# Patient Record
Sex: MALE | Race: WHITE | NOT HISPANIC OR LATINO | Employment: OTHER | URBAN - METROPOLITAN AREA
[De-identification: names, ages, dates, MRNs, and addresses within clinical notes are randomized per-mention and may not be internally consistent; named-entity substitution may affect disease eponyms.]

---

## 2017-03-15 ENCOUNTER — HOSPITAL ENCOUNTER (OUTPATIENT)
Dept: RADIOLOGY | Facility: HOSPITAL | Age: 82
Discharge: HOME | End: 2017-03-15
Attending: ORTHOPAEDIC SURGERY
Payer: MEDICARE

## 2017-03-15 DIAGNOSIS — M54.50 LOW BACK PAIN: ICD-10-CM

## 2017-03-15 PROCEDURE — 72170 X-RAY EXAM OF PELVIS: CPT | Mod: 26,,, | Performed by: RADIOLOGY

## 2017-03-15 PROCEDURE — 72110 X-RAY EXAM L-2 SPINE 4/>VWS: CPT | Mod: 26,,, | Performed by: RADIOLOGY

## 2017-03-15 PROCEDURE — 72110 X-RAY EXAM L-2 SPINE 4/>VWS: CPT | Mod: TC

## 2017-03-15 PROCEDURE — 72170 X-RAY EXAM OF PELVIS: CPT | Mod: TC

## 2017-03-27 PROBLEM — M54.50 LUMBAR BACK PAIN: Status: ACTIVE | Noted: 2017-03-27

## 2017-03-30 RX ORDER — CARBIDOPA AND LEVODOPA 25; 100 MG/1; MG/1
1 TABLET ORAL 3 TIMES DAILY
Qty: 90 TABLET | Refills: 3 | Status: SHIPPED | OUTPATIENT
Start: 2017-03-30 | End: 2017-04-17 | Stop reason: SDUPTHER

## 2017-04-17 ENCOUNTER — OFFICE VISIT (OUTPATIENT)
Dept: NEUROLOGY | Facility: CLINIC | Age: 82
End: 2017-04-17
Payer: MEDICARE

## 2017-04-17 VITALS
HEIGHT: 65 IN | RESPIRATION RATE: 16 BRPM | SYSTOLIC BLOOD PRESSURE: 128 MMHG | DIASTOLIC BLOOD PRESSURE: 62 MMHG | BODY MASS INDEX: 31.36 KG/M2 | HEART RATE: 50 BPM | WEIGHT: 188.25 LBS

## 2017-04-17 DIAGNOSIS — G20.A1 PARKINSON DISEASE: Primary | ICD-10-CM

## 2017-04-17 DIAGNOSIS — R41.3 MEMORY LOSS: ICD-10-CM

## 2017-04-17 PROCEDURE — 1157F ADVNC CARE PLAN IN RCRD: CPT | Mod: 8P | Performed by: PSYCHIATRY & NEUROLOGY

## 2017-04-17 PROCEDURE — 1159F MED LIST DOCD IN RCRD: CPT | Performed by: PSYCHIATRY & NEUROLOGY

## 2017-04-17 PROCEDURE — 99213 OFFICE O/P EST LOW 20 MIN: CPT | Mod: PBBFAC | Performed by: PSYCHIATRY & NEUROLOGY

## 2017-04-17 PROCEDURE — 1160F RVW MEDS BY RX/DR IN RCRD: CPT | Performed by: PSYCHIATRY & NEUROLOGY

## 2017-04-17 PROCEDURE — 99999 PR PBB SHADOW E&M-EST. PATIENT-LVL III: CPT | Mod: PBBFAC,,, | Performed by: PSYCHIATRY & NEUROLOGY

## 2017-04-17 PROCEDURE — 99213 OFFICE O/P EST LOW 20 MIN: CPT | Mod: S$PBB | Performed by: PSYCHIATRY & NEUROLOGY

## 2017-04-17 PROCEDURE — 1125F AMNT PAIN NOTED PAIN PRSNT: CPT | Performed by: PSYCHIATRY & NEUROLOGY

## 2017-04-17 RX ORDER — CARBIDOPA AND LEVODOPA 25; 100 MG/1; MG/1
1 TABLET ORAL 2 TIMES DAILY
Qty: 60 TABLET | Refills: 11 | Status: SHIPPED | OUTPATIENT
Start: 2017-04-17 | End: 2017-10-17 | Stop reason: SDUPTHER

## 2017-04-17 NOTE — PROGRESS NOTES
HPI:Elvis Thompson is a 84 y.o. male with tremor for 1 year and exam findings consistent with parkinson's disease. Vague memory loss noted at times  Since the last visit, the patient is taking sinemet BID.  He has some shaking in the left hand only in episodes and is not bothersome  He is walking well- no falls.  He is using a walker at night for security.   He is swallowing and eating well.  No constipation.  He is sleeping well with a good mood.  Memory is about the same/ he is functioning well- he has someone coming in to clear his house once per month now.   He drives well, without accidents or incidents.     Review of Systems  Review of Systems   Constitutional: Negative for fever.   HENT: Negative for nosebleeds.    Eyes: Negative for double vision.   Respiratory: Negative for hemoptysis.    Cardiovascular: Negative for leg swelling.   Gastrointestinal: Negative for constipation.   Genitourinary: Negative for hematuria.   Musculoskeletal: Negative for falls.   Skin: Negative for rash.   Neurological: Positive for tremors.   Psychiatric/Behavioral: Positive for memory loss.             Objective:      Physical Exam   Constitutional: He is oriented to person, place, and time. He appears well-developed and well-nourished. No distress.   Eyes: EOM are normal. Pupils are equal, round, and reactive to light.   Cardiovascular: Intact distal pulses.    Musculoskeletal: He exhibits no edema.   Neurological: He is oriented to person, place, and time. He has normal strength. He has a normal Finger-Nose-Finger Test, a normal Heel to Daniel Test and a normal Romberg Test. Gait normal.   Psychiatric: His speech is normal.   Neurologic Exam     Mental Status   Oriented to person, place, and time.   Attention: normal. Concentration: normal.   Speech: speech is normal   Level of consciousness: alert  Knowledge: consistent with education.   Able to name object. Able to repeat. Normal comprehension.        Recent and remote  memory intact     Cranial Nerves     CN II   Visual fields full to confrontation.   Right visual field deficit: none    CN III, IV, VI   Pupils are equal, round, and reactive to light.  Extraocular motions are normal.   CN III: no CN III palsy  Nystagmus: none   Diplopia: none  Ophthalmoparesis: none    CN V   Facial sensation intact.     CN VII   Facial expression full, symmetric.     CN VIII   CN VIII normal.     CN IX, X   CN IX normal.   CN X normal.     CN XI   CN XI normal.     CN XII   CN XII normal.        Optic disc are flat with normal vasculature      Motor Exam   Muscle bulk: normal  Overall muscle tone: normal  Left arm tone: cogwheel rigidity    Strength   Strength 5/5 throughout.        Mild bradykinesia noted bilaterally -- improved again today     Sensory Exam   Vibration normal.        Temp intact bilaterally     Gait, Coordination, and Reflexes     Gait  Gait: normal (No shuffling but reduced arm swing noted. Good postural reflexes)    Coordination   Romberg: negative  Finger to nose coordination: normal  Heel to shin coordination: normal    Tremor   Resting tremor: present (left greater than right hand-- but stable today and mild)  Intention tremor: absent  Action tremor: absent    Reflexes   Right ankle clonus: absent  Left ankle clonus: absent       1+ reflexes in the upper and lower extremities throughout         Imaging: 3/2016 CT head unremarkable  Labs: B12/TSh unremarkable        Assessment/ Recommendations:    Elvis Thompson is a 84 y.o. male with tremor consistent with parkinson's disease. Vague memory loss noted at times  I recommend:    1. Patient will continue sinemet at BID dose. TID dosing prior did not clearly improve symptoms/ he is satisfied with current symptom control.   3. Memory loss is rare and not bothersome- he is functioning well. Consider treating with exelon in the future if worse. No restrictions needed. CDR rating is less than 1,stable    RTC 6 months.

## 2017-04-17 NOTE — MR AVS SNAPSHOT
[facility]  [address] [address]  Phone: [phone]  Fax: [phone]                  Elvis Thompson   2017 10:30 AM   Office Visit    Description:  Male : 1933   Provider:  Bandar Lopez MD   Department:  [facility]           Reason for Visit     Tremors           Diagnoses this Visit        Comments    Parkinson disease    -  Primary            To Do List           Goals (5 Years of Data)     None      Follow-Up and Disposition     Return in about 6 months (around 10/17/2017).      [facility]     [facility] On Call Nurse Care Line -  Assistance  Unless otherwise directed by your provider, please contact [facility], our nurse care line that is available for  assistance.     Registered nurses in the [facility] On Call Center provide: appointment scheduling, clinical advisement, health education, and other advisory services.  Call: [phone] (toll free)               Medications           Message regarding Medications     Verify the changes and/or additions to your medication regime listed below are the same as discussed with your clinician today.  If any of these changes or additions are incorrect, please notify your healthcare provider.             Verify that the below list of medications is an accurate representation of the medications you are currently taking.  If none reported, the list may be blank. If incorrect, please contact your healthcare provider. Carry this list with you in case of emergency.           Current Medications     amlodipine (NORVASC) 5 MG tablet Take 5 mg by mouth once daily.    carbidopa-levodopa  mg (SINEMET)  mg per tablet Take 1 tablet by mouth 3 (three) times daily.    celecoxib (CELEBREX) 200 MG capsule Take 200 mg by mouth daily as needed for Pain.     furosemide (LASIX) 20 MG tablet Take 20 mg by mouth daily as needed.     warfarin (COUMADIN) 2 MG tablet Take 2 mg by mouth  "Daily.            Clinical Reference Information           Your Vitals Were     BP Pulse Resp Height Weight BMI    128/62 (BP Location: Right arm, Patient Position: Sitting, BP Method: Manual) 50 16 5' 5" (1.651 m) 85.4 kg (188 lb 4.4 oz) 31.33 kg/m2      Blood Pressure          Most Recent Value    BP  128/62      Allergies as of 4/17/2017     Iodine And Iodide Containing Products    Codeine    Morphine      Immunizations Administered on Date of Encounter - 4/17/2017     None      Language Assistance Services     ATTENTION: Language assistance services are available, free of charge. Please call [phone].      ATENCIÓN: Si habla español, tiene a chaney disposición servicios gratuitos de asistencia lingüística. Llame al [phone].     PARKER Ý: N?u b?n nói Ti?ng Vi?t, có các d?ch v? h? tr? ngôn ng? mi?n phí dành cho b?n. G?i s? [phone].         [facility] complies with applicable Federal civil rights laws and does not discriminate on the basis of race, color, national origin, age, disability, or sex.        "

## 2017-06-09 ENCOUNTER — HOSPITAL ENCOUNTER (EMERGENCY)
Facility: HOSPITAL | Age: 82
Discharge: HOME | End: 2017-06-09
Attending: SURGERY
Payer: MEDICARE

## 2017-06-09 VITALS
SYSTOLIC BLOOD PRESSURE: 140 MMHG | BODY MASS INDEX: 29.73 KG/M2 | WEIGHT: 185 LBS | RESPIRATION RATE: 18 BRPM | DIASTOLIC BLOOD PRESSURE: 60 MMHG | TEMPERATURE: 98 F | HEIGHT: 66 IN | OXYGEN SATURATION: 97 % | HEART RATE: 77 BPM
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
## 2017-06-09 DIAGNOSIS — S02.2XXA CLOSED FRACTURE OF NASAL BONE, INITIAL ENCOUNTER: Primary | ICD-10-CM

## 2017-06-09 DIAGNOSIS — T07.XXXA ABRASIONS OF MULTIPLE SITES: ICD-10-CM

## 2017-06-09 LAB
ALBUMIN SERPL BCP-MCNC: 3.6 G/DL
ALP SERPL-CCNC: 99 U/L
ALT SERPL W/O P-5'-P-CCNC: 13 U/L
ANION GAP SERPL CALC-SCNC: 6 MMOL/L
APTT BLDCRRT: 35.4 SEC
AST SERPL-CCNC: 19 U/L
BASOPHILS # BLD AUTO: 0.03 K/UL
BASOPHILS NFR BLD: 0.6 %
BILIRUB SERPL-MCNC: 1.2 MG/DL
BUN SERPL-MCNC: 16 MG/DL
CALCIUM SERPL-MCNC: 9.1 MG/DL
CHLORIDE SERPL-SCNC: 107 MMOL/L
CO2 SERPL-SCNC: 24 MMOL/L
CREAT SERPL-MCNC: 0.9 MG/DL
DIFFERENTIAL METHOD: ABNORMAL
EOSINOPHIL # BLD AUTO: 0 K/UL
EOSINOPHIL NFR BLD: 0.8 %
ERYTHROCYTE [DISTWIDTH] IN BLOOD BY AUTOMATED COUNT: 12.2 %
EST. GFR  (AFRICAN AMERICAN): >60 ML/MIN/1.73 M^2
EST. GFR  (NON AFRICAN AMERICAN): >60 ML/MIN/1.73 M^2
GLUCOSE SERPL-MCNC: 169 MG/DL
HCT VFR BLD AUTO: 39 %
HGB BLD-MCNC: 13.4 G/DL
INR PPP: 3.1
LYMPHOCYTES # BLD AUTO: 1.4 K/UL
LYMPHOCYTES NFR BLD: 27.9 %
MCH RBC QN AUTO: 31.8 PG
MCHC RBC AUTO-ENTMCNC: 34.4 %
MCV RBC AUTO: 92 FL
MONOCYTES # BLD AUTO: 0.6 K/UL
MONOCYTES NFR BLD: 11.8 %
NEUTROPHILS # BLD AUTO: 3 K/UL
NEUTROPHILS NFR BLD: 58.9 %
PLATELET # BLD AUTO: 158 K/UL
PMV BLD AUTO: 9.7 FL
POTASSIUM SERPL-SCNC: 4 MMOL/L
PROT SERPL-MCNC: 6.5 G/DL
PROTHROMBIN TIME: 31 SEC
RBC # BLD AUTO: 4.22 M/UL
SODIUM SERPL-SCNC: 137 MMOL/L
WBC # BLD AUTO: 5.01 K/UL

## 2017-06-09 PROCEDURE — 99284 EMERGENCY DEPT VISIT MOD MDM: CPT

## 2017-06-09 PROCEDURE — 80053 COMPREHEN METABOLIC PANEL: CPT

## 2017-06-09 PROCEDURE — 85730 THROMBOPLASTIN TIME PARTIAL: CPT

## 2017-06-09 PROCEDURE — 36415 COLL VENOUS BLD VENIPUNCTURE: CPT

## 2017-06-09 PROCEDURE — 85610 PROTHROMBIN TIME: CPT

## 2017-06-09 PROCEDURE — 12011 RPR F/E/E/N/L/M 2.5 CM/<: CPT

## 2017-06-09 PROCEDURE — 93005 ELECTROCARDIOGRAM TRACING: CPT

## 2017-06-09 PROCEDURE — 85025 COMPLETE CBC W/AUTO DIFF WBC: CPT

## 2017-06-09 PROCEDURE — 25000003 PHARM REV CODE 250: Performed by: SURGERY

## 2017-06-09 PROCEDURE — 93010 ELECTROCARDIOGRAM REPORT: CPT | Mod: ,,, | Performed by: INTERNAL MEDICINE

## 2017-06-09 RX ORDER — IBUPROFEN 800 MG/1
800 TABLET ORAL EVERY 6 HOURS PRN
Qty: 20 TABLET | Refills: 0 | Status: SHIPPED | OUTPATIENT
Start: 2017-06-09 | End: 2018-06-29

## 2017-06-09 RX ORDER — MUPIROCIN 20 MG/G
1 OINTMENT TOPICAL
Status: COMPLETED | OUTPATIENT
Start: 2017-06-09 | End: 2017-06-09

## 2017-06-09 RX ORDER — MUPIROCIN 20 MG/G
OINTMENT TOPICAL 3 TIMES DAILY
Qty: 15 G | Refills: 0 | Status: SHIPPED | OUTPATIENT
Start: 2017-06-09 | End: 2017-06-19

## 2017-06-09 RX ORDER — ACETAMINOPHEN 500 MG
1000 TABLET ORAL
Status: COMPLETED | OUTPATIENT
Start: 2017-06-09 | End: 2017-06-09

## 2017-06-09 RX ORDER — CEPHALEXIN 500 MG/1
500 CAPSULE ORAL EVERY 6 HOURS
Qty: 28 CAPSULE | Refills: 0 | Status: SHIPPED | OUTPATIENT
Start: 2017-06-09 | End: 2017-06-16

## 2017-06-09 RX ADMIN — ACETAMINOPHEN 1000 MG: 500 TABLET ORAL at 11:06

## 2017-06-09 RX ADMIN — MUPIROCIN 22 G: 20 OINTMENT TOPICAL at 11:06

## 2017-06-09 NOTE — ED PROVIDER NOTES
[facility]                                        June 9, 2017                   Chief Complaint  84 y.o. male with Fall and Facial Laceration    History of Present Illness  Elvis Thompson presents to the emergency room with facial abrasions today  Patient was working outside when a slip and fall, hitting his face prior to arrival  Patient denies any loss of consciousness with this injury, facial abrasions noted  Patient on exam is alert and oriented ×3 with a GCS 15 and normal neuro exam  Patient's only complaint is regarding the abrasions to the face and nasal pain  Patient has abrasions to the forehead, midface, and inner lip mucosa on exam   Patient has good distal pulses and capillary refill, is neurovascular intact on exam    The history is provided by the patient  Medical HX: Anticoagulation, bradycardia, HTN, Parkinson's, PE, shingles  Surgical HX: Pacemaker, cataracts, foot, healing, hernia, knee, prostate surgery  ALLERGIES: Iodine and codeine    Review of Systems and Physical Exam     Review of Systems  -- Constitution - no fever, denies fatigue, no weakness, no chills  -- Eyes - no tearing or redness, no visual disturbance  -- Ear, Nose - nasal bridge pain after falling this morning  -- Mouth,Throat - no sore throat, no toothache, normal voice, normal swallowing  -- Respiratory - denies cough and congestion, no shortness of breath, no SANCHEZ  -- Cardiovascular - denies chest pain, no palpitations, denies claudication  -- Gastrointestinal - denies abdominal pain, nausea, vomiting, or diarrhea  -- Musculoskeletal - denies back pain, negative for myalgias and arthralgias   -- Neurological - no headache, denies weakness or seizure; no LOC  -- Skin - facial abrasions after a fall this morning    Vital Signs  -- His oral temperature is 96 °F (35.6 °C).   -- His blood pressure is 143/66 (abnormal) and his pulse is 74.   -- His respiration is 18 and oxygen saturation is 98%.      Physical  Exam  -- Nursing note and vitals reviewed  -- Constitutional: Appears well-developed and well-nourished  -- Head: Facial and nasal bridge abrasions  -- Eyes: Pupils are equal and reactive to light. Normal conjunctiva and lids  -- Nose: Nose normal in appearance, nares grossly normal. No discharge  -- Throat: Small superficial abrasion in the right lower inner lip  -- Ears: External ears and TM normal bilaterally. Normal hearing and no drainage  -- Neck: Normal range of motion. Neck supple. No masses, trachea midline  -- Cardiac: Normal rate, regular rhythm and normal heart sounds  -- Pulmonary: Normal respiratory effort, breath sounds clear to auscultation  -- Abdominal: Soft, no tenderness. Normal bowel sounds. Normal liver edge  -- Musculoskeletal: Normal range of motion, no effusions. Joints stable   -- Neurological: No focal deficits. Showed good interaction with staff  -- Vascular: Posterior tibial, dorsalis pedis and radial pulses 2+ bilaterally      Emergency Room Course     EKG  --Atrial fibrillation with occasional ventricular paced complexes      CT Face  -- Comminuted bilateral nasal bone fractures which are mildly displaced   -- with a mildly displaced fracture of the bony nasal septum.    Radiology  -- The CT of the head performed in the ER today was negative for acute pathology   -- C-spine x-rays showed no evidence of acute fracture or dislocation  -- Chest x-ray showed no infiltrate and showed no acute pathology  -- Pelvis x-ray showed no evidence of fracture or dislocation    Abnormal lab values  -- Glucose 169 (*)   -- Total Bilirubin 1.2 (*)   -- Anion Gap 6 (*)   -- RBC 4.22 (*)   -- Hemoglobin 13.4 (*)   -- Hematocrit 39.0 (*)   -- MCH 31.8 (*)   -- Prothrombin Time 31.0 (*)   -- INR 3.1 (*)     Medications Given  -- mupirocin 2 % ointment 22 g (22 g Topical (Top) Given 6/9/17 1127)   -- acetaminophen tablet 1,000 mg (1,000 mg Oral Given 6/9/17 1127)     Laceration Repair  -- Performed by:  JACK ORTIZ  -- Consent Done: Emergent Situation  -- Body area: Nasal bridge  -- Laceration length: Superficial abrasion, 2 cm  -- Tendon involvement: none  -- Nerve involvement: none  -- Vascular damage: no  -- Amount of cleaning: standard  -- Skin closure: Dermabond    ED Physician Notes  -- 11:33 AM: Patient with fall at home with facial abrasions and a mild nasal fracture   -- Wounds were dressed up with Bactroban, no active nasal bleeding in the ER now   -- Small amount of Dermabond applied to the bridge of the nose to stop bleeding today  -- Daughter counseled on wound care and close follow-up, voiced understanding of plan     Diagnosis  -- The primary encounter diagnosis was Closed fracture of nasal bone, initial encounter.   -- Diagnoses of Fall and Abrasions of multiple sites were also pertinent to this visit.    Disposition and Plan  -- Disposition: home  -- Condition: stable  -- Follow-up: Patient to follow up with Regulo Martínez MD in 1-2 days.  -- I advised the patient that we have found no life threatening condition today  -- At this time, I believe the patient is clinically stable for discharge.   -- The patient acknowledges that close follow up with a MD is required   -- Patient agrees to comply with all instruction and direction given in the ER    This note is dictated on Dragon Natural Speaking word recognition program.  There are word recognition mistakes that are occasionally missed on review.           Jack Ortiz MD  06/09/17 6759

## 2017-06-09 NOTE — ED TRIAGE NOTES
Patient presents to the ER via ambulance with c/o facial pain after falling at home in his driveway while doing yard work.  Patient denies LOC.  Patient has a laceration on his nose, abrasion on forehead and nasal passages are packed at this time due to nose bleed.  Patient does take blood thinnners.

## 2017-09-07 RX ORDER — CARBIDOPA AND LEVODOPA 25; 100 MG/1; MG/1
1 TABLET ORAL 3 TIMES DAILY
Qty: 90 TABLET | Refills: 3 | Status: SHIPPED | OUTPATIENT
Start: 2017-09-07 | End: 2017-10-02 | Stop reason: SDUPTHER

## 2017-10-02 RX ORDER — CARBIDOPA AND LEVODOPA 25; 100 MG/1; MG/1
1 TABLET ORAL 2 TIMES DAILY
Qty: 60 TABLET | Refills: 3 | Status: SHIPPED | OUTPATIENT
Start: 2017-10-02 | End: 2017-10-17 | Stop reason: SDUPTHER

## 2017-10-02 NOTE — TELEPHONE ENCOUNTER
----- Message from Migdalia Lugo sent at 10/2/2017 10:35 AM CDT -----  Contact: PATIENT  Elvis Thompson  MRN: [id number]  : 1933  PCP: Regulo Martínez  Home Phone      [phone]  Work Phone      Not on file.  Mobile          Not on file.      MESSAGE: Patient needs a refill on Carbidopa-Levodopa  mg BID sent to [facility].      Phone: [phone]

## 2017-10-02 NOTE — TELEPHONE ENCOUNTER
----- Message from Migdalia Lugo sent at 10/2/2017 10:35 AM CDT -----  Contact: PATIENT  Elvis Thompson  MRN: [id number]  : 1933  PCP: Regulo Martínez  Home Phone      [phone]  Work Phone      Not on file.  Mobile          Not on file.      MESSAGE: Patient needs a refill on Carbidopa-Levodopa  mg BID sent to [facility].      Phone: [phone]

## 2017-10-17 ENCOUNTER — OFFICE VISIT (OUTPATIENT)
Dept: NEUROLOGY | Facility: CLINIC | Age: 82
End: 2017-10-17
Payer: MEDICARE

## 2017-10-17 VITALS
BODY MASS INDEX: 31.96 KG/M2 | SYSTOLIC BLOOD PRESSURE: 110 MMHG | HEART RATE: 60 BPM | WEIGHT: 191.81 LBS | HEIGHT: 65 IN | DIASTOLIC BLOOD PRESSURE: 70 MMHG | RESPIRATION RATE: 16 BRPM

## 2017-10-17 DIAGNOSIS — R41.3 MEMORY LOSS: ICD-10-CM

## 2017-10-17 DIAGNOSIS — G20.A1 PARKINSON DISEASE: Primary | ICD-10-CM

## 2017-10-17 PROCEDURE — 99214 OFFICE O/P EST MOD 30 MIN: CPT | Mod: S$PBB | Performed by: PSYCHIATRY & NEUROLOGY

## 2017-10-17 PROCEDURE — 99213 OFFICE O/P EST LOW 20 MIN: CPT | Mod: PBBFAC | Performed by: PSYCHIATRY & NEUROLOGY

## 2017-10-17 PROCEDURE — 99999 PR PBB SHADOW E&M-EST. PATIENT-LVL III: CPT | Mod: PBBFAC,,, | Performed by: PSYCHIATRY & NEUROLOGY

## 2017-10-17 PROCEDURE — 99999 PR STA SHADOW: CPT | Mod: PBBFAC,,, | Performed by: PSYCHIATRY & NEUROLOGY

## 2017-10-17 RX ORDER — CARBIDOPA AND LEVODOPA 25; 100 MG/1; MG/1
1 TABLET ORAL 3 TIMES DAILY
Qty: 90 TABLET | Refills: 11 | Status: SHIPPED | OUTPATIENT
Start: 2017-10-17 | End: 2018-08-21 | Stop reason: SDUPTHER

## 2017-10-17 NOTE — PROGRESS NOTES
HPI:Elvis Thompson is a 84 y.o. male with tremor consistent with parkinson's disease. Vague memory loss noted at times    Since the last visit, patient had a fall which resulted in ER visit and nasal bone fracture. CT head done at that time (6/2017- see below). He states he was working in yard when he tried to unplug his blower and fell forward. He had another fall since then, again outdoors, but no injury as he fell in the grass.   The patient has shaking in the left arm which is more prominent that before.   No trouble with voice or swallowing.  Patient has seen Dr Bettencourt for lumbar injections without much relief - he knows of degenerative disc disease in the L spine- and has relief with NSAIDs    Review of Systems  Review of Systems   Constitutional: Negative for fever.   HENT: Negative for nosebleeds.    Eyes: Negative for double vision.   Respiratory: Negative for hemoptysis.    Cardiovascular: Negative for chest pain.   Gastrointestinal: Negative for constipation.   Genitourinary: Negative for hematuria.   Musculoskeletal: Positive for falls.   Skin: Negative for rash.   Neurological: Positive for tremors.   Psychiatric/Behavioral: Positive for memory loss.        Memory stable, doing well. Driving without difficulty             Objective:      Physical Exam   Constitutional: He is oriented to person, place, and time. He appears well-developed and well-nourished. No distress.   Eyes: EOM are normal. Pupils are equal, round, and reactive to light.   Cardiovascular: Intact distal pulses.    Musculoskeletal: He exhibits no edema.   Neurological: He is oriented to person, place, and time. He has normal strength. He has a normal Finger-Nose-Finger Test, a normal Heel to Daniel Test and a normal Romberg Test. Gait normal.   Reflex Scores:       Achilles reflexes are 2+ on the right side.  Psychiatric: His speech is normal.   Neurologic Exam     Mental Status   Oriented to person, place, and time.   Attention: normal.  Concentration: normal.   Speech: speech is normal   Level of consciousness: alert  Knowledge: consistent with education.   Able to name object. Able to repeat. Normal comprehension.   Recent and remote memory intact     Cranial Nerves     CN II   Visual fields full to confrontation.   Right visual field deficit: none    CN III, IV, VI   Pupils are equal, round, and reactive to light.  Extraocular motions are normal.   CN III: no CN III palsy  Nystagmus: none   Diplopia: none  Ophthalmoparesis: none    CN V   Facial sensation intact.     CN VII   Facial expression full, symmetric.     CN VIII   CN VIII normal.     CN IX, X   CN IX normal.   CN X normal.     CN XI   CN XI normal.     CN XII   CN XII normal.   Optic disc are flat with normal vasculature      Motor Exam   Muscle bulk: normal  Overall muscle tone: normal  Left arm tone: cogwheel rigidity    Strength   Strength 5/5 throughout. Mild bradykinesia noted bilaterally     Sensory Exam   Vibration normal.   Temp intact bilaterally     Gait, Coordination, and Reflexes     Gait  Gait: normal (No shuffling but reduced arm swing noted. Good postural reflexes)    Coordination   Romberg: negative  Finger to nose coordination: normal  Heel to shin coordination: normal    Tremor   Resting tremor: present (left greater than right hand-- mildly worse today)  Intention tremor: absent  Action tremor: absent    Reflexes   Right achilles: 2+  Right ankle clonus: absent  Left ankle clonus: absent1+ reflexes in the upper and lower extremities throughout         Imagin2017:  CT head unremarkable  Labs: B12/TSh unremarkable        Assessment/ Recommendations:    Elvis Thompson is a 84 y.o. male with tremor consistent with parkinson's disease. Vague memory loss noted at times  I recommend:    1. Increase sinemet to TID dosing unless side effects.   2. Refer for LSVT Big Therapy for Parkinson's disease with increasing falls.   3. Memory loss is rare and not bothersome- he is  functioning well. Consider treating with exelon in the future if worse. No restrictions needed. CDR rating is less than 1,stable    RTC 4 months

## 2017-10-24 ENCOUNTER — CLINICAL SUPPORT (OUTPATIENT)
Dept: REHABILITATION | Facility: HOSPITAL | Age: 82
End: 2017-10-24
Payer: MEDICARE

## 2017-10-24 DIAGNOSIS — G20.A1 PARKINSON'S DISEASE: ICD-10-CM

## 2017-10-24 DIAGNOSIS — R27.9 LACK OF COORDINATION: ICD-10-CM

## 2017-10-24 DIAGNOSIS — R26.89 BALANCE PROBLEM: Primary | ICD-10-CM

## 2017-10-24 PROCEDURE — G8985 CARRY GOAL STATUS: HCPCS | Mod: CI

## 2017-10-24 PROCEDURE — G8984 CARRY CURRENT STATUS: HCPCS | Mod: CI

## 2017-10-24 PROCEDURE — 97165 OT EVAL LOW COMPLEX 30 MIN: CPT

## 2017-10-25 NOTE — PROGRESS NOTES
"Name: Elvis Thompson  MRN: [id number]   Physician: Dr. Lopez     Diagnosis: Parkinson's Disease    Onset date: ~1.5 years    Date of service: 10/25/2017  Start time: 200  End time: 300    Orders: Eval and Treat    Subjective:  Patient goals: "I will like to get back walking on my treadmill."  Chief Complaint: No chief complaint on file.     Past Medical History:   Diagnosis Date    Anticoagulant long-term use     Bradycardia     HTN (hypertension)     Parkinson disease 03/24/2017    Pulmonary embolism     Shingles       Current Outpatient Prescriptions   Medication Sig    amlodipine (NORVASC) 5 MG tablet Take 5 mg by mouth once daily.    carbidopa-levodopa  mg (SINEMET)  mg per tablet Take 1 tablet by mouth 3 (three) times daily.    celecoxib (CELEBREX) 200 MG capsule Take 200 mg by mouth daily as needed for Pain.     furosemide (LASIX) 20 MG tablet Take 20 mg by mouth daily as needed.     ibuprofen (ADVIL,MOTRIN) 800 MG tablet Take 1 tablet (800 mg total) by mouth every 6 (six) hours as needed for Pain.    warfarin (COUMADIN) 2 MG tablet Take 2 mg by mouth Daily.      No current facility-administered medications for this visit.        Social Hx: Pt has been living alone since his wife passed away four years ago.  He lives in a one story house with no steps to enter the house.  Pt reports that he go to [place] and [place] several times a week.  Pt has hired some one to cut his grass since his most recent fall (2 falls in past month) and a lady come clean his house once a month.    DME: Pt uses a RW only at night to use the restroom.  Pt also has a straight cane that was given to him by a neighbor but does not use it due to incorrect height.          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    Objective    Physical Exam:    Postural examination/scapula alignment: Rounded shoulder, Head forward and Increased kyphosis  Skin integrity: distal (B) UE appears red, slight swelling, dry and thin  Edema: Mild    Range of Motion:  (B) UE ROM was WFL for all planes/axis    Strength      Left Right   Shoulder flex 3+/5 4/5   Shoulder abd 3+/5 3+/5   Shoulder ER 5/5 4/5   Shoulder IR 5/5 4/5   Shoulder Extension 3/5 4/5   Shoulder Horizontal adduction 5/5 5/5        Strength:                  (R) UE: Good                   (L) UE: Fair    9-hole peg:   (L): 41.60"  (R): 47.05"    Buttonin minutes and .09 seconds                                           Bernabe Balance Scale    SITTING TO STANDING  INSTRUCTIONS: Please stand up. Try not to use your hand for support.  (    ) 4 able to stand without using hands and stabilize independently  ( X ) 3 able to stand independently using hands  (    ) 2 able to stand using hands after several tries  (    ) 1 needs minimal aid to stand or stabilize  (    ) 0 needs moderate or maximal assist to stand    STANDING UNSUPPORTED  INSTRUCTIONS: Please stand for two minutes without holding on.  (  X) 4 able to stand safely for 2 minutes  (    ) 3 able to stand 2 minutes with supervision  (    ) 2 able to stand 30 seconds unsupported  (    ) 1 needs several tries to stand 30 seconds unsupported  (    ) 0 unable to stand 30 seconds unsupported    If a subject is able to stand 2 minutes unsupported, score full points for sitting unsupported. Proceed to item #4.    SITTING WITH BACK UNSUPPORTED BUT FEET SUPPORTED ON FLOOR OR ON A STOOL  INSTRUCTIONS: Please sit with arms folded for 2 minutes.  (    ) 4 able to sit safely and securely for 2 minutes  (    ) 3 able to sit 2 minutes under supervision  (    ) 2 able to " able to sit 30 seconds  (    ) 1 able to sit 10 seconds  (    ) 0 unable to sit  without support 10 seconds    STANDING TO SITTING  INSTRUCTIONS: Please sit down.  (  X) 4 sits safely with minimal use of hands  (    ) 3 controls descent by using hands  (    ) 2 uses back of legs against chair to control descent  (    ) 1 sits independently but has uncontrolled descent  (    ) 0 needs assist to sit    TRANSFERS  INSTRUCTIONS: Arrange chair(s) for pivot transfer. Ask subject to transfer one way toward a seat with armrests and one way toward a seat without armrests. You may use two chairs (one with and one without armrests) or a bed and a chair.  (    ) 4 able to transfer safely with minor use of hands  ( X ) 3 able to transfer safely definite need of hands  (    ) 2 able to transfer with verbal cuing and/or supervision  (    ) 1 needs one person to assist  (    ) 0 needs two people to assist or supervise to be safe    STANDING UNSUPPORTED WITH EYES CLOSED  INSTRUCTIONS: Please close your eyes and stand still for 10 seconds.  ( X ) 4 able to stand 10 seconds safely  (    ) 3 able to stand 10 seconds with supervision   (    ) 2 able to stand 3 seconds  (    ) 1 unable to keep eyes closed 3 seconds but stays safely  (    ) 0 needs help to keep from falling    STANDING UNSUPPORTED WITH FEET TOGETHER  INSTRUCTIONS: Place your feet together and stand without holding on.  (  X) 4 able to place feet together independently and stand 1 minute safely  (    ) 3 able to place feet together independently and stand 1 minute with supervision  (    ) 2 able to place feet together independently but unable to hold for 30 seconds  (    ) 1 needs help to attain position but able to stand 15 seconds feet together  (    ) 0 needs help to attain position and unable to hold for 15 seconds      REACHING FORWARD WITH OUTSTRETCHED ARM WHILE STANDING  INSTRUCTIONS: Lift arm to 90 degrees. Stretch out your fingers and reach forward as far as you  can. (Examiner places a ruler at the end of fingertips when arm is at 90 degrees. Fingers should not touch the ruler while reaching forward. The recorded measure is the distance forward that the fingers reach while the subject is in the most forward lean position. When possible, ask subject to use both arms when reaching to avoid rotation of the trunk.)  (    ) 4 can reach forward confidently 25 cm (10 inches)  (    ) 3 can reach forward  12 cm (5 inches)  ( X ) 2 can reach forward 5 cm (2 inches)  (    ) 1 reaches forward but needs supervision  (    ) 0 loses balance while trying/requires external support     OBJECT FROM THE FLOOR FROM A STANDING POSITION  INSTRUCTIONS:  the shoe/slipper, which is place in front of your feet.  (    ) 4 able to  slipper safely and easily  ( X ) 3 able to  slipper but needs supervision   (    ) 2 unable to  but reaches 2-5 cm(1-2 inches) from slipper and keeps balance  independently  (    ) 1 unable to  and needs supervision while trying  (    ) 0 unable to try/needs assist to keep from losing balance or falling  TURNING TO LOOK BEHIND OVER LEFT AND RIGHT SHOULDERS WHILE STANDING  INSTRUCTIONS: Turn to look directly behind you over toward the left shoulder. Repeat to the right. Examiner may pick an object to look at directly behind the subject to encourage a better twist turn.  (    ) 4 looks behind from both sides and weight shifts well  ( X ) 3 looks behind one side only other side shows less weight shift  (    ) 2 turns sideways only but maintains balance  (    ) 1 needs supervision when turning  (    ) 0 needs assist to keep from losing balance or falling    TURN 360 DEGREES  INSTRUCTIONS: Turn completely around in a full [place]. Pause. Then turn a full [place] in the other direction.  ( X ) 4 able to turn 360 degrees safely in 4 seconds or less  (    ) 3 able to turn 360 degrees safely one side only 4 seconds or less  (    ) 2 able to  turn 360 degrees safely but slowly  (    ) 1 needs close supervision or verbal cuing  (    ) 0 needs assistance while turning    PLACE ALTERNATE FOOT ON STEP OR STOOL WHILE STANDING UNSUPPORTED  INSTRUCTIONS: Place each foot alternately on the step/stool. Continue until each foot has touch the step/stool four times.  ( X ) 4 able to stand independently and safely and complete 8 steps in 20 seconds  (    ) 3 able to stand independently and complete 8 steps in > 20 seconds  (    ) 2 able to complete 4 steps without aid with supervision  (    ) 1 able to complete > 2 steps needs minimal assist  (    ) 0 needs assistance to keep from falling/unable to try    STANDING UNSUPPORTED ONE FOOT IN FRONT  INSTRUCTIONS: (DEMONSTRATE TO SUBJECT) Place one foot directly in front of the other. If you feel that you cannot place your foot directly in front, try to step far enough ahead that the heel of your forward foot is ahead of the toes of the other foot. (To score 3 points, the length of the step should exceed the length of the other foot and the width of the stance should approximate the subjects normal stride width.)   (    ) 4 able to place foot tandem independently and hold 30 seconds  ( X ) 3 able to place foot ahead independently and hold 30 seconds  (    ) 2 able to take small step independently and hold 30 seconds  (    ) 1 needs help to step but can hold 15 seconds  (    ) 0 loses balance while stepping or standing    STANDING ON ONE LEG  INSTRUCTIONS: Stand on one leg as long as you can without holding on.  (    ) 4 able to lift leg independently and hold > 10 seconds  (    ) 3 able to lift leg independently and hold  5-10 seconds  (    ) 2 able to lift leg independently and hold ? 3 seconds  ( X ) 1 tries to lift leg unable to hold 3 seconds but remains standing independently.  (    ) 0 unable to try of needs assist to prevent fall      (  45  ) TOTAL SCORE (Maximum = 56)    Interpretation: 41-56 = low fall  "risk     21-40 = medium fall risk     0 -20 = high fall risk    Minimal Detectable Change:    A change of 4 points is needed to be 95% confident that true change has occurred if a patient scores within 45-56 initially, 5 points if they score within 35-44, 7 points if they score within 25-34 and, finally, 5 points if their initial score is within 0-24 on the Bernabe Balance Scale.  Christina RAMÍREZ; Physiotherapy Research and Older People (PROP) group, Kate FERNANDEZ. (2009). How much change is true change? The minimum detectable change of the Bernabe Balance Scale in elderly people.  J Rehabil Med. 41(5):343-6.    TIMED UP and GO: 17.05"       Treatment today also included: OT evaluation    ASSESSMENT:  OT diagnosis: decrease balance limiting Pt performance in ADLs/IADLs    Assessment  Elvis Thompson is a 84 y.o. male with a medical diagnosis of No diagnosis found. referred to occupational therapy for Parkinson's disease. He presents with decrease balance.    Elvis can benefit from outpatient Occupational therapy and a home program to address the stated goals to improve impairments and functional limitations. Treatment will be directed at improve the following impairments. Rehab potential is good.    PROBLEM LIST/IMPAIRMENTS:   impaired function due to decrease balance    GOALS:  Time frame: 4 weeks   1. Pt will increase Bernabe Balance score by 4 points to show a significant change in balance to improve performance in ADLs/IADLs   2. Pt will tolerate 5 minutes walking on treadmill with UE support at a speed of 2.5 to increase performance of leisure activity of walking on treadmill.    3. Pt will increase MMT score of shoulder flexion, EXT, and ABD to 4/5 to increase proximal strength for transfers.              4. Pt will decrease 9-hole peg score by 10 sec to increase fine motor coordination for increase performance in dressing.    G-codes:  Carrying, moving and handling objects  Current:  CI  1<20% impaired  Goal:     "   CI 1<20% impaired     Factors that may affect patient's status:  [x] Patient's age [] Time since onset of injury/illness/exacerbation  [x]Prior Level of function       [x] Current level of function [] Status of current condition []Patient's cognitive status and safety concerns      [] Patient's level of motivation    [] Patient's home situation (environment and family support)  [x] Objective examination findings [] Goals and goal agreement with the patient        [x] Rehab potential (prognosis) and probable outcome    [x] Expected progression of patient    Criteria:     Medical Background and History:    Brief History - 30211  Occupational Performance and Deficits:  identifies 1 - 3 performance deficits - 16449  Clinical Decision Making (Complexity):  limited # of tx options - 57018     Evaluation Code (Complexity):  Low - 30468 -       After brief  review of medical and/or therapy records relating to the presenting problem and on examination of body system using standardized tests and measures patient presents with 1 - 3 elements from any of the following: body structures and functions, activity limitations, and/or participation restrictions.  Leading to a clinical consideration of a  limited number of treatment options that may lead to no necessary modification of tasks or assistance necessary to enable completion of evaluation component.    PLAN:    Patient/caregiver understands and agrees with plan of care.    Outpatient occupational therapy 4x/week  for 4 weeks  to include: pt ed, hep, therapeutic exercises, therapeutic activity, ADLs,  neuromuscular re-education, and the use of high amplitude, high frequency movements to induce cortical change for improved movement patterns.    SONIA Cuellar    I certify the need for these services furnished under this plan of treatment and while under my care.  ____________________________________ Physician/Referring _______________________Practitioner Date of  Signature

## 2017-10-30 ENCOUNTER — CLINICAL SUPPORT (OUTPATIENT)
Dept: REHABILITATION | Facility: HOSPITAL | Age: 82
End: 2017-10-30
Payer: MEDICARE

## 2017-10-30 DIAGNOSIS — R27.9 LACK OF COORDINATION: ICD-10-CM

## 2017-10-30 DIAGNOSIS — G20.A1 PARKINSON'S DISEASE: ICD-10-CM

## 2017-10-30 DIAGNOSIS — R26.89 BALANCE PROBLEM: Primary | ICD-10-CM

## 2017-10-30 PROCEDURE — 97110 THERAPEUTIC EXERCISES: CPT

## 2017-10-30 NOTE — PROGRESS NOTES
Occupational Therapy   Treatment    Elvis Thompson   MRN: [id number]   Date of Note: 10/30/2017   Referring Physician: Dr. Lopez      Diagnosis: Parkinson's   Encounter Diagnoses   Name Primary?    Balance problem Yes    Parkinson's disease     Lack of coordination       Start Time: 2:00  End Time: 3:00  Total Time: 60 min    Subjective:   Pain: no reports of pain today    Objective:   Patient being seen by occupational therapy for therapeutic exercise in order to     Treatment: Therapeutic exercises x 60 min  Maximal Daily Exercises  1) Floor to Ceiling (seated) - 8 reps held for 10 seconds each  2) Side to Side (seated) - 8 reps held for 10 seconds each  3) Forward Step and Reach (standing) - 8 reps each side right and left  4) Sideways Step and Reach (standing) - 8 reps each side right and left  5) Backward Step and Reach (standing) - 8 reps each side right and left  6) Forwards Rock and Reach (standing) - 10 reps each side right and left  7) Sideways Rock and Reach (standing) - 10 reps each side right and left    Functional Component Tasks  1) Movement #1: BIG Sit to Stand - 5 reps. Required tactile cues for full upright posture just prior to and after standing.  2) Movement #2: Bilateral  strengthening with medium strength (pink) thera putty with gross grasp and tripod pinch.  3) Movement #3: Bilateral scapula retractions 10 reps x 5  4) Movement #4: Picked items off floor.  5) Movement #5: To be completed when appropriate.    BIG Walking (Distance, surface, time, etc):  Completed 30 feet in wide hallway x 5 reps. Required visual and verbal cues to continue posture and BIG arms throughout activity.     Hierarchy Tasks (patient effort, modeling, cueing, assistance, progressions)    1) Hierarchy #1: To be completed when appropriate.  2) Hierarchy #2: To be completed when appropriate.  3) Hierarchy #3: To be completed when appropriate.    Carryover Assignment:   To be completed when  appropriate.    Education:  Patient educated on home exercise program and given a handout. Patient demonstrated and verbalized understanding.     Assessment:  Patient tolerated session well. Patient with more difficulty with balance and required CGA at times when patient was cued and began to complete exercises upright with good posture. Patient would benefit from continued skilled OT services to continue challenging high rep therapeutic exercise and activity in order for patient to improve strength and balance needed for patient to improve his ability to complete and safety with his daily home activities.    Plan:  Continue 4 x week x 4 weeks with an additional 2 weeks for the original evaluation and at the end for a discharge session during the certification period from   10/25/2017 to 12/1/2017 in pursuit of OT goals.      SONIA Jones,CHT

## 2017-10-31 ENCOUNTER — CLINICAL SUPPORT (OUTPATIENT)
Dept: REHABILITATION | Facility: HOSPITAL | Age: 82
End: 2017-10-31
Payer: MEDICARE

## 2017-10-31 DIAGNOSIS — G20.A1 PARKINSON'S DISEASE: ICD-10-CM

## 2017-10-31 DIAGNOSIS — R27.9 LACK OF COORDINATION: ICD-10-CM

## 2017-10-31 DIAGNOSIS — R26.89 BALANCE PROBLEM: Primary | ICD-10-CM

## 2017-10-31 PROCEDURE — 97110 THERAPEUTIC EXERCISES: CPT

## 2017-10-31 NOTE — PROGRESS NOTES
"Occupational Therapy   Treatment    Elvis Thompson   MRN: [id number]   Date of Note: 10/31/2017   Referring Physician: Dr. Lopez      Diagnosis: Parkinson's   Encounter Diagnoses   Name Primary?    Balance problem Yes    Parkinson's disease     Lack of coordination       Start Time: 2:00  End Time: 3:00  Total Time: 60 min    Subjective: "I did my exercises this morning."  Pain: no reports of pain today    Objective:   Patient being seen by occupational therapy for therapeutic exercise in order to     Treatment: Therapeutic exercises x 60 min  Maximal Daily Exercises  1) Floor to Ceiling (seated) - 8 reps held for 10 seconds each  2) Side to Side (seated) - 8 reps held for 10 seconds each  3) Forward Step and Reach (standing) - 8 reps each side right and left  4) Sideways Step and Reach (standing) - 8 reps each side right and left  5) Backward Step and Reach (standing) - 8 reps each side right and left-Pt require verbal cues due to forward flexion of trunk instead of flexion at the hips  6) Forwards Rock and Reach (standing) - 10 reps each side right and left-Pt with decrease coordination of movements.  Pt LE with correct movement pattern with reduce speed.  LE and UE performed separately and then added together as one, but with difficulty due to the lack of coordination of UE.   7) Sideways Rock and Reach (standing) - 10 reps each side right and left    Functional Component Tasks  1) Movement #1: BIG Sit to Stand - 5 reps. Pt with LOB on first attempted, educated on "nose over toes" visual cueing before standing.  2) Movement #2: Bilateral  strengthening with medium strength (pink) thera putty with gross grasp and tripod pinch.  3) Movement #3: Bilateral scapula retractions 10 reps x 5  4) Movement #4: Picked cones off floor x 5 reps  5) Movement #5: Step-ups onto [employer] x 5 reps. Pt with LOB on first attempt that required Mod (A) from therapist to recover.     BIG Walking (Distance, surface, time, " etc):  Completed 30 feet in wide hallway x 5 reps. Required visual and verbal cues to continue posture and BIG arms throughout activity.     Hierarchy Tasks (patient effort, modeling, cueing, assistance, progressions)    1) Hierarchy #1: Trunk rotation with functional reaching x 5 reps each side.  Pt compensates with the use of momentum with arm swing instead of functional trunk rotation and IR at hip joint with pivoting of the foot, required visual and verbal cues in order to correct.   2) Hierarchy #2: Walking on treadmill at 1.0 speed x 2 minutes with (B) UE supported.- Pt short of breath after 2 minutes, but able to recover quickly.  Pt with short steps on treadmill with decrease foot width, but able to maintain balance.      Carryover Assignment:   To be completed when appropriate.       Assessment:  Patient tolerated session well. Patient with increase LOB today with sit to stand and change in terrain. Pt continues to lack erect posture, but able to self correct when verbally cued.  Patient would benefit from continued skilled OT services to continue challenging high rep therapeutic exercise and activity in order for patient to improve strength and balance needed for patient to improve his ability to complete and safety with his daily home activities.    Plan:  Continue 4 x week x 4 weeks with an additional 2 weeks for the original evaluation and at the end for a discharge session during the certification period from   10/25/2017 to 12/1/2017 in pursuit of OT goals.      SONIA Cuellar

## 2017-11-01 ENCOUNTER — CLINICAL SUPPORT (OUTPATIENT)
Dept: REHABILITATION | Facility: HOSPITAL | Age: 82
End: 2017-11-01
Payer: MEDICARE

## 2017-11-01 DIAGNOSIS — R26.89 BALANCE PROBLEM: Primary | ICD-10-CM

## 2017-11-01 DIAGNOSIS — G20.A1 PARKINSON'S DISEASE: ICD-10-CM

## 2017-11-01 DIAGNOSIS — R27.9 LACK OF COORDINATION: ICD-10-CM

## 2017-11-01 PROCEDURE — 97110 THERAPEUTIC EXERCISES: CPT

## 2017-11-02 ENCOUNTER — CLINICAL SUPPORT (OUTPATIENT)
Dept: REHABILITATION | Facility: HOSPITAL | Age: 82
End: 2017-11-02
Payer: MEDICARE

## 2017-11-02 DIAGNOSIS — R27.9 LACK OF COORDINATION: ICD-10-CM

## 2017-11-02 DIAGNOSIS — R26.89 BALANCE PROBLEM: Primary | ICD-10-CM

## 2017-11-02 DIAGNOSIS — G20.A1 PARKINSON'S DISEASE: ICD-10-CM

## 2017-11-02 PROCEDURE — 97110 THERAPEUTIC EXERCISES: CPT

## 2017-11-02 NOTE — PROGRESS NOTES
"Occupational Therapy   Treatment    Elvis Thompson   MRN: [id number]   Date of Note: 11/1/2017  Referring Physician: Dr. Lopez      Diagnosis: Parkinson's   Encounter Diagnoses   Name Primary?    Balance problem Yes    Parkinson's disease     Lack of coordination       Start Time: 2:00  End Time: 3:00  Total Time: 60 min    Visit: 4/10    Medicare:  Therapeutic exercise x 4= $127.92  Total: $468.49    Subjective: "I did my exercises this morning. My neighbor told me yesterday that they think I am walking more upright." per pt  Pain: pt unable to numerically rate pain, however reported, "my back hurts a little" per pt    Objective:   Patient being seen by occupational therapy for therapeutic exercise in order to improve posture, ROM, strength, balance, and endurance in order to improve patient's ability to complete functional mobility and daily home activities with improved independence and safety.    Treatment: Therapeutic exercises x 60 min  Maximal Daily Exercises  1) Floor to Ceiling (seated) - 8 reps held for 10 seconds each  2) Side to Side (seated) - 8 reps held for 10 seconds each  3) Forward Step and Reach (standing) - 8 reps each side right and left  4) Sideways Step and Reach (standing) - 8 reps each side right and left  5) Backward Step and Reach (standing) - 8 reps each side right and left-Pt require verbal cues due to forward flexion of trunk instead of flexion at the hips  6) Forwards Rock and Reach (standing) - 10 reps each side right and left-Pt with decrease coordination of movements.  Pt LE with correct movement pattern with reduce speed.  LE and UE performed separately and then added together as one, but with difficulty due to the lack of coordination of UE.   7) Sideways Rock and Reach (standing) - 10 reps each side right and left    (Completed all maximum daily exercises without a chair with patient requiring constant cueing for upright posture. Patient with increased loss of balance when " "attempting exercises with upright shoulders back posture. Then completed daily maximal exercises modified with chair- pt was then able to complete exercises with upright shoulders back posture with min verbal cueing without loss of balance.    Functional Component Tasks  1) Movement #1: BIG Sit to Stand - 5 reps. educated on "nose over toes" visual cueing before standing.  2) Movement #2: Bilateral  strengthening with medium strength (pink) thera putty with gross grasp and tripod pinch. (not completed today 11/1/2017)  3) Movement #3: Bilateral scapula retractions 10 reps x 5 with red theraband  4) Movement #4: Picked cones off floor x 5 reps  5) Movement #5: Step-ups onto Airex x 5 reps. Pt with LOB on first attempt that required Mod (A) from therapist to recover.     BIG Walking (Distance, surface, time, etc):  Completed 30 feet in wide hallway x 5 reps. Required visual and verbal cues to continue posture and BIG arms throughout activity.     Hierarchy Tasks (patient effort, modeling, cueing, assistance, progressions)    1) Hierarchy #1: Trunk rotation with functional reaching x 5 reps of 5 sets each side.    2) Hierarchy #2: Obstacle course with patient turning in small spaces and stepping over objects. Required constant cueing for shoulders back posture   3) Hierarchy #3: Walked on treadmill for 2 min at 1.0 speed. Verbal cueing for larger steps needed to decrease shuffling.    Assessment:  Patient tolerated session well. Patient with increase LOB today when attempting activities with shoulder back posture. Pt continues to lack erect posture, but able to self correct when verbally cued.  Patient would benefit from continued skilled OT services to continue challenging high rep therapeutic exercise and activity in order for patient to improve strength and balance needed for patient to improve his ability to complete and safety with his daily home activities.    Plan:  Continue 4 x week x 4 weeks with an " additional 2 weeks for the original evaluation and at the end for a discharge session during the certification period from 10/25/2017 to 12/1/2017 in pursuit of OT goals.      SONIA Armando

## 2017-11-06 ENCOUNTER — CLINICAL SUPPORT (OUTPATIENT)
Dept: REHABILITATION | Facility: HOSPITAL | Age: 82
End: 2017-11-06
Attending: PSYCHIATRY & NEUROLOGY
Payer: MEDICARE

## 2017-11-06 DIAGNOSIS — G20.A1 PARKINSON'S DISEASE: ICD-10-CM

## 2017-11-06 DIAGNOSIS — R26.89 BALANCE PROBLEM: Primary | ICD-10-CM

## 2017-11-06 DIAGNOSIS — R27.9 LACK OF COORDINATION: ICD-10-CM

## 2017-11-06 PROCEDURE — 97110 THERAPEUTIC EXERCISES: CPT

## 2017-11-06 NOTE — PROGRESS NOTES
"Occupational Therapy   Treatment    Elvis Thompson   MRN: [id number]   Date of Note: 11/06/2017  Referring Physician: Dr. Lopez      Diagnosis: Parkinson's   Encounter Diagnoses   Name Primary?    Balance problem Yes    Parkinson's disease     Lack of coordination       Start Time: 2:00  End Time: 3:00  Total Time: 60 min    Visit: 6/10    Medicare:  Therapeutic exercise x 4= $127.92  Total: $724.33    Subjective: No new complaints  Pain: pt unable to numerically rate pain, however reported, "my back was hurting this weekend. It's better today but I wasn't able to do many of my exercises this weekend." per pt  "It's hard for me to keep good posture because my back feels better when I'm not as straight." per pt    Objective:   Patient being seen by occupational therapy for therapeutic exercise in order to improve posture, ROM, strength, balance, and endurance in order to improve patient's ability to complete functional mobility and daily home activities with improved independence and safety.    Treatment: Therapeutic exercises x 60 min  Maximal Daily Exercises  1) Floor to Ceiling (seated) - 8 reps held for 10 seconds each  2) Side to Side (seated) - 8 reps held for 10 seconds each  3) Forward Step and Reach (standing) - 8 reps each side right and left  4) Sideways Step and Reach (standing) - 8 reps each side right and left  5) Backward Step and Reach (standing) - 8 reps each side right and left  6) Forwards Rock and Reach (standing) - 10 reps each side right and left  7) Sideways Rock and Reach (standing) - 10 reps each side right and left  *Required verbal cueing to  feet to take big steps during all exercises      Functional Component Tasks  1) Movement #1: BIG Sit to Stand - 5 reps. educated on "nose over toes" visual cueing before standing.  2) Movement #2: Bilateral  strengthening with medium strength (pink) thera putty with gross grasp and tripod pinch  3) Movement #3: Bilateral scapula " retractions 10 reps x 5 with red theraband  4) Movement #4: Picked cones off floor x 5 reps  5) Movement #5: Step-ups onto Airex x 5 reps.    BIG Walking (Distance, surface, time, etc):  Completed 30 feet in wide hallway x 5 reps. Required visual and verbal cues to continue posture and BIG arms throughout activity.     Hierarchy Tasks (patient effort, modeling, cueing, assistance, progressions)    1) Hierarchy #1: Walking on either side of cones to maintain proper width between feet with walking    2) Hierarchy #2: Obstacle course with patient turning in small spaces and stepping over objects.    3) Hierarchy #3: Walked on treadmill for 2 min at 1.5 speed. Verbal cueing for larger steps needed to decrease shuffling, however less cueing needed today verses last session. Pt had to hold front bar instead of side rails to increase balance.     Assessment:  Patient tolerated session well.  Pt continues to lack erect posture, but able to self correct when verbally cued, however back pain also contributing to posture. Patient with improved balance and speed on treadmill however poor endurance with patient needed to stop at 2 min. Patient's goal is 10 min due to this being his routine at home prior to becoming weak. Patient would benefit from continued skilled OT services to continue challenging high rep therapeutic exercise and activity in order for patient to improve strength and balance needed for patient to improve his ability to complete and safety with his daily home activities.    Plan:  Continue 4 x week x 4 weeks with an additional 2 weeks for the original evaluation and at the end for a discharge session during the certification period from 10/25/2017 to 12/1/2017 in pursuit of OT goals.      SONIA Armando

## 2017-11-06 NOTE — PROGRESS NOTES
"Occupational Therapy   Treatment    Elvis Thompson   MRN: [id number]   Date of Note: 11/2/2017  Referring Physician: Dr. Lopez      Diagnosis: Parkinson's   Encounter Diagnoses   Name Primary?    Balance problem Yes    Parkinson's disease     Lack of coordination       Start Time: 2:00  End Time: 3:00  Total Time: 60 min    Visit: 5/10    Medicare:  Therapeutic exercise x 4= $127.92  Total: $596.41    Subjective: No new complaints  Pain: pt unable to numerically rate pain, however reported, "my back hurts a little" per pt    Objective:   Patient being seen by occupational therapy for therapeutic exercise in order to improve posture, ROM, strength, balance, and endurance in order to improve patient's ability to complete functional mobility and daily home activities with improved independence and safety.    Treatment: Therapeutic exercises x 60 min  Maximal Daily Exercises  1) Floor to Ceiling (seated) - 8 reps held for 10 seconds each  2) Side to Side (seated) - 8 reps held for 10 seconds each  3) Forward Step and Reach (standing) - 8 reps each side right and left  4) Sideways Step and Reach (standing) - 8 reps each side right and left  5) Backward Step and Reach (standing) - 8 reps each side right and left  6) Forwards Rock and Reach (standing) - 10 reps each side right and left  7) Sideways Rock and Reach (standing) - 10 reps each side right and left      Functional Component Tasks  1) Movement #1: BIG Sit to Stand - 5 reps. educated on "nose over toes" visual cueing before standing.  2) Movement #2: Bilateral  strengthening with medium strength (pink) thera putty with gross grasp and tripod pinch  3) Movement #3: Bilateral scapula retractions 10 reps x 5 with red theraband  4) Movement #4: Picked cones off floor x 5 reps  5) Movement #5: Step-ups onto Airex x 5 reps.    BIG Walking (Distance, surface, time, etc):  Completed 30 feet in wide hallway x 5 reps. Required visual and verbal cues to continue " posture and BIG arms throughout activity.     Hierarchy Tasks (patient effort, modeling, cueing, assistance, progressions)    1) Hierarchy #1: Walking on either side of cones to maintain proper width between feet with walking    2) Hierarchy #2: Obstacle course with patient turning in small spaces and stepping over objects.    3) Hierarchy #3: Walked on treadmill for 2 min at 1.0 speed. Verbal cueing for larger steps needed to decrease shuffling. Pt had to hold front bar instead of side rails to increase balance.     Assessment:  Patient tolerated session well.  Pt continues to lack erect posture, but able to self correct when verbally cued.  Patient would benefit from continued skilled OT services to continue challenging high rep therapeutic exercise and activity in order for patient to improve strength and balance needed for patient to improve his ability to complete and safety with his daily home activities.    Plan:  Continue 4 x week x 4 weeks with an additional 2 weeks for the original evaluation and at the end for a discharge session during the certification period from 10/25/2017 to 12/1/2017 in pursuit of OT goals.      Lisha Mchugh, OT

## 2017-11-07 ENCOUNTER — CLINICAL SUPPORT (OUTPATIENT)
Dept: REHABILITATION | Facility: HOSPITAL | Age: 82
End: 2017-11-07
Payer: MEDICARE

## 2017-11-07 DIAGNOSIS — G20.A1 PARKINSON'S DISEASE: ICD-10-CM

## 2017-11-07 DIAGNOSIS — R26.89 BALANCE PROBLEM: Primary | ICD-10-CM

## 2017-11-07 DIAGNOSIS — R27.9 LACK OF COORDINATION: ICD-10-CM

## 2017-11-07 PROCEDURE — 97110 THERAPEUTIC EXERCISES: CPT

## 2017-11-07 NOTE — PROGRESS NOTES
"Occupational Therapy   Treatment    Elvis Thompson   MRN: [id number]   Date of Note: 11/07/2017  Referring Physician: Dr. Lopez      Diagnosis: Parkinson's   Encounter Diagnoses   Name Primary?    Balance problem Yes    Parkinson's disease     Lack of coordination       Start Time: 1:55  End Time: 2:50  Total Time: 55 min    Visit: 8/10    Medicare:  Therapeutic exercise x 4= $127.92  Total: $852.25    Subjective: "My back been hurting since the weekend, but I feel like I'm standing up straighter."    Pain:   Pre-session: 5/10  Post session: 5/10    Objective:   Patient being seen by occupational therapy for therapeutic exercise in order to improve posture, ROM, strength, balance, and endurance in order to improve patient's ability to complete functional mobility and daily home activities with improved independence and safety.    Treatment: Therapeutic exercises x 60 min    Maximal Daily Exercises  1) Floor to Ceiling (seated) - 8 reps held for 10 seconds each  2) Side to Side (seated) - 8 reps held for 10 seconds each  3) Forward Step and Reach (standing) - 8 reps each side right and left  4) Sideways Step and Reach (standing) - 8 reps each side right and left  5) Backward Step and Reach (standing) - 8 reps each side right and left (Pt required increase verbal cues for hip and knee flexion)  6) Forwards Rock and Reach (standing) - 10 reps each side right and left (Pt with improved coordination, however, with (R) LE leading decrease balance noted)  7) Sideways Rock and Reach (standing) - 10 reps each side right and left (Pt required verbal cueing to increase cervical rotation)        Functional Component Tasks  1) Movement #1: BIG Sit to Stand - 5 reps. educated on "nose over toes" visual cueing before standing.  2) Movement #2: Bilateral  strengthening with medium strength (pink) thera putty with gross grasp and tripod pinch  3) Movement #3: Bilateral scapula retractions 10 reps x 5 with red theraband " (performed in standing today to increase erect posture in standing)  4) Movement #4: Picked cones off floor x 5 reps (with one foot on airex and one foot on the floor with cones and varying distances; also educated on proper body mechanics with protection of the back when picking items up from the floor)  5) Movement #5: Step-ups onto Airex x 5 reps.    BIG Walking (Distance, surface, time, etc):  Completed 30 feet in wide hallway x 5 reps. Required visual and verbal cues to continue posture and BIG arms throughout activity.     Hierarchy Tasks (patient effort, modeling, cueing, assistance, progressions)    1) Hierarchy #1: Walking on either side of cones to maintain proper width between feet with walking    2) Hierarchy #2: Obstacle course with patient turning in small spaces and stepping over objects.    3) Hierarchy #3: Walked on treadmill for 2.5 min at 1.5 speed. Verbal cueing for BIG posture. Pt alternated from side rails to front railings to increase balance.     Assessment:  Patient tolerated session well.  Pt continues to lack erect posture, but able to self correct when verbally cued, however back pain also contributing to posture. Patient with improved balance and speed and endurance today on treadmill.   Patient would benefit from continued skilled OT services to continue challenging high rep therapeutic exercise and activity in order for patient to improve strength and balance needed for patient to improve his ability to complete and safety with his daily home activities.    Plan:  Continue 4 x week x 4 weeks with an additional 2 weeks for the original evaluation and at the end for a discharge session during the certification period from 10/25/2017 to 12/1/2017 in pursuit of OT goals.      Lisha Mchugh, OT

## 2017-11-08 ENCOUNTER — CLINICAL SUPPORT (OUTPATIENT)
Dept: REHABILITATION | Facility: HOSPITAL | Age: 82
End: 2017-11-08
Payer: MEDICARE

## 2017-11-08 DIAGNOSIS — R26.89 BALANCE PROBLEM: Primary | ICD-10-CM

## 2017-11-08 DIAGNOSIS — R27.9 LACK OF COORDINATION: ICD-10-CM

## 2017-11-08 DIAGNOSIS — G20.A1 PARKINSON'S DISEASE: ICD-10-CM

## 2017-11-08 PROCEDURE — 97110 THERAPEUTIC EXERCISES: CPT

## 2017-11-08 NOTE — PROGRESS NOTES
"Occupational Therapy   Treatment    Elvis Thompson   MRN: [id number]   Date of Note: 11/08/2017  Referring Physician: Dr. Lopez      Diagnosis: Parkinson's   Encounter Diagnoses   Name Primary?    Balance problem Yes    Parkinson's disease     Lack of coordination       Start Time: 2:55  End Time: 3:45  Total Time: 50 min    Visit: 9/10    Medicare:  Therapeutic exercise x 3= $95.94  Total: $948.19    Subjective: "My back been hurting since the weekend, but I feel like I'm standing up straighter."    Pain:   Pre-session: 5/10  Post session: 5/10    Objective:   Patient being seen by occupational therapy for therapeutic exercise in order to improve posture, ROM, strength, balance, and endurance in order to improve patient's ability to complete functional mobility and daily home activities with improved independence and safety.    Treatment: Therapeutic exercises x 60 min    Maximal Daily Exercises  1) Floor to Ceiling (seated) - 8 reps held for 10 seconds each  2) Side to Side (seated) - 8 reps held for 10 seconds each  3) Forward Step and Reach (standing) - 8 reps each side right and left  4) Sideways Step and Reach (standing) - 8 reps each side right and left  5) Backward Step and Reach (standing) - 8 reps each side right and left (Pt required increase verbal cues for hip and knee flexion)  6) Forwards Rock and Reach (standing) - 10 reps each side right and left   7) Sideways Rock and Reach (standing) - 10 reps each side right and left         Functional Component Tasks  1) Movement #1: BIG Sit to Stand - 5 reps. educated on "nose over toes" visual cueing before standing.  2) Movement #2: Bilateral  strengthening with medium strength (pink) thera putty with gross grasp and tripod pinch, and rotation of cone in putty (to simulate shower knob Pt reports having difficulty with.)  3) Movement #3: Bilateral scapula retractions 10 reps x 5 with red theraband   4) Movement #4: Picked cones off floor x 5 reps " (with one foot on airex and one foot on the floor with cones and varying distances; also educated on proper body mechanics with protection of the back when picking items up from the floor; Pt with initial LOB with Min (A) required to recover with (R) LE on airex)  5) Movement #5: Step-ups onto Airex x 5 reps.    BIG Walking (Distance, surface, time, etc):  Completed 30 feet in wide hallway x 5 reps. Required visual and verbal cues to continue posture and BIG arms throughout activity.     Hierarchy Tasks (patient effort, modeling, cueing, assistance, progressions)    1) Hierarchy #1: Stepping over cones into small spaces ( Pt required close supervision initially and after two attempts, increase balance noted.)  2) Hierarchy #2: Obstacle course with patient turning in small spaces and stepping over objects.    3) Hierarchy #3: Walked on treadmill for 2.5 min at 1.5 speed. Verbal cueing for BIG posture. Pt alternated from side rails to front railings to increase balance.     Assessment:  Patient tolerated session well.    Patient would benefit from continued skilled OT services to continue challenging high rep therapeutic exercise and activity in order for patient to improve strength and balance needed for patient to improve his ability to complete and safety with his daily home activities.    Plan:  Continue 4 x week x 4 weeks with an additional 2 weeks for the original evaluation and at the end for a discharge session during the certification period from 10/25/2017 to 12/1/2017 in pursuit of OT goals.      Lisha Mchugh, OT

## 2017-11-09 ENCOUNTER — CLINICAL SUPPORT (OUTPATIENT)
Dept: REHABILITATION | Facility: HOSPITAL | Age: 82
End: 2017-11-09
Payer: MEDICARE

## 2017-11-09 DIAGNOSIS — R26.89 BALANCE PROBLEM: Primary | ICD-10-CM

## 2017-11-09 DIAGNOSIS — G20.A1 PARKINSON'S DISEASE: ICD-10-CM

## 2017-11-09 DIAGNOSIS — R27.9 LACK OF COORDINATION: ICD-10-CM

## 2017-11-09 PROCEDURE — G8985 CARRY GOAL STATUS: HCPCS | Mod: CI

## 2017-11-09 PROCEDURE — 97110 THERAPEUTIC EXERCISES: CPT

## 2017-11-09 PROCEDURE — G8984 CARRY CURRENT STATUS: HCPCS | Mod: CI

## 2017-11-09 NOTE — PROGRESS NOTES
Occupational Therapy   Treatment    Elvis Thompson   MRN: [id number]   Date of Note: 11/09/2017  Referring Physician: Dr. Lopez      Diagnosis: Parkinson's   Encounter Diagnoses   Name Primary?    Balance problem Yes    Parkinson's disease     Lack of coordination       Start Time: 1:55  End Time: 2:55  Total Time: 60 min    Visit: 10/10    Medicare:  Therapeutic exercise x 4= $127.92  Total: $1076.11    Subjective: No new complaints     Pain:   Pre-session: 3/10  Post session: 3/10    Objective:   Patient being seen by occupational therapy for therapeutic exercise in order to improve posture, ROM, strength, balance, and endurance in order to improve patient's ability to complete functional mobility and daily home activities with improved independence and safety.    Treatment: Therapeutic exercises x 60 min    Maximal Daily Exercises  1) Floor to Ceiling (seated) - 8 reps held for 10 seconds each  2) Side to Side (seated) - 8 reps held for 10 seconds each  3) Forward Step and Reach (standing) - 8 reps each side right and left  4) Sideways Step and Reach (standing) - 8 reps each side right and left  5) Backward Step and Reach (standing) - 8 reps each side right and left   6) Forwards Rock and Reach (standing) - 10 reps each side right and left   7) Sideways Rock and Reach (standing) - 10 reps each side right and left         Functional Component Tasks  1) Movement #1: BIG Sit to Stand - 5 reps.   2) Movement #2: Bilateral  strengthening with medium strength (pink) thera putty with gross grasp and rotation of cone in putty (to simulate shower knob Pt reports having difficulty with.)  3) Movement #3: Bilateral scapula retractions 10 reps x 5 with red theraband   4) Movement #4: Picked cones off floor x 5 reps   5) Movement #5: Step-ups onto Airex x 5 reps.    BIG Walking (Distance, surface, time, etc):  Completed 30 feet in wide hallway x 5 reps. Required visual and verbal cues to continue posture and BIG  "arms throughout activity.     Hierarchy Tasks (patient effort, modeling, cueing, assistance, progressions)    1) Hierarchy #1: Stepping over cones into small spaces   2) Hierarchy #2: Obstacle course with patient turning in small spaces.    3) Hierarchy #3: Walked on treadmill for 2 min 40 sec at 1.6 speed. Verbal cueing for BIG posture. Pt alternated from side rails  to increase balance.     Pt. Reassessed as follows:    9-hole peg:   (L): 42.77" (+1.17)  (R): 45.55" (-1.5)     Buttonin minutes and 28.58 seconds (-1 minute 11.51 sec)                                           Bernabe Balance Scale     SITTING TO STANDING  INSTRUCTIONS: Please stand up. Try not to use your hand for support.  ( X  ) 4   able to stand without using hands and stabilize independently  (    ) 3  able to stand independently using hands  (    ) 2   able to stand using hands after several tries  (    ) 1   needs minimal aid to stand or stabilize  (    ) 0   needs moderate or maximal assist to stand     STANDING UNSUPPORTED  INSTRUCTIONS: Please stand for two minutes without holding on.  (  X) 4  able to stand safely for 2 minutes  (    ) 3   able to stand 2 minutes with supervision  (    ) 2   able to stand 30 seconds unsupported  (    ) 1   needs several tries to stand 30 seconds unsupported  (    ) 0   unable to stand 30 seconds unsupported     If a subject is able to stand 2 minutes unsupported, score full points for sitting unsupported. Proceed to item #4.     SITTING WITH BACK UNSUPPORTED BUT FEET SUPPORTED ON FLOOR OR ON A STOOL  INSTRUCTIONS: Please sit with arms folded for 2 minutes.  ( X  ) 4   able to sit safely and securely for 2 minutes  (    ) 3   able to sit 2 minutes under supervision  (    ) 2   able to able to sit 30 seconds  (    ) 1   able to sit 10 seconds  (    ) 0   unable to sit  without support 10 seconds     STANDING TO SITTING  INSTRUCTIONS: Please sit down.  (  X) 4  sits safely with minimal use of hands  (    ) 3 "   controls descent by using hands  (    ) 2   uses back of legs against chair to control descent  (    ) 1   sits independently but has uncontrolled descent  (    ) 0   needs assist to sit     TRANSFERS  INSTRUCTIONS: Arrange chair(s) for pivot transfer. Ask subject to transfer one way toward a seat with armrests and one way toward a seat without armrests. You may use two chairs (one with and one without armrests) or a bed and a chair.  ( X  ) 4   able to transfer safely with minor use of hands  (    ) 3  able to transfer safely definite need of hands  (    ) 2   able to transfer with verbal cuing and/or supervision  (    ) 1   needs one person to assist  (    ) 0   needs two people to assist or supervise to be safe     STANDING UNSUPPORTED WITH EYES CLOSED  INSTRUCTIONS: Please close your eyes and stand still for 10 seconds.  ( X ) 4  able to stand 10 seconds safely  (    ) 3   able to stand 10 seconds with supervision   (    ) 2   able to stand 3 seconds  (    ) 1   unable to keep eyes closed 3 seconds but stays safely  (    ) 0   needs help to keep from falling     STANDING UNSUPPORTED WITH FEET TOGETHER  INSTRUCTIONS: Place your feet together and stand without holding on.  (  X) 4  able to place feet together independently and stand 1 minute safely  (    ) 3   able to place feet together independently and stand 1 minute with supervision  (    ) 2   able to place feet together independently but unable to hold for 30 seconds  (    ) 1   needs help to attain position but able to stand 15 seconds feet together  (    ) 0   needs help to attain position and unable to hold for 15 seconds        REACHING FORWARD WITH OUTSTRETCHED ARM WHILE STANDING  INSTRUCTIONS: Lift arm to 90 degrees. Stretch out your fingers and reach forward as far as you can. (Examiner places a ruler at the end of fingertips when arm is at 90 degrees. Fingers should not touch the ruler while reaching forward. The recorded measure is the distance  forward that the fingers reach while the subject is in the most forward lean position. When possible, ask subject to use both arms when reaching to avoid rotation of the trunk.)  (    ) 4   can reach forward confidently 25 cm (10 inches)  ( X ) 3   can reach forward  12 cm (5 inches)  (    ) 2  can reach forward 5 cm (2 inches)  (    ) 1   reaches forward but needs supervision  (    ) 0   loses balance while trying/requires external support      OBJECT FROM THE FLOOR FROM A STANDING POSITION  INSTRUCTIONS:  the shoe/slipper, which is place in front of your feet.  ( X) 4   able to  slipper safely and easily  (   ) 3  able to  slipper but needs supervision   (    ) 2   unable to  but reaches 2-5 cm(1-2 inches) from slipper and keeps balance  independently  (    ) 1   unable to  and needs supervision while trying  (    ) 0   unable to try/needs assist to keep from losing balance or falling  TURNING TO LOOK BEHIND OVER LEFT AND RIGHT SHOULDERS WHILE STANDING  INSTRUCTIONS: Turn to look directly behind you over toward the left shoulder. Repeat to the right. Examiner may pick an object to look at directly behind the subject to encourage a better twist turn.  ( X  ) 4   looks behind from both sides and weight shifts well  (    ) 3  looks behind one side only other side shows less weight shift  (    ) 2   turns sideways only but maintains balance  (    ) 1   needs supervision when turning  (    ) 0   needs assist to keep from losing balance or falling     TURN 360 DEGREES  INSTRUCTIONS: Turn completely around in a full [place]. Pause. Then turn a full [place] in the other direction.  (    ) 4  able to turn 360 degrees safely in 4 seconds or less  (    ) 3   able to turn 360 degrees safely one side only 4 seconds or less  ( X  ) 2   able to turn 360 degrees safely but slowly  (    ) 1   needs close supervision or verbal cuing  (    ) 0   needs assistance while turning     PLACE  ALTERNATE FOOT ON STEP OR STOOL WHILE STANDING UNSUPPORTED  INSTRUCTIONS: Place each foot alternately on the step/stool. Continue until each foot has touch the step/stool four times.  (  ) 4  able to stand independently and safely and complete 8 steps in 20 seconds  ( X ) 3   able to stand independently and complete 8 steps in > 20 seconds  (    ) 2   able to complete 4 steps without aid with supervision  (    ) 1   able to complete > 2 steps needs minimal assist  (    ) 0   needs assistance to keep from falling/unable to try     STANDING UNSUPPORTED ONE FOOT IN FRONT  INSTRUCTIONS: (DEMONSTRATE TO SUBJECT) Place one foot directly in front of the other. If you feel that you cannot place your foot directly in front, try to step far enough ahead that the heel of your forward foot is ahead of the toes of the other foot. (To score 3 points, the length of the step should exceed the length of the other foot and the width of the stance should approximate the subjects normal stride width.)   (    ) 4   able to place foot tandem independently and hold 30 seconds  ( X ) 3  able to place foot ahead independently and hold 30 seconds  (    ) 2   able to take small step independently and hold 30 seconds  (    ) 1   needs help to step but can hold 15 seconds  (    ) 0   loses balance while stepping or standing     STANDING ON ONE LEG  INSTRUCTIONS: Stand on one leg as long as you can without holding on.  (    ) 4   able to lift leg independently and hold > 10 seconds  (    ) 3   able to lift leg independently and hold  5-10 seconds  (    ) 2   able to lift leg independently and hold ? 3 seconds  ( X ) 1  tries to lift leg unable to hold 3 seconds but remains standing independently.  (    ) 0   unable to try of needs assist to prevent fall        (  49 ) TOTAL SCORE (Maximum = 56)  (+4)    Minimal Detectable Change:    A change of 4 points is needed to be 95% confident that true change has occurred if a patient scores within  "45-56 initially, 5 points if they score within 35-44, 7 points if they score within 25-34 and, finally, 5 points if their initial score is within 0-24 on the Bernabe Balance Scale.  Christina RAMÍREZ; [employer] [employer] and Older People ([employer]) group, Kate FERNANDEZ. (2009). How much change is true change? The minimum detectable change of the Bernabe Balance Scale in elderly people.  J Rehabil Med. 41(5):343-6.     TIMED UP and GO: 14.14" (-2.91")    Assessment:  Patient tolerated session well.  Pt reassessed today with significant improvements made in the TUG and Bernabe Balance test.  Pt functional fine motor (buttoning) also improved.  Pt also required less verbal cues for BIG, erect posture and also voiced less back pain today.  Pt with no LOB today with any exercise.  Pt progressing well towards goals.   Patient would benefit from continued skilled OT services to continue challenging high rep therapeutic exercise and activity in order for patient to improve strength and balance needed for patient to improve his ability to complete and safety with his daily home activities.    GOALS:  Time frame: 4 weeks              1. Pt will increase Bernabe Balance score by 4 points to show a significant change in balance to improve performance in ADLs/IADLs (met)              2. Pt will tolerate 5 minutes walking on treadmill with UE support at a speed of 2.5 to increase performance of leisure activity of walking on treadmill.                  (unmet)              3. Pt will increase MMT score of shoulder flexion, EXT, and ABD to 4/5 to increase proximal strength for transfers. NT              4. Pt will decrease 9-hole peg score by 10 sec to increase fine motor coordination for increase performance in dressing. (unmet)  Revised/Continued Goals:       1. Pt will increase Bernabe Balance score to 50  to show a significant change in balance to improve performance in ADLs/IADLs               2. Pt will tolerate 5 minutes walking on treadmill with UE " support at a speed of 2.5 to increase performance of leisure activity of walking on treadmill.                        3. Pt will increase MMT score of shoulder flexion, EXT, and ABD to 4/5 to increase proximal strength for transfers.              4. Pt will decrease 9-hole peg score by 10 sec to increase fine motor coordination for increase performance in dressing.   G-codes:  Carrying, moving and handling objects  Current:  CI  1<20% impaired  Goal:       CI 1<20% impaired      Plan:  Continue 4 x week x 4 weeks with an additional 2 weeks for the original evaluation and at the end for a discharge session during the certification period from 10/25/2017 to 12/1/2017 in pursuit of OT goals.      Lisha Mchugh, OT

## 2017-11-13 ENCOUNTER — CLINICAL SUPPORT (OUTPATIENT)
Dept: REHABILITATION | Facility: HOSPITAL | Age: 82
End: 2017-11-13
Attending: PSYCHIATRY & NEUROLOGY
Payer: MEDICARE

## 2017-11-13 DIAGNOSIS — R26.89 BALANCE PROBLEM: ICD-10-CM

## 2017-11-13 DIAGNOSIS — R27.9 LACK OF COORDINATION: ICD-10-CM

## 2017-11-13 DIAGNOSIS — G20.A1 PARKINSON'S DISEASE: Primary | ICD-10-CM

## 2017-11-13 PROCEDURE — 97110 THERAPEUTIC EXERCISES: CPT

## 2017-11-13 NOTE — PROGRESS NOTES
"Occupational Therapy   Treatment    Elvis Thompson   MRN: [id number]   Date of Note: 11/13/2017  Referring Physician: Dr. Lopez      Diagnosis: Parkinson's   Encounter Diagnoses   Name Primary?    Parkinson's disease Yes    Balance problem     Lack of coordination       Start Time: 1:50  End Time: 2:45  Total Time: 55 min    Visit: 1/10     Medicare:  Therapeutic exercise x 4= $127.92  Total: $1204.03    Subjective: No new complaints   "My back is a little better today." per pt  Pain: 1/10 back    Objective:   Patient being seen by occupational therapy for therapeutic exercise in order to improve posture, ROM, strength, balance, and endurance in order to improve patient's ability to complete functional mobility and daily home activities with improved independence and safety.    Treatment: Therapeutic exercises x 60 min    Maximal Daily Exercises  1) Floor to Ceiling (seated) - 8 reps held for 10 seconds each  2) Side to Side (seated) - 8 reps held for 10 seconds each  3) Forward Step and Reach (standing) - 8 reps each side right and left  4) Sideways Step and Reach (standing) - 8 reps each side right and left  5) Backward Step and Reach (standing) - 8 reps each side right and left   6) Forwards Rock and Reach (standing) - 10 reps each side right and left   7) Sideways Rock and Reach (standing) - 10 reps each side right and left   Required consistent verbal cueing for patient to lift feet higher to clear the ground fully during all exercises requiring steps    Functional Component Tasks  1) Movement #1: BIG Sit to Stand - 5 reps.   2) Movement #2: Bilateral  strengthening with medium strength (pink) thera putty with gross grasp and rotation of cone in putty (to simulate shower knob Pt reports having difficulty with.)  3) Movement #3: Bilateral scapula retractions 10 reps x 5 with red theraband   4) Movement #4: Picked cones off floor with one foot on airex x 5 reps   5) Movement #5: Step-ups onto Airex x 5 " reps.  6) Large step ups with one foot at a time onto a box about 10 inches high. Completed many reps until fatigue for motor relearning of gait height for safety. Required CGA with patient able to step up with left than right.    BIG Walking (Distance, surface, time, etc):  Completed 30 feet in wide hallway x 5 reps. Required visual and verbal cues to continue posture and BIG arms about 1/4 to 1/2 of activity.     Hierarchy Tasks (patient effort, modeling, cueing, assistance, progressions)  1) Hierarchy #1: Stepping over cones into small spaces   2) Hierarchy #2: Obstacle course with patient turning in small spaces.    3) Hierarchy #3: Walked on treadmill for 3 min at 1.7 speed. Constant verbal cueing to  feet to decrease shuffle. Pt used side rails to increase balance.       Assessment:  Patient tolerated session well.  Pt reassessed today with significant improvements made in the TUG and Bernabe Balance test. Improved balance noted during BIG walking and daily exercises today with no loss of balance during most activities. Only slight loss of balance when patient cued to pick feet up higher. Patient with difficulty stepping over cones knocking them down ~50 to 75% of the time.  Pt progressing well towards goals.   Patient would benefit from continued skilled OT services to continue challenging high rep therapeutic exercise and activity in order for patient to improve strength and balance needed for patient to improve his ability to complete and safety with his daily home activities.    GOALS:  Time frame: 4 weeks              1. Pt will increase Bernabe Balance score by 4 points to show a significant change in balance to improve performance in ADLs/IADLs (met)              2. Pt will tolerate 5 minutes walking on treadmill with UE support at a speed of 2.5 to increase performance of leisure activity of walking on treadmill.                  (unmet)              3. Pt will increase MMT score of shoulder flexion,  EXT, and ABD to 4/5 to increase proximal strength for transfers. NT              4. Pt will decrease 9-hole peg score by 10 sec to increase fine motor coordination for increase performance in dressing. (unmet)  Revised/Continued Goals:       1. Pt will increase Bernabe Balance score to 50  to show a significant change in balance to improve performance in ADLs/IADLs               2. Pt will tolerate 5 minutes walking on treadmill with UE support at a speed of 2.5 to increase performance of leisure activity of walking on treadmill.                        3. Pt will increase MMT score of shoulder flexion, EXT, and ABD to 4/5 to increase proximal strength for transfers.              4. Pt will decrease 9-hole peg score by 10 sec to increase fine motor coordination for increase performance in dressing.   G-codes:  Carrying, moving and handling objects  Current:  CI  1<20% impaired  Goal:       CI 1<20% impaired      Plan:  Continue 4 x week x 4 weeks with an additional 2 weeks for the original evaluation and at the end for a discharge session during the certification period from 10/25/2017 to 12/1/2017 in pursuit of OT goals.      SONIA Armando

## 2017-11-14 ENCOUNTER — CLINICAL SUPPORT (OUTPATIENT)
Dept: REHABILITATION | Facility: HOSPITAL | Age: 82
End: 2017-11-14
Payer: MEDICARE

## 2017-11-14 DIAGNOSIS — G20.A1 PARKINSON'S DISEASE: ICD-10-CM

## 2017-11-14 DIAGNOSIS — R27.9 LACK OF COORDINATION: ICD-10-CM

## 2017-11-14 DIAGNOSIS — R26.89 BALANCE PROBLEM: Primary | ICD-10-CM

## 2017-11-14 PROCEDURE — 97110 THERAPEUTIC EXERCISES: CPT

## 2017-11-14 NOTE — PROGRESS NOTES
Occupational Therapy   Treatment    Elvis Thompson   MRN: [id number]   Date of Note: 11/14/2017  Referring Physician: Dr. Lopez      Diagnosis: Parkinson's   Encounter Diagnoses   Name Primary?    Parkinson's disease     Lack of coordination     Balance problem Yes      Start Time: 1:50  End Time: 2:40  Total Time: 50 min    Visit: 2/10 (12)    Medicare:  Therapeutic exercise x 3= $95.94  Total: $1299.97    Subjective: No new complaints   Pain:   Pre-session: 3/10 back  Post session: 3/10 back    Objective:   Patient being seen by occupational therapy for therapeutic exercise in order to improve posture, ROM, strength, balance, and endurance in order to improve patient's ability to complete functional mobility and daily home activities with improved independence and safety.    Treatment: Therapeutic exercises x 60 min    Maximal Daily Exercises  1) Floor to Ceiling (seated) - 8 reps held for 10 seconds each  2) Side to Side (seated) - 8 reps held for 10 seconds each  3) Forward Step and Reach (standing) - 8 reps each side right and left  4) Sideways Step and Reach (standing) - 8 reps each side right and left  5) Backward Step and Reach (standing) - 8 reps each side right and left   6) Forwards Rock and Reach (standing) - 10 reps each side right and left   7) Sideways Rock and Reach (standing) - 10 reps each side right and left   Required minimal verbal cueing for patient to lift feet higher to clear the ground fully during all exercises requiring steps    Functional Component Tasks  1) Movement #1: BIG Sit to Stand - 5 reps.   2) Movement #2: Bilateral  strengthening with medium strength (pink) thera putty with gross grasp and rotation of cone in putty (to simulate shower knob Pt reports having difficulty with.)  3) Movement #3: Bilateral scapula retractions 10 reps x 5 with red theraband   4) Movement #4: Picked cones off floor with (B) LE  on airex x 5 reps, CGA at times   5) Movement #5: Step-ups onto  Airex x 5 reps.  6) Large step ups with one foot at a time onto a box about 10 inches high x 10 reps each (B) LE. Fatigued after ~ 8 reps on each LE but tolerated until completion.    BIG Walking (Distance, surface, time, etc):  Completed 30 feet in wide hallway x 5 reps. Required visual and verbal cues to continue posture and BIG arms about 1/4 to 1/2 of activity.     Hierarchy Tasks (patient effort, modeling, cueing, assistance, progressions)  1) Hierarchy #1: Stepping over cones into small spaces   2) Hierarchy #2: Obstacle course with patient turning in small spaces.    3) Hierarchy #3: Walked on treadmill for 3 min at 1.8 speed. Improvement with shuffle, Pt required verbal cueing for erect posture. Pt used side rails to increase balance.       Assessment:  Patient tolerated session well.  Loss of balance noted on one rep of backward step and reach. Patient with difficulty stepping over cones knocking them down ~25 on first rotation and self corrected there after. Pt tolerated increase in speed of treadmill with good endurance.   Pt progressing well towards goals.   Patient would benefit from continued skilled OT services to continue challenging high rep therapeutic exercise and activity in order for patient to improve strength and balance needed for patient to improve his ability to complete and safety with his daily home activities.    GOALS:  Time frame: 4 weeks              1. Pt will increase Bernabe Balance score by 4 points to show a significant change in balance to improve performance in ADLs/IADLs (met)              2. Pt will tolerate 5 minutes walking on treadmill with UE support at a speed of 2.5 to increase performance of leisure activity of walking on treadmill.                  (unmet)              3. Pt will increase MMT score of shoulder flexion, EXT, and ABD to 4/5 to increase proximal strength for transfers. NT              4. Pt will decrease 9-hole peg score by 10 sec to increase fine motor  coordination for increase performance in dressing. (unmet)    Revised/Continued Goals:       1. Pt will increase Bernabe Balance score to 50  to show a significant change in balance to improve performance in ADLs/IADLs               2. Pt will tolerate 5 minutes walking on treadmill with UE support at a speed of 2.5 to increase performance of leisure activity of walking on treadmill.                        3. Pt will increase MMT score of shoulder flexion, EXT, and ABD to 4/5 to increase proximal strength for transfers.              4. Pt will decrease 9-hole peg score by 10 sec to increase fine motor coordination for increase performance in dressing.     G-codes:  Carrying, moving and handling objects  Current:  CI  1<20% impaired  Goal:       CI 1<20% impaired      Plan:  Continue 4 x week x 4 weeks with an additional 2 weeks for the original evaluation and at the end for a discharge session during the certification period from 10/25/2017 to 12/1/2017 in pursuit of OT goals.      Lisha Mchugh, OT

## 2017-11-15 ENCOUNTER — CLINICAL SUPPORT (OUTPATIENT)
Dept: REHABILITATION | Facility: HOSPITAL | Age: 82
End: 2017-11-15
Payer: MEDICARE

## 2017-11-15 DIAGNOSIS — R26.89 BALANCE PROBLEM: Primary | ICD-10-CM

## 2017-11-15 DIAGNOSIS — R27.9 LACK OF COORDINATION: ICD-10-CM

## 2017-11-15 DIAGNOSIS — G20.A1 PARKINSON'S DISEASE: ICD-10-CM

## 2017-11-15 PROCEDURE — 97110 THERAPEUTIC EXERCISES: CPT

## 2017-11-15 NOTE — PROGRESS NOTES
"Occupational Therapy   Treatment    Elvis Thompson   MRN: [id number]   Date of Note: 11/15/2017  Referring Physician: Dr. Lopez      Diagnosis: Parkinson's   Encounter Diagnoses   Name Primary?    Parkinson's disease     Lack of coordination     Balance problem Yes      Start Time: 2:50  End Time: 3:40  Total Time: 50 min    Visit: 3/10 (13)    Medicare:  Therapeutic exercise x 3= $95.94  Total: $1395.91    Subjective: "I noticed I don't shuffle my feet as much anymore when I walk."     Pain:   Pre-session: 3/10 back  Post session: 3/10 back    Objective:   Patient being seen by occupational therapy for therapeutic exercise in order to improve posture, ROM, strength, balance, and endurance in order to improve patient's ability to complete functional mobility and daily home activities with improved independence and safety.    Treatment: Therapeutic exercises x 50 min    Maximal Daily Exercises  1) Floor to Ceiling (seated) - 8 reps held for 10 seconds each  2) Side to Side (seated) - 8 reps held for 10 seconds each  3) Forward Step and Reach (standing) - 8 reps each side right and left  4) Sideways Step and Reach (standing) - 8 reps each side right and left  5) Backward Step and Reach (standing) - 8 reps each side right and left   6) Forwards Rock and Reach (standing) - 10 reps each side right and left   7) Sideways Rock and Reach (standing) - 10 reps each side right and left   Required minimal verbal cueing for patient to lift feet higher to clear the ground fully during all exercises requiring steps    Functional Component Tasks  1) Movement #1: BIG Sit to Stand - 5 reps.   2) Movement #2: Bilateral  strengthening with medium strength (pink) thera putty with gross grasp and rotation of cone in putty (to simulate shower knob Pt reports having difficulty with.)  3) Movement #3: Bilateral scapula retractions 10 reps x 5 with red theraband   4) Movement #4: Picked cones off floor with (B) LE  on airex x 5 " reps, CGA at times   5) Movement #5: Step-ups onto Airex x 5 reps.  6) Large step ups with one foot at a time onto a box about 10 inches high x 15 reps each (B) LE.     BIG Walking (Distance, surface, time, etc):  Completed 30 feet in wide hallway x 5 reps. Minimal VC's needed for correction of posture    Hierarchy Tasks (patient effort, modeling, cueing, assistance, progressions)  1) Hierarchy #1: Stepping over cones into small spaces   2) Hierarchy #2: Obstacle course with patient turning in small spaces.    3) Hierarchy #3: Walked on treadmill for 3 min (2 min at 1.8 speed and 1 min and 1.9 speed)  Improvement with shuffle, Pt required verbal cueing for erect posture. Pt used side rails to increase balance.       Assessment:  Patient tolerated session well.   Patient with difficulty stepping over cones knocking them down ~25 on first rotation and self corrected there after. Pt tolerated increase in speed of treadmill with good endurance.   Pt progressing well towards goals.   Patient would benefit from continued skilled OT services to continue challenging high rep therapeutic exercise and activity in order for patient to improve strength and balance needed for patient to improve his ability to complete and safety with his daily home activities.    GOALS:  Time frame: 4 weeks              1. Pt will increase Bernabe Balance score by 4 points to show a significant change in balance to improve performance in ADLs/IADLs (met)              2. Pt will tolerate 5 minutes walking on treadmill with UE support at a speed of 2.5 to increase performance of leisure activity of walking on treadmill.                           (unmet)              3. Pt will increase MMT score of shoulder flexion, EXT, and ABD to 4/5 to increase proximal strength for transfers. NT              4. Pt will decrease 9-hole peg score by 10 sec to increase fine motor coordination for increase performance in dressing. (unmet)    Revised/Continued Goals:        1. Pt will increase Bernabe Balance score to 50  to show a significant change in balance to improve performance in ADLs/IADLs               2. Pt will tolerate 5 minutes walking on treadmill with UE support at a speed of 2.5 to increase performance of leisure activity of walking on treadmill.                        3. Pt will increase MMT score of shoulder flexion, EXT, and ABD to 4/5 to increase proximal strength for transfers.              4. Pt will decrease 9-hole peg score by 10 sec to increase fine motor coordination for increase performance in dressing.     G-codes:  Carrying, moving and handling objects  Current:  CI  1<20% impaired  Goal:       CI 1<20% impaired      Plan:  Continue 4 x week x 4 weeks with an additional 2 weeks for the original evaluation and at the end for a discharge session during the certification period from 10/25/2017 to 12/1/2017 in pursuit of OT goals.      Lisha Mchugh, OT

## 2017-11-17 ENCOUNTER — CLINICAL SUPPORT (OUTPATIENT)
Dept: REHABILITATION | Facility: HOSPITAL | Age: 82
End: 2017-11-17
Payer: MEDICARE

## 2017-11-17 DIAGNOSIS — G20.A1 PARKINSON'S DISEASE: Primary | ICD-10-CM

## 2017-11-17 DIAGNOSIS — R27.9 LACK OF COORDINATION: ICD-10-CM

## 2017-11-17 DIAGNOSIS — R26.89 BALANCE PROBLEM: ICD-10-CM

## 2017-11-17 PROCEDURE — 97110 THERAPEUTIC EXERCISES: CPT

## 2017-11-17 NOTE — PROGRESS NOTES
"Occupational Therapy   Treatment    Elvis Thompson   MRN: [id number]   Date of Note: 11/17/2017  Referring Physician: Dr. Lopez      Diagnosis: Parkinson's   Encounter Diagnoses   Name Primary?    Parkinson's disease Yes    Lack of coordination     Balance problem       Start Time: 1:55  End Time: 2:45  Total Time: 50 min    Visit: 4/10 (14)    Medicare:  Therapeutic exercise x 3= $95.94  Total: $1491.85    Subjective: "I noticed I don't shuffle my feet as much anymore when I walk."     Pain:   Pre-session: 3/10 back  Post session: 3/10 back    Objective:   Patient being seen by occupational therapy for therapeutic exercise in order to improve posture, ROM, strength, balance, and endurance in order to improve patient's ability to complete functional mobility and daily home activities with improved independence and safety.    Treatment: Therapeutic exercises x 50 min    Maximal Daily Exercises  1) Floor to Ceiling (seated) - 8 reps held for 10 seconds each  2) Side to Side (seated) - 8 reps held for 10 seconds each  3) Forward Step and Reach (standing) - 8 reps each side right and left  4) Sideways Step and Reach (standing) - 8 reps each side right and left  5) Backward Step and Reach (standing) - 8 reps each side right and left   6) Forwards Rock and Reach (standing) - 10 reps each side right and left   7) Sideways Rock and Reach (standing) - 10 reps each side right and left   Required minimal verbal cueing for patient to lift feet higher to clear the ground fully during all exercises requiring steps    Functional Component Tasks  1) Movement #1: BIG Sit to Stand - 5 reps.   2) Movement #2: Bilateral  strengthening with medium strength (pink) thera putty with gross grasp and rotation of cone in putty (to simulate shower knob Pt reports having difficulty with.)  3) Movement #3: Bilateral scapula retractions 10 reps x 5 with red theraband   4) Movement #4: Picked cones off floor with (B) LE  on airex x 5 " reps, CGA at times   5) Movement #5: Step-ups onto Airex x 5 reps.  6) Large step ups with one foot at a time onto a box about 10 inches high x 15 reps each (B) LE.     BIG Walking (Distance, surface, time, etc):  Completed 30 feet in wide hallway x 5 reps. Minimal VC's needed for correction of posture    Hierarchy Tasks (patient effort, modeling, cueing, assistance, progressions)  1) Hierarchy #1: Stepping over cones into small spaces   2) Hierarchy #2: Obstacle course with patient turning in small spaces.    3) Hierarchy #3: Walked on treadmill for 3 min at 2.0 speed, Improvement with shuffle, Pt required verbal cueing for erect posture. Pt used side rails to increase balance.       Assessment:  Patient tolerated session well.   Patient with difficulty stepping over cones knocking them down ~25 on first rotation and self corrected there after. Pt tolerated increase in speed of treadmill with good endurance. Patient with improved gait with no shuffling beginning of session while walking up to room. Required cueing at end of session once fatigued when leaving. Pt progressing well towards goals.   Patient would benefit from continued skilled OT services to continue challenging high rep therapeutic exercise and activity in order for patient to improve strength and balance needed for patient to improve his ability to complete and safety with his daily home activities.    GOALS:  Time frame: 4 weeks              1. Pt will increase Bernabe Balance score by 4 points to show a significant change in balance to improve performance in ADLs/IADLs (met)              2. Pt will tolerate 5 minutes walking on treadmill with UE support at a speed of 2.5 to increase performance of leisure activity of walking on treadmill.                           (unmet)              3. Pt will increase MMT score of shoulder flexion, EXT, and ABD to 4/5 to increase proximal strength for transfers. NT              4. Pt will decrease 9-hole peg score  by 10 sec to increase fine motor coordination for increase performance in dressing. (unmet)    Revised/Continued Goals:       1. Pt will increase Bernabe Balance score to 50  to show a significant change in balance to improve performance in ADLs/IADLs               2. Pt will tolerate 5 minutes walking on treadmill with UE support at a speed of 2.5 to increase performance of leisure activity of walking on treadmill.                        3. Pt will increase MMT score of shoulder flexion, EXT, and ABD to 4/5 to increase proximal strength for transfers.              4. Pt will decrease 9-hole peg score by 10 sec to increase fine motor coordination for increase performance in dressing.     G-codes:  Carrying, moving and handling objects  Current:  CI  1<20% impaired  Goal:       CI 1<20% impaired      Plan:  Continue 4 x week x 4 weeks with an additional 2 weeks for the original evaluation and at the end for a discharge session during the certification period from 10/25/2017 to 12/1/2017 in pursuit of OT goals.

## 2017-11-20 ENCOUNTER — CLINICAL SUPPORT (OUTPATIENT)
Dept: REHABILITATION | Facility: HOSPITAL | Age: 82
End: 2017-11-20
Payer: MEDICARE

## 2017-11-20 DIAGNOSIS — R26.89 BALANCE PROBLEM: ICD-10-CM

## 2017-11-20 DIAGNOSIS — R27.9 LACK OF COORDINATION: ICD-10-CM

## 2017-11-20 DIAGNOSIS — G20.A1 PARKINSON'S DISEASE: Primary | ICD-10-CM

## 2017-11-20 PROCEDURE — 97110 THERAPEUTIC EXERCISES: CPT

## 2017-11-20 NOTE — PROGRESS NOTES
"Occupational Therapy   Treatment    Elvis Thompson   MRN: [id number]   Date of Note: 11/20/2017  Referring Physician: Dr. Lopez      Diagnosis: Parkinson's   Encounter Diagnoses   Name Primary?    Parkinson's disease Yes    Lack of coordination     Balance problem       Start Time: 1:55  End Time: 2:45  Total Time: 50 min    Visit: 5/10 (15)    Medicare:  Therapeutic exercise x 3= $95.94  Total: $1587.79    Subjective: "It's better today than it was on Saturday but my back was really hurting on Saturday." per pt   Pain:   Pre-session: 3/10 back  Post session: 3/10 back    Objective:   Patient being seen by occupational therapy for therapeutic exercise in order to improve posture, ROM, strength, balance, and endurance in order to improve patient's ability to complete functional mobility and daily home activities with improved independence and safety.    Treatment: Therapeutic exercises x 50 min    Maximal Daily Exercises  1) Floor to Ceiling (seated) - 8 reps held for 10 seconds each  2) Side to Side (seated) - 8 reps held for 10 seconds each  3) Forward Step and Reach (standing) - 8 reps each side right and left  4) Sideways Step and Reach (standing) - 8 reps each side right and left  5) Backward Step and Reach (standing) - 8 reps each side right and left   6) Forwards Rock and Reach (standing) - 10 reps each side right and left   7) Sideways Rock and Reach (standing) - 10 reps each side right and left   Required minimal verbal cueing for patient to lift feet higher to clear the ground fully during all exercises requiring steps    Functional Component Tasks  1) Movement #1: BIG Sit to Stand - 5 reps.   2) Movement #2: Bilateral  strengthening with medium strength (pink) thera putty with gross grasp and rotation of cone in putty (to simulate shower knob Pt reports having difficulty with.)  3) Movement #3: Bilateral scapula retractions 10 reps x 5 with red theraband   4) Movement #4: Picked cones off floor " with (B) LE  on airex x 5 reps, CGA at times   5) Movement #5: Step-ups onto Airex x 5 reps.  6) Large step ups with one foot at a time onto a box about 10 inches high x 15 reps each (B) LE.     BIG Walking (Distance, surface, time, etc):  Completed 30 feet in wide hallway x 5 reps. Minimal VC's needed for correction of posture    Hierarchy Tasks (patient effort, modeling, cueing, assistance, progressions)  1) Hierarchy #1: Stepping over cones into small spaces   2) Hierarchy #2: Obstacle course with patient turning in small spaces.    3) Hierarchy #3: Walked on treadmill for 3 min at 2.0 speed, Improvement with shuffle, Pt required verbal cueing for erect posture. Pt used side rails to increase balance.       Assessment:  Patient tolerated session well. Patient continues to need constant cueing for posture, however his progress in this area may be limited due to his significant back pain. Pt progressing well towards all other goals.   Patient would benefit from continued skilled OT services to continue challenging high rep therapeutic exercise and activity in order for patient to improve strength and balance needed for patient to improve his ability to complete and safety with his daily home activities.    GOALS:  Time frame: 4 weeks              1. Pt will increase Bernabe Balance score by 4 points to show a significant change in balance to improve performance in ADLs/IADLs (met)              2. Pt will tolerate 5 minutes walking on treadmill with UE support at a speed of 2.5 to increase performance of leisure activity of walking on treadmill.                           (unmet)              3. Pt will increase MMT score of shoulder flexion, EXT, and ABD to 4/5 to increase proximal strength for transfers. NT              4. Pt will decrease 9-hole peg score by 10 sec to increase fine motor coordination for increase performance in dressing. (unmet)    Revised/Continued Goals:       1. Pt will increase Bernabe Balance score  to 50  to show a significant change in balance to improve performance in ADLs/IADLs               2. Pt will tolerate 5 minutes walking on treadmill with UE support at a speed of 2.5 to increase performance of leisure activity of walking on treadmill.                        3. Pt will increase MMT score of shoulder flexion, EXT, and ABD to 4/5 to increase proximal strength for transfers.              4. Pt will decrease 9-hole peg score by 10 sec to increase fine motor coordination for increase performance in dressing.     G-codes:  Carrying, moving and handling objects  Current:  CI  1<20% impaired  Goal:       CI 1<20% impaired      Plan:  Continue 4 x week x 4 weeks with an additional 2 weeks for the original evaluation and at the end for a discharge session during the certification period from 10/25/2017 to 12/1/2017 in pursuit of OT goals.

## 2017-11-21 ENCOUNTER — CLINICAL SUPPORT (OUTPATIENT)
Dept: REHABILITATION | Facility: HOSPITAL | Age: 82
End: 2017-11-21
Payer: MEDICARE

## 2017-11-21 DIAGNOSIS — R27.9 LACK OF COORDINATION: Primary | ICD-10-CM

## 2017-11-21 DIAGNOSIS — R26.89 BALANCE PROBLEM: ICD-10-CM

## 2017-11-21 DIAGNOSIS — G20.A1 PARKINSON'S DISEASE: ICD-10-CM

## 2017-11-21 PROCEDURE — 97110 THERAPEUTIC EXERCISES: CPT

## 2017-11-22 ENCOUNTER — CLINICAL SUPPORT (OUTPATIENT)
Dept: REHABILITATION | Facility: HOSPITAL | Age: 82
End: 2017-11-22
Payer: MEDICARE

## 2017-11-22 DIAGNOSIS — G20.A1 PARKINSON'S DISEASE: ICD-10-CM

## 2017-11-22 DIAGNOSIS — R27.9 LACK OF COORDINATION: Primary | ICD-10-CM

## 2017-11-22 DIAGNOSIS — R26.89 BALANCE PROBLEM: ICD-10-CM

## 2017-11-22 PROCEDURE — G8985 CARRY GOAL STATUS: HCPCS | Mod: CI

## 2017-11-22 PROCEDURE — 97110 THERAPEUTIC EXERCISES: CPT

## 2017-11-22 PROCEDURE — G8986 CARRY D/C STATUS: HCPCS | Mod: CI

## 2017-11-22 NOTE — PROGRESS NOTES
"Occupational Therapy   Treatment/Discharge    Elvis Thompson   MRN: [id number]   Date of Note: 11/22/2017  Referring Physician: Dr. Lopez      Diagnosis: Parkinson's   Encounter Diagnoses   Name Primary?    Parkinson's disease     Lack of coordination Yes    Balance problem       Start Time: 2:50  End Time: 3:50  Total Time: 60 min    Visit: 7/10 (17)    Medicare:  Therapeutic exercise x 4= $127.92  Total: $1811.65    Subjective: "My back pain is about a four today, but it was up to a nine Saturday."    Pain:   Pre-session: 4/10 back  Post session: 4/10 back    Objective:   Patient being seen by occupational therapy for therapeutic exercise in order to improve posture, ROM, strength, balance, and endurance in order to improve patient's ability to complete functional mobility and daily home activities with improved independence and safety.    Treatment: Therapeutic exercises x 45 min    Maximal Daily Exercises  1) Floor to Ceiling (seated) - 8 reps held for 10 seconds each  2) Side to Side (seated) - 8 reps held for 10 seconds each  3) Forward Step and Reach (standing) - 8 reps each side right and left  4) Sideways Step and Reach (standing) - 8 reps each side right and left  5) Backward Step and Reach (standing) - 8 reps each side right and left   6) Forwards Rock and Reach (standing) - 10 reps each side right and left   7) Sideways Rock and Reach (standing) - 10 reps each side right and left       Functional Component Tasks  1) Movement #1: BIG Sit to Stand - 5 reps.   2) Movement #2: Bilateral  strengthening with medium strength (pink) thera putty with gross grasp and rotation of cone in putty (to simulate shower knob Pt reports having difficulty with.)  3) Movement #3: Bilateral scapula retractions 10 reps x 5 with red theraband   4) Movement #4: Picked cones off floor with (B) LE  on airex x 5 reps, CGA at times   5) Movement #5: Step-ups onto Airex x 5 reps.  6) Functional reaching across midline (B) " "UE x 5 reps.     BIG Walking (Distance, surface, time, etc):  Completed 30 feet in wide hallway x 5 reps. Minimal VC's needed for correction of posture    Hierarchy Tasks (patient effort, modeling, cueing, assistance, progressions)  1) Hierarchy #1: Stepping over cones into small spaces   2) Hierarchy #2: Obstacle course with patient turning in small spaces.    3) Hierarchy #3: Walked on treadmill for 3.5 min at 2.0 speed, Improvement with shuffle, Pt required verbal cueing for erect posture. Pt used side rails to increase balance.     Pt reassessed as follows:     Strength         Left Right   Shoulder flex 4/5 (+1) 4/5 (+0)   Shoulder abd 4/5 (+1) 5/5 (+2)   Shoulder ER 5/5 (+0) 5/5 (+1)   Shoulder IR 5/5 (+0) 5/5 (+1)   Shoulder Extension 5/5 (+3) 5/5 (+1)   Shoulder Horizontal adduction 5/5 (+0) 5/5 (+0)          Strength:                  (R) UE: Good                   (L) UE: Good     9-hole peg:   (L): 36.50" (-5.1")  (R): 44.94" (-2.11")     Buttonin:25.94 minutes (-34.15")                                             Bernabe Balance Scale    SITTING TO STANDING  INSTRUCTIONS: Please stand up. Try not to use your hand for support.  (  X  ) 4 able to stand without using hands and stabilize independently  (    ) 3 able to stand independently using hands  (    ) 2 able to stand using hands after several tries  (    ) 1 needs minimal aid to stand or stabilize  (    ) 0 needs moderate or maximal assist to stand    STANDING UNSUPPORTED  INSTRUCTIONS: Please stand for two minutes without holding on.  (  X  ) 4 able to stand safely for 2 minutes  (    ) 3 able to stand 2 minutes with supervision  (    ) 2 able to stand 30 seconds unsupported  (    ) 1 needs several tries to stand 30 seconds unsupported  (    ) 0 unable to stand 30 seconds unsupported    If a subject is able to stand 2 minutes unsupported, score full points for sitting unsupported. Proceed to item #4.    SITTING WITH BACK UNSUPPORTED BUT FEET " SUPPORTED ON FLOOR OR ON A STOOL  INSTRUCTIONS: Please sit with arms folded for 2 minutes.  ( X   ) 4 able to sit safely and securely for 2 minutes  (    ) 3 able to sit 2 minutes under supervision  (    ) 2 able to able to sit 30 seconds  (    ) 1 able to sit 10 seconds  (    ) 0 unable to sit  without support 10 seconds    STANDING TO SITTING  INSTRUCTIONS: Please sit down.  ( X   ) 4 sits safely with minimal use of hands  (    ) 3 controls descent by using hands  (    ) 2 uses back of legs against chair to control descent  (    ) 1 sits independently but has uncontrolled descent  (    ) 0 needs assist to sit    TRANSFERS  INSTRUCTIONS: Arrange chair(s) for pivot transfer. Ask subject to transfer one way toward a seat with armrests and one way toward a seat without armrests. You may use two chairs (one with and one without armrests) or a bed and a chair.  (  X  ) 4 able to transfer safely with minor use of hands  (    ) 3 able to transfer safely definite need of hands  (    ) 2 able to transfer with verbal cuing and/or supervision  (    ) 1 needs one person to assist  (    ) 0 needs two people to assist or supervise to be safe    STANDING UNSUPPORTED WITH EYES CLOSED  INSTRUCTIONS: Please close your eyes and stand still for 10 seconds.  (  X  ) 4 able to stand 10 seconds safely  (    ) 3 able to stand 10 seconds with supervision   (    ) 2 able to stand 3 seconds  (    ) 1 unable to keep eyes closed 3 seconds but stays safely  (    ) 0 needs help to keep from falling    STANDING UNSUPPORTED WITH FEET TOGETHER  INSTRUCTIONS: Place your feet together and stand without holding on.  ( X   ) 4 able to place feet together independently and stand 1 minute safely  (    ) 3 able to place feet together independently and stand 1 minute with supervision  (    ) 2 able to place feet together independently but unable to hold for 30 seconds  (    ) 1 needs help to attain position but able to stand 15 seconds feet together  (     ) 0 needs help to attain position and unable to hold for 15 seconds      REACHING FORWARD WITH OUTSTRETCHED ARM WHILE STANDING  INSTRUCTIONS: Lift arm to 90 degrees. Stretch out your fingers and reach forward as far as you can. (Examiner places a ruler at the end of fingertips when arm is at 90 degrees. Fingers should not touch the ruler while reaching forward. The recorded measure is the distance forward that the fingers reach while the subject is in the most forward lean position. When possible, ask subject to use both arms when reaching to avoid rotation of the trunk.)  (    ) 4 can reach forward confidently 25 cm (10 inches)  ( X  ) 3 can reach forward  12 cm (5 inches)  (    ) 2 can reach forward 5 cm (2 inches)  (    ) 1 reaches forward but needs supervision  (    ) 0 loses balance while trying/requires external support     OBJECT FROM THE FLOOR FROM A STANDING POSITION  INSTRUCTIONS:  the shoe/slipper, which is place in front of your feet.  (X    ) 4 able to  slipper safely and easily  (    ) 3 able to  slipper but needs supervision   (    ) 2 unable to  but reaches 2-5 cm(1-2 inches) from slipper and keeps balance  independently  (    ) 1 unable to  and needs supervision while trying  (    ) 0 unable to try/needs assist to keep from losing balance or falling  TURNING TO LOOK BEHIND OVER LEFT AND RIGHT SHOULDERS WHILE STANDING  INSTRUCTIONS: Turn to look directly behind you over toward the left shoulder. Repeat to the right. Examiner may pick an object to look at directly behind the subject to encourage a better twist turn.  (  X  ) 4 looks behind from both sides and weight shifts well  (    ) 3 looks behind one side only other side shows less weight shift  (    ) 2 turns sideways only but maintains balance  (    ) 1 needs supervision when turning  (    ) 0 needs assist to keep from losing balance or falling    TURN 360 DEGREES  INSTRUCTIONS: Turn completely around in  a full [place]. Pause. Then turn a full [place] in the other direction.  (    ) 4 able to turn 360 degrees safely in 4 seconds or less  ( X   ) 3 able to turn 360 degrees safely one side only 4 seconds or less  (    ) 2 able to turn 360 degrees safely but slowly  (    ) 1 needs close supervision or verbal cuing  (    ) 0 needs assistance while turning    PLACE ALTERNATE FOOT ON STEP OR STOOL WHILE STANDING UNSUPPORTED  INSTRUCTIONS: Place each foot alternately on the step/stool. Continue until each foot has touch the step/stool four times.  ( X   ) 4 able to stand independently and safely and complete 8 steps in 20 seconds  (    ) 3 able to stand independently and complete 8 steps in > 20 seconds  (    ) 2 able to complete 4 steps without aid with supervision  (    ) 1 able to complete > 2 steps needs minimal assist  (    ) 0 needs assistance to keep from falling/unable to try    STANDING UNSUPPORTED ONE FOOT IN FRONT  INSTRUCTIONS: (DEMONSTRATE TO SUBJECT) Place one foot directly in front of the other. If you feel that you cannot place your foot directly in front, try to step far enough ahead that the heel of your forward foot is ahead of the toes of the other foot. (To score 3 points, the length of the step should exceed the length of the other foot and the width of the stance should approximate the subjects normal stride width.)   (    ) 4 able to place foot tandem independently and hold 30 seconds  ( X   ) 3 able to place foot ahead independently and hold 30 seconds  (    ) 2 able to take small step independently and hold 30 seconds  (    ) 1 needs help to step but can hold 15 seconds  (    ) 0 loses balance while stepping or standing    STANDING ON ONE LEG  INSTRUCTIONS: Stand on one leg as long as you can without holding on.  (    ) 4 able to lift leg independently and hold > 10 seconds  (    ) 3 able to lift leg independently and hold  5-10 seconds  ( X   ) 2 able to lift leg independently and hold ? 3  "seconds  (    ) 1 tries to lift leg unable to hold 3 seconds but remains standing independently.  (    ) 0 unable to try of needs assist to prevent fall      ( 51   )   TOTAL SCORE (Maximum = 56)    Interpretation: 41-56 = low fall risk     21-40 = medium fall risk     0 -20 = high fall risk    Minimal Detectable Change:    A change of 4 points is needed to be 95% confident that true change has occurred if a patient scores within 45-56 initially, 5 points if they score within 35-44, 7 points if they score within 25-34 and, finally, 5 points if their initial score is within 0-24 on the Bernabe Balance Scale.  Christina RAMÍREZ; [facility] [employer] and Older People ([facility]) [facility], Kate FERNANDEZ. (2009). How much change is true change? The minimum detectable change of the Bernabe Balance Scale in elderly people.  J Rehabil Med. 41(5):343-6.        TIMED UP and GO: 14.97" (-2.08")        Assessment:  Pt has progress well throughout treatment and has met 2/4 goals.  Pt is now able to walk on the treadmill for 4 minutes uninterrupted at a speed of 2.0, the previous speed he was walking before debility.  Pt with improvement in erect posture although he continues to suffer from kyphosis due to back pain, but improved. Also Pt with improved balance, results of Bernabe balance test, and also ambulation directly from sitting. Pt and therapist agree that Pt is appropriate for d/c at this time with f/u visit scheduled with MD.  Pt is aware and voice understanding that he is to continue HEP to maintain gains.     GOALS:  Time frame: 4 weeks              1. Pt will increase Bernabe Balance score by 4 points to show a significant change in balance to improve performance in ADLs/IADLs (met)              2. Pt will tolerate 5 minutes walking on treadmill with UE support at a speed of 2.5 to increase performance of leisure activity of walking on treadmill.                           (unmet)              3. Pt will increase MMT score of shoulder flexion, " EXT, and ABD to 4/5 to increase proximal strength for transfers. NT              4. Pt will decrease 9-hole peg score by 10 sec to increase fine motor coordination for increase performance in dressing. (unmet)    Revised/Continued Goals:       1. Pt will increase Bernabe Balance score to 50  to show a significant change in balance to improve performance in ADLs/IADLs (met)              2. Pt will tolerate 5 minutes walking on treadmill with UE support at a speed of 2.5 to increase performance of leisure activity of walking on treadmill.                    (unmet)                     3. Pt will increase MMT score of shoulder flexion, EXT, and ABD to 4/5 to increase proximal strength for transfers. (met)              4. Pt will decrease 9-hole peg score by 10 sec to increase fine motor coordination for increase performance in dressing. (unmet)    G-codes:  Carrying, moving and handling objects  Goal:       CI 1<20% impaired   Discharge:  CI  1<20% impaired (8.9%)    Plan:  Pt to be d/c at this time due to reaching maximal possible benefit in therapy.       SONIA Cuellar

## 2018-02-22 ENCOUNTER — LAB VISIT (OUTPATIENT)
Dept: LAB | Facility: HOSPITAL | Age: 83
End: 2018-02-22
Attending: FAMILY MEDICINE
Payer: MEDICARE

## 2018-02-22 DIAGNOSIS — Z79.01 LONG TERM (CURRENT) USE OF ANTICOAGULANTS: Primary | ICD-10-CM

## 2018-02-22 LAB
INR PPP: 2.9
PROTHROMBIN TIME: 27.7 SEC

## 2018-02-22 PROCEDURE — 36415 COLL VENOUS BLD VENIPUNCTURE: CPT

## 2018-02-22 PROCEDURE — 85610 PROTHROMBIN TIME: CPT

## 2018-03-20 ENCOUNTER — OFFICE VISIT (OUTPATIENT)
Dept: NEUROLOGY | Facility: CLINIC | Age: 83
End: 2018-03-20
Payer: MEDICARE

## 2018-03-20 VITALS
HEIGHT: 65 IN | DIASTOLIC BLOOD PRESSURE: 62 MMHG | RESPIRATION RATE: 16 BRPM | HEART RATE: 60 BPM | BODY MASS INDEX: 31 KG/M2 | SYSTOLIC BLOOD PRESSURE: 134 MMHG | WEIGHT: 186.06 LBS

## 2018-03-20 DIAGNOSIS — R41.3 MEMORY LOSS: ICD-10-CM

## 2018-03-20 DIAGNOSIS — K59.09 OTHER CONSTIPATION: ICD-10-CM

## 2018-03-20 DIAGNOSIS — G20.A1 PARKINSON DISEASE: Primary | ICD-10-CM

## 2018-03-20 PROCEDURE — 99214 OFFICE O/P EST MOD 30 MIN: CPT | Mod: S$PBB | Performed by: PSYCHIATRY & NEUROLOGY

## 2018-03-20 PROCEDURE — 99999 PR PBB SHADOW E&M-EST. PATIENT-LVL III: CPT | Mod: PBBFAC,,, | Performed by: PSYCHIATRY & NEUROLOGY

## 2018-03-20 PROCEDURE — 99213 OFFICE O/P EST LOW 20 MIN: CPT | Mod: PBBFAC | Performed by: PSYCHIATRY & NEUROLOGY

## 2018-03-20 PROCEDURE — 99999 PR STA SHADOW: CPT | Mod: PBBFAC,,, | Performed by: PSYCHIATRY & NEUROLOGY

## 2018-03-20 NOTE — PROGRESS NOTES
HPI: Elvis Thompson is a 85 y.o. male with tremor consistent with parkinson's disease. Vague memory loss noted at times  He states his tremor is stable and not bothersome.  Raising sinemet dose has not been of obvious benefit to him.   He continues to exercise. He has more difficulty with some ADLs over time. This was not improved with BIG therapy- he perfers his home exercise. However, he no longer falls  Swallowing is is good  Mood has been good  Sleep is good.  The patient needs a more local provider.     Review of Systems  Review of Systems   Constitutional: Negative for fever.   HENT: Negative for nosebleeds.    Eyes: Negative for double vision.   Respiratory: Negative for hemoptysis.    Cardiovascular: Negative for chest pain.   Gastrointestinal: Positive for constipation.        Uses OTC fiber for constipation with good relief   Genitourinary: Negative for hematuria.   Musculoskeletal: Negative for falls.   Skin: Negative for rash.   Neurological: Positive for tremors.   Psychiatric/Behavioral: Positive for memory loss.        Memory stable, doing well. Driving without difficulty still             Objective:      Physical Exam   Constitutional: He is oriented to person, place, and time. He appears well-developed and well-nourished. No distress.   Eyes: EOM are normal. Pupils are equal, round, and reactive to light.   Cardiovascular: Intact distal pulses.    Musculoskeletal: He exhibits no edema.   Neurological: He is oriented to person, place, and time. He has normal strength. He has a normal Finger-Nose-Finger Test, a normal Heel to Daniel Test and a normal Romberg Test. Gait normal.   Reflex Scores:       Achilles reflexes are 2+ on the right side.  Psychiatric: His speech is normal.   Neurologic Exam     Mental Status   Oriented to person, place, and time.   Attention: normal. Concentration: normal.   Speech: speech is normal   Level of consciousness: alert  Knowledge: consistent with education.   Able to  name object. Able to repeat. Normal comprehension.   Recent and remote memory intact     Cranial Nerves     CN II   Visual fields full to confrontation.   Right visual field deficit: none    CN III, IV, VI   Pupils are equal, round, and reactive to light.  Extraocular motions are normal.   CN III: no CN III palsy  Nystagmus: none   Diplopia: none  Ophthalmoparesis: none    CN V   Facial sensation intact.     CN VII   Facial expression full, symmetric.     CN VIII   CN VIII normal.     CN IX, X   CN IX normal.   CN X normal.     CN XI   CN XI normal.     CN XII   CN XII normal.   Optic disc are flat with normal vasculature      Motor Exam   Muscle bulk: normal  Overall muscle tone: normal  Left arm tone: cogwheel rigidity    Strength   Strength 5/5 throughout. Mild bradykinesia noted bilaterally     Sensory Exam   Vibration normal.   Temp intact bilaterally     Gait, Coordination, and Reflexes     Gait  Gait: normal (No shuffling but reduced arm swing noted. Good postural reflexes)    Coordination   Romberg: negative  Finger to nose coordination: normal  Heel to shin coordination: normal    Tremor   Resting tremor: present (left greater than right hand-- stable today)  Intention tremor: absent  Action tremor: absent    Reflexes   Right achilles: 2+  Right ankle clonus: absent  Left ankle clonus: absent1+ reflexes in the upper and lower extremities throughout         [email or web address]:  CT head unremarkable  Labs: B12/TSh unremarkable        Assessment/ Recommendations:    Elvis Thompson is a 85 y.o. male with tremor consistent with parkinson's disease. Vague memory loss noted at times  I recommend:    1. Continue sinemet to TID dosing - most recent dose change did not effect symptoms.   2. He completed LSVT Big Therapy for Parkinson's disease which did reduce his falls.   3. Memory loss is rare and not bothersome- he is functioning well. Consider treating with exelon in the future if worse. No restrictions needed. CDR  rating is less than 1,stable  4. He treats constipation with OTC agents PRN.   5. Refer to family medicine to establish care locally.   RTC 6 months

## 2018-03-22 ENCOUNTER — LAB VISIT (OUTPATIENT)
Dept: LAB | Facility: HOSPITAL | Age: 83
End: 2018-03-22
Attending: FAMILY MEDICINE
Payer: MEDICARE

## 2018-03-22 DIAGNOSIS — Z79.01 LONG TERM (CURRENT) USE OF ANTICOAGULANTS: Primary | ICD-10-CM

## 2018-03-22 LAB
INR PPP: 3
PROTHROMBIN TIME: 29.3 SEC

## 2018-03-22 PROCEDURE — 36415 COLL VENOUS BLD VENIPUNCTURE: CPT

## 2018-03-22 PROCEDURE — 85610 PROTHROMBIN TIME: CPT

## 2018-04-23 ENCOUNTER — LAB VISIT (OUTPATIENT)
Dept: LAB | Facility: HOSPITAL | Age: 83
End: 2018-04-23
Attending: FAMILY MEDICINE
Payer: MEDICARE

## 2018-04-23 DIAGNOSIS — Z79.01 LONG TERM (CURRENT) USE OF ANTICOAGULANTS: Primary | ICD-10-CM

## 2018-04-23 LAB
INR PPP: 3
PROTHROMBIN TIME: 29.3 SEC

## 2018-04-23 PROCEDURE — 36415 COLL VENOUS BLD VENIPUNCTURE: CPT

## 2018-04-23 PROCEDURE — 85610 PROTHROMBIN TIME: CPT

## 2018-05-21 ENCOUNTER — LAB VISIT (OUTPATIENT)
Dept: LAB | Facility: HOSPITAL | Age: 83
End: 2018-05-21
Attending: FAMILY MEDICINE
Payer: MEDICARE

## 2018-05-21 DIAGNOSIS — Z79.01 ENCOUNTER FOR CURRENT LONG-TERM USE OF ANTICOAGULANTS: Primary | ICD-10-CM

## 2018-05-21 LAB
INR PPP: 2.5
PROTHROMBIN TIME: 24.5 SEC

## 2018-05-21 PROCEDURE — 85610 PROTHROMBIN TIME: CPT

## 2018-05-21 PROCEDURE — 36415 COLL VENOUS BLD VENIPUNCTURE: CPT

## 2018-05-25 ENCOUNTER — HOSPITAL ENCOUNTER (EMERGENCY)
Facility: HOSPITAL | Age: 83
Discharge: HOME | End: 2018-05-25
Attending: EMERGENCY MEDICINE
Payer: MEDICARE

## 2018-05-25 VITALS
WEIGHT: 188 LBS | SYSTOLIC BLOOD PRESSURE: 175 MMHG | BODY MASS INDEX: 31.32 KG/M2 | HEART RATE: 52 BPM | HEIGHT: 65 IN | DIASTOLIC BLOOD PRESSURE: 81 MMHG | TEMPERATURE: 96 F | RESPIRATION RATE: 18 BRPM

## 2018-05-25 DIAGNOSIS — S30.0XXA LUMBAR CONTUSION, INITIAL ENCOUNTER: Primary | ICD-10-CM
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
## 2018-05-25 PROCEDURE — 99284 EMERGENCY DEPT VISIT MOD MDM: CPT

## 2018-05-25 PROCEDURE — 25000003 PHARM REV CODE 250: Performed by: EMERGENCY MEDICINE

## 2018-05-25 RX ORDER — OXYCODONE AND ACETAMINOPHEN 5; 325 MG/1; MG/1
1 TABLET ORAL EVERY 4 HOURS PRN
Qty: 6 TABLET | Refills: 0 | Status: SHIPPED | OUTPATIENT
Start: 2018-05-25 | End: 2018-06-29

## 2018-05-25 RX ORDER — ONDANSETRON 4 MG/1
4 TABLET, ORALLY DISINTEGRATING ORAL
Status: DISCONTINUED | OUTPATIENT
Start: 2018-05-25 | End: 2018-05-25

## 2018-05-25 RX ORDER — OXYCODONE AND ACETAMINOPHEN 5; 325 MG/1; MG/1
1 TABLET ORAL
Status: DISCONTINUED | OUTPATIENT
Start: 2018-05-25 | End: 2018-05-25

## 2018-05-25 RX ORDER — ACETAMINOPHEN 500 MG
1000 TABLET ORAL
Status: COMPLETED | OUTPATIENT
Start: 2018-05-25 | End: 2018-05-25

## 2018-05-25 RX ADMIN — ACETAMINOPHEN 1000 MG: 500 TABLET ORAL at 03:05

## 2018-05-25 NOTE — ED TRIAGE NOTES
85 y.o. male presents to ER    Chief Complaint   Patient presents with    Back Pain     lower back    Leg Pain     bilateral legs   pt c/o lower back pain & bilateral leg pain after falling on 5/22. No acute distress noted.

## 2018-05-25 NOTE — DISCHARGE INSTRUCTIONS
You have been evaluated for back pain after a fall.  Your tests do not show any evidence of fracture or current problem.  You should take ibuprofen 800 mg and Tylenol 1000 mg together as needed every 8 hours as needed for pain.  For pain not controlled by this regimen you may take a Percocet.  Please return immediately to the ER for any neurologic symptoms including changes in vision or hearing or balance.

## 2018-05-25 NOTE — ED NOTES
Discharged to home/self care.    - Condition at discharge: Good  - Mode of Discharge: Ambulatory  - The patient left the ED accompanied by a family member.  - The discharge instructions were discussed with the patient & his daughter.  - They both stated an understanding of the discharge instructions.  - Walked pt to the discharge station.

## 2018-06-20 ENCOUNTER — LAB VISIT (OUTPATIENT)
Dept: LAB | Facility: HOSPITAL | Age: 83
End: 2018-06-20
Attending: FAMILY MEDICINE
Payer: MEDICARE

## 2018-06-20 DIAGNOSIS — Z79.01 LONG TERM (CURRENT) USE OF ANTICOAGULANTS: Primary | ICD-10-CM

## 2018-06-20 LAB
INR PPP: 4.1
PROTHROMBIN TIME: 39 SEC

## 2018-06-20 PROCEDURE — 85610 PROTHROMBIN TIME: CPT

## 2018-06-20 PROCEDURE — 36415 COLL VENOUS BLD VENIPUNCTURE: CPT

## 2018-06-29 ENCOUNTER — HOSPITAL ENCOUNTER (OUTPATIENT)
Dept: RADIOLOGY | Facility: HOSPITAL | Age: 83
Discharge: HOME | End: 2018-06-29
Attending: PSYCHIATRY & NEUROLOGY
Payer: MEDICARE

## 2018-06-29 ENCOUNTER — OFFICE VISIT (OUTPATIENT)
Dept: NEUROLOGY | Facility: CLINIC | Age: 83
End: 2018-06-29
Payer: MEDICARE

## 2018-06-29 VITALS
HEIGHT: 65 IN | BODY MASS INDEX: 31.81 KG/M2 | HEART RATE: 56 BPM | WEIGHT: 190.94 LBS | RESPIRATION RATE: 16 BRPM | SYSTOLIC BLOOD PRESSURE: 138 MMHG | DIASTOLIC BLOOD PRESSURE: 64 MMHG

## 2018-06-29 DIAGNOSIS — G20.A1 PARKINSON DISEASE: ICD-10-CM

## 2018-06-29 DIAGNOSIS — K59.09 OTHER CONSTIPATION: ICD-10-CM

## 2018-06-29 DIAGNOSIS — R26.9 ABNORMAL GAIT: ICD-10-CM

## 2018-06-29 DIAGNOSIS — R53.1 WEAKNESS: ICD-10-CM

## 2018-06-29 DIAGNOSIS — R53.1 WEAKNESS: Primary | ICD-10-CM

## 2018-06-29 PROCEDURE — 99213 OFFICE O/P EST LOW 20 MIN: CPT | Mod: PBBFAC | Performed by: PSYCHIATRY & NEUROLOGY

## 2018-06-29 PROCEDURE — 99214 OFFICE O/P EST MOD 30 MIN: CPT | Mod: S$PBB | Performed by: PSYCHIATRY & NEUROLOGY

## 2018-06-29 PROCEDURE — 70450 CT HEAD/BRAIN W/O DYE: CPT | Mod: 26,,, | Performed by: RADIOLOGY

## 2018-06-29 PROCEDURE — 99999 PR STA SHADOW: CPT | Mod: PBBFAC,,, | Performed by: PSYCHIATRY & NEUROLOGY

## 2018-06-29 PROCEDURE — 99999 PR PBB SHADOW E&M-EST. PATIENT-LVL III: CPT | Mod: PBBFAC,,, | Performed by: PSYCHIATRY & NEUROLOGY

## 2018-06-29 PROCEDURE — 72125 CT NECK SPINE W/O DYE: CPT | Mod: TC

## 2018-06-29 PROCEDURE — 72125 CT NECK SPINE W/O DYE: CPT | Mod: 26,,, | Performed by: RADIOLOGY

## 2018-06-29 PROCEDURE — 70450 CT HEAD/BRAIN W/O DYE: CPT | Mod: TC

## 2018-06-29 NOTE — PROGRESS NOTES
HPI: Elvis Thompson is a 85 y.o. male with tremor consistent with parkinson's disease. Vague memory loss noted at times    The patient is here sooner than his scheduled 6 months appointment to discuss his symptoms. He believes his PD symptoms are worsening- he feels he is getting weaker. He can't carry a gallon of milk  He feels it is difficulty to throw a towel over his shower door like her used to. He also feels off balance with walking and weaker the legs. He has no neck pain  His tremor is well controlled. He leans to the left with sitting. He has not hit his head with falling.   He does not feel light headedness or dizziness. The weakness is constant  He continues to see PCP routinely.    He fell onto his back last month while exercising at home. CT L spine was done in the ER (see below).  He was weaker before this fall. No further falls  He knew of a L spine chronic compression fracture prior to the fall  He did not establish with PCP locally- states he continues to see Dr Martínez in [place]  Sees Dr Arce for lower back pain with disc disease    Review of Systems  Review of Systems   Constitutional: Negative for fever.   HENT: Negative for nosebleeds.    Eyes: Negative for double vision.   Respiratory: Negative for cough, hemoptysis and shortness of breath.    Cardiovascular: Negative for leg swelling.   Gastrointestinal: Positive for constipation.        Uses OTC fiber for constipation with good relief   Genitourinary: Negative for dysuria and hematuria.   Musculoskeletal: Positive for falls.   Skin: Negative for rash.   Neurological: Positive for tremors.   Psychiatric/Behavioral: Positive for memory loss.        Memory stable, doing well. Driving without difficulty still. Daughter is here today and states he driving locally only             Objective:      Physical Exam   Constitutional: He is oriented to person, place, and time. He appears well-developed and well-nourished. No distress.   Eyes: EOM are  normal. Pupils are equal, round, and reactive to light.   Cardiovascular: Intact distal pulses.    Musculoskeletal: He exhibits no edema.   Neurological: He is oriented to person, place, and time. He has normal strength. He has a normal Finger-Nose-Finger Test, a normal Heel to Daniel Test and a normal Romberg Test.   Reflex Scores:       Achilles reflexes are 2+ on the right side.  Psychiatric: His speech is normal.   Neurologic Exam     Mental Status   Oriented to person, place, and time.   Attention: normal. Concentration: normal.   Speech: speech is normal   Level of consciousness: alert  Knowledge: consistent with education.   Able to name object. Able to repeat. Normal comprehension.   Recent and remote memory intact     Cranial Nerves     CN II   Visual fields full to confrontation.   Right visual field deficit: none    CN III, IV, VI   Pupils are equal, round, and reactive to light.  Extraocular motions are normal.   CN III: no CN III palsy  Nystagmus: none   Diplopia: none  Ophthalmoparesis: none    CN V   Facial sensation intact.     CN VII   Facial expression full, symmetric.     CN VIII   CN VIII normal.     CN IX, X   CN IX normal.   CN X normal.     CN XI   CN XI normal.     CN XII   CN XII normal.   Optic disc are flat with normal vasculature      Motor Exam   Muscle bulk: normal  Overall muscle tone: normal  Left arm tone: cogwheel rigidity    Strength   Strength 5/5 throughout. Mild bradykinesia noted bilaterally     Sensory Exam   Vibration normal.   Temp intact bilaterally     Gait, Coordination, and Reflexes     Gait  Gait: shuffling (Now shuffling and reduced arm swing noted. Good postural reflexes)    Coordination   Romberg: negative  Finger to nose coordination: normal  Heel to shin coordination: normal    Tremor   Resting tremor: present (left greater than right hand-- stable today)  Intention tremor: absent  Action tremor: absent    Reflexes   Right achilles: 2+  Right ankle clonus:  absent  Left ankle clonus: absent1+ reflexes in the upper and lower extremities throughout         [email or web address]:  CT head unremarkable  2018 CT L spine: Chronic compression fracture of L1 with loss of approximately 30% superior vertebral body height.  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  No acute fracture or subluxation is seen.    Multilevel degenerative changes of the lumbar spine contributing to central canal stenosis and neural foraminal narrowing as detailed in the above level by level description.    Labs: B12/TSh unremarkable  IN 4.1 this month- this followed by PCP.       Assessment/ Recommendations:    Elvis Thompson is a 85 y.o. male with tremor consistent with parkinson's disease. Vague memory loss noted at times  Here today complaining of generalized  Weakness and exam shows slower gait  I recommend:  1. Check CT of the head and the CT head neck given his symptoms with slower gait (can't have MRI due to pacemaker)  2. Labs: CMP, CBC, TSH, B12, CK, ESR as otherwise the cause of his generalized weakness is unknown to me. He may need a visit with PCP if no cause found to be sure no other metabolic cause is found. He would also benefit from re consult with PT  3. Continue sinemet to TID dosing - most recent dose change did not effect symptoms. He is not having more tremor at this time  4. He completed LSVT Big Therapy for Parkinson's disease which did reduce his falls- can reconsider at any point   5. Memory loss is rare and not bothersome- he is functioning well. Consider treating with exelon in the future if worse. No restrictions needed. CDR rating is less than 1,stable  6. He treats constipation with OTC agents PRN.     RTC as scheduled  Patient was asked to call for results and further recommendations. To the ER for any new or worsening symptoms in the interim

## 2018-07-09 ENCOUNTER — HOSPITAL ENCOUNTER (OUTPATIENT)
Dept: RADIOLOGY | Facility: HOSPITAL | Age: 83
Discharge: HOME | End: 2018-07-09
Attending: FAMILY MEDICINE
Payer: MEDICARE

## 2018-07-09 DIAGNOSIS — E04.9 ENLARGEMENT OF THYROID: ICD-10-CM

## 2018-07-09 PROCEDURE — 76536 US EXAM OF HEAD AND NECK: CPT | Mod: 26,,, | Performed by: RADIOLOGY

## 2018-07-09 PROCEDURE — 76536 US EXAM OF HEAD AND NECK: CPT | Mod: TC

## 2018-07-19 ENCOUNTER — LAB VISIT (OUTPATIENT)
Dept: LAB | Facility: HOSPITAL | Age: 83
End: 2018-07-19
Attending: FAMILY MEDICINE
Payer: MEDICARE

## 2018-07-19 DIAGNOSIS — Z79.01 LONG TERM (CURRENT) USE OF ANTICOAGULANTS: Primary | ICD-10-CM

## 2018-07-19 LAB
INR PPP: 4.1
PROTHROMBIN TIME: 39.1 SEC

## 2018-07-19 PROCEDURE — 85610 PROTHROMBIN TIME: CPT

## 2018-07-19 PROCEDURE — 36415 COLL VENOUS BLD VENIPUNCTURE: CPT

## 2018-07-27 ENCOUNTER — HOSPITAL ENCOUNTER (EMERGENCY)
Facility: HOSPITAL | Age: 83
Discharge: HOME | End: 2018-07-27
Attending: EMERGENCY MEDICINE
Payer: MEDICARE

## 2018-07-27 VITALS
BODY MASS INDEX: 29.73 KG/M2 | HEART RATE: 53 BPM | HEIGHT: 66 IN | RESPIRATION RATE: 16 BRPM | WEIGHT: 185 LBS | OXYGEN SATURATION: 95 % | TEMPERATURE: 97 F | SYSTOLIC BLOOD PRESSURE: 165 MMHG | DIASTOLIC BLOOD PRESSURE: 72 MMHG

## 2018-07-27 DIAGNOSIS — S02.2XXA CLOSED FRACTURE OF NASAL BONE, INITIAL ENCOUNTER: ICD-10-CM

## 2018-07-27 DIAGNOSIS — S00.03XA CONTUSION OF SCALP, INITIAL ENCOUNTER: ICD-10-CM
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
## 2018-07-27 DIAGNOSIS — Z79.01 ANTICOAGULANT LONG-TERM USE: ICD-10-CM

## 2018-07-27 DIAGNOSIS — S00.83XA FACIAL CONTUSION, INITIAL ENCOUNTER: Primary | ICD-10-CM
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
## 2018-07-27 LAB
ALBUMIN SERPL BCP-MCNC: 3.8 G/DL
ALP SERPL-CCNC: 95 U/L
ALT SERPL W/O P-5'-P-CCNC: 25 U/L
ANION GAP SERPL CALC-SCNC: 6 MMOL/L
AST SERPL-CCNC: 26 U/L
BASOPHILS # BLD AUTO: 0.02 K/UL
BASOPHILS NFR BLD: 0.4 %
BILIRUB SERPL-MCNC: 0.9 MG/DL
BNP SERPL-MCNC: 213 PG/ML
BUN SERPL-MCNC: 19 MG/DL
CALCIUM SERPL-MCNC: 9.3 MG/DL
CHLORIDE SERPL-SCNC: 107 MMOL/L
CO2 SERPL-SCNC: 26 MMOL/L
CREAT SERPL-MCNC: 0.9 MG/DL
DIFFERENTIAL METHOD: ABNORMAL
EOSINOPHIL # BLD AUTO: 0.1 K/UL
EOSINOPHIL NFR BLD: 2.8 %
ERYTHROCYTE [DISTWIDTH] IN BLOOD BY AUTOMATED COUNT: 12.7 %
EST. GFR  (AFRICAN AMERICAN): >60 ML/MIN/1.73 M^2
EST. GFR  (NON AFRICAN AMERICAN): >60 ML/MIN/1.73 M^2
GLUCOSE SERPL-MCNC: 108 MG/DL
HCT VFR BLD AUTO: 39.9 %
HGB BLD-MCNC: 13.4 G/DL
INR PPP: 2.1
LYMPHOCYTES # BLD AUTO: 2.1 K/UL
LYMPHOCYTES NFR BLD: 45.4 %
MCH RBC QN AUTO: 32.1 PG
MCHC RBC AUTO-ENTMCNC: 33.6 G/DL
MCV RBC AUTO: 96 FL
MONOCYTES # BLD AUTO: 0.5 K/UL
MONOCYTES NFR BLD: 10.7 %
NEUTROPHILS # BLD AUTO: 1.9 K/UL
NEUTROPHILS NFR BLD: 40.7 %
PLATELET # BLD AUTO: 145 K/UL
PMV BLD AUTO: 10.3 FL
POTASSIUM SERPL-SCNC: 4.3 MMOL/L
PROT SERPL-MCNC: 6.6 G/DL
PROTHROMBIN TIME: 20.9 SEC
RBC # BLD AUTO: 4.18 M/UL
SODIUM SERPL-SCNC: 139 MMOL/L
WBC # BLD AUTO: 4.6 K/UL

## 2018-07-27 PROCEDURE — 93010 ELECTROCARDIOGRAM REPORT: CPT | Mod: ,,, | Performed by: INTERNAL MEDICINE

## 2018-07-27 PROCEDURE — 80053 COMPREHEN METABOLIC PANEL: CPT

## 2018-07-27 PROCEDURE — 93005 ELECTROCARDIOGRAM TRACING: CPT

## 2018-07-27 PROCEDURE — 90471 IMMUNIZATION ADMIN: CPT | Performed by: EMERGENCY MEDICINE

## 2018-07-27 PROCEDURE — 85025 COMPLETE CBC W/AUTO DIFF WBC: CPT

## 2018-07-27 PROCEDURE — 36415 COLL VENOUS BLD VENIPUNCTURE: CPT

## 2018-07-27 PROCEDURE — 90714 TD VACC NO PRESV 7 YRS+ IM: CPT | Performed by: EMERGENCY MEDICINE

## 2018-07-27 PROCEDURE — 83880 ASSAY OF NATRIURETIC PEPTIDE: CPT

## 2018-07-27 PROCEDURE — 85610 PROTHROMBIN TIME: CPT

## 2018-07-27 PROCEDURE — 63600175 PHARM REV CODE 636 W HCPCS: Performed by: EMERGENCY MEDICINE

## 2018-07-27 PROCEDURE — 99285 EMERGENCY DEPT VISIT HI MDM: CPT | Mod: 25

## 2018-07-27 RX ADMIN — CLOSTRIDIUM TETANI TOXOID ANTIGEN (FORMALDEHYDE INACTIVATED) AND CORYNEBACTERIUM DIPHTHERIAE TOXOID ANTIGEN (FORMALDEHYDE INACTIVATED) 0.5 ML: 5; 2 INJECTION, SUSPENSION INTRAMUSCULAR at 08:07

## 2018-07-27 NOTE — ED TRIAGE NOTES
Patient fell forward in yard when getting paper. Patient reports falling on face. Facial abrasions noted to right side. Denies LOC. Patient alert.

## 2018-07-27 NOTE — ED NOTES
Patient discharged home. Patient is stable. Patient's daughter at bedside. Patient given discharge paperwork, with verbal understanding and instructions to follow up with PCP. Patient escorted out of ED via wheelchair to daughter's vehicle.

## 2018-07-27 NOTE — ED PROVIDER NOTES
Encounter Date: 7/27/2018       History     Chief Complaint   Patient presents with    Fall    Abrasion     right side facial abrasions     Patient is a 85-year-old male who is currently on Coumadin who presents to the emergency room after a fall in the front door while attempting to retrieve his newspaper.  He states that he leaned forward and simply could not stop himself from tipping over.  He struck the sidewalk in front of him.  He did not suffer loss of consciousness at that time and he pressed his rescue Alert button cementing the police and EMS          Review of patient's allergies indicates:   Allergen Reactions    Iodine and iodide containing products Other (See Comments)    Codeine Other (See Comments)     Insomnia      Morphine Nausea And Vomiting     Past Medical History:   Diagnosis Date    Anticoagulant long-term use     Bradycardia     HTN (hypertension)     Parkinson disease 03/24/2017    Pulmonary embolism     Shingles      Past Surgical History:   Procedure Laterality Date    CARDIAC PACEMAKER PLACEMENT      cataract      FOOT SURGERY      HAND SURGERY      HERNIA REPAIR      KNEE SURGERY      right knee replacement    PROSTATE SURGERY       Family History   Problem Relation Age of Onset    Heart disease Mother      Social History   Substance Use Topics    Smoking status: Never Smoker    Smokeless tobacco: Never Used    Alcohol use No     Review of Systems   Constitutional: Positive for unexpected weight change. Negative for fever.   HENT: Negative for sore throat.    Respiratory: Negative for shortness of breath.    Cardiovascular: Negative for chest pain.   Gastrointestinal: Negative for nausea.   Genitourinary: Negative for dysuria.   Musculoskeletal: Negative for back pain.   Skin: Negative for rash.   Neurological: Negative for weakness.   Hematological: Does not bruise/bleed easily.   All other systems reviewed and are negative.      Physical Exam     Initial Vitals    BP Pulse Resp Temp SpO2   07/27/18 0739 07/27/18 0736 07/27/18 0736 07/27/18 0736 07/27/18 0739   (!) 162/74 68 18 96.7 °F (35.9 °C) 97 %      MAP       --                Physical Exam    Nursing note and vitals reviewed.  Constitutional: He appears well-developed and well-nourished. He does not have a sickly appearance. He does not appear ill.   HENT:   Head: Normocephalic. Head is with abrasion and with contusion.       Nose: Rhinorrhea and nose lacerations present. No septal deviation. Epistaxis is observed.   All there is small skin tear to the right malar/nasal region   Eyes: Conjunctivae, EOM and lids are normal. Pupils are equal, round, and reactive to light.   Neck: Normal range of motion. Neck supple.   Cardiovascular: Normal rate and regular rhythm. Exam reveals no gallop and no friction rub.    No murmur heard.  Pulmonary/Chest: Breath sounds normal. He has no wheezes. He has no rales.   Abdominal: Soft. There is no tenderness. There is no guarding.   Musculoskeletal: Normal range of motion.   Neurological: He is alert and oriented to person, place, and time. He has normal strength.   Skin:        Psychiatric: He has a normal mood and affect. Thought content normal.         ED Course   Procedures  Labs Reviewed   CBC W/ AUTO DIFFERENTIAL - Abnormal; Notable for the following:        Result Value    RBC 4.18 (*)     Hemoglobin 13.4 (*)     Hematocrit 39.9 (*)     MCH 32.1 (*)     Platelets 145 (*)     All other components within normal limits   COMPREHENSIVE METABOLIC PANEL - Abnormal; Notable for the following:     Anion Gap 6 (*)     All other components within normal limits   PROTIME-INR - Abnormal; Notable for the following:     Prothrombin Time 20.9 (*)     INR 2.1 (*)     All other components within normal limits          Imaging Results          X-Ray Chest AP Portable (Final result)  Result time 07/27/18 08:34:54    Final result by Dorcas Cortes MD (07/27/18 08:34:54)                  Impression:      As above.      Electronically signed by: Dorcas Cortes MD  Date:    07/27/2018  Time:    08:34             Narrative:    EXAMINATION:  XR CHEST AP PORTABLE    CLINICAL HISTORY:  hx of chf;    TECHNIQUE:  Single frontal view of the chest was performed.    COMPARISON:  06/09/2017    FINDINGS:  Cardiomegaly with central pulmonary vascular congestion.  No consolidation or pleural effusions.  Left-sided pacemaker device is in place.  The skeletal structures are intact.                               CT Maxillofacial Without Contrast (In process)  Result time 07/27/18 08:35:57               CT CERVICAL SPINE WITHOUT CONTRAST (Final result)  Result time 07/27/18 08:34:25    Final result by Dorcas Cortes MD (07/27/18 08:34:25)                 Impression:      Multilevel degenerative changes of the cervical spine without fracture or subluxation.      Electronically signed by: Dorcas Cortes MD  Date:    07/27/2018  Time:    08:34             Narrative:    EXAMINATION:  CT CERVICAL SPINE WITHOUT CONTRAST    CLINICAL HISTORY:  C-spine trauma, NEXUS/CCR positive, +risk factor(s);    TECHNIQUE:  Low dose axial images, sagittal and coronal reformations were performed though the cervical spine.  Contrast was not administered.    COMPARISON:  06/29/2018    FINDINGS:  The incidentally visualized soft tissue structures of the neck show vascular calcifications and heterogeneity of the thyroid.  The lung apices are clear.    Bones are osteopenic.  There are multilevel degenerative changes consisting of disc space narrowing, endplate sclerosis, marginal osteophytosis and facet arthropathy.                               CT Head Without Contrast (Final result)  Result time 07/27/18 08:23:02    Final result by Dorcas Cortes MD (07/27/18 08:23:02)                 Impression:      No acute intracranial findings.    Age-appropriate cerebral volume loss with mild moderate patchy decreased attenuation supratentorial  white matter while nonspecific suggestive for chronic ischemic change.    No evidence for acute intracranial hemorrhage.  Clinical correlation and further evaluation as warranted.      Electronically signed by: Dorcas Cortes MD  Date:    07/27/2018  Time:    08:23             Narrative:    EXAMINATION:  CT HEAD WITHOUT CONTRAST    CLINICAL HISTORY:  Head trauma, minor, GCS>=13, NOC/NEXUS/CCR neg, first study;    TECHNIQUE:  Multiple sequential 5 mm axial images of the head without contrast.  Coronal and sagittal reformatted imaging from the axial acquisition.    COMPARISON:  06/29/2018    FINDINGS:  There is mild age-appropriate generalized cerebral volume loss.  Compensatory enlargement of the ventricle sulci and cisterns without hydrocephalus.  There is no midline shift or mass effect.  There is mild moderate ill-defined decreased attenuation in the supratentorial white matter while nonspecific suggestive for chronic ischemic change.  There is no evidence for acute intracranial hemorrhage or sulcal effacement.  There is no midline shift or mass effect.  Prominent vascular calcifications.  Visualized paranasal sinuses and mastoid air cells are clear..                                   Closed Head Injury precautions were discussed with patient and/or family/caretaker; specifically that despite unrevealing CT scan or exam, a concussion can represent brain tissue injury.  Second impact syndrome was also discussed.    Trauma precautions were discussed with patient and/or family/caretaker; I do not specifically detect any abdominal, thoracic, CNS, orthopedic, or other emergent or life threatening condition and that patient is safe to be discharged.  It was also discussed that despite an unrevealing examination and negative radiographic examination for serious or life threatening injury, these conditions may still exist.  As such, patient should return to ED immediately should they experience, severe or worsening pain,  shortness of breath, abdominal pain, headache, vomiting, or any other concern.  It was also discussed that not infrequently, injuries may not be diagnosed during the initial ED visit (such as fractures) and that if the patient discovers a new area of concern, a new area of injury that was not evaluated in the ED, they should return for evaluation as they may have an injury that requires treatment.    I have a low suspicion for medical, surgical or other life threatening illness and I believe patient is safe for discharge.  I specifically counseled the patient and/or family/caretaker that despite an unrevealing evaluation in the ED, patient may still be at risk for serious, even life threatening illness.  I have attempted to answer all questions related to her stay today.  I have given the patient explicit instructions to return immediately for any worsening or change in current symptoms, or for any concern.     Patient's headache is either consistent with previous headache and/or lacks features concerning for emergent or life threatening condition.  I do not suspect SAH, meningitis, increased IC pressure, infectious, toxic, vascular, CNS, or other EMC.  I have discussed this at length with patient and/or family/caretaker.      8:53 AM - Re-evaluation:  The patient is resting comfortably and is in no acute distress. Discussed test results and notified of pending labs. Answered questions at this time.                        Clinical Impression:   The primary encounter diagnosis was Facial contusion, initial encounter. Diagnoses of Fall (on)(from) sidewalk curb, initial encounter, Fall, Anticoagulant long-term use, Contusion of scalp, initial encounter, and Closed fracture of nasal bone, initial encounter were also pertinent to this visit.      Disposition:   Disposition: Discharged  Condition: Stable                        Valerio Larios MD  07/27/18 0853       Valerio Larios MD  07/27/18 0854

## 2018-08-21 RX ORDER — CARBIDOPA AND LEVODOPA 25; 100 MG/1; MG/1
1 TABLET ORAL 3 TIMES DAILY
Qty: 270 TABLET | Refills: 3 | Status: SHIPPED | OUTPATIENT
Start: 2018-08-21 | End: 2018-09-25

## 2018-08-23 ENCOUNTER — LAB VISIT (OUTPATIENT)
Dept: LAB | Facility: HOSPITAL | Age: 83
End: 2018-08-23
Attending: FAMILY MEDICINE
Payer: MEDICARE

## 2018-08-23 DIAGNOSIS — Z79.01 LONG TERM (CURRENT) USE OF ANTICOAGULANTS: Primary | ICD-10-CM

## 2018-08-23 LAB
INR PPP: 4.5
PROTHROMBIN TIME: 42.2 SEC

## 2018-08-23 PROCEDURE — 36415 COLL VENOUS BLD VENIPUNCTURE: CPT

## 2018-08-23 PROCEDURE — 85610 PROTHROMBIN TIME: CPT

## 2018-08-28 ENCOUNTER — LAB VISIT (OUTPATIENT)
Dept: LAB | Facility: HOSPITAL | Age: 83
End: 2018-08-28
Attending: FAMILY MEDICINE
Payer: MEDICARE

## 2018-08-28 DIAGNOSIS — Z79.01 LONG TERM (CURRENT) USE OF ANTICOAGULANTS: Primary | ICD-10-CM

## 2018-08-28 LAB
INR PPP: 1.5
PROTHROMBIN TIME: 14.6 SEC

## 2018-08-28 PROCEDURE — 85610 PROTHROMBIN TIME: CPT

## 2018-08-28 PROCEDURE — 36415 COLL VENOUS BLD VENIPUNCTURE: CPT

## 2018-09-19 ENCOUNTER — LAB VISIT (OUTPATIENT)
Dept: LAB | Facility: HOSPITAL | Age: 83
End: 2018-09-19
Attending: FAMILY MEDICINE
Payer: MEDICARE

## 2018-09-19 DIAGNOSIS — Z79.01 LONG TERM (CURRENT) USE OF ANTICOAGULANTS: Primary | ICD-10-CM

## 2018-09-19 LAB
INR PPP: 4
PROTHROMBIN TIME: 37.8 SEC

## 2018-09-19 PROCEDURE — 36415 COLL VENOUS BLD VENIPUNCTURE: CPT

## 2018-09-19 PROCEDURE — 85610 PROTHROMBIN TIME: CPT

## 2018-09-24 ENCOUNTER — LAB VISIT (OUTPATIENT)
Dept: LAB | Facility: HOSPITAL | Age: 83
End: 2018-09-24
Attending: FAMILY MEDICINE
Payer: MEDICARE

## 2018-09-24 DIAGNOSIS — Z79.01 LONG TERM (CURRENT) USE OF ANTICOAGULANTS: Primary | ICD-10-CM

## 2018-09-24 LAB
INR PPP: 1.4
PROTHROMBIN TIME: 13.7 SEC

## 2018-09-24 PROCEDURE — 36415 COLL VENOUS BLD VENIPUNCTURE: CPT

## 2018-09-24 PROCEDURE — 85610 PROTHROMBIN TIME: CPT

## 2018-09-25 ENCOUNTER — OFFICE VISIT (OUTPATIENT)
Dept: NEUROLOGY | Facility: CLINIC | Age: 83
End: 2018-09-25
Payer: MEDICARE

## 2018-09-25 VITALS
WEIGHT: 186.31 LBS | BODY MASS INDEX: 36.58 KG/M2 | HEIGHT: 60 IN | DIASTOLIC BLOOD PRESSURE: 60 MMHG | SYSTOLIC BLOOD PRESSURE: 132 MMHG | HEART RATE: 50 BPM | RESPIRATION RATE: 18 BRPM

## 2018-09-25 DIAGNOSIS — G20.A1 PARKINSON DISEASE: Primary | ICD-10-CM

## 2018-09-25 DIAGNOSIS — K59.09 OTHER CONSTIPATION: ICD-10-CM

## 2018-09-25 DIAGNOSIS — R41.3 MEMORY LOSS: ICD-10-CM

## 2018-09-25 PROCEDURE — 99999 PR STA SHADOW: CPT | Mod: PBBFAC,,, | Performed by: PSYCHIATRY & NEUROLOGY

## 2018-09-25 PROCEDURE — 99999 PR PBB SHADOW E&M-EST. PATIENT-LVL III: CPT | Mod: PBBFAC,,, | Performed by: PSYCHIATRY & NEUROLOGY

## 2018-09-25 PROCEDURE — 99213 OFFICE O/P EST LOW 20 MIN: CPT | Mod: PBBFAC | Performed by: PSYCHIATRY & NEUROLOGY

## 2018-09-25 PROCEDURE — 99214 OFFICE O/P EST MOD 30 MIN: CPT | Mod: S$PBB | Performed by: PSYCHIATRY & NEUROLOGY

## 2018-09-25 RX ORDER — CARBIDOPA AND LEVODOPA 25; 100 MG/1; MG/1
1 TABLET ORAL 4 TIMES DAILY
Qty: 360 TABLET | Refills: 3 | Status: SHIPPED | OUTPATIENT
Start: 2018-09-25

## 2018-09-25 NOTE — PROGRESS NOTES
HPI: Elvis Thompson is a 85 y.o. male with tremor consistent with parkinson's disease. Vague memory loss noted at times  At the last visit:  complaining of generalized  Weakness and exam shows slower gait    Since the last visit, the patient had a fall in July (ER CT scans reviewed)- he fell over while attempting to get a neighbor's newspaper    He also had Thyroid US ordered by PCP after CT showed some abnormalities there-PCP is following    He has fallen 6 times over the past 3 months. He has slipped in the shower, he slipped getting into or out of bed and then slipped with his walker in the yard.  He does have a medical alert bracelet now. There was no other injury.   He is no longer driving due to visual problems. He is here with his daughter today.   He feels his legs are getting stuck. He is not is fainting. His tremor is mildy worse as well. His posture is more hunched.   Review of Systems  Review of Systems   Constitutional: Negative for fever.   HENT: Negative for nosebleeds.    Eyes: Negative for double vision.   Respiratory: Negative for hemoptysis.    Cardiovascular: Negative for leg swelling.   Gastrointestinal: Positive for constipation.        Uses OTC fiber for constipation with good relief   Genitourinary: Negative for dysuria and hematuria.   Musculoskeletal: Positive for falls.   Skin: Negative for rash.   Neurological: Positive for tremors.   Psychiatric/Behavioral: Positive for memory loss.        Memory stable, doing well. Daughter states this is stable             Objective:           Exam:    Gen Appearance, well developed/nourished in no apparent distress  CV: 2+ distal pulses with no edema or swelling  Neuro:  MS: Awake, alert, oriented to place, person, time, situation. Sustains attention. Recent/remote memory intact, Language is full to spontaneous speech/repetition/naming/comprehension. Fund of Knowledge is full  CN: Optic discs are flat with normal vasculature, PERRL, Extraoccular  movements and visual fields are full. Normal facial sensation and strength, Hearing symmetric, Tongue and Palate are midline and strong. Shoulder Shrug symmetric and strong.  Motor: Normal bulk, tone increased throughout, and tremor throughout the arms at rest. 5/5 strength bilateral upper/lower extremities with 1+ reflexes and no clonus. And there is moderate bradykinesia  Sensory: symmetric to temp and vibration. Romberg negative  Cerebellar: Finger-nose,Heal-shin, Rapid alternating movements intact  Gait: Normal stance, no ataxia, but stooped posture, mild difficulty with rise from chair and slowed gait with mild-moderate posture reflex impairment.         Imagin2017:  CT head unremarkable  2018 CT L spine: Chronic compression fracture of L1 with loss of approximately 30% superior vertebral body height.  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  No acute fracture or subluxation is seen.Multilevel degenerative changes of the lumbar spine contributing to central canal stenosis and neural foraminal narrowing as detailed in the above level by level description.    2018 CT C spine: Multilevel degenerative changes of the cervical spine, most pronounced at C5-6, where there is likely a degree of central canal stenosis and neural foraminal narrowing.    Heterogenous and enlarged thyroid gland.  Consider further evaluation with a thyroid ultrasound as clinically warranted.      2018 CT head: Multilevel degenerative changes of the lumbar spine contributing to central canal stenosis and neural foraminal narrowing as detailed in the above level by level description.      2017 CT head: No acute intracranial findings.    Age-appropriate cerebral volume loss with mild moderate patchy decreased attenuation supratentorial white matter while nonspecific suggestive for chronic ischemic change.    No evidence for acute intracranial hemorrhage.  Clinical correlation and further evaluation as  warranted.   7/2017 CT C spine: Multilevel degenerative changes of the cervical spine without fracture or subluxation.            2018: CMP, CBC, TSH, B12, CK, ESR unremarkable/ stable    Assessment/ Recommendations:    Elvis Thompson is a 85 y.o. male with tremor consistent with parkinson's disease. Vague memory loss noted at times  At the last visit:  complaining of generalized  Weakness and exam shows slower gait  I recommend:  1. Updated CT head showed no changes in 2018 and updated CT C spine showed spinal stenosis. (can't have MRI due to pacemaker). CT was again unremarkable  after a fall this summer- he has had increased falls. Daughter will notify me for any further significant falls or worsening- can repeat imaging at that time given his coumadin use.   2. Patient was referred to PCP for generalized weakness. He was found to have thyroid lesions followed by PCP  3. Patient needs continued PT but will do so at home as he is not driving due to visual problems. Needs home health with home PT/OT and nursing to help reduce fall risk given his frequent falls  4. Raise dose of sinemet to QID unless side effects.   5. He completed LSVT Big Therapy for Parkinson's disease which did reduce his falls-prior.   6. Memory loss is rare and not bothersome- he is functioning well. Consider treating with exelon in the future if worse.   7. He treats constipation with OTC agents PRN.   8. Patient is considering assisted living long term, which is recommended by me as well if he does not improve with home health     RTC 3 months , 6 months

## 2018-10-01 ENCOUNTER — HOSPITAL ENCOUNTER (EMERGENCY)
Facility: HOSPITAL | Age: 83
Discharge: HOME | End: 2018-10-01
Attending: SURGERY
Payer: MEDICARE

## 2018-10-01 VITALS
SYSTOLIC BLOOD PRESSURE: 148 MMHG | DIASTOLIC BLOOD PRESSURE: 62 MMHG | HEART RATE: 72 BPM | OXYGEN SATURATION: 96 % | TEMPERATURE: 97 F | RESPIRATION RATE: 18 BRPM

## 2018-10-01 DIAGNOSIS — S20.229A CONTUSION OF BACK, UNSPECIFIED LATERALITY, INITIAL ENCOUNTER: Primary | ICD-10-CM
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
## 2018-10-01 PROCEDURE — 99283 EMERGENCY DEPT VISIT LOW MDM: CPT | Mod: 25

## 2018-10-01 RX ORDER — TRAMADOL HYDROCHLORIDE 50 MG/1
50 TABLET ORAL EVERY 6 HOURS PRN
Qty: 20 TABLET | Refills: 0 | Status: SHIPPED | OUTPATIENT
Start: 2018-10-01 | End: 2018-10-11

## 2018-10-01 RX ORDER — TRAMADOL HYDROCHLORIDE 50 MG/1
50 TABLET ORAL
Status: DISCONTINUED | OUTPATIENT
Start: 2018-10-01 | End: 2018-10-01

## 2018-10-01 NOTE — ED PROVIDER NOTES
[facility]                                                 Chief Complaint  85 y.o. male with Fall    History of Present Illness  Elvis Thompson presents to the emergency room with back pain today  Patient had a slip and fall backwards with abrasion the thoracic spine area  Patient denies any head trauma or loss of consciousness with this fall PTA  Patient is able to ambulate without difficulty, suffering from Parkinson's dz  Pt has frequent falls lately, has old compression fractures per daughter now    The history is provided by the patient   device was not used during this ER visit  Medical HX: Anticoagulation, bradycardia, HTN, Parkinson's, PE, shingles  Surgical HX: Pacemaker, cataracts, foot, healing, hernia, knee, prostate surgery  ALLERGIES: Iodine and codeine    Review of Systems and Physical Exam      Review of Systems  -- Constitution - no fever, denies fatigue, no weakness, no chills  -- Eyes - no tearing or redness, no visual disturbance  -- Ear, Nose - no tinnitus or earache, no nasal congestion or discharge  -- Mouth,Throat - no sore throat, no toothache, normal voice, normal swallowing  -- Respiratory - denies cough and congestion, no shortness of breath, no SANCHEZ  -- Cardiovascular - denies chest pain, no palpitations, denies claudication  -- Gastrointestinal - denies abdominal pain, nausea, vomiting, or diarrhea  -- Genitourinary - no dysuria, no hematuria, no flank pain, no bladder pain  -- Musculoskeletal - back pain, negative for myalgias and arthralgias   -- Neurological - no headache, denies weakness or seizure; no LOC  -- Skin - denies pallor, rash, or changes in skin. no hives or welts noted    Vital Signs  His blood pressure is 152/59 and his pulse is 72.   His respiration is 18 and oxygen saturation is 96%.     Physical Exam  -- Nursing note and vitals reviewed  -- Head: Atraumatic. Normocephalic. No obvious abnormality  -- Eyes: Pupils are equal and  reactive to light. Normal conjunctiva and lids  -- Neck: Normal range of motion. Neck supple. No masses, trachea midline  -- Cardiac: Normal rate, regular rhythm and normal heart sounds  -- Pulmonary: Normal respiratory effort, breath sounds clear to auscultation  -- Abdominal: Soft, no tenderness. Normal bowel sounds. Normal liver edge  -- Musculoskeletal: Normal range of motion, no effusions. Joints stable   -- Neurological: No focal deficits. Showed good interaction with staff  -- Skin: small abrasion on the thoracic area, no bleeding    Emergency Room Course      Radiology  -- Chest x-ray showed no infiltrate and showed no acute pathology  -- T-spine x-rays showed no evidence of fracture or dislocation   -- L-spine x-rays showed no evidence of acute fracture or dislocation   -- Pelvis x-ray showed no evidence of fracture or dislocation      Diagnosis  -- The primary encounter diagnosis was Contusion of back, unspecified laterality, initial encounter.   -- A diagnosis of Fall was also pertinent to this visit.    Disposition and Plan  -- Disposition: home  -- Condition: stable  -- Follow-up: Patient to follow up with Regulo Martínez MD in 1-2 days.  -- I advised the patient that we have found no life threatening condition today  -- At this time, I believe the patient is clinically stable for discharge.   -- The patient acknowledges that close follow up with a MD is required   -- Patient agrees to comply with all instruction and direction given in the ER    This note is dictated on Dragon Natural Speaking word recognition program.  There are word recognition mistakes that are occasionally missed on review.          Jack Blancas MD  10/01/18 1257

## 2018-10-04 ENCOUNTER — HOSPITAL ENCOUNTER (OUTPATIENT)
Facility: HOSPITAL | Age: 83
Discharge: HOME | End: 2018-10-05
Attending: SURGERY | Admitting: SURGERY
Payer: MEDICARE

## 2018-10-04 ENCOUNTER — ANESTHESIA (OUTPATIENT)
Dept: SURGERY | Facility: HOSPITAL | Age: 83
End: 2018-10-04
Payer: MEDICARE

## 2018-10-04 ENCOUNTER — ANESTHESIA (OUTPATIENT)
Dept: EMERGENCY MEDICINE | Facility: HOSPITAL | Age: 83
End: 2018-10-04

## 2018-10-04 ENCOUNTER — ANESTHESIA EVENT (OUTPATIENT)
Dept: EMERGENCY MEDICINE | Facility: HOSPITAL | Age: 83
End: 2018-10-04

## 2018-10-04 ENCOUNTER — ANESTHESIA EVENT (OUTPATIENT)
Dept: SURGERY | Facility: HOSPITAL | Age: 83
End: 2018-10-04
Payer: MEDICARE

## 2018-10-04 DIAGNOSIS — Z01.818 PREOP EXAMINATION: ICD-10-CM

## 2018-10-04 DIAGNOSIS — K56.609 SBO (SMALL BOWEL OBSTRUCTION): ICD-10-CM

## 2018-10-04 DIAGNOSIS — K40.30 INCARCERATED LEFT INGUINAL HERNIA: Primary | ICD-10-CM

## 2018-10-04 LAB
ABO GROUP BLD: NORMAL
ALBUMIN SERPL BCP-MCNC: 3.4 G/DL
ALP SERPL-CCNC: 125 U/L
ALT SERPL W/O P-5'-P-CCNC: 11 U/L
ANION GAP SERPL CALC-SCNC: 8 MMOL/L
APTT BLDCRRT: 33 SEC
AST SERPL-CCNC: 32 U/L
BASOPHILS # BLD AUTO: 0.02 K/UL
BASOPHILS NFR BLD: 0.4 %
BILIRUB SERPL-MCNC: 1.4 MG/DL
BILIRUB UR QL STRIP: NEGATIVE
BLD GP AB SCN CELLS X3 SERPL QL: NORMAL
BUN SERPL-MCNC: 28 MG/DL
CALCIUM SERPL-MCNC: 9.4 MG/DL
CHLORIDE SERPL-SCNC: 103 MMOL/L
CLARITY UR: CLEAR
CO2 SERPL-SCNC: 28 MMOL/L
COLOR UR: YELLOW
CREAT SERPL-MCNC: 0.8 MG/DL
DIFFERENTIAL METHOD: ABNORMAL
EOSINOPHIL # BLD AUTO: 0.1 K/UL
EOSINOPHIL NFR BLD: 1.7 %
ERYTHROCYTE [DISTWIDTH] IN BLOOD BY AUTOMATED COUNT: 12.7 %
EST. GFR  (AFRICAN AMERICAN): >60 ML/MIN/1.73 M^2
EST. GFR  (NON AFRICAN AMERICAN): >60 ML/MIN/1.73 M^2
GLUCOSE SERPL-MCNC: 111 MG/DL
GLUCOSE UR QL STRIP: NEGATIVE
HCT VFR BLD AUTO: 37.9 %
HGB BLD-MCNC: 12.5 G/DL
HGB UR QL STRIP: ABNORMAL
INR PPP: 2
KETONES UR QL STRIP: ABNORMAL
LEUKOCYTE ESTERASE UR QL STRIP: NEGATIVE
LIPASE SERPL-CCNC: 16 U/L
LYMPHOCYTES # BLD AUTO: 0.8 K/UL
LYMPHOCYTES NFR BLD: 14.4 %
MCH RBC QN AUTO: 31.7 PG
MCHC RBC AUTO-ENTMCNC: 33 G/DL
MCV RBC AUTO: 96 FL
MONOCYTES # BLD AUTO: 0.5 K/UL
MONOCYTES NFR BLD: 9 %
NEUTROPHILS # BLD AUTO: 3.9 K/UL
NEUTROPHILS NFR BLD: 74.5 %
NITRITE UR QL STRIP: NEGATIVE
PH UR STRIP: 6 [PH] (ref 5–8)
PLATELET # BLD AUTO: 199 K/UL
PMV BLD AUTO: 9.5 FL
POTASSIUM SERPL-SCNC: 3.9 MMOL/L
PROT SERPL-MCNC: 6.7 G/DL
PROT UR QL STRIP: ABNORMAL
PROTHROMBIN TIME: 19.3 SEC
RBC # BLD AUTO: 3.94 M/UL
RH BLD: NORMAL
SODIUM SERPL-SCNC: 139 MMOL/L
SP GR UR STRIP: 1.02 (ref 1–1.03)
URN SPEC COLLECT METH UR: ABNORMAL
UROBILINOGEN UR STRIP-ACNC: NEGATIVE EU/DL
WBC # BLD AUTO: 5.21 K/UL

## 2018-10-04 PROCEDURE — 36000707: Performed by: SURGERY

## 2018-10-04 PROCEDURE — 25500020 PHARM REV CODE 255: Performed by: SURGERY

## 2018-10-04 PROCEDURE — 83690 ASSAY OF LIPASE: CPT

## 2018-10-04 PROCEDURE — 71000033 HC RECOVERY, INTIAL HOUR: Performed by: SURGERY

## 2018-10-04 PROCEDURE — 93010 ELECTROCARDIOGRAM REPORT: CPT | Mod: ,,, | Performed by: INTERNAL MEDICINE

## 2018-10-04 PROCEDURE — C1781 MESH (IMPLANTABLE): HCPCS | Performed by: SURGERY

## 2018-10-04 PROCEDURE — 85730 THROMBOPLASTIN TIME PARTIAL: CPT

## 2018-10-04 PROCEDURE — 25000003 PHARM REV CODE 250: Performed by: NURSE ANESTHETIST, CERTIFIED REGISTERED

## 2018-10-04 PROCEDURE — 25000003 PHARM REV CODE 250: Performed by: SURGERY

## 2018-10-04 PROCEDURE — 96375 TX/PRO/DX INJ NEW DRUG ADDON: CPT | Mod: 59

## 2018-10-04 PROCEDURE — 37000008 HC ANESTHESIA 1ST 15 MINUTES: Performed by: SURGERY

## 2018-10-04 PROCEDURE — 86850 RBC ANTIBODY SCREEN: CPT

## 2018-10-04 PROCEDURE — 63600175 PHARM REV CODE 636 W HCPCS: Performed by: NURSE ANESTHETIST, CERTIFIED REGISTERED

## 2018-10-04 PROCEDURE — 27000221 HC OXYGEN, UP TO 24 HOURS

## 2018-10-04 PROCEDURE — 80053 COMPREHEN METABOLIC PANEL: CPT

## 2018-10-04 PROCEDURE — 36000706: Performed by: SURGERY

## 2018-10-04 PROCEDURE — 96365 THER/PROPH/DIAG IV INF INIT: CPT | Mod: 59

## 2018-10-04 PROCEDURE — 63600175 PHARM REV CODE 636 W HCPCS: Performed by: SURGERY

## 2018-10-04 PROCEDURE — 36415 COLL VENOUS BLD VENIPUNCTURE: CPT

## 2018-10-04 PROCEDURE — 94761 N-INVAS EAR/PLS OXIMETRY MLT: CPT | Mod: 59

## 2018-10-04 PROCEDURE — 85025 COMPLETE CBC W/AUTO DIFF WBC: CPT

## 2018-10-04 PROCEDURE — A9698 NON-RAD CONTRAST MATERIALNOC: HCPCS | Performed by: SURGERY

## 2018-10-04 PROCEDURE — 00830 ANES HERNIA RPR LWR ABD NOS: CPT | Mod: QZ,P3 | Performed by: NURSE ANESTHETIST, CERTIFIED REGISTERED

## 2018-10-04 PROCEDURE — 96374 THER/PROPH/DIAG INJ IV PUSH: CPT

## 2018-10-04 PROCEDURE — 37000009 HC ANESTHESIA EA ADD 15 MINS: Performed by: SURGERY

## 2018-10-04 PROCEDURE — 49507 PRP I/HERN INIT BLOCK >5 YR: CPT | Mod: LT,,, | Performed by: SURGERY

## 2018-10-04 PROCEDURE — 99285 EMERGENCY DEPT VISIT HI MDM: CPT | Mod: 25

## 2018-10-04 PROCEDURE — 81003 URINALYSIS AUTO W/O SCOPE: CPT

## 2018-10-04 PROCEDURE — 85610 PROTHROMBIN TIME: CPT

## 2018-10-04 PROCEDURE — 93005 ELECTROCARDIOGRAM TRACING: CPT

## 2018-10-04 DEVICE — MESH PROLENE HERNIA EXTENDED: Type: IMPLANTABLE DEVICE | Site: INGUINAL | Status: FUNCTIONAL

## 2018-10-04 RX ORDER — AMLODIPINE BESYLATE 5 MG/1
5 TABLET ORAL DAILY
Status: DISCONTINUED | OUTPATIENT
Start: 2018-10-05 | End: 2018-10-05 | Stop reason: HOSPADM

## 2018-10-04 RX ORDER — ROCURONIUM BROMIDE 10 MG/ML
INJECTION, SOLUTION INTRAVENOUS
Status: DISCONTINUED | OUTPATIENT
Start: 2018-10-04 | End: 2018-10-04

## 2018-10-04 RX ORDER — SODIUM CHLORIDE 9 MG/ML
INJECTION, SOLUTION INTRAVENOUS CONTINUOUS
Status: DISCONTINUED | OUTPATIENT
Start: 2018-10-04 | End: 2018-10-05 | Stop reason: HOSPADM

## 2018-10-04 RX ORDER — ONDANSETRON 2 MG/ML
4 INJECTION INTRAMUSCULAR; INTRAVENOUS
Status: COMPLETED | OUTPATIENT
Start: 2018-10-04 | End: 2018-10-04

## 2018-10-04 RX ORDER — DEXAMETHASONE SODIUM PHOSPHATE 4 MG/ML
INJECTION, SOLUTION INTRA-ARTICULAR; INTRALESIONAL; INTRAMUSCULAR; INTRAVENOUS; SOFT TISSUE
Status: DISCONTINUED | OUTPATIENT
Start: 2018-10-04 | End: 2018-10-04

## 2018-10-04 RX ORDER — SODIUM CHLORIDE, SODIUM LACTATE, POTASSIUM CHLORIDE, CALCIUM CHLORIDE 600; 310; 30; 20 MG/100ML; MG/100ML; MG/100ML; MG/100ML
INJECTION, SOLUTION INTRAVENOUS CONTINUOUS PRN
Status: DISCONTINUED | OUTPATIENT
Start: 2018-10-04 | End: 2018-10-04

## 2018-10-04 RX ORDER — ETOMIDATE 2 MG/ML
INJECTION INTRAVENOUS
Status: DISCONTINUED | OUTPATIENT
Start: 2018-10-04 | End: 2018-10-04

## 2018-10-04 RX ORDER — ACETAMINOPHEN 325 MG/1
650 TABLET ORAL EVERY 6 HOURS PRN
Status: DISCONTINUED | OUTPATIENT
Start: 2018-10-04 | End: 2018-10-05 | Stop reason: HOSPADM

## 2018-10-04 RX ORDER — SUCCINYLCHOLINE CHLORIDE 20 MG/ML
INJECTION INTRAMUSCULAR; INTRAVENOUS
Status: DISCONTINUED | OUTPATIENT
Start: 2018-10-04 | End: 2018-10-04

## 2018-10-04 RX ORDER — BUPIVACAINE HYDROCHLORIDE AND EPINEPHRINE 5; 5 MG/ML; UG/ML
INJECTION, SOLUTION EPIDURAL; INTRACAUDAL; PERINEURAL
Status: DISCONTINUED | OUTPATIENT
Start: 2018-10-04 | End: 2018-10-04 | Stop reason: HOSPADM

## 2018-10-04 RX ORDER — NEOSTIGMINE METHYLSULFATE 5 MG/5 ML
SYRINGE (ML) INTRAVENOUS
Status: DISCONTINUED | OUTPATIENT
Start: 2018-10-04 | End: 2018-10-04

## 2018-10-04 RX ORDER — FENTANYL CITRATE 50 UG/ML
INJECTION, SOLUTION INTRAMUSCULAR; INTRAVENOUS
Status: DISCONTINUED | OUTPATIENT
Start: 2018-10-04 | End: 2018-10-04

## 2018-10-04 RX ORDER — LABETALOL HYDROCHLORIDE 5 MG/ML
INJECTION, SOLUTION INTRAVENOUS
Status: DISCONTINUED | OUTPATIENT
Start: 2018-10-04 | End: 2018-10-04

## 2018-10-04 RX ORDER — CARBIDOPA AND LEVODOPA 25; 100 MG/1; MG/1
1 TABLET ORAL 4 TIMES DAILY
Status: DISCONTINUED | OUTPATIENT
Start: 2018-10-04 | End: 2018-10-05 | Stop reason: HOSPADM

## 2018-10-04 RX ORDER — ACETAMINOPHEN 325 MG/1
650 TABLET ORAL EVERY 4 HOURS PRN
Status: DISCONTINUED | OUTPATIENT
Start: 2018-10-04 | End: 2018-10-05 | Stop reason: HOSPADM

## 2018-10-04 RX ORDER — CEFAZOLIN SODIUM 1 G/3ML
1 INJECTION, POWDER, FOR SOLUTION INTRAMUSCULAR; INTRAVENOUS
Status: DISCONTINUED | OUTPATIENT
Start: 2018-10-04 | End: 2018-10-04

## 2018-10-04 RX ORDER — ONDANSETRON 2 MG/ML
4 INJECTION INTRAMUSCULAR; INTRAVENOUS EVERY 12 HOURS PRN
Status: DISCONTINUED | OUTPATIENT
Start: 2018-10-04 | End: 2018-10-05 | Stop reason: HOSPADM

## 2018-10-04 RX ORDER — LIDOCAINE HYDROCHLORIDE 20 MG/ML
INJECTION, SOLUTION EPIDURAL; INFILTRATION; INTRACAUDAL; PERINEURAL
Status: DISCONTINUED | OUTPATIENT
Start: 2018-10-04 | End: 2018-10-04

## 2018-10-04 RX ORDER — PROPOFOL 10 MG/ML
VIAL (ML) INTRAVENOUS
Status: DISCONTINUED | OUTPATIENT
Start: 2018-10-04 | End: 2018-10-04

## 2018-10-04 RX ORDER — CEFAZOLIN SODIUM 2 G/50ML
2 SOLUTION INTRAVENOUS
Status: COMPLETED | OUTPATIENT
Start: 2018-10-04 | End: 2018-10-04

## 2018-10-04 RX ORDER — ACETAMINOPHEN 10 MG/ML
1000 INJECTION, SOLUTION INTRAVENOUS ONCE
Status: COMPLETED | OUTPATIENT
Start: 2018-10-04 | End: 2018-10-04

## 2018-10-04 RX ORDER — CELECOXIB 200 MG/1
200 CAPSULE ORAL DAILY PRN
Status: DISCONTINUED | OUTPATIENT
Start: 2018-10-04 | End: 2018-10-05 | Stop reason: HOSPADM

## 2018-10-04 RX ORDER — ONDANSETRON HYDROCHLORIDE 2 MG/ML
INJECTION, SOLUTION INTRAMUSCULAR; INTRAVENOUS
Status: DISCONTINUED | OUTPATIENT
Start: 2018-10-04 | End: 2018-10-04

## 2018-10-04 RX ORDER — GLYCOPYRROLATE 0.2 MG/ML
INJECTION INTRAMUSCULAR; INTRAVENOUS
Status: DISCONTINUED | OUTPATIENT
Start: 2018-10-04 | End: 2018-10-04

## 2018-10-04 RX ORDER — SODIUM CHLORIDE 9 MG/ML
1000 INJECTION, SOLUTION INTRAVENOUS
Status: COMPLETED | OUTPATIENT
Start: 2018-10-04 | End: 2018-10-04

## 2018-10-04 RX ADMIN — GLYCOPYRROLATE 0.4 MG: 0.2 INJECTION INTRAMUSCULAR; INTRAVENOUS at 03:10

## 2018-10-04 RX ADMIN — ROCURONIUM BROMIDE 25 MG: 10 INJECTION, SOLUTION INTRAVENOUS at 02:10

## 2018-10-04 RX ADMIN — CEFAZOLIN SODIUM 2 G: 2 SOLUTION INTRAVENOUS at 01:10

## 2018-10-04 RX ADMIN — CARBIDOPA AND LEVODOPA 1 TABLET: 25; 100 TABLET ORAL at 09:10

## 2018-10-04 RX ADMIN — FENTANYL CITRATE 100 MCG: 50 INJECTION, SOLUTION INTRAMUSCULAR; INTRAVENOUS at 02:10

## 2018-10-04 RX ADMIN — ACETAMINOPHEN 1000 MG: 10 INJECTION, SOLUTION INTRAVENOUS at 04:10

## 2018-10-04 RX ADMIN — ROCURONIUM BROMIDE 5 MG: 10 INJECTION, SOLUTION INTRAVENOUS at 02:10

## 2018-10-04 RX ADMIN — SODIUM CHLORIDE, SODIUM LACTATE, POTASSIUM CHLORIDE, AND CALCIUM CHLORIDE: .6; .31; .03; .02 INJECTION, SOLUTION INTRAVENOUS at 02:10

## 2018-10-04 RX ADMIN — SUCCINYLCHOLINE CHLORIDE 100 MG: 20 INJECTION, SOLUTION INTRAMUSCULAR; INTRAVENOUS at 02:10

## 2018-10-04 RX ADMIN — FENTANYL CITRATE 50 MCG: 50 INJECTION, SOLUTION INTRAMUSCULAR; INTRAVENOUS at 02:10

## 2018-10-04 RX ADMIN — DEXAMETHASONE SODIUM PHOSPHATE 8 MG: 4 INJECTION, SOLUTION INTRAMUSCULAR; INTRAVENOUS at 02:10

## 2018-10-04 RX ADMIN — Medication 4 MG: at 03:10

## 2018-10-04 RX ADMIN — LABETALOL HYDROCHLORIDE 10 MG: 5 INJECTION, SOLUTION INTRAVENOUS at 03:10

## 2018-10-04 RX ADMIN — CARBIDOPA AND LEVODOPA 1 TABLET: 25; 100 TABLET ORAL at 04:10

## 2018-10-04 RX ADMIN — PROPOFOL 50 MG: 10 INJECTION, EMULSION INTRAVENOUS at 02:10

## 2018-10-04 RX ADMIN — Medication 450 ML: at 11:10

## 2018-10-04 RX ADMIN — Medication 450 ML: at 10:10

## 2018-10-04 RX ADMIN — SODIUM CHLORIDE 1000 ML: 0.9 INJECTION, SOLUTION INTRAVENOUS at 01:10

## 2018-10-04 RX ADMIN — CEFAZOLIN 2 G: 1 INJECTION, POWDER, FOR SOLUTION INTRAVENOUS at 02:10

## 2018-10-04 RX ADMIN — ETOMIDATE 15 MG: 2 INJECTION, SOLUTION INTRAVENOUS at 02:10

## 2018-10-04 RX ADMIN — SODIUM CHLORIDE 500 ML: 0.9 INJECTION, SOLUTION INTRAVENOUS at 10:10

## 2018-10-04 RX ADMIN — LIDOCAINE HYDROCHLORIDE 30 MG: 20 INJECTION, SOLUTION EPIDURAL; INFILTRATION; INTRACAUDAL; PERINEURAL at 02:10

## 2018-10-04 RX ADMIN — ONDANSETRON 8 MG: 2 INJECTION, SOLUTION INTRAMUSCULAR; INTRAVENOUS at 02:10

## 2018-10-04 RX ADMIN — SODIUM CHLORIDE: 0.9 INJECTION, SOLUTION INTRAVENOUS at 04:10

## 2018-10-04 RX ADMIN — FENTANYL CITRATE 50 MCG: 50 INJECTION, SOLUTION INTRAMUSCULAR; INTRAVENOUS at 03:10

## 2018-10-04 RX ADMIN — ONDANSETRON 4 MG: 2 INJECTION INTRAMUSCULAR; INTRAVENOUS at 10:10

## 2018-10-04 NOTE — PLAN OF CARE
Problem: Patient Care Overview  Goal: Plan of Care Review  Outcome: Ongoing (interventions implemented as appropriate)  Patient stable, AAO. Denies pain. Patient to remain NPO except medications for tonight. Receiving IV fluids. Remains free from falls. Family at bedside. Plan of care reviewed. Patient voiced understanding.

## 2018-10-04 NOTE — OP NOTE
DATE OF PROCEDURE:  10/04/2018    PREOPERATIVE DIAGNOSES:  Incarcerated left inguinal hernia with resulting small   bowel obstruction, history of atrial fibrillation on Coumadin, Parkinson   disease.    PROCEDURE PERFORMED:  Repair of left inguinal hernia.  Prolene hernia system   extended version.    POSTOPERATIVE DIAGNOSES:  Reduction of incarcerated hernia with induction of   anesthesia.  Small-bowel obstruction due to incarcerated hernia, which should   resolve, elevated clotting factors due to outpatient Coumadin, Parkinson   disease.    SURGEON:  Raman Garcia M.D.    ANESTHESIA:  General.    PROCEDURE IN DETAIL:  The patient was prepped and draped in sterile fashion   after induction of anesthesia.  Once inducing anesthesia, the patient's inguinal   hernia reduced.  Therefore, I had no idea whether or not he had any   strangulation, but on CT scan there are no signs of strangulation.  The patient   did have a bowel obstruction and had about 2 liters of fluid that came out of   his NG tube preop.  Incision was made on the left inguinal region with local   anesthetic supplementation.  Dissection was carried down.  External oblique   fascia was opened with local anesthetic supplementation, opened towards the   internal and external rings with knife and scissors.  I then found the hernia   sac with some edema, but no signs of any bloody fluid.  I dissected the hernia   sac out from the surrounding cord structures and then placed it into the   preperitoneal space with blunt and sharp dissection with a 4 x 4 placed to   create a preperitoneal space.  The patient did have a weak floor also.  A 4 x 4   was removed.  Prolene hernia system extended version was placed and posterior   circles were open.  Anterior wings were opened.  A slit was placed to allow the   cord structures to pass through.  Sutures were placed at the pubic tubercle,   inguinal ligament superior border x2 because of the weak floor and at the site    of the cord structures.  The lateral piece was placed underneath the external   oblique fascia.  The irrigation and hemostasis was obtained and confirmed.  More   local anesthetic was infiltrated again at all places.  External oblique fascia   was then closed with cord structure with running Vicryl sutures.  Skin was   closed with undyed Monocryl and Dermabond.    BLOOD LOSS:  Minimal.    COUNTS:  Correct.      BGL/HN  dd: 10/04/2018 15:15:59 (CDT)  td: 10/04/2018 16:16:01 (CDT)  Doc ID   #[id number]  Job ID #[id number]    CC:

## 2018-10-04 NOTE — BRIEF OP NOTE
[facility]  Brief Operative Note     SUMMARY     Surgery Date: 10/4/2018     Surgeon(s) and Role:     * Raman Garcia MD - Primary    Assisting Surgeon: None    Pre-op Diagnosis:  Incarcerated left inguinal hernia [K40.30]    Post-op Diagnosis:  Post-Op Diagnosis Codes:     * Incarcerated left inguinal hernia [K40.30]    Procedure(s) (LRB):  REPAIR, HERNIA, INGUINAL, INCARCERATED, INITIAL (Left)    Anesthesia: General    Description of the findings of the procedure: LIH reduced with induction of anesthesia. Moderate size lih. Weak floor.     Findings/Key Components: see above    Estimated Blood Loss: * No values recorded between 10/4/2018  2:40 PM and 10/4/2018  3:15 PM *         Specimens:   Specimen (12h ago, onward)    None          Discharge Note    SUMMARY     Admit Date: 10/4/2018    Discharge Date and Time: No discharge date for patient encounter.    Hospital Course (synopsis of major diagnoses, care, treatment, and services provided during the course of the hospital stay): Patient staying overnight because of SBO and heart issues.     Final Diagnosis: Post-Op Diagnosis Codes:     * Incarcerated left inguinal hernia [K40.30]    Disposition: Home or Self Care    Follow Up/Patient Instructions:     Medications:  Reconciled Home Medications:      Medication List      ASK your doctor about these medications    amLODIPine 5 MG tablet  Commonly known as:  NORVASC  Take 5 mg by mouth once daily.     carbidopa-levodopa  mg  mg per tablet  Commonly known as:  SINEMET  Take 1 tablet by mouth 4 (four) times daily.     celecoxib 200 MG capsule  Commonly known as:  CeleBREX  Take 200 mg by mouth daily as needed for Pain.     furosemide 20 MG tablet  Commonly known as:  LASIX  Take 20 mg by mouth daily as needed.     traMADol 50 mg tablet  Commonly known as:  ULTRAM  Take 1 tablet (50 mg total) by mouth every 6 (six) hours as needed for Pain.     warfarin 2 MG tablet  Commonly known as:   COUMADIN  Take 1 mg by mouth Daily.          No discharge procedures on file.

## 2018-10-04 NOTE — ED NOTES
The patient is awake, alert and cooperative with a calm affect, patient is aware of environment, airway is open and patent, respirations are spontaneous, normal effort and rate noted, skin warm and dry, moves all extremities well, appearance: no apparent distress noted, bed placed in low position, side rails up x 2, call light is within reach of patient or family, plan of care: family to bedside, observe and reassure, position of comfort, patient offers no complaints at this time, awaiting physician orders, will continue to monitor.

## 2018-10-04 NOTE — H&P
The patient has been examined and the H&P has been reviewed:  I concur with the findings and no changes have occurred since H&P was written.    Patient cleared for Anesthesia: General    Anesthesia/Surgery risks, benefits and alternative options discussed and understood by patient/family.      Family History   Problem Relation Age of Onset    Heart disease Mother      Social History     Tobacco Use    Smoking status: Never Smoker    Smokeless tobacco: Never Used   Substance Use Topics    Alcohol use: No     Outpatient Medications Marked as Taking for the 10/4/18 encounter (Hospital Encounter)   Medication Sig Dispense Refill    amlodipine (NORVASC) 5 MG tablet Take 5 mg by mouth once daily.  5    carbidopa-levodopa  mg (SINEMET)  mg per tablet Take 1 tablet by mouth 4 (four) times daily. 360 tablet 3    celecoxib (CELEBREX) 200 MG capsule Take 200 mg by mouth daily as needed for Pain.   99    furosemide (LASIX) 20 MG tablet Take 20 mg by mouth daily as needed.   4    traMADol (ULTRAM) 50 mg tablet Take 1 tablet (50 mg total) by mouth every 6 (six) hours as needed for Pain. 20 tablet 0    warfarin (COUMADIN) 2 MG tablet Take 1 mg by mouth Daily.   99     Review of patient's allergies indicates:   Allergen Reactions    Iodine and iodide containing products Other (See Comments)    Codeine Other (See Comments)     Insomnia      Morphine Nausea And Vomiting     There are no hospital problems to display for this patient.

## 2018-10-04 NOTE — ED NOTES
Hourly Rounding complete. Pt resting quietly in room respirations even and unlabored PONCHO pt has no needs or complaints at this time bed locked and in lowest position call bell in reach pt instructed to call for needs

## 2018-10-04 NOTE — ED NOTES
Pt lying in bed, no distress noted, family at bedside, pt transported to surgery with surgery tech.

## 2018-10-04 NOTE — ANESTHESIA POSTPROCEDURE EVALUATION
Anesthesia Post Evaluation    Patient: Elvis Thompson    Procedure(s) Performed: Procedure(s) (LRB):  REPAIR, HERNIA, INGUINAL, INCARCERATED, INITIAL (Left)    Final Anesthesia Type: general  Patient location during evaluation: PACU  Patient participation: Yes- Able to Participate  Level of consciousness: awake and alert, oriented and awake  Post-procedure vital signs: reviewed and stable  Pain management: adequate  Airway patency: patent  PONV status at discharge: No PONV  Anesthetic complications: no      Cardiovascular status: blood pressure returned to baseline, hemodynamically stable and stable  Respiratory status: unassisted, spontaneous ventilation and room air  Hydration status: euvolemic  Follow-up not needed.        Visit Vitals  BP (!) 152/70 (BP Location: Left arm, Patient Position: Lying)   Pulse (!) 58   Temp 36 °C (96.8 °F) (Tympanic)   Resp 16   Ht 5' (1.524 m)   Wt 83.9 kg (185 lb)   SpO2 (!) 93%   BMI 36.13 kg/m²       Pain/Tres Score: Pain Assessment Performed: Yes (10/4/2018  3:45 PM)  Presence of Pain: denies (10/4/2018  3:45 PM)  Tres Score: 10 (10/4/2018  3:45 PM)

## 2018-10-04 NOTE — ANESTHESIA PREPROCEDURE EVALUATION
10/04/2018  Elvis Thompson is a 85 y.o., male.    Anesthesia Evaluation    I have reviewed the Patient Summary Reports.    I have reviewed the Nursing Notes.   I have reviewed the Medications.     Review of Systems  Anesthesia Hx:  No problems with previous Anesthesia    Social:  Non-Smoker, No Alcohol Use    Hematology/Oncology:  Hematology Normal   Oncology Normal     EENT/Dental:EENT/Dental Normal   Cardiovascular:   Exercise tolerance: good Pacemaker Hypertension, well controlled    Pulmonary:  Pulmonary Normal    Renal/:  Renal/ Normal     Hepatic/GI:  Hepatic/GI Normal    Musculoskeletal:  Musculoskeletal Normal    Neurological:  Neurology Normal    Endocrine:  Endocrine Normal    Dermatological:  Skin Normal    Psych:  Psychiatric Normal           Physical Exam  General:  Well nourished    Airway/Jaw/Neck:  Airway Findings: Mouth Opening: Normal Tongue: Normal  Pre-Existing Airway Tube(s): Oral Endotracheal tube  General Airway Assessment: Adult  Mallampati: II  TM Distance: Normal, at least 6 cm  Jaw/Neck Findings:  Neck ROM: Normal ROM      Dental:  Dental Findings: In tact        Mental Status:  Mental Status Findings:  Cooperative         Anesthesia Plan  Type of Anesthesia, risks & benefits discussed:  Anesthesia Type:  general  Patient's Preference:   Intra-op Monitoring Plan: standard ASA monitors  Intra-op Monitoring Plan Comments:   Post Op Pain Control Plan: multimodal analgesia  Post Op Pain Control Plan Comments:   Induction:   IV  Beta Blocker:  Patient is not currently on a Beta-Blocker (No further documentation required).       Informed Consent: Patient understands risks and agrees with Anesthesia plan.  Questions answered. Anesthesia consent signed with patient.  ASA Score: 3  emergent   Day of Surgery Review of History & Physical: I have interviewed and examined the patient. I  have reviewed the patient's H&P dated: 10/4/18. There are no significant changes.  H&P update referred to the surgeon.         Ready For Surgery From Anesthesia Perspective.

## 2018-10-04 NOTE — TRANSFER OF CARE
Anesthesia Transfer of Care Note    Patient: Elvis Thompson    Procedure(s) Performed: Procedure(s) (LRB):  REPAIR, HERNIA, INGUINAL, INCARCERATED, INITIAL (Left)    Patient location: PACU    Anesthesia Type: general    Transport from OR: Transported from OR on 6-10 L/min O2 by face mask with adequate spontaneous ventilation    Post pain: adequate analgesia    Post assessment: no apparent anesthetic complications and tolerated procedure well    Post vital signs: stable    Level of consciousness: sedated    Nausea/Vomiting: no nausea/vomiting    Complications: none    Transfer of care protocol was followed      Last vitals:   Visit Vitals  BP (!) 190/77 (BP Location: Left arm, Patient Position: Lying)   Pulse (!) 58   Temp 36 °C (96.8 °F) (Tympanic)   Resp 16   Ht 5' (1.524 m)   Wt 83.9 kg (185 lb)   SpO2 95%   BMI 36.13 kg/m²

## 2018-10-04 NOTE — CONSULTS
[facility]  General Surgery  Consult Note    Consults  Subjective:     Chief Complaint/Reason for Admission:     History of Present Illness:  The patient is an 85-year-old male brought into the emergency room for abdominal pain, swelling, with nausea vomiting the vomiting started last night.  He states he throughout 4 times.  Patient suffers from mild Parkinson's disease.  He underwent CT scan of the abdomen and pelvis which showed incarcerated left inguinal hernia with small bowel loops causing a small-bowel obstruction. I saw and examined the patient. His daughter was at bedside.  Patient is also on low-dose Coumadin.  He takes 1 mg daily.  His last INR was 1.4 last week.  I discussed with him and his daughter that he does need emergency surgery to fix the incarcerated inguinal hernia causing his bowel obstruction. He understood and so did the daughter.  He signed informed consent.  Dr. Raman Garcia is on call and he will be doing the surgery.    No current facility-administered medications on file prior to encounter.      Current Outpatient Medications on File Prior to Encounter   Medication Sig    amlodipine (NORVASC) 5 MG tablet Take 5 mg by mouth once daily.    carbidopa-levodopa  mg (SINEMET)  mg per tablet Take 1 tablet by mouth 4 (four) times daily.    celecoxib (CELEBREX) 200 MG capsule Take 200 mg by mouth daily as needed for Pain.     furosemide (LASIX) 20 MG tablet Take 20 mg by mouth daily as needed.     traMADol (ULTRAM) 50 mg tablet Take 1 tablet (50 mg total) by mouth every 6 (six) hours as needed for Pain.    warfarin (COUMADIN) 2 MG tablet Take 1 mg by mouth Daily.        Review of patient's allergies indicates:   Allergen Reactions    Iodine and iodide containing products Other (See Comments)    Codeine Other (See Comments)     Insomnia      Morphine Nausea And Vomiting       Past Medical History:   Diagnosis Date    Anticoagulant long-term use      Bradycardia     HTN (hypertension)     Parkinson disease 03/24/2017    Pulmonary embolism     Shingles      Past Surgical History:   Procedure Laterality Date    CARDIAC PACEMAKER PLACEMENT      cataract      FOOT SURGERY      HAND SURGERY      HERNIA REPAIR      KNEE SURGERY      right knee replacement    MYELOGRAM N/A 3/27/2017    Performed by [facility] OR    PROSTATE SURGERY       Family History     Problem Relation (Age of Onset)    Heart disease Mother        Tobacco Use    Smoking status: Never Smoker    Smokeless tobacco: Never Used   Substance and Sexual Activity    Alcohol use: No    Drug use: Not on file    Sexual activity: Not Currently     Review of Systems   Gastrointestinal: Positive for abdominal distention, abdominal pain, nausea and vomiting.   All other systems reviewed and are negative.    Objective:     Vital Signs (Most Recent):  Temp: 98.2 °F (36.8 °C) (10/04/18 1005)  Pulse: 73 (10/04/18 1005)  Resp: 18 (10/04/18 1005)  BP: (!) 134/90 (10/04/18 1005)  SpO2: (!) 94 % (10/04/18 1005) Vital Signs (24h Range):  Temp:  [98.2 °F (36.8 °C)] 98.2 °F (36.8 °C)  Pulse:  [73] 73  Resp:  [18] 18  SpO2:  [94 %] 94 %  BP: (134)/(90) 134/90     Weight: 83.9 kg (185 lb)  Body mass index is 36.13 kg/m².      Intake/Output Summary (Last 24 hours) at 10/4/2018 1331  Last data filed at 10/4/2018 1121  Gross per 24 hour   Intake 500 ml   Output --   Net 500 ml       Physical Exam   Constitutional: He is oriented to person, place, and time. He appears well-developed and well-nourished.   HENT:   Head: Normocephalic.   Eyes: Pupils are equal, round, and reactive to light.   Neck: Normal range of motion.   Pulmonary/Chest: Effort normal.   Abdominal: He exhibits distension. A hernia (Incarcerated left inguinal hernia) is present.   Musculoskeletal: Normal range of motion.   Neurological: He is alert and oriented to person, place, and time.   Skin: Skin is warm and dry.    Psychiatric: He has a normal mood and affect.   Nursing note and vitals reviewed.       Significant Labs:  CBC:   Recent Labs   Lab  10/04/18   1037   WBC  5.21   RBC  3.94*   HGB  12.5*   HCT  37.9*   PLT  199   MCV  96   MCH  31.7*   MCHC  33.0     CMP:   Recent Labs   Lab  10/04/18   1037   GLU  111*   CALCIUM  9.4   ALBUMIN  3.4*   PROT  6.7   NA  139   K  3.9   CO2  28   CL  103   BUN  28*   CREATININE  0.8   ALKPHOS  125   ALT  11   AST  32   BILITOT  1.4*       Significant Diagnostics:  CT: I have reviewed all pertinent results/findings within the past 24 hours. Incarcerated left inguinal hernia with small bowel loops causing bowel obstruction.    Assessment/Plan:     Incarcerated left inguinal hernia with bowel obstruction.  Patient will be taken emergently to surgery for incarcerated left inguinal hernia repair with mesh.  Patient is on Coumadin so he definitely has high risk of complications including wound infection, bleeding, bruising, etc.    Thank you for your consult.    Calixto Mendenhall Jr, MD  General Surgery  [facility]

## 2018-10-04 NOTE — ED TRIAGE NOTES
Pt presents with C/O constipation and vomiting. He states his LBM was Saturday, and he vomited 4 times last night

## 2018-10-04 NOTE — ED PROVIDER NOTES
[facility]                                                 Chief Complaint  85 y.o. male with Constipation and Emesis    History of Present Illness  Elvis Thompson presents to the emergency room with nausea vomiting today  Patient has nausea vomiting abdominal pain, no BM in the last 2-3 days now  Patient on exam has a distended abdomen, incarcerated left inguinal hernia  General surgery immediately contacted for bowel obstruction with inguinal hernia    The history is provided by the patient   device was not used during this ER visit  Medical HX: Anticoagulation, bradycardia, HTN, Parkinson's, PE, shingles  Surgical HX: Pacemaker, cataracts, foot, healing, hernia, knee, prostate surgery  ALLERGIES: Iodine and codeine    Review of Systems and Physical Exam      Review of Systems  -- Constitution - no fever, denies fatigue, no weakness, no chills  -- Eyes - no tearing or redness, no visual disturbance  -- Ear, Nose - no tinnitus or earache, no nasal congestion or discharge  -- Mouth,Throat - no sore throat, no toothache, normal voice, normal swallowing  -- Respiratory - denies cough and congestion, no shortness of breath, no SANCHEZ  -- Cardiovascular - denies chest pain, no palpitations, denies claudication  -- Gastrointestinal - constipation and abdominal pain, nausea, vomiting  -- Genitourinary - no dysuria, denies flank pain, no hematuria, no STD risk  -- Musculoskeletal - denies back pain, negative for myalgias and arthralgias   -- Neurological - no headache, denies weakness or seizure; no LOC  -- Skin - denies pallor, rash, or changes in skin. no hives or welts noted  -- Psychiatric - Denies SI or HI, no psychosis or fractured thought noted     Vital Signs  His oral temperature is 98.2 °F (36.8 °C).   His blood pressure is 134/90 and his pulse is 73.   His respiration is 18 and oxygen saturation is 94%.     Physical Exam  -- Nursing note and vitals reviewed  --  Constitutional: Appears well-developed and well-nourished  -- Head: Atraumatic. Normocephalic. No obvious abnormality  -- Eyes: Pupils are equal and reactive to light. Normal conjunctiva and lids  -- Cardiac: Normal rate, regular rhythm and normal heart sounds  -- Pulmonary: Normal respiratory effort, breath sounds clear to auscultation  -- Abdominal: Soft, no tenderness. Normal bowel sounds. Normal liver edge  -- Genitourinary:  Incarcerated left inguinal hernia with tenderness, no skin change  -- Musculoskeletal: Normal range of motion, no effusions. Joints stable   -- Neurological: No focal deficits. Showed good interaction with staff  -- Vascular: Posterior tibial, dorsalis pedis and radial pulses 2+ bilaterally      Emergency Room Course      Lab Results     K 3.9      CO2 28   BUN 28 (H)   CREATININE 0.8    (H)   ALKPHOS 125   AST 32   ALT 11   BILITOT 1.4 (H)   ALBUMIN 3.4 (L)   PROT 6.7   WBC 5.21   HGB 12.5 (L)   HCT 37.9 (L)      CPK 83   INR 1.4 (H)    (H)   TSH 0.978     Urinalysis  -- Urinalysis performed during this ER visit showed no signs of infection      EKG  -- atrial fibrillation with no obvious RVR    Radiology  There is a left inguinal hernia which contains loops of small bowel.  The small bowel proximal to the hernia is dilated and fluid-filled with decompression of the small bowel exiting the hernia sac.  Findings are compatible with a high-grade small bowel obstruction with transition zone in the left lower quadrant inguinal hernia.  Component of strangulation and/or incarceration not excluded.     Medications Given  0.9%  NaCl infusion (not administered)   ceFAZolin injection 1 g (not administered)   sodium chloride 0.9% bolus 500 mL (0 mLs Intravenous Stopped 10/4/18 1121)   ondansetron injection 4 mg (4 mg Intravenous Given 10/4/18 1050)   barium 2.1 % (w/v), 2.0 % (w/w) suspension 450 mL (450 mLs Oral Given 10/4/18 1130)   barium 2.1 % (w/v), 2.0 % (w/w)  suspension 450 mL (450 mLs Oral Given 10/4/18 1045)     Critical Care ED Physician Time (minutes):  -- Performed by: Jack Blancas M.D.  -- Date/Time: 1:30 PM 10/4/2018   -- Direct Patient Care (Face Time): 10  -- Additional History from Records or Taking Additional History: 10  -- Ordering, Reviewing, and Interpreting Diagnostic Studies: 10  -- Total Time in Documentation: 10  -- Consultation with Other Physicians: 10  -- Consultation with Family Related to Condition: 10  -- Total Critical Care Time: 60     Diagnosis  -- Incarcerated left inguinal hernia.   -- SBO (small bowel obstruction)     Disposition and Plan  -- Disposition: OR  -- Condition: stable    This note is dictated on Dragon Natural Speaking word recognition program.  There are word recognition mistakes that are occasionally missed on review.         Jack Blancas MD  10/04/18 9424

## 2018-10-05 PROCEDURE — 25000003 PHARM REV CODE 250: Performed by: SURGERY

## 2018-10-05 PROCEDURE — 27000221 HC OXYGEN, UP TO 24 HOURS

## 2018-10-05 RX ADMIN — AMLODIPINE BESYLATE 5 MG: 5 TABLET ORAL at 08:10

## 2018-10-05 RX ADMIN — SODIUM CHLORIDE: 0.9 INJECTION, SOLUTION INTRAVENOUS at 12:10

## 2018-10-05 RX ADMIN — CARBIDOPA AND LEVODOPA 1 TABLET: 25; 100 TABLET ORAL at 08:10

## 2018-10-05 RX ADMIN — CARBIDOPA AND LEVODOPA 1 TABLET: 25; 100 TABLET ORAL at 01:10

## 2018-10-05 NOTE — PROGRESS NOTES
Staff Handoff     report received from ROSEMARIE Arenas. VS stable. Afebrile. No complaints at this time. Dr. Garcia spoke with Dagoberto, gave orders to discharge patient. Assessment complete per flowsheet. Call bell in reach. Will continue to monitor for any change in status.   Resident Handoff

## 2018-10-05 NOTE — PLAN OF CARE
Problem: Patient Care Overview  Goal: Plan of Care Review  Outcome: Ongoing (interventions implemented as appropriate)  Patient resting with no complaints of pain or nausea. Incision clean and dry. Some intermittent confusion but over all pleasant demeanor. Family at bedside with no questions or comments. VS stable, A&Ox4. No acute changes, Plan of care reviewed with patient and family.

## 2018-10-05 NOTE — PLAN OF CARE
Problem: Patient Care Overview  Goal: Plan of Care Review  Outcome: Ongoing (interventions implemented as appropriate)  Patient and family aware of plan of care. VS stable. Afebrile. Discharge papers reviewed and given. No complaints of pain. Tolerating diet. No N/V. Up to restroom with walker and standby assist. Voiding without difficulty.  Free from falls/injuries. No questions or concerns at this time. Agrees with plan of care.

## 2018-10-09 ENCOUNTER — HOSPITAL ENCOUNTER (INPATIENT)
Facility: HOSPITAL | Age: 83
LOS: 6 days | Discharge: NURSING, REHAB OR LONG-TERM CARE | DRG: 871 | End: 2018-10-15
Attending: SURGERY | Admitting: INTERNAL MEDICINE
Payer: MEDICARE

## 2018-10-09 VITALS
HEART RATE: 60 BPM | OXYGEN SATURATION: 94 % | HEIGHT: 60 IN | TEMPERATURE: 98 F | RESPIRATION RATE: 17 BRPM | WEIGHT: 185 LBS | DIASTOLIC BLOOD PRESSURE: 70 MMHG | SYSTOLIC BLOOD PRESSURE: 151 MMHG | BODY MASS INDEX: 36.32 KG/M2

## 2018-10-09 DIAGNOSIS — R53.1 WEAKNESS: ICD-10-CM

## 2018-10-09 DIAGNOSIS — J18.9 HCAP (HEALTHCARE-ASSOCIATED PNEUMONIA): ICD-10-CM

## 2018-10-09 DIAGNOSIS — A41.9 SEPSIS, DUE TO UNSPECIFIED ORGANISM: Primary | ICD-10-CM

## 2018-10-09 DIAGNOSIS — R19.7 DIARRHEA, UNSPECIFIED TYPE: ICD-10-CM

## 2018-10-09 DIAGNOSIS — J18.9 PNEUMONIA: ICD-10-CM

## 2018-10-09 DIAGNOSIS — J18.9 PNEUMONIA DUE TO INFECTIOUS ORGANISM, UNSPECIFIED LATERALITY, UNSPECIFIED PART OF LUNG: ICD-10-CM

## 2018-10-09 DIAGNOSIS — R10.9 ABDOMINAL PAIN: ICD-10-CM

## 2018-10-09 DIAGNOSIS — J69.0 ASPIRATION PNEUMONITIS: ICD-10-CM

## 2018-10-09 LAB
ALBUMIN SERPL BCP-MCNC: 3 G/DL
ALP SERPL-CCNC: 123 U/L
ALT SERPL W/O P-5'-P-CCNC: 10 U/L
ANION GAP SERPL CALC-SCNC: 12 MMOL/L
APTT BLDCRRT: 27.6 SEC
AST SERPL-CCNC: 32 U/L
BASOPHILS # BLD AUTO: 0.03 K/UL
BASOPHILS NFR BLD: 0.2 %
BILIRUB SERPL-MCNC: 1.3 MG/DL
BILIRUB UR QL STRIP: NEGATIVE
BNP SERPL-MCNC: 230 PG/ML
BUN SERPL-MCNC: 20 MG/DL
CALCIUM SERPL-MCNC: 8.8 MG/DL
CHLORIDE SERPL-SCNC: 102 MMOL/L
CK MB SERPL-MCNC: 2.6 NG/ML
CK MB SERPL-RTO: 3.8 %
CK SERPL-CCNC: 69 U/L
CK SERPL-CCNC: 69 U/L
CLARITY UR: ABNORMAL
CO2 SERPL-SCNC: 21 MMOL/L
COLOR UR: YELLOW
CREAT SERPL-MCNC: 0.8 MG/DL
DIFFERENTIAL METHOD: ABNORMAL
EOSINOPHIL # BLD AUTO: 0.1 K/UL
EOSINOPHIL NFR BLD: 0.9 %
ERYTHROCYTE [DISTWIDTH] IN BLOOD BY AUTOMATED COUNT: 12.7 %
EST. GFR  (AFRICAN AMERICAN): >60 ML/MIN/1.73 M^2
EST. GFR  (NON AFRICAN AMERICAN): >60 ML/MIN/1.73 M^2
GLUCOSE SERPL-MCNC: 134 MG/DL
GLUCOSE UR QL STRIP: NEGATIVE
HCT VFR BLD AUTO: 36.7 %
HGB BLD-MCNC: 12.4 G/DL
HGB UR QL STRIP: ABNORMAL
INFLUENZA A, MOLECULAR: NEGATIVE
INFLUENZA B, MOLECULAR: NEGATIVE
INR PPP: 1.7
KETONES UR QL STRIP: ABNORMAL
LACTATE SERPL-SCNC: 1.2 MMOL/L
LACTATE SERPL-SCNC: 2.1 MMOL/L
LEUKOCYTE ESTERASE UR QL STRIP: NEGATIVE
LIPASE SERPL-CCNC: 24 U/L
LIPASE SERPL-CCNC: 29 U/L
LYMPHOCYTES # BLD AUTO: 0.8 K/UL
LYMPHOCYTES NFR BLD: 6.3 %
MAGNESIUM SERPL-MCNC: 1.8 MG/DL
MCH RBC QN AUTO: 31.7 PG
MCHC RBC AUTO-ENTMCNC: 33.8 G/DL
MCV RBC AUTO: 94 FL
MICROSCOPIC COMMENT: ABNORMAL
MONOCYTES # BLD AUTO: 0.5 K/UL
MONOCYTES NFR BLD: 4 %
NEUTROPHILS # BLD AUTO: 11.7 K/UL
NEUTROPHILS NFR BLD: 88.6 %
NITRITE UR QL STRIP: NEGATIVE
PH UR STRIP: 7 [PH] (ref 5–8)
PHOSPHATE SERPL-MCNC: 2.7 MG/DL
PLATELET # BLD AUTO: 242 K/UL
PMV BLD AUTO: 10.1 FL
POTASSIUM SERPL-SCNC: 4.2 MMOL/L
PROT SERPL-MCNC: 6.5 G/DL
PROT UR QL STRIP: ABNORMAL
PROTHROMBIN TIME: 16.9 SEC
RBC # BLD AUTO: 3.91 M/UL
RBC #/AREA URNS HPF: 35 /HPF (ref 0–4)
SODIUM SERPL-SCNC: 135 MMOL/L
SP GR UR STRIP: 1.02 (ref 1–1.03)
SPECIMEN SOURCE: NORMAL
TROPONIN I SERPL DL<=0.01 NG/ML-MCNC: 0.02 NG/ML
URN SPEC COLLECT METH UR: ABNORMAL
UROBILINOGEN UR STRIP-ACNC: 1 EU/DL
WBC # BLD AUTO: 13.26 K/UL

## 2018-10-09 PROCEDURE — 25000003 PHARM REV CODE 250: Performed by: SURGERY

## 2018-10-09 PROCEDURE — 63600175 PHARM REV CODE 636 W HCPCS: Performed by: SURGERY

## 2018-10-09 PROCEDURE — 51702 INSERT TEMP BLADDER CATH: CPT

## 2018-10-09 PROCEDURE — 82553 CREATINE MB FRACTION: CPT

## 2018-10-09 PROCEDURE — 96374 THER/PROPH/DIAG INJ IV PUSH: CPT

## 2018-10-09 PROCEDURE — 87186 SC STD MICRODIL/AGAR DIL: CPT

## 2018-10-09 PROCEDURE — 87040 BLOOD CULTURE FOR BACTERIA: CPT | Mod: 59

## 2018-10-09 PROCEDURE — A9698 NON-RAD CONTRAST MATERIALNOC: HCPCS | Performed by: SURGERY

## 2018-10-09 PROCEDURE — 94760 N-INVAS EAR/PLS OXIMETRY 1: CPT

## 2018-10-09 PROCEDURE — 84145 PROCALCITONIN (PCT): CPT

## 2018-10-09 PROCEDURE — 85610 PROTHROMBIN TIME: CPT

## 2018-10-09 PROCEDURE — 87076 CULTURE ANAEROBE IDENT EACH: CPT

## 2018-10-09 PROCEDURE — 84484 ASSAY OF TROPONIN QUANT: CPT

## 2018-10-09 PROCEDURE — 85025 COMPLETE CBC W/AUTO DIFF WBC: CPT

## 2018-10-09 PROCEDURE — 25500020 PHARM REV CODE 255: Performed by: SURGERY

## 2018-10-09 PROCEDURE — 94761 N-INVAS EAR/PLS OXIMETRY MLT: CPT

## 2018-10-09 PROCEDURE — 87502 INFLUENZA DNA AMP PROBE: CPT

## 2018-10-09 PROCEDURE — 87077 CULTURE AEROBIC IDENTIFY: CPT

## 2018-10-09 PROCEDURE — 93005 ELECTROCARDIOGRAM TRACING: CPT

## 2018-10-09 PROCEDURE — 83690 ASSAY OF LIPASE: CPT | Mod: 91

## 2018-10-09 PROCEDURE — 83605 ASSAY OF LACTIC ACID: CPT | Mod: 91

## 2018-10-09 PROCEDURE — 20000000 HC ICU ROOM

## 2018-10-09 PROCEDURE — 36415 COLL VENOUS BLD VENIPUNCTURE: CPT

## 2018-10-09 PROCEDURE — 84100 ASSAY OF PHOSPHORUS: CPT

## 2018-10-09 PROCEDURE — 82550 ASSAY OF CK (CPK): CPT

## 2018-10-09 PROCEDURE — 83880 ASSAY OF NATRIURETIC PEPTIDE: CPT

## 2018-10-09 PROCEDURE — 27000221 HC OXYGEN, UP TO 24 HOURS

## 2018-10-09 PROCEDURE — 80053 COMPREHEN METABOLIC PANEL: CPT

## 2018-10-09 PROCEDURE — 81000 URINALYSIS NONAUTO W/SCOPE: CPT

## 2018-10-09 PROCEDURE — 83735 ASSAY OF MAGNESIUM: CPT

## 2018-10-09 PROCEDURE — 99285 EMERGENCY DEPT VISIT HI MDM: CPT | Mod: 25

## 2018-10-09 PROCEDURE — 93010 ELECTROCARDIOGRAM REPORT: CPT | Mod: ,,, | Performed by: INTERNAL MEDICINE

## 2018-10-09 PROCEDURE — 85730 THROMBOPLASTIN TIME PARTIAL: CPT

## 2018-10-09 PROCEDURE — 83690 ASSAY OF LIPASE: CPT

## 2018-10-09 RX ORDER — LEVALBUTEROL 1.25 MG/.5ML
1.25 SOLUTION, CONCENTRATE RESPIRATORY (INHALATION) EVERY 6 HOURS PRN
Status: DISCONTINUED | OUTPATIENT
Start: 2018-10-09 | End: 2018-10-15 | Stop reason: HOSPADM

## 2018-10-09 RX ORDER — PANTOPRAZOLE SODIUM 40 MG/1
40 TABLET, DELAYED RELEASE ORAL DAILY
Status: DISCONTINUED | OUTPATIENT
Start: 2018-10-10 | End: 2018-10-15 | Stop reason: HOSPADM

## 2018-10-09 RX ORDER — SODIUM CHLORIDE 9 MG/ML
1000 INJECTION, SOLUTION INTRAVENOUS
Status: COMPLETED | OUTPATIENT
Start: 2018-10-09 | End: 2018-10-09

## 2018-10-09 RX ORDER — ONDANSETRON 2 MG/ML
4 INJECTION INTRAMUSCULAR; INTRAVENOUS EVERY 8 HOURS PRN
Status: DISCONTINUED | OUTPATIENT
Start: 2018-10-09 | End: 2018-10-15 | Stop reason: HOSPADM

## 2018-10-09 RX ORDER — ACETAMINOPHEN 325 MG/1
650 TABLET ORAL EVERY 8 HOURS PRN
Status: DISCONTINUED | OUTPATIENT
Start: 2018-10-09 | End: 2018-10-15 | Stop reason: HOSPADM

## 2018-10-09 RX ORDER — SODIUM CHLORIDE 9 MG/ML
INJECTION, SOLUTION INTRAVENOUS CONTINUOUS
Status: DISCONTINUED | OUTPATIENT
Start: 2018-10-09 | End: 2018-10-10

## 2018-10-09 RX ORDER — VANCOMYCIN HYDROCHLORIDE 1 G/20ML
INJECTION, POWDER, LYOPHILIZED, FOR SOLUTION INTRAVENOUS
Status: COMPLETED
Start: 2018-10-09 | End: 2018-10-09

## 2018-10-09 RX ADMIN — PIPERACILLIN SODIUM AND TAZOBACTAM SODIUM 4.5 G: 4; .5 INJECTION, POWDER, FOR SOLUTION INTRAVENOUS at 08:10

## 2018-10-09 RX ADMIN — Medication 900 ML: at 08:10

## 2018-10-09 RX ADMIN — ACETAMINOPHEN 650 MG: 325 TABLET ORAL at 10:10

## 2018-10-09 RX ADMIN — VANCOMYCIN HYDROCHLORIDE 1500 MG: 1 INJECTION, POWDER, LYOPHILIZED, FOR SOLUTION INTRAVENOUS at 10:10

## 2018-10-09 RX ADMIN — SODIUM CHLORIDE 1000 ML: 0.9 INJECTION, SOLUTION INTRAVENOUS at 08:10

## 2018-10-09 RX ADMIN — SODIUM CHLORIDE: 0.9 INJECTION, SOLUTION INTRAVENOUS at 09:10

## 2018-10-09 NOTE — ED NOTES
DARLIN BLOOD NOTED DURING INSERTION OF BACK CATH.  BLEEDING STOPPED AFTER INSERTION OF BACK CATH.

## 2018-10-09 NOTE — ED PROVIDER NOTES
Encounter Date: 10/9/2018       History     Chief Complaint   Patient presents with    Diarrhea    Weakness     Patient presents with weakness associated with multiple episodes of diarrhea, new onset yesterday.  Reports greater than 6 episodes per day.  That has a history of atrial fibrillation.  He has a pacemaker and takes Coumadin.  Denies blood in stool.  He denies recent antibiotic use, new medication, suspicious food intake, or travel.  He denies abdominal pain, nausea, vomiting, fever.  He denies chest pain or shortness of breath.  He reports inguinal hernia repair 1-2 weeks ago.      The history is provided by the patient.     Review of patient's allergies indicates:   Allergen Reactions    Iodine and iodide containing products Other (See Comments)    Codeine Other (See Comments)     Insomnia      Morphine Nausea And Vomiting     Past Medical History:   Diagnosis Date    Anticoagulant long-term use     Bradycardia     HTN (hypertension)     Parkinson disease 03/24/2017    Pulmonary embolism     Shingles      Past Surgical History:   Procedure Laterality Date    CARDIAC PACEMAKER PLACEMENT      cataract      FOOT SURGERY      HAND SURGERY      HERNIA REPAIR      KNEE SURGERY      right knee replacement    MYELOGRAM N/A 3/27/2017    Performed by [facility] OR    PROSTATE SURGERY      REPAIR, HERNIA, INGUINAL WITH PROLENE HERNIA SYSTEM (EXTENDED VERSION) Left 10/4/2018    Performed by Raman Garcia MD at [facility] OR     Family History   Problem Relation Age of Onset    Heart disease Mother      Social History     Tobacco Use    Smoking status: Never Smoker    Smokeless tobacco: Never Used   Substance Use Topics    Alcohol use: No    Drug use: Not on file     Review of Systems   Constitutional: Positive for chills. Negative for fatigue and fever.   HENT: Negative for congestion, dental problem, ear pain, rhinorrhea, sore throat and trouble swallowing.    Eyes:  Negative for pain, discharge, redness and visual disturbance.   Respiratory: Negative for cough, shortness of breath and wheezing.    Cardiovascular: Negative for chest pain, palpitations and leg swelling.   Gastrointestinal: Positive for diarrhea. Negative for abdominal pain, constipation, nausea and vomiting.   Genitourinary: Negative for difficulty urinating, dysuria, flank pain, frequency, hematuria and urgency.   Musculoskeletal: Negative for arthralgias, back pain, myalgias and neck pain.   Skin: Negative for pallor, rash and wound.   Neurological: Positive for weakness. Negative for seizures and headaches.   Psychiatric/Behavioral: Negative.        Physical Exam     Initial Vitals [10/09/18 1621]   BP Pulse Resp Temp SpO2   (!) 108/54 96 18 99.4 °F (37.4 °C) (!) 93 %      MAP       --         Physical Exam    Nursing note and vitals reviewed.  Constitutional: No distress.   HENT:   Head: Normocephalic and atraumatic.   Right Ear: External ear normal.   Left Ear: External ear normal.   Nose: Nose normal.   Mouth/Throat: Oropharynx is clear and moist.   Eyes: Conjunctivae, EOM and lids are normal. Pupils are equal, round, and reactive to light.   Neck: Neck supple.   Cardiovascular: Normal rate, regular rhythm, normal heart sounds and intact distal pulses.   Pulmonary/Chest: Breath sounds normal. No respiratory distress.   Abdominal: Soft. Bowel sounds are normal. There is no tenderness. There is no rigidity, no rebound and no guarding.   Musculoskeletal: Normal range of motion.   Neurological: He is alert and oriented to person, place, and time. He has normal strength.   Skin: Skin is warm and dry. Capillary refill takes less than 2 seconds. No rash noted.   Psychiatric: He has a normal mood and affect. His behavior is normal.         ED Course   Procedures  Labs Reviewed   CBC W/ AUTO DIFFERENTIAL - Abnormal; Notable for the following components:       Result Value    WBC 13.26 (*)     RBC 3.91 (*)      Hemoglobin 12.4 (*)     Hematocrit 36.7 (*)     MCH 31.7 (*)     Gran # (ANC) 11.7 (*)     Lymph # 0.8 (*)     Gran% 88.6 (*)     Lymph% 6.3 (*)     All other components within normal limits   COMPREHENSIVE METABOLIC PANEL - Abnormal; Notable for the following components:    Sodium 135 (*)     CO2 21 (*)     Glucose 134 (*)     Albumin 3.0 (*)     Total Bilirubin 1.3 (*)     All other components within normal limits   URINALYSIS, REFLEX TO URINE CULTURE - Abnormal; Notable for the following components:    Appearance, UA Hazy (*)     Protein, UA Trace (*)     Ketones, UA Trace (*)     Occult Blood UA 2+ (*)     All other components within normal limits    Narrative:     Preferred Collection Type->Urine, Clean Catch   B-TYPE NATRIURETIC PEPTIDE - Abnormal; Notable for the following components:     (*)     All other components within normal limits   PROTIME-INR - Abnormal; Notable for the following components:    Prothrombin Time 16.9 (*)     INR 1.7 (*)     All other components within normal limits   URINALYSIS MICROSCOPIC - Abnormal; Notable for the following components:    RBC, UA 35 (*)     All other components within normal limits    Narrative:     Preferred Collection Type->Urine, Clean Catch   INFLUENZA A & B BY MOLECULAR   CULTURE, BLOOD   CULTURE, BLOOD   CULTURE, BLOOD   CULTURE, BLOOD   CLOSTRIDIUM DIFFICILE   LIPASE   APTT   CK-MB   CK   LACTIC ACID, PLASMA   TROPONIN I   LACTIC ACID, PLASMA   MAGNESIUM   PHOSPHORUS   LIPASE   PROCALCITONIN          Imaging Results          CT Abdomen Pelvis  Without Contrast (Final result)  Result time 10/09/18 20:31:04   Procedure changed from CT Abdomen Pelvis With Contrast     Final result by Robin Elkins III, MD (10/09/18 20:31:04)                 Impression:      Worsening bilateral pleural effusions with adjacent bibasilar airspace consolidation.    Interval reduction of the previously identified left inguinal hernia.  Jejuno jejunal intussusception with  mild dilation of the more proximal small bowel.  There is also mild dilation of the more distal small bowel with intervening segments of collapsed small bowel.  No focal transition point is identified.  The degree of dilation is significantly improved compared to prior exam.    Diverticulosis without acute diverticulitis.  Cirrhotic appearing liver.    Acute to subacute T11 burst fracture with progressive vertebral body height loss.    All CT scans at this facility are performed  using dose modulation techniques as appropriate to performed exam including the following:  automated exposure control; adjustment of mA and/or kV according to the patients size (this includes techniques or standardized protocols for targeted exams where dose is matched to indication/reason for exam: i.e. extremities or head);  iterative reconstruction technique.      Electronically signed by: Robin Elkins MD  Date:    10/09/2018  Time:    20:31             Narrative:    EXAMINATION:  CT ABDOMEN PELVIS WITHOUT CONTRAST    CLINICAL HISTORY:  abdominal pain; Unspecified abdominal pain    TECHNIQUE:  Routine CT abdomen and pelvis performed without IV contrast.  Coronal and sagittal reformatted images obtained.    COMPARISON:  Abdominal CT 10/04/2018    FINDINGS:  Narrow increasing moderate bilateral pleural effusions with adjacent bibasilar airspace consolidations/compressive atelectasis.  Stable chronic healed right rib fractures and chronic T12 and L1 anterior compression fractures.  There is a progressive acute to subacute T11 compression or burst fracture with fracture extending through the anterior and posterior cortex of the vertebral body and worsening vertebral body height loss.  No other acute osseous abnormality identified.    The kidneys and ureters are unremarkable without evidence of urolithiasis or hydronephrosis.  There is a well-positioned Woods catheter decompressing the bladder..    Stable lobular cirrhotic appearing liver.   The unenhanced pancreas, gallbladder, bile ducts, spleen and adrenals are unremarkable.    There has been interval reduction of the previously identified left inguinal hernia with persistent fat stranding in the left inguinal canal.  No residual bowel containing hernia is identified.  There is a jejuno jejunal intussusception in the left lower quadrant there is mild focal dilation of the more proximal jejunum without evidence of complete obstruction.  There is mild dilation of the more distal small bowel with intervening collapsed segments of small bowel.  No focal transition point identified.  There are multiple colonic diverticula without evidence of acute diverticulitis.  There is no evidence of appendicitis.The aorta is normal in caliber.  No pathologically enlarged lymph nodes identified.                               X-Ray Chest 1 View (Final result)  Result time 10/09/18 17:43:06    Final result by Robin Elkins III, MD (10/09/18 17:43:06)                 Impression:      Cardiomegaly and mild vascular congestion.  Bilateral lower lobe infiltrates could represent pulmonary edema or pneumonia.  Small bilateral pleural effusions.      Electronically signed by: Robin Elkins MD  Date:    10/09/2018  Time:    17:43             Narrative:    EXAMINATION:  XR CHEST 1 VIEW    CLINICAL HISTORY:  Weakness    COMPARISON:  10/01/2018    FINDINGS:  Pacemaker lead projects in expected position.  There is cardiomegaly and mild vascular congestion..  There are new bilateral lower lobe and infrahilar alveolar infiltrates with adjacent peribronchial cuffing.  There are small bilateral pleural effusions.  The upper lungs appear clear of active disease.  No pneumothorax identified.                                     Medications   sodium chloride 0.9% bolus 2,517 mL (not administered)   piperacillin-tazobactam 4.5 g in dextrose 5 % 100 mL IVPB (ready to mix system) (not administered)   vancomycin (VANCOCIN) 1,250 mg in  dextrose 5 % 250 mL IVPB (not administered)   barium 2.1 % (w/v), 2.0 % (w/w) suspension 900 mL (900 mLs Oral Given 10/9/18 2030)   0.9%  NaCl infusion (1,000 mLs Intravenous New Bag 10/9/18 2038)                         Clinical Impression:   The primary encounter diagnosis was Sepsis, due to unspecified organism. Diagnoses of Weakness, Abdominal pain, Pneumonia due to infectious organism, unspecified laterality, unspecified part of lung, and Diarrhea, unspecified type were also pertinent to this visit.    Critical Care ED Physician Time (minutes):  -- Performed by: Jack Blancas M.D.  -- Date/Time: 8:54 PM 10/9/2018   -- Direct Patient Care (Face Time): 10  -- Additional History from Records or Taking Additional History: 10  -- Ordering, Reviewing, and Interpreting Diagnostic Studies: 10  -- Total Time in Documentation: 10  -- Consultation with Other Physicians: 10  -- Consultation with Family Related to Condition: 10  -- Total Critical Care Time: 60    Sepsis Criteria  -- Temperature > 100.9° or < 96.8° F: No  -- HR > 90:  Yes  -- RR > 20:  Yes  -- WBC > 12,000 or <4,000:  Yes  -- 2 above criteria and infection source:  Yes  -- Severe sepsis: sepsis with end-organ dysfunction & lactic acid >2:  Yes        I took over this patient from the nurse practitioner  This patient had an incarcerated left inguinal hernia repair in October 4th  The patient was discharged from the hospital, has had profuse diarrhea  The patient was not able to get off the toilet today, very weak  Low-grade temperature with tachycardia, patient meet sepsis criteria  Patient on workup today has consolidation in both lungs, likely pneumonia  Improved CT scan shows resolving bowel obstruction, benign abdominal exam  Patient will be admitted to ICU under the sepsis protocol, pneumonia antibiotic  Breathing treatments with a pulmonology consult, will consult General surgery as well                     Jack Blancas MD  10/09/18 2054

## 2018-10-09 NOTE — ED TRIAGE NOTES
"Pt presents to ED via EMS. Family reports hernia repair Friday. Reports "quite a bit of diarrhea today"  "

## 2018-10-10 PROBLEM — A41.9 SEPSIS: Status: RESOLVED | Noted: 2018-10-09 | Resolved: 2018-10-10

## 2018-10-10 PROBLEM — R19.7 DIARRHEA OF PRESUMED INFECTIOUS ORIGIN: Status: ACTIVE | Noted: 2018-10-10

## 2018-10-10 PROBLEM — A41.9 SEPSIS: Status: ACTIVE | Noted: 2018-10-10

## 2018-10-10 PROBLEM — G20.A1 PARKINSON DISEASE: Status: ACTIVE | Noted: 2018-10-10

## 2018-10-10 PROBLEM — J69.0 ASPIRATION PNEUMONIA: Status: ACTIVE | Noted: 2018-10-10

## 2018-10-10 PROBLEM — J18.9 HCAP (HEALTHCARE-ASSOCIATED PNEUMONIA): Status: ACTIVE | Noted: 2018-10-10

## 2018-10-10 LAB
ALBUMIN SERPL BCP-MCNC: 2.4 G/DL
ALP SERPL-CCNC: 99 U/L
ALT SERPL W/O P-5'-P-CCNC: 21 U/L
ANION GAP SERPL CALC-SCNC: 7 MMOL/L
AST SERPL-CCNC: 28 U/L
BASOPHILS # BLD AUTO: 0.02 K/UL
BASOPHILS NFR BLD: 0.2 %
BILIRUB SERPL-MCNC: 1.1 MG/DL
BUN SERPL-MCNC: 17 MG/DL
CALCIUM SERPL-MCNC: 7.9 MG/DL
CHLORIDE SERPL-SCNC: 105 MMOL/L
CO2 SERPL-SCNC: 21 MMOL/L
CREAT SERPL-MCNC: 0.8 MG/DL
DIFFERENTIAL METHOD: ABNORMAL
EOSINOPHIL # BLD AUTO: 0.1 K/UL
EOSINOPHIL NFR BLD: 0.6 %
ERYTHROCYTE [DISTWIDTH] IN BLOOD BY AUTOMATED COUNT: 12.6 %
EST. GFR  (AFRICAN AMERICAN): >60 ML/MIN/1.73 M^2
EST. GFR  (NON AFRICAN AMERICAN): >60 ML/MIN/1.73 M^2
GLUCOSE SERPL-MCNC: 107 MG/DL
HCT VFR BLD AUTO: 32.1 %
HGB BLD-MCNC: 10.6 G/DL
INR PPP: 1.8
LYMPHOCYTES # BLD AUTO: 0.8 K/UL
LYMPHOCYTES NFR BLD: 8.2 %
MCH RBC QN AUTO: 31.4 PG
MCHC RBC AUTO-ENTMCNC: 33 G/DL
MCV RBC AUTO: 95 FL
MONOCYTES # BLD AUTO: 1 K/UL
MONOCYTES NFR BLD: 10 %
NEUTROPHILS # BLD AUTO: 8.2 K/UL
NEUTROPHILS NFR BLD: 81 %
PLATELET # BLD AUTO: 193 K/UL
PMV BLD AUTO: 9.6 FL
POTASSIUM SERPL-SCNC: 4.2 MMOL/L
PROCALCITONIN SERPL IA-MCNC: 6.58 NG/ML
PROT SERPL-MCNC: 5.2 G/DL
PROTHROMBIN TIME: 17.7 SEC
RBC # BLD AUTO: 3.38 M/UL
SODIUM SERPL-SCNC: 133 MMOL/L
WBC # BLD AUTO: 10.08 K/UL

## 2018-10-10 PROCEDURE — 25000003 PHARM REV CODE 250: Performed by: SURGERY

## 2018-10-10 PROCEDURE — 25000242 PHARM REV CODE 250 ALT 637 W/ HCPCS: Performed by: FAMILY MEDICINE

## 2018-10-10 PROCEDURE — 85025 COMPLETE CBC W/AUTO DIFF WBC: CPT

## 2018-10-10 PROCEDURE — 63600175 PHARM REV CODE 636 W HCPCS: Performed by: INTERNAL MEDICINE

## 2018-10-10 PROCEDURE — 25000242 PHARM REV CODE 250 ALT 637 W/ HCPCS: Performed by: SURGERY

## 2018-10-10 PROCEDURE — 25000003 PHARM REV CODE 250: Performed by: FAMILY MEDICINE

## 2018-10-10 PROCEDURE — G8982 BODY POS GOAL STATUS: HCPCS | Mod: CL

## 2018-10-10 PROCEDURE — 36415 COLL VENOUS BLD VENIPUNCTURE: CPT

## 2018-10-10 PROCEDURE — 97162 PT EVAL MOD COMPLEX 30 MIN: CPT

## 2018-10-10 PROCEDURE — 85610 PROTHROMBIN TIME: CPT

## 2018-10-10 PROCEDURE — 25000242 PHARM REV CODE 250 ALT 637 W/ HCPCS: Performed by: INTERNAL MEDICINE

## 2018-10-10 PROCEDURE — 27000646 HC AEROBIKA DEVICE

## 2018-10-10 PROCEDURE — 94640 AIRWAY INHALATION TREATMENT: CPT

## 2018-10-10 PROCEDURE — 63600175 PHARM REV CODE 636 W HCPCS: Performed by: SURGERY

## 2018-10-10 PROCEDURE — G8981 BODY POS CURRENT STATUS: HCPCS | Mod: CM

## 2018-10-10 PROCEDURE — 80053 COMPREHEN METABOLIC PANEL: CPT

## 2018-10-10 PROCEDURE — 27000190 HC CPAP FULL FACE MASK W/VALVE

## 2018-10-10 PROCEDURE — 94664 DEMO&/EVAL PT USE INHALER: CPT

## 2018-10-10 PROCEDURE — 99900035 HC TECH TIME PER 15 MIN (STAT)

## 2018-10-10 PROCEDURE — 11000001 HC ACUTE MED/SURG PRIVATE ROOM

## 2018-10-10 PROCEDURE — 99223 1ST HOSP IP/OBS HIGH 75: CPT | Mod: ,,, | Performed by: FAMILY MEDICINE

## 2018-10-10 PROCEDURE — 97530 THERAPEUTIC ACTIVITIES: CPT

## 2018-10-10 PROCEDURE — 94761 N-INVAS EAR/PLS OXIMETRY MLT: CPT

## 2018-10-10 PROCEDURE — 27000221 HC OXYGEN, UP TO 24 HOURS

## 2018-10-10 PROCEDURE — 94660 CPAP INITIATION&MGMT: CPT

## 2018-10-10 RX ORDER — CELECOXIB 200 MG/1
200 CAPSULE ORAL DAILY PRN
Status: DISCONTINUED | OUTPATIENT
Start: 2018-10-10 | End: 2018-10-15 | Stop reason: HOSPADM

## 2018-10-10 RX ORDER — WARFARIN 2 MG/1
2 TABLET ORAL DAILY
Status: DISCONTINUED | OUTPATIENT
Start: 2018-10-10 | End: 2018-10-10

## 2018-10-10 RX ORDER — LEVALBUTEROL 1.25 MG/.5ML
1.25 SOLUTION, CONCENTRATE RESPIRATORY (INHALATION) EVERY 12 HOURS
Status: DISCONTINUED | OUTPATIENT
Start: 2018-10-10 | End: 2018-10-15 | Stop reason: HOSPADM

## 2018-10-10 RX ORDER — LEVALBUTEROL 1.25 MG/.5ML
1.25 SOLUTION, CONCENTRATE RESPIRATORY (INHALATION) EVERY 12 HOURS
Status: DISCONTINUED | OUTPATIENT
Start: 2018-10-10 | End: 2018-10-10 | Stop reason: SDUPTHER

## 2018-10-10 RX ORDER — FUROSEMIDE 10 MG/ML
40 INJECTION INTRAMUSCULAR; INTRAVENOUS ONCE
Status: COMPLETED | OUTPATIENT
Start: 2018-10-10 | End: 2018-10-10

## 2018-10-10 RX ORDER — CARBIDOPA AND LEVODOPA 25; 100 MG/1; MG/1
1 TABLET ORAL 4 TIMES DAILY
Status: DISCONTINUED | OUTPATIENT
Start: 2018-10-10 | End: 2018-10-15 | Stop reason: HOSPADM

## 2018-10-10 RX ORDER — WARFARIN 1 MG/1
1 TABLET ORAL DAILY
Status: DISCONTINUED | OUTPATIENT
Start: 2018-10-10 | End: 2018-10-13

## 2018-10-10 RX ORDER — IPRATROPIUM BROMIDE 0.5 MG/2.5ML
0.5 SOLUTION RESPIRATORY (INHALATION) EVERY 6 HOURS
Status: DISCONTINUED | OUTPATIENT
Start: 2018-10-10 | End: 2018-10-15 | Stop reason: HOSPADM

## 2018-10-10 RX ADMIN — PIPERACILLIN SODIUM AND TAZOBACTAM SODIUM 4.5 G: 4; .5 INJECTION, POWDER, FOR SOLUTION INTRAVENOUS at 01:10

## 2018-10-10 RX ADMIN — CARBIDOPA AND LEVODOPA 1 TABLET: 25; 100 TABLET ORAL at 08:10

## 2018-10-10 RX ADMIN — IPRATROPIUM BROMIDE 0.5 MG: 0.5 SOLUTION RESPIRATORY (INHALATION) at 07:10

## 2018-10-10 RX ADMIN — CELECOXIB 200 MG: 200 CAPSULE ORAL at 08:10

## 2018-10-10 RX ADMIN — LEVALBUTEROL 1.25 MG: 1.25 SOLUTION, CONCENTRATE RESPIRATORY (INHALATION) at 09:10

## 2018-10-10 RX ADMIN — CARBIDOPA AND LEVODOPA 1 TABLET: 25; 100 TABLET ORAL at 12:10

## 2018-10-10 RX ADMIN — CARBIDOPA AND LEVODOPA 1 TABLET: 25; 100 TABLET ORAL at 04:10

## 2018-10-10 RX ADMIN — PIPERACILLIN SODIUM AND TAZOBACTAM SODIUM 4.5 G: 4; .5 INJECTION, POWDER, FOR SOLUTION INTRAVENOUS at 10:10

## 2018-10-10 RX ADMIN — PANTOPRAZOLE SODIUM 40 MG: 40 TABLET, DELAYED RELEASE ORAL at 08:10

## 2018-10-10 RX ADMIN — LEVALBUTEROL 1.25 MG: 1.25 SOLUTION, CONCENTRATE RESPIRATORY (INHALATION) at 07:10

## 2018-10-10 RX ADMIN — WARFARIN SODIUM 1 MG: 2 TABLET ORAL at 04:10

## 2018-10-10 RX ADMIN — PIPERACILLIN SODIUM AND TAZOBACTAM SODIUM 4.5 G: 4; .5 INJECTION, POWDER, FOR SOLUTION INTRAVENOUS at 05:10

## 2018-10-10 RX ADMIN — FUROSEMIDE 40 MG: 10 INJECTION, SOLUTION INTRAMUSCULAR; INTRAVENOUS at 10:10

## 2018-10-10 RX ADMIN — LEVALBUTEROL 1.25 MG: 1.25 SOLUTION, CONCENTRATE RESPIRATORY (INHALATION) at 10:10

## 2018-10-10 RX ADMIN — IPRATROPIUM BROMIDE 0.5 MG: 0.5 SOLUTION RESPIRATORY (INHALATION) at 01:10

## 2018-10-10 NOTE — PLAN OF CARE
Pt had small amount of bloody drainage around grey catheter insertion site, Dr Cabrera notified Waiting for PT/INR results.

## 2018-10-10 NOTE — NURSING
Report per Milagros RN; patient to CCU2 via stretcher with RN at side. No acute s/s distress at present, vitals stable per flow sheet. Admit orders noted, family updated.

## 2018-10-10 NOTE — PROGRESS NOTES
RESUMED CARE,REC'D WITH DENIAL SOB OR CHEST DISCOMFORT.REORIENT STAFF,RM & CALL-LIGHT USE FOR ANY ASST NEED PT & DGTER @ BS.HOB UP,BACK INTACT DOWN DRAIN INTACT NO FURTHER BLDING @ TIP OF PENIS NOTED,WILL CONT MONITOR.MADE COMFORTABLE the patient & FAMILY AWARE OF ISOLATION PROCEDURES.

## 2018-10-10 NOTE — PLAN OF CARE
Pt transported via bed with O2 transport and portable CM in progress per Dylan HOUSTON and Estefani DAWSON. Stable upon leaving CCU.

## 2018-10-10 NOTE — PLAN OF CARE
Dr Leach assessed pt and spoke with pt and pts daughter concerning pt condition, both voiced understanding reinforcement needed with pt.

## 2018-10-10 NOTE — PROGRESS NOTES
[facility]  General Surgery  Progress Note    Subjective:     Interval History:  Patient underwent incarcerated left inguinal hernia repair with mesh 5 days ago here at [facility].  Over the last few days patient has had copious amounts of watery stools consistent with diarrhea.  He was very weak at home and shaky.  He was brought into the emergency room and found to be dehydrated with severe diarrhea.  Patient denies any abdominal pain. Denies any pain around his incision in the left inguinal region. Patient underwent CT scan last night which shows intussusception at the jejunum.  I am unsure of the significance of this as the patient is having multiple bowel movements.  No evidence of bowel obstruction.    Post-Op Info:  * No surgery found *          Medications:  Continuous Infusions:   sodium chloride 0.9% 125 mL/hr at 10/10/18 0900     Scheduled Meds:   levalbuterol  1.25 mg Nebulization Q12H    pantoprazole  40 mg Oral Daily    piperacillin-tazobactam (ZOSYN) IVPB  4.5 g Intravenous Q8H    vancomycin (VANCOCIN) IVPB  15 mg/kg Intravenous Q24H    warfarin  2 mg Oral Daily     PRN Meds:acetaminophen, levalbuterol, ondansetron, promethazine (PHENERGAN) IVPB     Objective:     Vital Signs (Most Recent):  Temp: 97.6 °F (36.4 °C) (10/10/18 0715)  Pulse: 71 (10/10/18 0900)  Resp: (!) 27 (10/10/18 0900)  BP: (!) 115/52 (10/10/18 0900)  SpO2: (!) 93 % (10/10/18 0900) Vital Signs (24h Range):  Temp:  [97.6 °F (36.4 °C)-99.4 °F (37.4 °C)] 97.6 °F (36.4 °C)  Pulse:  [51-96] 71  Resp:  [12-30] 27  SpO2:  [89 %-99 %] 93 %  BP: (100-148)/(38-74) 115/52       Intake/Output Summary (Last 24 hours) at 10/10/2018 0937  Last data filed at 10/10/2018 0900  Gross per 24 hour   Intake 2075 ml   Output 630 ml   Net 1445 ml       Physical Exam   Constitutional: He is oriented to person, place, and time. He appears well-developed and well-nourished.   HENT:   Head: Normocephalic.   Eyes: Pupils are equal,  round, and reactive to light.   Neck: Normal range of motion.   Pulmonary/Chest: Effort normal.   Abdominal: Soft.   His left inguinal incision is healing well.  No evidence of any infection.  No drainage. No bruising.   Musculoskeletal: Normal range of motion.   Neurological: He is alert and oriented to person, place, and time.   Skin: Skin is warm and dry.   Psychiatric: He has a normal mood and affect.   Nursing note and vitals reviewed.      Significant Labs:  BMP:   Recent Labs   Lab  10/09/18   2100  10/10/18   0355   GLU   --   107   NA   --   133*   K   --   4.2   CL   --   105   CO2   --   21*   BUN   --   17   CREATININE   --   0.8   CALCIUM   --   7.9*   MG  1.8   --      CBC:   Recent Labs   Lab  10/10/18   0355   WBC  10.08   RBC  3.38*   HGB  10.6*   HCT  32.1*   PLT  193   MCV  95   MCH  31.4*   MCHC  33.0       Significant Diagnostics:  I have reviewed all pertinent imaging results/findings within the past 24 hours.    Assessment/Plan:     Active Diagnoses:    Diagnosis Date Noted POA    HCAP (healthcare-associated pneumonia) [J18.9] 10/10/2018 Yes    Parkinson disease [G20] 10/10/2018 Yes    Diarrhea of presumed infectious origin [R19.7] 10/10/2018 Yes    Sepsis [A41.9] 10/10/2018 Yes      Problems Resolved During this Admission:    Diagnosis Date Noted Date Resolved POA    Sepsis [A41.9] 10/09/2018 10/10/2018 Yes     Patient is no longer having diarrhea but his next loose stool be sent for C diff.  Patient can have a diet as tolerated.  No further surgical recommendations at this time.    Calixto Mendenhall Jr, MD  General Surgery  [facility]

## 2018-10-10 NOTE — PLAN OF CARE
Problem: Patient Care Overview  Goal: Plan of Care Review  Outcome: Ongoing (interventions implemented as appropriate)  Patient new admit this shift through ED with diarrhea x2 days; to CCU2 with UTI/PNA for sepsis w/u. Patient alert/oriented afebrile with stable vitals on 3L O2 with grey cath in place; scant blood at insertion site noted. Recent hernia repair 10/4; incision to abdomen JUSTINE and well approximated. IVF at 125cc/hr tolerates pills w/o difficulty. Instructed on POC with verbalized understanding.

## 2018-10-10 NOTE — H&P
[facility] Medicine  History & Physical    Patient Name: Elvis Thompson  MRN: [id number]  Admission Date: 10/9/2018  Attending Physician: Dulce Mcmahon MD   Primary Care Provider: Regulo Martínez MD         Patient information was obtained from patient and ER records.     Subjective:     Principal Problem:<principal problem not specified>    Chief Complaint:   Chief Complaint   Patient presents with    Diarrhea    Weakness        HPI: 85 year old male presents to ED with 14 hour h/o diarrhea. No n/v. No fever. Recent inguinal hernia repair Thursday.  He was kept overnight. Went home Fri. Had some wheezing and coughing post op per daughter. CXR with small bilateral infiltrates called by radiologist.  He meets sepsis criteria in ED. Lactate was normal. Admitted overnight for sepsis and started on Zosyn and VANC. Getting fluids at 125/hour. Pressures good, stable. Making urine.     Past Medical History:   Diagnosis Date    Anticoagulant long-term use     Bradycardia     HTN (hypertension)     Parkinson disease 03/24/2017    Pulmonary embolism     Shingles        Past Surgical History:   Procedure Laterality Date    CARDIAC PACEMAKER PLACEMENT      cataract      FOOT SURGERY      HAND SURGERY      HERNIA REPAIR      KNEE SURGERY      right knee replacement    MYELOGRAM N/A 3/27/2017    Performed by [facility] Provider at [facility]    PROSTATE SURGERY      REPAIR, HERNIA, INGUINAL WITH PROLENE HERNIA SYSTEM (EXTENDED VERSION) Left 10/4/2018    Performed by Raman Garcia MD at [facility] OR        Review of patient's allergies indicates:   Allergen Reactions    Iodine and iodide containing products Other (See Comments)    Codeine Other (See Comments)     Insomnia      Morphine Nausea And Vomiting       No current facility-administered medications on file prior to encounter.      Current Outpatient Medications on File Prior to Encounter   Medication Sig    amlodipine  (NORVASC) 5 MG tablet Take 5 mg by mouth once daily.    carbidopa-levodopa  mg (SINEMET)  mg per tablet Take 1 tablet by mouth 4 (four) times daily.    celecoxib (CELEBREX) 200 MG capsule Take 200 mg by mouth daily as needed for Pain.     furosemide (LASIX) 20 MG tablet Take 20 mg by mouth daily as needed.     warfarin (COUMADIN) 2 MG tablet Take 1 mg by mouth Daily.     traMADol (ULTRAM) 50 mg tablet Take 1 tablet (50 mg total) by mouth every 6 (six) hours as needed for Pain.     Family History     Problem Relation (Age of Onset)    Heart disease Mother        Tobacco Use    Smoking status: Never Smoker    Smokeless tobacco: Never Used   Substance and Sexual Activity    Alcohol use: No    Drug use: Not on file    Sexual activity: Not Currently       Review of Systems   Constitutional: Negative for activity change, appetite change, chills, diaphoresis, fatigue, fever and unexpected weight change.   HENT: Negative for congestion, dental problem, drooling, ear discharge, ear pain, facial swelling, mouth sores, nosebleeds, postnasal drip, rhinorrhea, sinus pressure, sneezing, sore throat, trouble swallowing and voice change.    Eyes: Negative for photophobia, pain, discharge, redness, itching and visual disturbance.   Respiratory: Negative for apnea, cough, chest tightness, shortness of breath and wheezing.    Cardiovascular: Negative for chest pain, palpitations and leg swelling.   Gastrointestinal: Positive for diarrhea. Negative for abdominal distention, abdominal pain, blood in stool, constipation, nausea and vomiting.   Genitourinary: Negative for difficulty urinating, dysuria, enuresis, flank pain, frequency, hematuria and urgency.   Musculoskeletal: Positive for back pain. Negative for arthralgias, gait problem, joint swelling, myalgias, neck pain and neck stiffness.   Skin: Negative for color change, rash and wound.   Neurological: Positive for weakness. Negative for dizziness, tremors,  seizures, syncope, facial asymmetry, speech difficulty, light-headedness, numbness and headaches.   Hematological: Negative for adenopathy. Does not bruise/bleed easily.   Psychiatric/Behavioral: Negative for agitation, behavioral problems, confusion, decreased concentration, dysphoric mood, sleep disturbance and suicidal ideas. The patient is not nervous/anxious.      Objective:     Vital Signs (Most Recent):  Temp: 97.6 °F (36.4 °C) (10/10/18 0715)  Pulse: 71 (10/10/18 0900)  Resp: (!) 27 (10/10/18 0900)  BP: (!) 115/52 (10/10/18 0900)  SpO2: (!) 93 % (10/10/18 0900) Vital Signs (24h Range):  Temp:  [97.6 °F (36.4 °C)-99.4 °F (37.4 °C)] 97.6 °F (36.4 °C)  Pulse:  [51-96] 71  Resp:  [12-30] 27  SpO2:  [89 %-99 %] 93 %  BP: (100-148)/(38-74) 115/52     Weight: 92 kg (202 lb 13.2 oz)  Body mass index is 33.75 kg/m².    Physical Exam   Constitutional: He is oriented to person, place, and time. He appears well-developed and well-nourished.   HENT:   Head: Normocephalic and atraumatic.   Right Ear: External ear normal.   Left Ear: External ear normal.   Nose: Nose normal.   Mouth/Throat: Oropharynx is clear and moist.   Eyes: Conjunctivae and EOM are normal. Pupils are equal, round, and reactive to light. Right eye exhibits no discharge. Left eye exhibits no discharge. No scleral icterus.   Neck: Normal range of motion. Neck supple. No JVD present. No tracheal deviation present. No thyromegaly present.   Cardiovascular: Normal rate, regular rhythm, normal heart sounds and intact distal pulses.   No murmur heard.  Pulmonary/Chest: Effort normal and breath sounds normal. No respiratory distress. He has no wheezes. He has no rales. He exhibits no tenderness.   Crackles right base    Abdominal: Soft. Bowel sounds are normal. He exhibits distension. He exhibits no mass. There is no tenderness. There is no rebound and no guarding.   Musculoskeletal: Normal range of motion.   Lymphadenopathy:     He has no cervical adenopathy.    Neurological: He is alert and oriented to person, place, and time. He has normal reflexes. He displays normal reflexes. No cranial nerve deficit. He exhibits abnormal muscle tone. Coordination normal.   Skin: Skin is warm and dry.   Psychiatric: He has a normal mood and affect. His behavior is normal. Judgment and thought content normal.         CRANIAL NERVES     CN III, IV, VI   Pupils are equal, round, and reactive to light.  Extraocular motions are normal.        Significant Labs:   Blood Culture: No results for input(s): LABBLOO in the last 48 hours.  CBC:   Recent Labs   Lab  10/09/18   1625  10/10/18   0355   WBC  13.26*  10.08   HGB  12.4*  10.6*   HCT  36.7*  32.1*   PLT  242  193     CMP:   Recent Labs   Lab  10/09/18   1625  10/10/18   0355   NA  135*  133*   K  4.2  4.2   CL  102  105   CO2  21*  21*   GLU  134*  107   BUN  20  17   CREATININE  0.8  0.8   CALCIUM  8.8  7.9*   PROT  6.5  5.2*   ALBUMIN  3.0*  2.4*   BILITOT  1.3*  1.1*   ALKPHOS  123  99   AST  32  28   ALT  10  21   ANIONGAP  12  7*   EGFRNONAA  >60  >60       Lactic Acid:   Recent Labs   Lab  10/09/18   1639  10/09/18   2101   LACTATE  2.1  1.2       Urine Studies:   Recent Labs   Lab  10/09/18   1818   COLORU  Yellow   APPEARANCEUA  Hazy*   PHUR  7.0   SPECGRAV  1.020   PROTEINUA  Trace*   GLUCUA  Negative   KETONESU  Trace*   BILIRUBINUA  Negative   OCCULTUA  2+*   NITRITE  Negative   UROBILINOGEN  1.0   LEUKOCYTESUR  Negative   RBCUA  35*          Significant Imaging: I have reviewed and interpreted all pertinent imaging results/findings within the past 24 hours.    Assessment/Plan:     Diarrhea of presumed infectious origin    GE?  Cdiff?(no recent abx). Ordered in ED   Resolved now  Will feed BRAT diet         Parkinson disease    Continue his home sinemet  Order PT           HCAP (healthcare-associated pneumonia)    Vanc and Zosyn day 1   Order nebs BID   downdgrade to floor   Blood Culture             VTE Risk Mitigation (From  admission, onward)        Ordered     warfarin (COUMADIN) tablet 2 mg  Daily      10/10/18 0836        Critical care time spent on the evaluation and treatment of severe organ dysfunction, review of pertinent labs and imaging studies, discussions with consulting providers and discussions with patient/family: 35 minutes.     Santos Cabrera MD  Department of Hospital Medicine   [facility]

## 2018-10-10 NOTE — PT/OT/SLP EVAL
"Physical Therapy Evaluation    Patient Name:  Elvis Thompson   MRN:  [id number]    Recommendations:     Discharge Recommendations:  home with home health, home health PT, nursing facility, skilled   Discharge Equipment Recommendations: none   Barriers to discharge: None    Assessment:     Elvis Thompson is a 85 y.o. male admitted with a medical diagnosis of <principal problem not specified>.  He presents with the following impairments/functional limitations:  weakness, impaired endurance, impaired self care skills, impaired functional mobilty, gait instability, impaired balance, decreased coordination, decreased upper extremity function, decreased lower extremity function, decreased safety awareness . Pt is a Parkinson's patient who was having HH PT prior to admit. Pt presents with weakness in BLE and decreased functional activity tolerance with minimal activity. Pt with decline from mod I PLOF with RW. Pt with increased fall risk and delayed balance reactions. Pt requires increased assistance with all gross mobility skills at this time. Pt unable to get OOB upon evaluation or stand.    Rehab Prognosis:  Good; patient would benefit from acute skilled PT services to address these deficits and reach maximum level of function.      Recent Surgery: * No surgery found *      Plan:     During this hospitalization, patient to be seen (1-2x/day Monday-Friday; PRN Weekends/Holidays) to address the above listed problems via gait training, therapeutic activities, therapeutic exercises  · Plan of Care Expires:  (upon D/C from facility)   Plan of Care Reviewed with: patient    Subjective     Communicated with patient and his daughter prior to session.  Patient found supine in bed upon PT entry to room, agreeable to evaluation.      Chief Complaint: "I feel so weak and tired."  Patient comments/goals: "Get stronger and move more"  Pain/Comfort:  · Pain Rating 1: 0/10    Patients cultural, spiritual, Mandaen conflicts given the " current situation:      Living Environment:  Pt lives at home alone with 24 hour paid caregivers.  Prior to admission, patients level of function was mod I with RW prior to last sx.  Patient has the following equipment: wheelchair, bedside commode, walker, rolling.  DME owned (not currently used): rolling walker, bedside commode and wheelchair.  Upon discharge, patient will have assistance from family and paid help. Family interested in possible SNF placement.    Objective:     Patient found with: blood pressure cuff, grey catheter, pulse ox (continuous), oxygen, peripheral IV, telemetry     General Precautions: Standard, contact, fall   Orthopedic Precautions:N/A   Braces: N/A     Exams:  · Cognitive Exam:  Patient is oriented to Person, Place, Time and Situation  · Fine Motor Coordination:    · -       Intact  · Gross Motor Coordination:  WFL  · RLE ROM: WFL  · RLE Strength: Deficits: grossly 3-/5  · LLE ROM: WFL  · LLE Strength: Deficits: grossly 3-/5    Functional Mobility:  · Bed Mobility:     · Rolling Left:  maximal assistance  · Rolling Right: maximal assistance  · Scooting: maximal assistance  · Supine to Sit: maximal assistance  · Sit to Supine: maximal assistance  Pt unable to stand upon evaluation    AM-PAC 6 CLICK MOBILITY  Total Score:9       Patient left supine with all lines intact, call button in reach, NSG notified and daughter present.    GOALS:   Multidisciplinary Problems     Physical Therapy Goals        Problem: Physical Therapy Goal    Goal Priority Disciplines Outcome Goal Variances Interventions   Physical Therapy Goal     PT, PT/OT      Description:  Goals to be met by: upon D/C from facility     Patient will increase functional independence with mobility by performin. Supine to sit with Contact Guard Assistance  2. Sit to supine with Contact Guard Assistance  3. Sit to stand transfer with Contact Guard Assistance  4. Bed to chair transfer with Contact Guard Assistance using  Rolling Walker  5. Gait  x 50 feet with Minimal Assistance using Rolling Walker.                       History:     Past Medical History:   Diagnosis Date    Anticoagulant long-term use     Bradycardia     HTN (hypertension)     Parkinson disease 03/24/2017    Pulmonary embolism     Shingles        Past Surgical History:   Procedure Laterality Date    CARDIAC PACEMAKER PLACEMENT      cataract      FOOT SURGERY      HAND SURGERY      HERNIA REPAIR      KNEE SURGERY      right knee replacement    MYELOGRAM N/A 3/27/2017    Performed by [facility]    PROSTATE SURGERY      REPAIR, HERNIA, INGUINAL WITH PROLENE HERNIA SYSTEM (EXTENDED VERSION) Left 10/4/2018    Performed by Raman Garcia MD at [facility] OR       Clinical Decision Making:     History  Co-morbidities and personal factors that may impact the plan of care Examination  Body Structures and Functions, activity limitations and participation restrictions that may impact the plan of care Clinical Presentation   Decision Making/ Complexity Score   Co-morbidities:   [] Time since onset of injury / illness / exacerbation  [] Status of current condition  []Patient's cognitive status and safety concerns    [x] Multiple Medical Problems (see med hx)  Personal Factors:   [x] Patient's age  [] Prior Level of function   [] Patient's home situation (environment and family support)  [] Patient's level of motivation  [] Expected progression of patient      HISTORY:(criteria)    [] 60180 - no personal factors/history    [x] 60856 - has 1-2 personal factor/comorbidity     [] 46044 - has >3 personal factor/comorbidity     Body Regions:  [] Objective examination findings  [] Head     []  Neck  [] Trunk   [x] Upper Extremity  [x] Lower Extremity    Body Systems:  [] For communication ability, affect, cognition, language, and learning style: the assessment of the ability to make needs known, consciousness, orientation (person, place, and time),  expected emotional /behavioral responses, and learning preferences (eg, learning barriers, education  needs)  [x] For the neuromuscular system: a general assessment of gross coordinated movement (eg, balance, gait, locomotion, transfers, and transitions) and motor function  (motor control and motor learning)  [x] For the musculoskeletal system: the assessment of gross symmetry, gross range of motion, gross strength, height, and weight  [] For the integumentary system: the assessment of pliability(texture), presence of scar formation, skin color, and skin integrity  [] For cardiovascular/pulmonary system: the assessment of heart rate, respiratory rate, blood pressure, and edema     Activity limitations:    [] Patient's cognitive status and saf ety concerns          [] Status of current condition      [] Weight bearing restriction  [] Cardiopulmunary Restriction    Participation Restrictions:   [] Goals and goal agreement with the patient     [] Rehab potential (prognosis) and probable outcome      Examination of Body System: (criteria)    [] 38788 - addressing 1-2 elements    [x] 11005 - addressing a total of 3 or more elements     [] 15272 -  Addressing a total of 4 or more elements         Clinical Presentation: (criteria)  Evolving - 52167     On examination of body system using standardized tests and measures patient presents with 3 or more elements from any of the following: body structures and functions, activity limitations, and/or participation restrictions.  Leading to a clinical presentation that is considered evolving with changing characteristics                              Clinical Decision Making  (Eval Complexity):  Moderate - 19819     Time Tracking:     PT Received On: 10/10/18  PT Start Time: 1023     PT Stop Time: 1046  PT Total Time (min): 23 min     Billable Minutes: Evaluation 13 minutes and Therapeutic Activity 10 minutes      Agatha Romo, PT  10/10/2018

## 2018-10-10 NOTE — HPI
85 year old male presents to ED with 14 hour h/o diarrhea. No n/v. No fever. Recent inguinal hernia repair Thursday.  He was kept overnight. Went home Fri. Had some wheezing and coughing post op per daughter. CXR with small bilateral infiltrates called by radiologist.  He meets sepsis criteria in ED. Lactate was normal. Admitted overnight for sepsis and started on Zosyn and VANC. Getting fluids at 125/hour. Pressures good, stable. Making urine.

## 2018-10-10 NOTE — PLAN OF CARE
Asessment done. Pt presents asleep easily arousable with voice. Appropriate responses for condition. CM in progress alarms set and on. NAD. BBS clear to upper lobes with fine rales to bases. Oriented x4. PIV to left arm intact secured with IVF infusing well to site. Woods to BSD. S/P inquinal hernia repair last week, incision to mid lower abd well approximated,dry and intact. Denies c/o at present. Reviewed plan of care with pt he voiced understanding reinforcement needed. Continuing to monitor. callbell within reach.

## 2018-10-10 NOTE — PROGRESS NOTES
END OF SHIFT EVAL,RM QUIET LIGHTS DIMMED,HOB UP CALL-LIGHT IN REACH.BACK INTACT  DOWN  IV  FLDS INFUSING WELL VIA PUMP,W/O SIGNS DISTRESS.REPORT GIVEN ON-COMING STAFF.

## 2018-10-10 NOTE — PT/OT/SLP PROGRESS
"Physical Therapy Treatment    Patient Name:  Elvis Thompson   MRN:  [id number]    Recommendations:     Discharge Recommendations:  home with home health, home health PT, nursing facility, skilled   Discharge Equipment Recommendations: none   Barriers to discharge: None    Assessment:     Elvis Thompson is a 85 y.o. male admitted with a medical diagnosis of <principal problem not specified>.  He presents with the following impairments/functional limitations:  weakness, impaired endurance, impaired self care skills, impaired functional mobilty, gait instability, impaired balance, decreased upper extremity function, decreased lower extremity function, decreased safety awareness Pt easily fatigued this PM with minimal activity.    Rehab Prognosis:  Good; patient would benefit from acute skilled PT services to address these deficits and reach maximum level of function.      Recent Surgery: * No surgery found *      Plan:     During this hospitalization, patient to be seen (1-2x/day Monday-Friday; PRN Weekends/Holidays) to address the above listed problems via gait training, therapeutic activities, therapeutic exercises  · Plan of Care Expires:  (upon D/C from facility)   Plan of Care Reviewed with: patient, daughter    Subjective     Communicated with patient prior to session.  Patient found supine in bed upon PT entry to room, agreeable to treatment.      Chief Complaint: "I am so weak and little dizzy"  Patient comments/goals: "Get stronger"  Pain/Comfort:  · Pain Rating 1: 0/10    Patients cultural, spiritual, Yazdanism conflicts given the current situation:      Objective:     Patient found with: blood pressure cuff, grey catheter, pulse ox (continuous), oxygen, peripheral IV, telemetry     General Precautions: Standard, contact, fall   Orthopedic Precautions:N/A   Braces: N/A     Functional Mobility:  · Bed Mobility:     · Rolling Left:  maximal assistance  · Rolling Right: maximal assistance  · Scooting: maximal " assistance  · Supine to Sit: maximal assistance  · Sit to Supine: maximal assistance      AM-PAC 6 CLICK MOBILITY  Turning over in bed (including adjusting bedclothes, sheets and blankets)?: 2  Sitting down on and standing up from a chair with arms (e.g., wheelchair, bedside commode, etc.): 2  Moving from lying on back to sitting on the side of the bed?: 2  Moving to and from a bed to a chair (including a wheelchair)?: 1  Need to walk in hospital room?: 1  Climbing 3-5 steps with a railing?: 1  Basic Mobility Total Score: 9       Patient left supine with all lines intact, call button in reach, NSG notified and daughter present..    GOALS:   Multidisciplinary Problems     Physical Therapy Goals        Problem: Physical Therapy Goal    Goal Priority Disciplines Outcome Goal Variances Interventions   Physical Therapy Goal     PT, PT/OT      Description:  Goals to be met by: upon D/C from facility     Patient will increase functional independence with mobility by performin. Supine to sit with Contact Guard Assistance  2. Sit to supine with Contact Guard Assistance  3. Sit to stand transfer with Contact Guard Assistance  4. Bed to chair transfer with Contact Guard Assistance using Rolling Walker  5. Gait  x 50 feet with Minimal Assistance using Rolling Walker.                       Time Tracking:     PT Received On: 10/10/18  PT Start Time: 1600     PT Stop Time: 1615  PT Total Time (min): 15 min     Billable Minutes: Therapeutic Activity 15 minutes    Treatment Type: Treatment  PT/PTA: PT           Agatha Romo, PT  10/10/2018

## 2018-10-10 NOTE — CONSULTS
[facility]  Pulmonology  Consult Note    Patient Name: Elvis Thompson  MRN: [id number]  Admission Date: 10/9/2018  Hospital Length of Stay: 1 days  Code Status: Full Code  Attending Physician: Dulce Mcmahon MD  Primary Care Provider: Regulo Martínez MD   Principal Problem: <principal problem not specified>    Consults  Subjective:     HPI:  Elvis Thompson is a 85 y.o. year old male that's presents with a chief complaint of SOB and Wheezing after emergency surgery  for an incarcerated ventral hernia.Pt  After surgery developed bilateral infiltrates Right greater than the Left probably secondary to aspiration .Will staRT bETA AGONIST pipercillin is adequate for aspiration thus vancomycin could be stopped. Consulted to evaluate Respiratory status.    Past Medical History:   Diagnosis Date    Anticoagulant long-term use     Bradycardia     HTN (hypertension)     Parkinson disease 03/24/2017    Pulmonary embolism     Shingles         Past Surgical History:   Procedure Laterality Date    CARDIAC PACEMAKER PLACEMENT      cataract      FOOT SURGERY      HAND SURGERY      HERNIA REPAIR      KNEE SURGERY      right knee replacement    MYELOGRAM N/A 3/27/2017    Performed by [facility] OR    PROSTATE SURGERY      REPAIR, HERNIA, INGUINAL WITH PROLENE HERNIA SYSTEM (EXTENDED VERSION) Left 10/4/2018    Performed by Raman Garcia MD at [facility] OR       Prior to Admission medications    Medication Sig Start Date End Date Taking? Authorizing Provider   amlodipine (NORVASC) 5 MG tablet Take 5 mg by mouth once daily. 2/22/16  Yes Historical Provider, MD   carbidopa-levodopa  mg (SINEMET)  mg per tablet Take 1 tablet by mouth 4 (four) times daily. 9/25/18  Yes Bandar Lopez MD   celecoxib (CELEBREX) 200 MG capsule Take 200 mg by mouth daily as needed for Pain.  2/29/16  Yes Historical Provider, MD   furosemide (LASIX) 20 MG tablet Take 20 mg by mouth daily  as needed.  2/17/16  Yes Historical Provider, MD   warfarin (COUMADIN) 2 MG tablet Take 1 mg by mouth Daily.  2/3/16  Yes Historical Provider, MD   traMADol (ULTRAM) 50 mg tablet Take 1 tablet (50 mg total) by mouth every 6 (six) hours as needed for Pain. 10/1/18 10/11/18  Jack Blancas MD       Social History     Socioeconomic History    Marital status:      Spouse name: Not on file    Number of children: Not on file    Years of education: Not on file    Highest education level: Not on file   Social Needs    Financial resource strain: Not on file    Food insecurity - worry: Not on file    Food insecurity - inability: Not on file    Transportation needs - medical: Not on file    Transportation needs - non-medical: Not on file   Occupational History    Not on file   Tobacco Use    Smoking status: Never Smoker    Smokeless tobacco: Never Used   Substance and Sexual Activity    Alcohol use: No    Drug use: Not on file    Sexual activity: Not Currently   Other Topics Concern    Not on file   Social History Narrative    Not on file       Family History   Problem Relation Age of Onset    Heart disease Mother        Review of patient's allergies indicates:   Allergen Reactions    Iodine and iodide containing products Other (See Comments)    Codeine Other (See Comments)     Insomnia      Morphine Nausea And Vomiting    Allergies have been reviewed.     ROS: Review of Systems   Constitutional: Negative for chills, fever and weight loss.   HENT: Positive for congestion. Negative for sore throat.    Eyes: Negative for double vision and photophobia.   Respiratory: Positive for cough, sputum production, shortness of breath and wheezing. Negative for hemoptysis.    Cardiovascular: Positive for leg swelling (mild). Negative for palpitations and PND (occasional).   Gastrointestinal: Positive for abdominal pain (secondary to incision ). Negative for diarrhea.   Genitourinary: Negative for dysuria and  frequency.   Musculoskeletal: Positive for myalgias (generalized). Negative for back pain and neck pain.   Skin: Negative.    Neurological: Negative for dizziness, weakness and headaches.   Endo/Heme/Allergies: Does not bruise/bleed easily.   Psychiatric/Behavioral: Negative for memory loss. The patient has insomnia (intermittant ).        PE:   Vitals:    10/10/18 1000 10/10/18 1001 10/10/18 1015 10/10/18 1030   BP: 132/66      Pulse: 67 67 72 73   Resp: (!) 27 (!) 22 (!) 26 (!) 26   Temp:       TempSrc:       SpO2: 97% 96% 98% 98%   Weight:       Height:        Physical Exam    Alert and orientated X 3   HEENT: Head: Normocephalic no trauma                Ears : Normal Pinna No Drainage no Battles sign                Eyes: Vision Unchanged, No conjunctivitis,No drainage                Neck: Supple, No JVD,No Abnormal Carotid Pulsations                Throat: No Erythema, No pus,No Swelling,Mallampati score= 3    Chest: Course BS bilaterlly crackles bilaterally in lower lobes R>L pt talking RR18 no cough   Cardiac: RRR S1+ S2 with a -S3: +M = 2/6, No R/H/G  Abdomen: Bowel Sounds are Normal.Soft Abdomen. No organomegaly of Liver,Spleen,or Kidneys   CNS: Non focal and intact. Cranial nerves 2, 346,8,9,10 and 12 are normal.Norrmal gait.Normal posture.  Extremities: No Clubbing,No Cyanosis with oxygen,Positive mild edema of lower extremities Bilateral  Skin: No Rash, No Ulcerative sores,and No cellulitis of the IV site.    Lab Results   Component Value Date    WBC 10.08 10/10/2018    HGB 10.6 (L) 10/10/2018    HCT 32.1 (L) 10/10/2018     10/10/2018    ALT 21 10/10/2018    AST 28 10/10/2018     (L) 10/10/2018    K 4.2 10/10/2018     10/10/2018    CREATININE 0.8 10/10/2018    BUN 17 10/10/2018    CO2 21 (L) 10/10/2018    TSH 0.978 06/29/2018    INR 1.8 (H) 10/10/2018               Assessment/Plan:     Diarrhea of presumed infectious origin    Stable hemodynamics stable         Incarcerated left  inguinal hernia    Sp emergency surgery         Aspiration pneumonia   Bilateral but R>L in the lower lobes     Could stop vanc add beta agonist and follow clinical course       Thank you for your consult. I will follow-up with patient. Please contact us if you have any additional questions.     Reuben Leach MD  Pulmonology  [facility]

## 2018-10-10 NOTE — SUBJECTIVE & OBJECTIVE
Past Medical History:   Diagnosis Date    Anticoagulant long-term use     Bradycardia     HTN (hypertension)     Parkinson disease 03/24/2017    Pulmonary embolism     Shingles        Past Surgical History:   Procedure Laterality Date    CARDIAC PACEMAKER PLACEMENT      cataract      FOOT SURGERY      HAND SURGERY      HERNIA REPAIR      KNEE SURGERY      right knee replacement    MYELOGRAM N/A 3/27/2017    Performed by [facility] OR    PROSTATE SURGERY      REPAIR, HERNIA, INGUINAL WITH PROLENE HERNIA SYSTEM (EXTENDED VERSION) Left 10/4/2018    Performed by Raman Garcia MD at [facility] OR        Review of patient's allergies indicates:   Allergen Reactions    Iodine and iodide containing products Other (See Comments)    Codeine Other (See Comments)     Insomnia      Morphine Nausea And Vomiting       No current facility-administered medications on file prior to encounter.      Current Outpatient Medications on File Prior to Encounter   Medication Sig    amlodipine (NORVASC) 5 MG tablet Take 5 mg by mouth once daily.    carbidopa-levodopa  mg (SINEMET)  mg per tablet Take 1 tablet by mouth 4 (four) times daily.    celecoxib (CELEBREX) 200 MG capsule Take 200 mg by mouth daily as needed for Pain.     furosemide (LASIX) 20 MG tablet Take 20 mg by mouth daily as needed.     warfarin (COUMADIN) 2 MG tablet Take 1 mg by mouth Daily.     traMADol (ULTRAM) 50 mg tablet Take 1 tablet (50 mg total) by mouth every 6 (six) hours as needed for Pain.     Family History     Problem Relation (Age of Onset)    Heart disease Mother        Tobacco Use    Smoking status: Never Smoker    Smokeless tobacco: Never Used   Substance and Sexual Activity    Alcohol use: No    Drug use: Not on file    Sexual activity: Not Currently       Review of Systems   Constitutional: Negative for activity change, appetite change, chills, diaphoresis, fatigue, fever and unexpected weight  change.   HENT: Negative for congestion, dental problem, drooling, ear discharge, ear pain, facial swelling, mouth sores, nosebleeds, postnasal drip, rhinorrhea, sinus pressure, sneezing, sore throat, trouble swallowing and voice change.    Eyes: Negative for photophobia, pain, discharge, redness, itching and visual disturbance.   Respiratory: Negative for apnea, cough, chest tightness, shortness of breath and wheezing.    Cardiovascular: Negative for chest pain, palpitations and leg swelling.   Gastrointestinal: Positive for diarrhea. Negative for abdominal distention, abdominal pain, blood in stool, constipation, nausea and vomiting.   Genitourinary: Negative for difficulty urinating, dysuria, enuresis, flank pain, frequency, hematuria and urgency.   Musculoskeletal: Positive for back pain. Negative for arthralgias, gait problem, joint swelling, myalgias, neck pain and neck stiffness.   Skin: Negative for color change, rash and wound.   Neurological: Positive for weakness. Negative for dizziness, tremors, seizures, syncope, facial asymmetry, speech difficulty, light-headedness, numbness and headaches.   Hematological: Negative for adenopathy. Does not bruise/bleed easily.   Psychiatric/Behavioral: Negative for agitation, behavioral problems, confusion, decreased concentration, dysphoric mood, sleep disturbance and suicidal ideas. The patient is not nervous/anxious.      Objective:     Vital Signs (Most Recent):  Temp: 97.6 °F (36.4 °C) (10/10/18 0715)  Pulse: 71 (10/10/18 0900)  Resp: (!) 27 (10/10/18 0900)  BP: (!) 115/52 (10/10/18 0900)  SpO2: (!) 93 % (10/10/18 0900) Vital Signs (24h Range):  Temp:  [97.6 °F (36.4 °C)-99.4 °F (37.4 °C)] 97.6 °F (36.4 °C)  Pulse:  [51-96] 71  Resp:  [12-30] 27  SpO2:  [89 %-99 %] 93 %  BP: (100-148)/(38-74) 115/52     Weight: 92 kg (202 lb 13.2 oz)  Body mass index is 33.75 kg/m².    Physical Exam   Constitutional: He is oriented to person, place, and time. He appears  well-developed and well-nourished.   HENT:   Head: Normocephalic and atraumatic.   Right Ear: External ear normal.   Left Ear: External ear normal.   Nose: Nose normal.   Mouth/Throat: Oropharynx is clear and moist.   Eyes: Conjunctivae and EOM are normal. Pupils are equal, round, and reactive to light. Right eye exhibits no discharge. Left eye exhibits no discharge. No scleral icterus.   Neck: Normal range of motion. Neck supple. No JVD present. No tracheal deviation present. No thyromegaly present.   Cardiovascular: Normal rate, regular rhythm, normal heart sounds and intact distal pulses.   No murmur heard.  Pulmonary/Chest: Effort normal and breath sounds normal. No respiratory distress. He has no wheezes. He has no rales. He exhibits no tenderness.   Crackles right base    Abdominal: Soft. Bowel sounds are normal. He exhibits distension. He exhibits no mass. There is no tenderness. There is no rebound and no guarding.   Musculoskeletal: Normal range of motion.   Lymphadenopathy:     He has no cervical adenopathy.   Neurological: He is alert and oriented to person, place, and time. He has normal reflexes. He displays normal reflexes. No cranial nerve deficit. He exhibits abnormal muscle tone. Coordination normal.   Skin: Skin is warm and dry.   Psychiatric: He has a normal mood and affect. His behavior is normal. Judgment and thought content normal.         CRANIAL NERVES     CN III, IV, VI   Pupils are equal, round, and reactive to light.  Extraocular motions are normal.        Significant Labs:   Blood Culture: No results for input(s): LABBLOO in the last 48 hours.  CBC:   Recent Labs   Lab  10/09/18   1625  10/10/18   0355   WBC  13.26*  10.08   HGB  12.4*  10.6*   HCT  36.7*  32.1*   PLT  242  193     CMP:   Recent Labs   Lab  10/09/18   1625  10/10/18   0355   NA  135*  133*   K  4.2  4.2   CL  102  105   CO2  21*  21*   GLU  134*  107   BUN  20  17   CREATININE  0.8  0.8   CALCIUM  8.8  7.9*   PROT  6.5   5.2*   ALBUMIN  3.0*  2.4*   BILITOT  1.3*  1.1*   ALKPHOS  123  99   AST  32  28   ALT  10  21   ANIONGAP  12  7*   EGFRNONAA  >60  >60       Lactic Acid:   Recent Labs   Lab  10/09/18   1639  10/09/18   2101   LACTATE  2.1  1.2       Urine Studies:   Recent Labs   Lab  10/09/18   1818   COLORU  Yellow   APPEARANCEUA  Hazy*   PHUR  7.0   SPECGRAV  1.020   PROTEINUA  Trace*   GLUCUA  Negative   KETONESU  Trace*   BILIRUBINUA  Negative   OCCULTUA  2+*   NITRITE  Negative   UROBILINOGEN  1.0   LEUKOCYTESUR  Negative   RBCUA  35*          Significant Imaging: I have reviewed and interpreted all pertinent imaging results/findings within the past 24 hours.

## 2018-10-10 NOTE — PLAN OF CARE
No new orders from Dr Cabrera at present time. Report given to Dylan HOUSTON on 3rd floor. She voiced understanding of all information.

## 2018-10-11 PROBLEM — J69.0 ASPIRATION PNEUMONITIS: Status: ACTIVE | Noted: 2018-10-10

## 2018-10-11 LAB
ANION GAP SERPL CALC-SCNC: 11 MMOL/L
BASOPHILS # BLD AUTO: 0.01 K/UL
BASOPHILS NFR BLD: 0.1 %
BUN SERPL-MCNC: 15 MG/DL
CALCIUM SERPL-MCNC: 8.4 MG/DL
CHLORIDE SERPL-SCNC: 102 MMOL/L
CO2 SERPL-SCNC: 23 MMOL/L
CREAT SERPL-MCNC: 0.8 MG/DL
DIFFERENTIAL METHOD: ABNORMAL
EOSINOPHIL # BLD AUTO: 0.1 K/UL
EOSINOPHIL NFR BLD: 1.4 %
ERYTHROCYTE [DISTWIDTH] IN BLOOD BY AUTOMATED COUNT: 12.5 %
EST. GFR  (AFRICAN AMERICAN): >60 ML/MIN/1.73 M^2
EST. GFR  (NON AFRICAN AMERICAN): >60 ML/MIN/1.73 M^2
GLUCOSE SERPL-MCNC: 89 MG/DL
HCT VFR BLD AUTO: 34 %
HGB BLD-MCNC: 11.4 G/DL
INR PPP: 2
LYMPHOCYTES # BLD AUTO: 1 K/UL
LYMPHOCYTES NFR BLD: 13.4 %
MCH RBC QN AUTO: 31.8 PG
MCHC RBC AUTO-ENTMCNC: 33.5 G/DL
MCV RBC AUTO: 95 FL
MONOCYTES # BLD AUTO: 1.1 K/UL
MONOCYTES NFR BLD: 14.9 %
NEUTROPHILS # BLD AUTO: 5.1 K/UL
NEUTROPHILS NFR BLD: 70.2 %
PLATELET # BLD AUTO: 235 K/UL
PMV BLD AUTO: 10.5 FL
POTASSIUM SERPL-SCNC: 3.9 MMOL/L
PROTHROMBIN TIME: 19.5 SEC
RBC # BLD AUTO: 3.59 M/UL
SODIUM SERPL-SCNC: 136 MMOL/L
WBC # BLD AUTO: 7.3 K/UL

## 2018-10-11 PROCEDURE — 25000003 PHARM REV CODE 250: Performed by: FAMILY MEDICINE

## 2018-10-11 PROCEDURE — 63600175 PHARM REV CODE 636 W HCPCS: Performed by: SURGERY

## 2018-10-11 PROCEDURE — 99233 SBSQ HOSP IP/OBS HIGH 50: CPT | Mod: ,,, | Performed by: FAMILY MEDICINE

## 2018-10-11 PROCEDURE — 97530 THERAPEUTIC ACTIVITIES: CPT

## 2018-10-11 PROCEDURE — 85610 PROTHROMBIN TIME: CPT

## 2018-10-11 PROCEDURE — 94664 DEMO&/EVAL PT USE INHALER: CPT

## 2018-10-11 PROCEDURE — 25000003 PHARM REV CODE 250: Performed by: SURGERY

## 2018-10-11 PROCEDURE — 80048 BASIC METABOLIC PNL TOTAL CA: CPT

## 2018-10-11 PROCEDURE — 25000003 PHARM REV CODE 250: Performed by: NURSE PRACTITIONER

## 2018-10-11 PROCEDURE — 36415 COLL VENOUS BLD VENIPUNCTURE: CPT

## 2018-10-11 PROCEDURE — 27000221 HC OXYGEN, UP TO 24 HOURS

## 2018-10-11 PROCEDURE — 99232 SBSQ HOSP IP/OBS MODERATE 35: CPT | Mod: 24,,, | Performed by: SURGERY

## 2018-10-11 PROCEDURE — 94761 N-INVAS EAR/PLS OXIMETRY MLT: CPT

## 2018-10-11 PROCEDURE — 94640 AIRWAY INHALATION TREATMENT: CPT

## 2018-10-11 PROCEDURE — 97110 THERAPEUTIC EXERCISES: CPT

## 2018-10-11 PROCEDURE — 94660 CPAP INITIATION&MGMT: CPT

## 2018-10-11 PROCEDURE — 25000242 PHARM REV CODE 250 ALT 637 W/ HCPCS: Performed by: INTERNAL MEDICINE

## 2018-10-11 PROCEDURE — 99900035 HC TECH TIME PER 15 MIN (STAT)

## 2018-10-11 PROCEDURE — 25000242 PHARM REV CODE 250 ALT 637 W/ HCPCS: Performed by: FAMILY MEDICINE

## 2018-10-11 PROCEDURE — 11000001 HC ACUTE MED/SURG PRIVATE ROOM

## 2018-10-11 PROCEDURE — 85025 COMPLETE CBC W/AUTO DIFF WBC: CPT

## 2018-10-11 PROCEDURE — 94799 UNLISTED PULMONARY SVC/PX: CPT

## 2018-10-11 RX ORDER — DOCUSATE SODIUM 50 MG/5ML
100 LIQUID ORAL DAILY
Status: DISCONTINUED | OUTPATIENT
Start: 2018-10-11 | End: 2018-10-11

## 2018-10-11 RX ORDER — DOCUSATE SODIUM 100 MG/1
100 CAPSULE, LIQUID FILLED ORAL DAILY
Status: DISCONTINUED | OUTPATIENT
Start: 2018-10-11 | End: 2018-10-15 | Stop reason: HOSPADM

## 2018-10-11 RX ADMIN — CARBIDOPA AND LEVODOPA 1 TABLET: 25; 100 TABLET ORAL at 09:10

## 2018-10-11 RX ADMIN — CELECOXIB 200 MG: 200 CAPSULE ORAL at 08:10

## 2018-10-11 RX ADMIN — IPRATROPIUM BROMIDE 0.5 MG: 0.5 SOLUTION RESPIRATORY (INHALATION) at 01:10

## 2018-10-11 RX ADMIN — CARBIDOPA AND LEVODOPA 1 TABLET: 25; 100 TABLET ORAL at 01:10

## 2018-10-11 RX ADMIN — LEVALBUTEROL 1.25 MG: 1.25 SOLUTION, CONCENTRATE RESPIRATORY (INHALATION) at 07:10

## 2018-10-11 RX ADMIN — CARBIDOPA AND LEVODOPA 1 TABLET: 25; 100 TABLET ORAL at 08:10

## 2018-10-11 RX ADMIN — PIPERACILLIN SODIUM AND TAZOBACTAM SODIUM 4.5 G: 4; .5 INJECTION, POWDER, FOR SOLUTION INTRAVENOUS at 06:10

## 2018-10-11 RX ADMIN — WARFARIN SODIUM 1 MG: 2 TABLET ORAL at 04:10

## 2018-10-11 RX ADMIN — IPRATROPIUM BROMIDE 0.5 MG: 0.5 SOLUTION RESPIRATORY (INHALATION) at 07:10

## 2018-10-11 RX ADMIN — CARBIDOPA AND LEVODOPA 1 TABLET: 25; 100 TABLET ORAL at 04:10

## 2018-10-11 RX ADMIN — ACETAMINOPHEN 650 MG: 325 TABLET ORAL at 11:10

## 2018-10-11 RX ADMIN — PANTOPRAZOLE SODIUM 40 MG: 40 TABLET, DELAYED RELEASE ORAL at 09:10

## 2018-10-11 RX ADMIN — PIPERACILLIN SODIUM AND TAZOBACTAM SODIUM 4.5 G: 4; .5 INJECTION, POWDER, FOR SOLUTION INTRAVENOUS at 09:10

## 2018-10-11 RX ADMIN — DOCUSATE SODIUM 100 MG: 100 CAPSULE, LIQUID FILLED ORAL at 11:10

## 2018-10-11 RX ADMIN — PIPERACILLIN SODIUM AND TAZOBACTAM SODIUM 4.5 G: 4; .5 INJECTION, POWDER, FOR SOLUTION INTRAVENOUS at 01:10

## 2018-10-11 NOTE — HOSPITAL COURSE
"10/11/18 afebrile WBC 13.26>10.08> 7.30  Day 3 abx; zosyn, Vanc- trough this am   /74 this am   Pt feeling "so so", states he couldn't breath last night.   BC x 4 sets; 2 of 4 positive GRAM NEGATIVE KALYANI, LACTOSE    Stools pending but pt has not had BM since admission; decreased bowel sounds; check KUB; re- consult general sx  NPO until after KUB viewed   Add PT    10/12/18 KUB yesterday Contrast material is seen in the colon from recent CT scan.  There is some gaseous distention of the bowel without evidence for definite obstruction.  No free air is seen.    Still No BM   Had 4 blood cultures drawn on 10/9 ; 2 are gram negative , one resulting Ecoli- sensitivity pending, one reslting gram +   Pt c/o some left side 'burning"  Day 4 abx  Family in process of evalution for possible nursing home placement    10/13: overall feeling well. Denies SOB, did not tolerate BipaP last night.  Still c/o left side "burning" pain that is intermittent  Still no BM  Had 4 blood cultures drawn on 10/9 ; 2 are gram negative , one resulting Ecoli- sensitive to rocephin, cipro, bactrim; one reslting gram +   Remains very weak and not able to return home as not back to baseline functional status    10/14: no complaints this AM. Not requiring BiPaP. Will try to start weaning O2 today.  Still no BM but more gas. No nausea and tolerating PO  He is still very weak and not able to ambulate independently--considering SWING vs NH    10/15 now on oral augmenting and cipro IV for bacteremia . VSS/afebrile . No elevated WBC. Still on 2L NC. POX 98%.   He is doing well with PT. Progressing towards goals. He is ambulating 25ft using RW with min assist. Goal is 50ft.   "

## 2018-10-11 NOTE — NURSING
Turned and position Patient to rightside. Several minutes later, Patient used call bell to c/o SOB.  Observed nasal cannula on appropriately. Checked wall unit O2, in which oxygen was flowing adequately. Pulse oximeter revealed desaturation of 85%. Increased oxygen flow rate to 6L NC. Immediately called Respiratory Therapist. Nebulizer treatment given followed by BiPAP as ordered by Dr. Madden. Dr. Cabrera included orders to administer Lasix 40mg IV x 1 dose and stat chest x-ray. O2Sat after placing Patient on BiPAP increased to 98%. Observed Patient respiration decreased with verbal confirmation of breathing better. BiPAP tolerating well. Call bell in reach.

## 2018-10-11 NOTE — ASSESSMENT & PLAN NOTE
S/p surgery  Now with distention and decreased bowel sounds.  Check KUB.  If no obstruction, will treat with daily softeners and advance diet.

## 2018-10-11 NOTE — PROGRESS NOTES
"[facility] Hospital Medicine  Progress Note    Patient Name: Elvis Thompson  MRN: [id number]  Patient Class: IP- Inpatient   Admission Date: 10/9/2018  Length of Stay: 2 days  Attending Physician: Dulce Mcmahon MD  Primary Care Provider: Regulo Martínez MD        Subjective:     Principal Problem:Aspiration pneumonitis    HPI:  85 year old male presents to ED with 14 hour h/o diarrhea. No n/v. No fever. Recent inguinal hernia repair Thursday.  He was kept overnight. Went home Fri. Had some wheezing and coughing post op per daughter. CXR with small bilateral infiltrates called by radiologist.  He meets sepsis criteria in ED. Lactate was normal. Admitted overnight for sepsis and started on Zosyn and VANC. Getting fluids at 125/hour. Pressures good, stable. Making urine.     Hospital Course:  10/11/18 afebrile WBC 13.26>10.08> 7.30  Day 3 abx; zosyn, Vanc- trough this am   /74 this am   Pt feeling "so so", states he couldn't breath last night.   BC x 4 sets; 2 of 4 positive GRAM NEGATIVE KALYANI, LACTOSE    Stools pending but pt has not had BM since admission; decreased bowel sounds; check KUB; re- consult general sx  NPO until after KUB viewed   Add PT        Review of Systems   Constitutional: Negative for chills and fever.   HENT: Negative for congestion, ear pain, postnasal drip, rhinorrhea, sore throat and trouble swallowing.    Eyes: Negative for redness and itching.   Respiratory: Positive for cough. Negative for shortness of breath and wheezing.    Cardiovascular: Negative for chest pain and palpitations.   Gastrointestinal: Positive for vomiting. Negative for abdominal pain, diarrhea and nausea.   Genitourinary: Positive for difficulty urinating. Negative for dysuria and frequency.   Skin: Negative for rash.   Neurological: Positive for weakness. Negative for headaches.     Objective:     Vital Signs (Most Recent):  Temp: 96.1 °F (35.6 °C) (10/11/18 0742)  Pulse: 63 (10/11/18 " 0742)  Resp: 18 (10/11/18 0742)  BP: (!) 165/74 (10/11/18 0742)  SpO2: 97 % (10/11/18 0742) Vital Signs (24h Range):  Temp:  [96 °F (35.6 °C)-99.5 °F (37.5 °C)] 96.1 °F (35.6 °C)  Pulse:  [55-92] 63  Resp:  [14-32] 18  SpO2:  [87 %-100 %] 97 %  BP: (109-165)/(52-74) 165/74     Weight: 92 kg (202 lb 13.2 oz)  Body mass index is 33.75 kg/m².    Physical Exam   Constitutional: He appears well-developed. No distress.   Thin appears stated age   HENT:   Head: Normocephalic and atraumatic.   Eyes: Conjunctivae are normal. Pupils are equal, round, and reactive to light.   Neck: Normal range of motion. Neck supple. No thyromegaly present.   Cardiovascular: Normal rate, regular rhythm, normal heart sounds and intact distal pulses.   Pulmonary/Chest: Effort normal and breath sounds normal. No respiratory distress. He has no wheezes.   Decreased BS to bilateral bases + right rhonchi   Abdominal: Soft. He exhibits distension. There is no tenderness.   Decreased BS    Well healing incision to left groin   Genitourinary:   Genitourinary Comments: Woods in place with bleeding/urine around   Musculoskeletal: Normal range of motion. He exhibits no edema.   Lymphadenopathy:     He has no cervical adenopathy.   Neurological: He is alert.   Skin: Skin is warm and dry. No rash noted.   Incision c/d/i   Psychiatric: He has a normal mood and affect. His behavior is normal.   Nursing note and vitals reviewed.        CRANIAL NERVES     CN III, IV, VI   Pupils are equal, round, and reactive to light.       Significant Labs:   Blood Culture:   Recent Labs   Lab  10/09/18   1725  10/09/18   2100  10/09/18   2101   LABBLOO  Gram stain tyree bottle: Gram negative rods   Results called to and read back by: Shannan Leggett RN  10/10/2018    12:09  No Growth to date  No Growth to date  No Growth to date  No Growth to date     CBC:   Recent Labs   Lab  10/09/18   1625  10/10/18   0355   WBC  13.26*  10.08   HGB  12.4*  10.6*   HCT  36.7*  32.1*    PLT  242  193     CMP:   Recent Labs   Lab  10/09/18   1625  10/10/18   0355   NA  135*  133*   K  4.2  4.2   CL  102  105   CO2  21*  21*   GLU  134*  107   BUN  20  17   CREATININE  0.8  0.8   CALCIUM  8.8  7.9*   PROT  6.5  5.2*   ALBUMIN  3.0*  2.4*   BILITOT  1.3*  1.1*   ALKPHOS  123  99   AST  32  28   ALT  10  21   ANIONGAP  12  7*   EGFRNONAA  >60  >60       Lactic Acid:   Recent Labs   Lab  10/09/18   1639  10/09/18   2101   LACTATE  2.1  1.2       Urine Studies:   Recent Labs   Lab  10/09/18   1818   COLORU  Yellow   APPEARANCEUA  Hazy*   PHUR  7.0   SPECGRAV  1.020   PROTEINUA  Trace*   GLUCUA  Negative   KETONESU  Trace*   BILIRUBINUA  Negative   OCCULTUA  2+*   NITRITE  Negative   UROBILINOGEN  1.0   LEUKOCYTESUR  Negative   RBCUA  35*          Significant Imaging: I have reviewed and interpreted all pertinent imaging results/findings within the past 24 hours.     10/10 CXR-No significant interval change of the bilateral pleural effusions and bilateral lower lobe alveolar infiltrates.  Cardiomegaly and suspected interstitial edema..    Assessment/Plan:      * Aspiration pneumonitis    Vanc and Zosyn day 1   Order nebs BID   downdgrade to floor   Blood Culture - GNR   10/11/18 day 3 abs- trough this am  CXR yesterday No significant interval change of the bilateral pleural effusions and bilateral lower lobe alveolar infiltrates.  Cardiomegaly and suspected interstitial edema..  Cont on zosyn only. pulm has d/c'd other abx with + cultures. Seems most c/w aspiration pna        Aspiration pneumonia    Observe swallow - this was during surgery/anesthesia, though          Sepsis    WBC, nml, afebrile  Day 3 abx. Trough pending          Diarrhea of presumed infectious origin    GE?  Cdiff?(no recent abx). Ordered in ED   Resolved now  Will feed Mason        Parkinson disease    Continue his home sinemet  Order PT           Incarcerated left inguinal hernia      S/p surgery  Now with distention and decreased  bowel sounds.  Check KUB.  If no obstruction, will treat with daily softeners and advance diet.          VTE Risk Mitigation (From admission, onward)        Ordered     warfarin (COUMADIN) tablet 1 mg  Daily      10/10/18 2348              Neva Ring MD  Department of Hospital Medicine   [facility]

## 2018-10-11 NOTE — CONSULTS
DATE OF CONSULTATION:  10/11/2018    CONSULT PROGRESS NOTE    HISTORY OF PRESENT ILLNESS:  Mr. Thompson had a left inguinal hernia that was   incarcerated about a week ago, for which he had a hernia repair as an emergency   with discharge the next day without problems.  Evidently, he came in two days   ago with pulmonary congestion, diarrhea, UTI.  He was doing fairly well and   improving, but then he had abdominal distention today and I was consulted to see   him for abdominal distention.  The patient's diarrhea has stopped.  He has   complaints of no abdominal pain, but he is distended and tympanitic.  I was   consulted only unrelated to the surgical problem of abdominal distention.    PAST MEDICAL HISTORY:  Reviewed.  Pertinent for Parkinson, pulmonary embolus,   bradycardia.    PAST SURGICAL HISTORY:  Includes a recent left inguinal hernia repair.    ALLERGIES:  IODINE, CODEINE, AND MORPHINE.    MEDICATIONS:  Reviewed.  He is still on warfarin.    FAMILY HISTORY:  Unchanged.    SOCIAL HISTORY:  Nonsmoker.    REVIEW OF SYSTEMS:  Unchanged.  He was having diarrhea.  He has had no bowel   movement in 2 days.    PHYSICAL EXAMINATION:  GENERAL:  Awake and alert, oriented, in no apparent distress.  HEAD AND NECK:  Normal.  LUNGS:  Clear.  HEART:  Regular rhythm.  ABDOMEN:  Soft.  He has some upper abdominal distention, but no tenderness.    LABORATORY RESULTS:  Today shows white count to be normal at 7.3, hematocrit is   34.  Electrolytes are normal.  KUB done this morning shows contrast material in   the colon for recent CT, some gaseous distention of the bowel, but no evidence   of any obstruction, no free air.  A chest x-ray does show some worsening of his   pulmonary edema.  Impression:  Worsening pulmonary edema and abdominal   distention without any signs of obstruction.  Also, noted is the left inguinal   wound is healing well with no signs of any problems or infection.    IMPRESSION:  1.  Abdominal distention,  most likely ileus pattern.  I do not think he has an   obstruction.  2.  Left groin wound healing well, status post incarcerated left inguinal hernia   repair.    I do think the patient is at risk for possible obstruction considering he   had a piece of incarcerated hernia, small bowel stuck there for quite some time,   unknown time and he could have some scarring, but at this time,  there is no   evidence that he has a bowel obstruction.  Please reconsult if he does not   improve.  As far as the hernia, he does not have to return to see me as already   he is doing well and says he has had his postop visit today.      BGL/HN  dd: 10/11/2018 11:54:49 (CDT)  td: 10/11/2018 12:51:26 (CDT)  Doc ID   #[id number]  Job ID #[id number]    CC:

## 2018-10-11 NOTE — PHYSICIAN QUERY
"PT Name: Elvis Thompson  MR #: [id number]    Physician Query Form - Atrial Fibrillation Specificity     CDS: Emilee Boston RN, CCDS         Contact information :ext [phone])  [email or web address]        This form is a permanent document in the medical record.     Query Date: October 11, 2018    By submitting this query, we are merely seeking further clarification of documentation. Please utilize your independent clinical judgment when addressing the question(s) below.    The medical record contains the following:   Indicators     Supporting Clinical Findings Location in Medical Record   x Atrial Fibrillation "has a history of atrial fibrillation.  He has a pacemaker and takes Coumadin"     ED provider note 10/9/18    EKG results     x Medication warfarin (COUMADIN) 2 MG tablet-take 1 mg po daily   Home Meds per H&P 10/10/18    Treatment     x Other "Cardiovascular: Normal rate, regular rhythm, normal heart sounds and intact distal pulses.   No murmur heard."     H&P 10/10/18       Provider, please further specify the Atrial Fibrillation diagnosis.    [  ] Chronic  [  ] Paroxysmal  [  ] Permanent  [  ] Persistent  [x  ] Other (please specify): ___________I did not see the patient _________________  [  ] Clinically Undetermined    Please document in your progress notes daily for the duration of treatment until resolved, and include in your discharge summary.    "

## 2018-10-11 NOTE — ASSESSMENT & PLAN NOTE
Vanc and Zosyn day 1   Order nebs BID   downdgrade to floor   Blood Culture - GNR   10/11/18 day 3 abs- trough this am  CXR yesterday No significant interval change of the bilateral pleural effusions and bilateral lower lobe alveolar infiltrates.  Cardiomegaly and suspected interstitial edema..  Cont on zosyn only. pulm has d/c'd other abx with + cultures. Seems most c/w aspiration pna

## 2018-10-11 NOTE — PT/OT/SLP PROGRESS
"Physical Therapy Treatment    Patient Name:  Elvis Thompson   MRN:  [id number]    Recommendations:     Discharge Recommendations:  home with home health, home health PT, nursing facility, skilled   Discharge Equipment Recommendations: none   Barriers to discharge: None    Assessment:     Elvis Thompson is a 85 y.o. male admitted with a medical diagnosis of Aspiration pneumonitis.  He presents with the following impairments/functional limitations:  weakness, gait instability, decreased lower extremity function, impaired balance, impaired endurance, impaired self care skills, impaired functional mobilty, decreased safety awareness .  Pt. is progressing towards PT POC goals.    Rehab Prognosis:  Fair; patient would benefit from acute skilled PT services to address these deficits and reach maximum level of function.      Recent Surgery: * No surgery found *      Plan:     During this hospitalization, patient to be seen (1-2x/day(M-F); PRN(Sat.,Sun.)) to address the above listed problems via therapeutic exercises, therapeutic activities, gait training  · Plan of Care Expires:  (upon d/c from facility)   Plan of Care Reviewed with: patient, daughter    Subjective     Communicated with patient prior to session.  Patient found supine in bed upon PT entry to room, agreeable to treatment.        Patient comments/goals: "I'm worn out from earlier."  Pain/Comfort:  · Pain Rating 1: 3/10  · Location - Side 1: Bilateral  · Location - Orientation 1: generalized  · Location 1: back  · Pain Addressed 1: Cessation of Activity, Nurse notified, Reposition  · Pain Rating Post-Intervention 1: 3/10    Patients cultural, spiritual, Mandaeism conflicts given the current situation:      Objective:     Patient found with: blood pressure cuff, grey catheter, pulse ox (continuous), oxygen, peripheral IV, telemetry     General Precautions: Standard, fall   Orthopedic Precautions:N/A   Braces: N/A         AM-PAC 6 CLICK MOBILITY  Turning over " in bed (including adjusting bedclothes, sheets and blankets)?: 2  Sitting down on and standing up from a chair with arms (e.g., wheelchair, bedside commode, etc.): 2  Moving from lying on back to sitting on the side of the bed?: 2  Moving to and from a bed to a chair (including a wheelchair)?: 2  Need to walk in hospital room?: 1  Climbing 3-5 steps with a railing?: 1  Basic Mobility Total Score: 10       Therapeutic Activities and Exercises:   There. Ex.:  Pt. performed gentle A/A exercises of BLE's while supine in bed, for N.M. Tone and strength and fluid dynamics.  1-on-1 BLE HC stretches performed with pt.  BLE: ankle pumps, heel slides, glut sets, hip abd/add (3x15) performed with pt.  Rest periods given to pt. as needed.    Patient left HOB elevated with all lines intact, call button in reach, RN notified and family present..    GOALS:   Multidisciplinary Problems     Physical Therapy Goals        Problem: Physical Therapy Goal    Goal Priority Disciplines Outcome Goal Variances Interventions   Physical Therapy Goal     PT, PT/OT Ongoing (interventions implemented as appropriate)     Description:  Goals to be met by: upon D/C from facility     Patient will increase functional independence with mobility by performin. Supine to sit with Contact Guard Assistance  2. Sit to supine with Contact Guard Assistance  3. Sit to stand transfer with Contact Guard Assistance  4. Bed to chair transfer with Contact Guard Assistance using Rolling Walker  5. Gait  x 50 feet with Minimal Assistance using Rolling Walker.                       Time Tracking:     PT Received On: 10/11/18  PT Start Time: 1417     PT Stop Time: 1433  PT Total Time (min): 16 min     Billable Minutes: Therapeutic Exercise 15 minutes    Treatment Type: Treatment  PT/PTA: PT           Lukas Georges, PT  10/11/2018

## 2018-10-11 NOTE — PLAN OF CARE
Problem: Patient Care Overview  Goal: Plan of Care Review  Outcome: Ongoing (interventions implemented as appropriate)  Patient stable. Denies pain. Woods removed this morning. Patient voiding. Receiving IV antibiotics. Patient got up to wheelchair with PT today. Tolerating bland diet. Plan of care reviewed with patient & daughter. They voiced understanding. Will continue to monitor.

## 2018-10-11 NOTE — PLAN OF CARE
10/11/18 1135   Discharge Assessment   Assessment Type Discharge Planning Assessment   Confirmed/corrected address and phone number on facesheet? Yes   Assessment information obtained from? Patient;Caregiver;Medical Record   Expected Length of Stay (days) 5   Communicated expected length of stay with patient/caregiver yes   Prior to hospitilization cognitive status: Alert/Oriented   Prior to hospitalization functional status: Assistive Equipment   Current cognitive status: Alert/Oriented   Current Functional Status: Assistive Equipment   Lives With alone;other (see comments)  (Pt has sitters around the clock)   Able to Return to Prior Arrangements yes   Is patient able to care for self after discharge? No   Who are your caregiver(s) and their phone number(s)? Erlinda hagen - [phone]   Patient's perception of discharge disposition skilled nursing facility   Readmission Within The Last 30 Days previous discharge plan unsuccessful   If yes, most recent facility name: [facility]   Patient currently being followed by outpatient case management? No   Patient currently receives any other outside agency services? Yes   Name and contact number of agency or person providing outside services [facility]   Is it the patient/care giver preference to resume care with the current outside agency? Yes   Equipment Currently Used at Home wheelchair;walker, rolling;rollator;bedside commode;cane, quad   Do you have any problems affording any of your prescribed medications? No   Is the patient taking medications as prescribed? yes   Does the patient have transportation home? Yes   Transportation Available family or friend will provide   Does the patient receive services at the [facility]? No   Discharge Plan A Skilled Nursing Facility   Discharge Plan B Home;Home Health   Patient/Family In Agreement With Plan yes       Pt is a 85 year old male admitted for weakness. Pt lives alone but has 24 hour sitters.  Daughter in room and they provide all care along with [facility].  No other Social Work needs noted at this time. Will continue to follow and offer support as needed.    Faraz Jauregui LMSW

## 2018-10-11 NOTE — PT/OT/SLP PROGRESS
"Physical Therapy Treatment    Patient Name:  Elvis Thompson   MRN:  [id number]    Recommendations:     Discharge Recommendations:  home with home health, home health PT, nursing facility, skilled   Discharge Equipment Recommendations: none   Barriers to discharge: None    Assessment:     Elvis Thompson is a 85 y.o. male admitted with a medical diagnosis of Aspiration pneumonitis.  He presents with the following impairments/functional limitations:  weakness, gait instability, impaired balance, impaired endurance, decreased lower extremity function, impaired self care skills, impaired functional mobilty .  Pt. is progressing towards PT POC goals.    Rehab Prognosis:  Fair; patient would benefit from acute skilled PT services to address these deficits and reach maximum level of function.      Recent Surgery: * No surgery found *      Plan:     During this hospitalization, patient to be seen (1-2x/day(M-F); PRN(Sat.,Sun.)) to address the above listed problems via therapeutic exercises, therapeutic activities, gait training  · Plan of Care Expires:  (upon d/c from facility)   Plan of Care Reviewed with: patient, daughter    Subjective     Communicated with patient prior to session.  Patient found supine in bed upon PT entry to room, agreeable to treatment.      Chief Complaint: LBP  Patient comments/goals: "I'm afraid I'm weak."  Pain/Comfort:  · Pain Rating 1: 3/10  · Location - Side 1: Bilateral  · Location - Orientation 1: generalized  · Location 1: back  · Pain Addressed 1: Nurse notified, Reposition, Cessation of Activity  · Pain Rating Post-Intervention 1: 3/10    Patients cultural, spiritual, Church conflicts given the current situation:      Objective:     Patient found with: blood pressure cuff, grey catheter, pulse ox (continuous), oxygen, peripheral IV, telemetry     General Precautions: Standard, contact, fall   Orthopedic Precautions:N/A   Braces: N/A     Functional Mobility:  · Bed Mobility:   "   · Rolling Left:  maximal assistance  · Rolling Right: maximal assistance  · Scooting: maximal assistance  · Bridging: maximal assistance  · Supine to Sit: maximal assistance  · Sit to Supine: maximal assistance  · Transfers:     · Sit to Stand:  maximal assistance with rolling walker  · Bed to Chair: maximal assistance with  rolling walker  using  Stand Pivot  Balance:    Static Sit: FAIR: Maintains without assist, but unable to take any challenges   Dynamic Sit: FAIR: Cannot move trunk without losing balance  Static Stand: POOR: Needs MODERATE assist to maintain        AM-PAC 6 CLICK MOBILITY  Turning over in bed (including adjusting bedclothes, sheets and blankets)?: 2  Sitting down on and standing up from a chair with arms (e.g., wheelchair, bedside commode, etc.): 2  Moving from lying on back to sitting on the side of the bed?: 2  Moving to and from a bed to a chair (including a wheelchair)?: 2  Need to walk in hospital room?: 1  Climbing 3-5 steps with a railing?: 1  Basic Mobility Total Score: 10       Therapeutic Activities and Exercises:   There. Act.:  Transfer Training and bed mobility tasks performed with pt. With assistance as noted.  VC's and manual guidance given to pt. for sequencing.  Weight shifting and weight acceptance tasks performed with pt. with mod. A.  Trunk control tasks performed with mod. A. while pt. seated EOB.    Patient left HOB elevated with all lines intact, call button in reach, RN notified and family present..    GOALS:   Multidisciplinary Problems     Physical Therapy Goals        Problem: Physical Therapy Goal    Goal Priority Disciplines Outcome Goal Variances Interventions   Physical Therapy Goal     PT, PT/OT Ongoing (interventions implemented as appropriate)     Description:  Goals to be met by: upon D/C from facility     Patient will increase functional independence with mobility by performin. Supine to sit with Contact Guard Assistance  2. Sit to supine with  Contact Guard Assistance  3. Sit to stand transfer with Contact Guard Assistance  4. Bed to chair transfer with Contact Guard Assistance using Rolling Walker  5. Gait  x 50 feet with Minimal Assistance using Rolling Walker.                       Time Tracking:     PT Received On: 10/11/18  PT Start Time: 1058     PT Stop Time: 1130  PT Total Time (min): 32 min     Billable Minutes: Therapeutic Activity 30 minutes    Treatment Type: Treatment  PT/PTA: PT           Lukas Georges, PT  10/11/2018

## 2018-10-11 NOTE — PLAN OF CARE
10/11/18 1504   Discharge Reassessment   Assessment Type Discharge Planning Reassessment         Seen patient on rounds today. MD is ordering imaging to check for obstruction/ileus. Spoke with patient and daughter about discharge plan. They are both wanting to do skilled level of nursing once medically cleared. We spoke about options of where this could happen. Daughter states she will speak with family and come up with a plan. CM will continue to follow and assist as needed.

## 2018-10-11 NOTE — PROGRESS NOTES
Called to 3rd floor, pt in moderate to severe distress. SpO2 85% on 6l NC. BiPAP initiated. Pt now resting comfortable and no distress at this time. Will continue to monitor.

## 2018-10-11 NOTE — PHYSICIAN QUERY
"PT Name: Elvis Thompson  MR #: [id number]    Physician Query Form - Pneumonia Clarification     CDS: Emilee Boston RN, CCDS         Contact information :ext [phone])  [email or web address]     This form is a permanent document in the medical record.    Query Date:  October 11, 2018    By submitting this query, we are merely seeking further clarification of documentation. Please utilize your independent clinical judgment when addressing the question(s) below.    The Medical record contains the following:   Indicators   Supporting Clinical Findings Location in Medical Record   x "Pneumonia" documented "HCAP (healthcare-associated pneumonia)"    "Aspiration pneumonia"   H&P 10/10/18      ID consult 10/10/18   x Chest X-Ray: "Cardiomegaly and mild vascular congestion.  Bilateral lower lobe infiltrates could represent pulmonary edema or pneumonia.  Small bilateral pleural effusions." /18    PaO2    PaCO2     O2 sat      Cultures      x Treatment  "Vanc and Zosyn day 1   Order nebs BID   downdgrade to floor   Blood Culture"  H&P 10/10/18    Supplemental O2     x Other "Parkinson disease"    "chief complaint of SOB and Wheezing after emergency surgery  for an incarcerated ventral hernia.Pt  After surgery developed bilateral infiltrates Right greater than the Left probably secondary to aspiration."   H&P 10/10/18    ID consult 10/10/18       Provider, please specify type of pneumonia.  Please clarify type of pneumonia.    [  ] Aspiration Pneumonia  [  ] Bacterial, Gram Negative organism Pneumonia  [  ] Bacterial Pneumonia (Specify organism): ______________________  [  ] Other type of pneumonia (please specify): ______________________________________  [  ] Clinically undetermined    Please document in your progress notes daily for the duration of treatment, until resolved, and include in your discharge summary.    I did not see the patient                                                                          "

## 2018-10-11 NOTE — SUBJECTIVE & OBJECTIVE
Review of Systems   Constitutional: Negative for chills and fever.   HENT: Negative for congestion, ear pain, postnasal drip, rhinorrhea, sore throat and trouble swallowing.    Eyes: Negative for redness and itching.   Respiratory: Positive for cough. Negative for shortness of breath and wheezing.    Cardiovascular: Negative for chest pain and palpitations.   Gastrointestinal: Positive for vomiting. Negative for abdominal pain, diarrhea and nausea.   Genitourinary: Positive for difficulty urinating. Negative for dysuria and frequency.   Skin: Negative for rash.   Neurological: Positive for weakness. Negative for headaches.     Objective:     Vital Signs (Most Recent):  Temp: 96.1 °F (35.6 °C) (10/11/18 0742)  Pulse: 63 (10/11/18 0742)  Resp: 18 (10/11/18 0742)  BP: (!) 165/74 (10/11/18 0742)  SpO2: 97 % (10/11/18 0742) Vital Signs (24h Range):  Temp:  [96 °F (35.6 °C)-99.5 °F (37.5 °C)] 96.1 °F (35.6 °C)  Pulse:  [55-92] 63  Resp:  [14-32] 18  SpO2:  [87 %-100 %] 97 %  BP: (109-165)/(52-74) 165/74     Weight: 92 kg (202 lb 13.2 oz)  Body mass index is 33.75 kg/m².    Physical Exam   Constitutional: He appears well-developed. No distress.   Thin appears stated age   HENT:   Head: Normocephalic and atraumatic.   Eyes: Conjunctivae are normal. Pupils are equal, round, and reactive to light.   Neck: Normal range of motion. Neck supple. No thyromegaly present.   Cardiovascular: Normal rate, regular rhythm, normal heart sounds and intact distal pulses.   Pulmonary/Chest: Effort normal and breath sounds normal. No respiratory distress. He has no wheezes.   Decreased BS to bilateral bases + right rhonchi   Abdominal: Soft. He exhibits distension. There is no tenderness.   Decreased BS    Well healing incision to left groin   Genitourinary:   Genitourinary Comments: Woods in place with bleeding/urine around   Musculoskeletal: Normal range of motion. He exhibits no edema.   Lymphadenopathy:     He has no cervical  adenopathy.   Neurological: He is alert.   Skin: Skin is warm and dry. No rash noted.   Incision c/d/i   Psychiatric: He has a normal mood and affect. His behavior is normal.   Nursing note and vitals reviewed.        CRANIAL NERVES     CN III, IV, VI   Pupils are equal, round, and reactive to light.       Significant Labs:   Blood Culture:   Recent Labs   Lab  10/09/18   1725  10/09/18   2100  10/09/18   2101   LABBLOO  Gram stain tyree bottle: Gram negative rods   Results called to and read back by: Shannan Leggett RN  10/10/2018    12:09  No Growth to date  No Growth to date  No Growth to date  No Growth to date     CBC:   Recent Labs   Lab  10/09/18   1625  10/10/18   0355   WBC  13.26*  10.08   HGB  12.4*  10.6*   HCT  36.7*  32.1*   PLT  242  193     CMP:   Recent Labs   Lab  10/09/18   1625  10/10/18   0355   NA  135*  133*   K  4.2  4.2   CL  102  105   CO2  21*  21*   GLU  134*  107   BUN  20  17   CREATININE  0.8  0.8   CALCIUM  8.8  7.9*   PROT  6.5  5.2*   ALBUMIN  3.0*  2.4*   BILITOT  1.3*  1.1*   ALKPHOS  123  99   AST  32  28   ALT  10  21   ANIONGAP  12  7*   EGFRNONAA  >60  >60       Lactic Acid:   Recent Labs   Lab  10/09/18   1639  10/09/18   2101   LACTATE  2.1  1.2       Urine Studies:   Recent Labs   Lab  10/09/18   1818   COLORU  Yellow   APPEARANCEUA  Hazy*   PHUR  7.0   SPECGRAV  1.020   PROTEINUA  Trace*   GLUCUA  Negative   KETONESU  Trace*   BILIRUBINUA  Negative   OCCULTUA  2+*   NITRITE  Negative   UROBILINOGEN  1.0   LEUKOCYTESUR  Negative   RBCUA  35*          Significant Imaging: I have reviewed and interpreted all pertinent imaging results/findings within the past 24 hours.     10/10 CXR-No significant interval change of the bilateral pleural effusions and bilateral lower lobe alveolar infiltrates.  Cardiomegaly and suspected interstitial edema..

## 2018-10-11 NOTE — PLAN OF CARE
10/11/18 1200   Medicare Message   Important Message from Medicare regarding Discharge Appeal Rights Given to patient/caregiver;Explained to patient/caregiver;Signed/date by patient/caregiver   Date IMM was signed 10/09/18   Time IMM was signed 2100

## 2018-10-11 NOTE — PLAN OF CARE
Pt developed moderate ton severe respiratory distress, was placed on BiPAP with settings 18/6 RR18 FiO2 40%. Pt tolerated well during the night. No distress noted at this time. Will continue to monitor.

## 2018-10-12 PROBLEM — R78.81 BACTEREMIA: Status: ACTIVE | Noted: 2018-10-12

## 2018-10-12 LAB
ANION GAP SERPL CALC-SCNC: 9 MMOL/L
BACTERIA BLD CULT: NORMAL
BUN SERPL-MCNC: 12 MG/DL
CALCIUM SERPL-MCNC: 8.7 MG/DL
CHLORIDE SERPL-SCNC: 102 MMOL/L
CO2 SERPL-SCNC: 23 MMOL/L
CREAT SERPL-MCNC: 0.7 MG/DL
EST. GFR  (AFRICAN AMERICAN): >60 ML/MIN/1.73 M^2
EST. GFR  (NON AFRICAN AMERICAN): >60 ML/MIN/1.73 M^2
GLUCOSE SERPL-MCNC: 91 MG/DL
INR PPP: 2.4
POTASSIUM SERPL-SCNC: 3.9 MMOL/L
PROTHROMBIN TIME: 23.5 SEC
SODIUM SERPL-SCNC: 134 MMOL/L

## 2018-10-12 PROCEDURE — 80048 BASIC METABOLIC PNL TOTAL CA: CPT

## 2018-10-12 PROCEDURE — 94660 CPAP INITIATION&MGMT: CPT

## 2018-10-12 PROCEDURE — 25000003 PHARM REV CODE 250: Performed by: FAMILY MEDICINE

## 2018-10-12 PROCEDURE — 25000242 PHARM REV CODE 250 ALT 637 W/ HCPCS: Performed by: INTERNAL MEDICINE

## 2018-10-12 PROCEDURE — 94799 UNLISTED PULMONARY SVC/PX: CPT

## 2018-10-12 PROCEDURE — 97110 THERAPEUTIC EXERCISES: CPT

## 2018-10-12 PROCEDURE — 25000242 PHARM REV CODE 250 ALT 637 W/ HCPCS: Performed by: FAMILY MEDICINE

## 2018-10-12 PROCEDURE — 94664 DEMO&/EVAL PT USE INHALER: CPT

## 2018-10-12 PROCEDURE — 25000003 PHARM REV CODE 250: Performed by: INTERNAL MEDICINE

## 2018-10-12 PROCEDURE — 97530 THERAPEUTIC ACTIVITIES: CPT

## 2018-10-12 PROCEDURE — 85610 PROTHROMBIN TIME: CPT

## 2018-10-12 PROCEDURE — 36415 COLL VENOUS BLD VENIPUNCTURE: CPT

## 2018-10-12 PROCEDURE — 99900035 HC TECH TIME PER 15 MIN (STAT)

## 2018-10-12 PROCEDURE — 25000003 PHARM REV CODE 250: Performed by: SURGERY

## 2018-10-12 PROCEDURE — 94640 AIRWAY INHALATION TREATMENT: CPT

## 2018-10-12 PROCEDURE — 99233 SBSQ HOSP IP/OBS HIGH 50: CPT | Mod: ,,, | Performed by: INTERNAL MEDICINE

## 2018-10-12 PROCEDURE — 11000001 HC ACUTE MED/SURG PRIVATE ROOM

## 2018-10-12 PROCEDURE — 63600175 PHARM REV CODE 636 W HCPCS: Performed by: SURGERY

## 2018-10-12 PROCEDURE — 25000003 PHARM REV CODE 250: Performed by: NURSE PRACTITIONER

## 2018-10-12 PROCEDURE — 94761 N-INVAS EAR/PLS OXIMETRY MLT: CPT

## 2018-10-12 PROCEDURE — 27000221 HC OXYGEN, UP TO 24 HOURS

## 2018-10-12 RX ORDER — SENNOSIDES 8.6 MG/1
8.6 TABLET ORAL DAILY
Status: DISCONTINUED | OUTPATIENT
Start: 2018-10-12 | End: 2018-10-15 | Stop reason: HOSPADM

## 2018-10-12 RX ORDER — CLONIDINE HYDROCHLORIDE 0.1 MG/1
0.1 TABLET ORAL EVERY 6 HOURS PRN
Status: DISCONTINUED | OUTPATIENT
Start: 2018-10-12 | End: 2018-10-15 | Stop reason: HOSPADM

## 2018-10-12 RX ADMIN — LEVALBUTEROL 1.25 MG: 1.25 SOLUTION, CONCENTRATE RESPIRATORY (INHALATION) at 06:10

## 2018-10-12 RX ADMIN — PIPERACILLIN SODIUM AND TAZOBACTAM SODIUM 4.5 G: 4; .5 INJECTION, POWDER, FOR SOLUTION INTRAVENOUS at 05:10

## 2018-10-12 RX ADMIN — IPRATROPIUM BROMIDE 0.5 MG: 0.5 SOLUTION RESPIRATORY (INHALATION) at 01:10

## 2018-10-12 RX ADMIN — ACETAMINOPHEN 650 MG: 325 TABLET ORAL at 11:10

## 2018-10-12 RX ADMIN — SENNA 8.6 MG: 8.6 TABLET, COATED ORAL at 02:10

## 2018-10-12 RX ADMIN — CELECOXIB 200 MG: 200 CAPSULE ORAL at 09:10

## 2018-10-12 RX ADMIN — PANTOPRAZOLE SODIUM 40 MG: 40 TABLET, DELAYED RELEASE ORAL at 10:10

## 2018-10-12 RX ADMIN — CARBIDOPA AND LEVODOPA 1 TABLET: 25; 100 TABLET ORAL at 09:10

## 2018-10-12 RX ADMIN — LEVALBUTEROL 1.25 MG: 1.25 SOLUTION, CONCENTRATE RESPIRATORY (INHALATION) at 07:10

## 2018-10-12 RX ADMIN — WARFARIN SODIUM 1 MG: 2 TABLET ORAL at 05:10

## 2018-10-12 RX ADMIN — CARBIDOPA AND LEVODOPA 1 TABLET: 25; 100 TABLET ORAL at 10:10

## 2018-10-12 RX ADMIN — PIPERACILLIN SODIUM AND TAZOBACTAM SODIUM 4.5 G: 4; .5 INJECTION, POWDER, FOR SOLUTION INTRAVENOUS at 09:10

## 2018-10-12 RX ADMIN — CARBIDOPA AND LEVODOPA 1 TABLET: 25; 100 TABLET ORAL at 02:10

## 2018-10-12 RX ADMIN — IPRATROPIUM BROMIDE 0.5 MG: 0.5 SOLUTION RESPIRATORY (INHALATION) at 07:10

## 2018-10-12 RX ADMIN — DOCUSATE SODIUM 100 MG: 100 CAPSULE, LIQUID FILLED ORAL at 10:10

## 2018-10-12 RX ADMIN — IPRATROPIUM BROMIDE 0.5 MG: 0.5 SOLUTION RESPIRATORY (INHALATION) at 06:10

## 2018-10-12 RX ADMIN — PIPERACILLIN SODIUM AND TAZOBACTAM SODIUM 4.5 G: 4; .5 INJECTION, POWDER, FOR SOLUTION INTRAVENOUS at 02:10

## 2018-10-12 RX ADMIN — CARBIDOPA AND LEVODOPA 1 TABLET: 25; 100 TABLET ORAL at 05:10

## 2018-10-12 NOTE — PROGRESS NOTES
Pt complaining of bridge of nose hurting and some redness noted, mask changed to under the nose. Pt placed on BiPAP with settings as charted. No distress noted and will continue to monitor.

## 2018-10-12 NOTE — PT/OT/SLP PROGRESS
"Physical Therapy Treatment    Patient Name:  Elvis Thompson   MRN:  [id number]    Recommendations:     Discharge Recommendations:  nursing facility, skilled   Discharge Equipment Recommendations: none   Barriers to discharge: None    Assessment:     Elvis Thompson is a 85 y.o. male admitted with a medical diagnosis of Aspiration pneumonitis.  He presents with the following impairments/functional limitations:  weakness, impaired endurance, impaired self care skills, impaired functional mobilty, gait instability, decreased lower extremity function, decreased upper extremity function, impaired balance, decreased safety awareness, pain . Pt requires motivation to get OOB. Pt c/o LBP today but cooperative throughout treatment.    Rehab Prognosis:  Good; patient would benefit from acute skilled PT services to address these deficits and reach maximum level of function.      Recent Surgery: * No surgery found *      Plan:     During this hospitalization, patient to be seen (1-2x/day Monday-Friday; PRN Weekends/Holidays) to address the above listed problems via gait training, therapeutic activities, therapeutic exercises, neuromuscular re-education  · Plan of Care Expires:  (upon D/C from facility)   Plan of Care Reviewed with: patient, daughter    Subjective     Communicated with patient prior to session.  Patient found supine in bed upon PT entry to room, agreeable to treatment.      Chief Complaint: "My back hurts"  Patient comments/goals: "Get stronger"  Pain/Comfort:  · Pain Rating 1: 3/10  · Location - Orientation 1: generalized  · Location 1: back  · Pain Addressed 1: Nurse notified  · Pain Rating Post-Intervention 1: 3/10    Patients cultural, spiritual, Religion conflicts given the current situation:      Objective:     Patient found with: oxygen, telemetry     General Precautions: Standard, fall   Orthopedic Precautions:N/A   Braces: N/A     Functional Mobility:  · Bed Mobility:     · Rolling Left:  moderate " assistance  · Rolling Right: moderate assistance  · Scooting: moderate assistance  · Supine to Sit: moderate assistance  · Sit to Supine: moderate assistance  · Transfers:     · Sit to Stand:  minimum assistance with rolling walker  · Bed to Chair: minimum assistance with  rolling walker  using  Step Transfer      AM-PAC 6 CLICK MOBILITY  Turning over in bed (including adjusting bedclothes, sheets and blankets)?: 2  Sitting down on and standing up from a chair with arms (e.g., wheelchair, bedside commode, etc.): 2  Moving from lying on back to sitting on the side of the bed?: 2  Moving to and from a bed to a chair (including a wheelchair)?: 2  Need to walk in hospital room?: 1  Climbing 3-5 steps with a railing?: 1  Basic Mobility Total Score: 10       Patient left up in chair with all lines intact, call button in reach, NSG notified and daughter present..    GOALS:   Multidisciplinary Problems     Physical Therapy Goals        Problem: Physical Therapy Goal    Goal Priority Disciplines Outcome Goal Variances Interventions   Physical Therapy Goal     PT, PT/OT Ongoing (interventions implemented as appropriate)     Description:  Goals to be met by: upon D/C from facility     Patient will increase functional independence with mobility by performin. Supine to sit with Contact Guard Assistance  2. Sit to supine with Contact Guard Assistance  3. Sit to stand transfer with Contact Guard Assistance  4. Bed to chair transfer with Contact Guard Assistance using Rolling Walker  5. Gait  x 50 feet with Minimal Assistance using Rolling Walker.                       Time Tracking:     PT Received On: 10/12/18  PT Start Time: 1037     PT Stop Time: 1100  PT Total Time (min): 23 min     Billable Minutes: Therapeutic Activity 23 minutes    Treatment Type: Treatment  PT/PTA: PT           Agatha Romo, PT  10/12/2018

## 2018-10-12 NOTE — PT/OT/SLP PROGRESS
Occupational Therapy      Patient Name:  Elvis Thompson   MRN:  [id number]    Patient not seen today secondary to Nursing care. Will follow-up Monday.    Daisy Sawyer OT  10/12/2018

## 2018-10-12 NOTE — SUBJECTIVE & OBJECTIVE
Interval History: up alert no sob and nl RR no further episodes of sob since 2 days ago CXR significant pneumonitis worsened with small effusions probably not heart faire but gastric acid Pneumonitis     Objective:     Vital Signs (Most Recent):  Temp: 96.1 °F (35.6 °C) (10/12/18 0407)  Pulse: 79 (10/12/18 0800)  Resp: 16 (10/12/18 0640)  BP: (!) 149/70 (10/12/18 0407)  SpO2: 97 % (10/12/18 0640) Vital Signs (24h Range):  Temp:  [96.1 °F (35.6 °C)-98 °F (36.7 °C)] 96.1 °F (35.6 °C)  Pulse:  [55-92] 79  Resp:  [16-28] 16  SpO2:  [96 %-98 %] 97 %  BP: (138-149)/(63-71) 149/70     Weight: 92 kg (202 lb 13.2 oz)  Body mass index is 33.75 kg/m².      Intake/Output Summary (Last 24 hours) at 10/12/2018 0852  Last data filed at 10/12/2018 0200  Gross per 24 hour   Intake 100 ml   Output 350 ml   Net -250 ml       Physical Exam   Constitutional: He is oriented to person, place, and time. He appears well-developed and well-nourished. He is cooperative.  Non-toxic appearance. He does not appear ill. No distress.   HENT:   Head: Normocephalic and atraumatic.   Right Ear: Hearing, tympanic membrane, external ear and ear canal normal.   Left Ear: Hearing, tympanic membrane, external ear and ear canal normal.   Nose: Nose normal. No mucosal edema, rhinorrhea or nasal deformity. No epistaxis. Right sinus exhibits no maxillary sinus tenderness and no frontal sinus tenderness. Left sinus exhibits no maxillary sinus tenderness and no frontal sinus tenderness.   Mouth/Throat: Uvula is midline, oropharynx is clear and moist and mucous membranes are normal. No trismus in the jaw. Normal dentition. No uvula swelling. No posterior oropharyngeal erythema.   Eyes: Conjunctivae and lids are normal. No scleral icterus.   Sclera clear bilat   Neck: Trachea normal, full passive range of motion without pain and phonation normal. Neck supple.   Cardiovascular: Normal rate, regular rhythm, normal heart sounds, intact distal pulses and normal  pulses.   Pulmonary/Chest: No respiratory distress (mild to moderate). He has decreased breath sounds in the right upper field, the right middle field, the right lower field, the left upper field, the left middle field and the left lower field. He has no wheezes. He has rhonchi in the right lower field and the left lower field.           Abdominal: Soft. Normal appearance and bowel sounds are normal. He exhibits no distension. There is no tenderness.   Musculoskeletal: Normal range of motion. He exhibits no edema or deformity.   Neurological: He is alert and oriented to person, place, and time. He exhibits normal muscle tone. Coordination normal.   Skin: Skin is warm and intact. He is diaphoretic. No pallor.   Psychiatric: He has a normal mood and affect. His speech is normal and behavior is normal. Judgment and thought content normal. Cognition and memory are normal.   Nursing note and vitals reviewed.    cxr worsened but will have a diffuse pneumonitis from aspiration bilaterally Vents:  Oxygen Concentration (%): 35 (10/12/18 0328)    Lines/Drains/Airways     Peripheral Intravenous Line                 Peripheral IV - Single Lumen 10/09/18 1620 Left Wrist 2 days                Significant Labs:    CBC/Anemia Profile:  Recent Labs   Lab  10/11/18   0539   WBC  7.30   HGB  11.4*   HCT  34.0*   PLT  235   MCV  95   RDW  12.5        Chemistries:  Recent Labs   Lab  10/11/18   0539  10/12/18   0527   NA  136  134*   K  3.9  3.9   CL  102  102   CO2  23  23   BUN  15  12   CREATININE  0.8  0.7   CALCIUM  8.4*  8.7       All pertinent labs within the past 24 hours have been reviewed.    Significant Imaging:  I have reviewed all pertinent imaging results/findings within the past 24 hours.

## 2018-10-12 NOTE — ASSESSMENT & PLAN NOTE
Vanc and Zosyn on admit for HCAP  Order nebs BID   downdgrade to floor   Blood Culture - GNR   10/11/18 day 3 abx- trough this am  CXR yesterday No significant interval change of the bilateral pleural effusions and bilateral lower lobe alveolar infiltrates.  Cardiomegaly and suspected interstitial edema..  Cont on zosyn only. pulm has d/c'd other abx with + cultures. Seems most c/w aspiration pna    10/12/18 Zosyn day 4- will need 2 weeks ABX for bacteremia pending sensitivites.  Of note, 1 blood cx growing Gram + cocci but I suspect will be a contaminate. Dr. Leach d/c the Vancomycin and for now I will leave off but if any fevers/worsening will resume

## 2018-10-12 NOTE — PLAN OF CARE
10/12/18 1638   Discharge Reassessment   Assessment Type Discharge Planning Reassessment         No small bowel obstruction showed on imaging. Daughter at bedside and states that she has a meeting with [facility] today to discuss care after discharge. Will continue to follow and assist as needed.

## 2018-10-12 NOTE — PT/OT/SLP PROGRESS
"Physical Therapy Treatment    Patient Name:  Elvis Thompson   MRN:  [id number]    Recommendations:     Discharge Recommendations:  nursing facility, skilled   Discharge Equipment Recommendations: none   Barriers to discharge: None    Assessment:     Elvis Thompson is a 85 y.o. male admitted with a medical diagnosis of Aspiration pneumonitis.  He presents with the following impairments/functional limitations:  weakness, impaired endurance, impaired self care skills, gait instability, impaired functional mobilty, decreased safety awareness, decreased upper extremity function, decreased lower extremity function . Pt tolerated treatment session well.    Rehab Prognosis:  Good; patient would benefit from acute skilled PT services to address these deficits and reach maximum level of function.      Recent Surgery: * No surgery found *      Plan:     During this hospitalization, patient to be seen (1-2x/day Monday-Friday; PRN Weekends/Holidays) to address the above listed problems via gait training, therapeutic activities, therapeutic exercises, neuromuscular re-education  · Plan of Care Expires:  (upon D/C from facility)   Plan of Care Reviewed with: patient, daughter    Subjective     Communicated with patient and his daughters prior to session.  Patient found supine in bed upon PT entry to room, agreeable to treatment.      Chief Complaint: "My back hurts"  Patient comments/goals: "Go home"  Pain/Comfort:  · Pain Rating 1: 3/10  · Location - Orientation 1: generalized  · Location 1: back  · Pain Addressed 1: Nurse notified  · Pain Rating Post-Intervention 1: 3/10    Patients cultural, spiritual, Congregation conflicts given the current situation:      Objective:     Patient found with: telemetry, oxygen     General Precautions: Standard, fall   Orthopedic Precautions:N/A   Braces: N/A     Functional Mobility:  · Bed Mobility:     · Rolling Left:  moderate assistance  · Rolling Right: moderate assistance  · Scooting: " moderate assistance  · Supine to Sit: moderate assistance  · Sit to Supine: moderate assistance  · Transfers:     · Sit to Stand:  minimum assistance with rolling walker      AM-PAC 6 CLICK MOBILITY  Turning over in bed (including adjusting bedclothes, sheets and blankets)?: 2  Sitting down on and standing up from a chair with arms (e.g., wheelchair, bedside commode, etc.): 2  Moving from lying on back to sitting on the side of the bed?: 2  Moving to and from a bed to a chair (including a wheelchair)?: 2  Need to walk in hospital room?: 1  Climbing 3-5 steps with a railing?: 1  Basic Mobility Total Score: 10       Therapeutic Activities and Exercises:   Therapeutic exercises on BLE seated and supine at all joints in available planes of motion with rest breaks as needed to increase strength and endurance with all gross mobility skills.     Patient left supine with all lines intact, call button in reach, NSG notified and daughters present..    GOALS:   Multidisciplinary Problems     Physical Therapy Goals        Problem: Physical Therapy Goal    Goal Priority Disciplines Outcome Goal Variances Interventions   Physical Therapy Goal     PT, PT/OT Ongoing (interventions implemented as appropriate)     Description:  Goals to be met by: upon D/C from facility     Patient will increase functional independence with mobility by performin. Supine to sit with Contact Guard Assistance  2. Sit to supine with Contact Guard Assistance  3. Sit to stand transfer with Contact Guard Assistance  4. Bed to chair transfer with Contact Guard Assistance using Rolling Walker  5. Gait  x 50 feet with Minimal Assistance using Rolling Walker.                       Time Tracking:     PT Received On: 10/12/18  PT Start Time: 1430     PT Stop Time: 1453  PT Total Time (min): 23 min     Billable Minutes: Therapeutic Activity 10 minutes and Therapeutic Exercise 13 minutes    Treatment Type: Treatment  PT/PTA: PT           Agatha Romo  PT  10/12/2018

## 2018-10-12 NOTE — PLAN OF CARE
Problem: Patient Care Overview  Goal: Plan of Care Review  Outcome: Ongoing (interventions implemented as appropriate)  Patient tolerating current therapy well.  BiPAP on stand-by this shift: patient doing well (no distress noted) on 3lpm nasal cannula with SaO2 of 97%.  Breath sounds are diminished with faint crackles in lower lobes.  No questions or concerns at this time.

## 2018-10-12 NOTE — PROGRESS NOTES
"[facility] Hospital Medicine  Progress Note    Patient Name: Elvis Thompson  MRN: [id number]  Patient Class: IP- Inpatient   Admission Date: 10/9/2018  Length of Stay: 3 days  Attending Physician: Dulce Mcmahon MD  Primary Care Provider: Regulo Martínez MD        Subjective:     Principal Problem:Aspiration pneumonitis    HPI:  85 year old male presents to ED with 14 hour h/o diarrhea. No n/v. No fever. Recent inguinal hernia repair Thursday.  He was kept overnight. Went home Fri. Had some wheezing and coughing post op per daughter. CXR with small bilateral infiltrates called by radiologist.  He meets sepsis criteria in ED. Lactate was normal. Admitted overnight for sepsis and started on Zosyn and VANC. Getting fluids at 125/hour. Pressures good, stable. Making urine.     Hospital Course:  10/11/18 afebrile WBC 13.26>10.08> 7.30  Day 3 abx; zosyn, Vanc- trough this am   /74 this am   Pt feeling "so so", states he couldn't breath last night.   BC x 4 sets; 2 of 4 positive GRAM NEGATIVE KALYANI, LACTOSE    Stools pending but pt has not had BM since admission; decreased bowel sounds; check KUB; re- consult general sx  NPO until after KUB viewed   Add PT    10/12/18 KUB yesterday Contrast material is seen in the colon from recent CT scan.  There is some gaseous distention of the bowel without evidence for definite obstruction.  No free air is seen.    Still No BM   Had 4 blood cultures drawn on 10/9 ; 2 are gram negative , one resulting Ecoli- sensitivity pending, one reslting gram +   Pt c/o some left side 'burning"  Day 4 abx  Family in process of evalution for possible nursing home placement      Review of Systems   Constitutional: Negative for chills and fever.   HENT: Negative for congestion, ear pain, postnasal drip, rhinorrhea, sore throat and trouble swallowing.    Eyes: Negative for redness and itching.   Respiratory: Positive for cough. Negative for shortness of breath and " wheezing.    Cardiovascular: Negative for chest pain and palpitations.   Gastrointestinal: Negative for abdominal pain, diarrhea, nausea and vomiting.   Genitourinary: Negative for difficulty urinating, dysuria and frequency.   Skin: Negative for rash.   Neurological: Positive for weakness (generalized). Negative for headaches.     Objective:     Vital Signs (Most Recent):  Temp: 96.1 °F (35.6 °C) (10/12/18 0407)  Pulse: 92 (10/12/18 0640)  Resp: 16 (10/12/18 0640)  BP: (!) 149/70 (10/12/18 0407)  SpO2: 97 % (10/12/18 0640) Vital Signs (24h Range):  Temp:  [96.1 °F (35.6 °C)-98 °F (36.7 °C)] 96.1 °F (35.6 °C)  Pulse:  [55-92] 92  Resp:  [16-28] 16  SpO2:  [96 %-98 %] 97 %  BP: (138-165)/(63-74) 149/70     Weight: 92 kg (202 lb 13.2 oz)  Body mass index is 33.75 kg/m².    Physical Exam   Constitutional: He appears well-developed and well-nourished. No distress.   HENT:   Head: Normocephalic and atraumatic.   Right Ear: External ear normal.   Left Ear: External ear normal.   Eyes: Conjunctivae and EOM are normal. Pupils are equal, round, and reactive to light.   Neck: Normal range of motion. Neck supple. No tracheal deviation present.   Cardiovascular: Normal rate, regular rhythm and normal heart sounds.   Pulmonary/Chest: Effort normal and breath sounds normal. No respiratory distress. He has no wheezes.   Abdominal: Soft. He exhibits distension. There is no tenderness.   Decreased BS    Well healing incision to left groin   Genitourinary:   Genitourinary Comments: Woods in place with bleeding/urine around   Musculoskeletal: Normal range of motion. He exhibits no edema.   Neurological: He is alert.   Skin: Skin is warm and dry. No rash noted.   Incision c/d/i   Psychiatric: He has a normal mood and affect. His behavior is normal.   Nursing note and vitals reviewed.        CRANIAL NERVES     CN III, IV, VI   Pupils are equal, round, and reactive to light.  Extraocular motions are normal.        Significant Labs:    Blood Culture:   No results for input(s): LABBLOO in the last 48 hours.  CBC:   Recent Labs   Lab  10/11/18   0539   WBC  7.30   HGB  11.4*   HCT  34.0*   PLT  235     CMP:   Recent Labs   Lab  10/11/18   0539  10/12/18   0527   NA  136  134*   K  3.9  3.9   CL  102  102   CO2  23  23   GLU  89  91   BUN  15  12   CREATININE  0.8  0.7   CALCIUM  8.4*  8.7   ANIONGAP  11  9   EGFRNONAA  >60  >60       Lactic Acid:   No results for input(s): LACTATE in the last 48 hours.       Significant Imaging: I have reviewed and interpreted all pertinent imaging results/findings within the past 24 hours.     10/10 CXR-No significant interval change of the bilateral pleural effusions and bilateral lower lobe alveolar infiltrates.  Cardiomegaly and suspected interstitial edema..  10/11/18 CXR- Interval worsening of the cardiopulmonary findings.  There is cardiomegaly with pulmonary vascular congestion and pulmonary edema.  Bilateral pleural effusions are suspected.  Left-sided pacemaker device remains in place.  The skeletal structures are intact.  10/11/18 KUB- Contrast material is seen in the colon from recent CT scan.  There is some gaseous distention of the bowel without evidence for definite obstruction.  No free air is seen.  Vascular calcifications are noted.     Assessment/Plan:      * Aspiration pneumonitis    Vanc and Zosyn on admit for HCAP  Order nebs BID   downdgrade to floor   Blood Culture - GNR   10/11/18 day 3 abx- trough this am  CXR yesterday No significant interval change of the bilateral pleural effusions and bilateral lower lobe alveolar infiltrates.  Cardiomegaly and suspected interstitial edema..  Cont on zosyn only. pulm has d/c'd other abx with + cultures. Seems most c/w aspiration pna    10/12/18 Zosyn day 4- will need 2 weeks ABX for bacteremia pending sensitivites.  Of note, 1 blood cx growing Gram + cocci but I suspect will be a contaminate. Dr. Leach d/c the Vancomycin and for now I will leave off  but if any fevers/worsening will resume        Bacteremia    Due to GNR  Pending identification and sensitivities  Cont Zosyn for now  1 with Gram + cocci however I suspect this will be a contaminate  No fevers, no leukocytosis          Aspiration pneumonia    Observe swallow -suspect this occurred during surgery/anesthesia, though  Cont zosyn pending cx results, may be able to de-escalate to PO antibx for 14 days total due to bacteremia. ?Augmentin          Sepsis    WBC, nml, afebrile  Day 3 abx.   Day 4 Zosyn /14 days abx        Diarrhea of presumed infectious origin    GE?  Cdiff?(no recent abx). Ordered in ED   Resolved now  Will feed [place]        Parkinson disease    Continue his home sinemet  Order PT           Incarcerated left inguinal hernia      S/p surgery  Now with distention and decreased bowel sounds.  Check KUB.  If no obstruction, will treat with daily softeners and advance diet.  10/12 no obstruction           VTE Risk Mitigation (From admission, onward)        Ordered     warfarin (COUMADIN) tablet 1 mg  Daily      10/10/18 6902              Makeda Pham MD  Department of Hospital Medicine   [facility]

## 2018-10-12 NOTE — PROGRESS NOTES
[facility]  Pulmonology  Progress Note    Patient Name: Elvis Thompson  MRN: [id number]  Admission Date: 10/9/2018  Hospital Length of Stay: 3 days  Code Status: Full Code  Attending Provider: Dulce Mcmahon MD  Primary Care Provider: Regulo Martínez MD   Principal Problem: Aspiration pneumonitis    Subjective:     Interval History: up alert no sob and nl RR no further episodes of sob since 2 days ago CXR significant pneumonitis worsened with small effusions probably not heart faire but gastric acid Pneumonitis     Objective:     Vital Signs (Most Recent):  Temp: 96.1 °F (35.6 °C) (10/12/18 0407)  Pulse: 79 (10/12/18 0800)  Resp: 16 (10/12/18 0640)  BP: (!) 149/70 (10/12/18 0407)  SpO2: 97 % (10/12/18 0640) Vital Signs (24h Range):  Temp:  [96.1 °F (35.6 °C)-98 °F (36.7 °C)] 96.1 °F (35.6 °C)  Pulse:  [55-92] 79  Resp:  [16-28] 16  SpO2:  [96 %-98 %] 97 %  BP: (138-149)/(63-71) 149/70     Weight: 92 kg (202 lb 13.2 oz)  Body mass index is 33.75 kg/m².      Intake/Output Summary (Last 24 hours) at 10/12/2018 0852  Last data filed at 10/12/2018 0200  Gross per 24 hour   Intake 100 ml   Output 350 ml   Net -250 ml       Physical Exam   Constitutional: He is oriented to person, place, and time. He appears well-developed and well-nourished. He is cooperative.  Non-toxic appearance. He does not appear ill. No distress.   HENT:   Head: Normocephalic and atraumatic.   Right Ear: Hearing, tympanic membrane, external ear and ear canal normal.   Left Ear: Hearing, tympanic membrane, external ear and ear canal normal.   Nose: Nose normal. No mucosal edema, rhinorrhea or nasal deformity. No epistaxis. Right sinus exhibits no maxillary sinus tenderness and no frontal sinus tenderness. Left sinus exhibits no maxillary sinus tenderness and no frontal sinus tenderness.   Mouth/Throat: Uvula is midline, oropharynx is clear and moist and mucous membranes are normal. No trismus in the jaw. Normal dentition. No  uvula swelling. No posterior oropharyngeal erythema.   Eyes: Conjunctivae and lids are normal. No scleral icterus.   Sclera clear bilat   Neck: Trachea normal, full passive range of motion without pain and phonation normal. Neck supple.   Cardiovascular: Normal rate, regular rhythm, normal heart sounds, intact distal pulses and normal pulses.   Pulmonary/Chest: No respiratory distress (mild to moderate). He has decreased breath sounds in the right upper field, the right middle field, the right lower field, the left upper field, the left middle field and the left lower field. He has no wheezes. He has rhonchi in the right lower field and the left lower field.           Abdominal: Soft. Normal appearance and bowel sounds are normal. He exhibits no distension. There is no tenderness.   Musculoskeletal: Normal range of motion. He exhibits no edema or deformity.   Neurological: He is alert and oriented to person, place, and time. He exhibits normal muscle tone. Coordination normal.   Skin: Skin is warm and intact. He is diaphoretic. No pallor.   Psychiatric: He has a normal mood and affect. His speech is normal and behavior is normal. Judgment and thought content normal. Cognition and memory are normal.   Nursing note and vitals reviewed.    cxr worsened but will have a diffuse pneumonitis from aspiration bilaterally Vents:  Oxygen Concentration (%): 35 (10/12/18 0328)    Lines/Drains/Airways     Peripheral Intravenous Line                 Peripheral IV - Single Lumen 10/09/18 1620 Left Wrist 2 days                Significant Labs:    CBC/Anemia Profile:  Recent Labs   Lab  10/11/18   0539   WBC  7.30   HGB  11.4*   HCT  34.0*   PLT  235   MCV  95   RDW  12.5        Chemistries:  Recent Labs   Lab  10/11/18   0539  10/12/18   0527   NA  136  134*   K  3.9  3.9   CL  102  102   CO2  23  23   BUN  15  12   CREATININE  0.8  0.7   CALCIUM  8.4*  8.7       All pertinent labs within the past 24 hours have been  reviewed.    Significant Imaging:  I have reviewed all pertinent imaging results/findings within the past 24 hours.    Assessment/Plan:     * Aspiration pneumonitis    Stable cxr slightly worse pt on NIV and oxygenating well         Aspiration pneumonia    On piperacillin         Sepsis    resolved        Diarrhea of presumed infectious origin    Stable hemodynamics stable         Parkinson disease    ++ tremor         Incarcerated left inguinal hernia    S/p emergency surgery                Reuben Leach MD  Pulmonology  [facility]

## 2018-10-12 NOTE — PLAN OF CARE
Problem: Patient Care Overview  Goal: Plan of Care Review  Patient remains stable with BiPAP order for hours of sleep/HS. O2Sat 97% on BiPAP. Continues of Zosyn 4.5 g IV to Left wrist. All care clustered. Turn and position supine and to leftside q2H. Nasal cannula on during the day. Verbalizes needs and knows how to use the call bell. S?P grey. Depends changed following one wet depends. Patient observed to be calm, accepting and no respiratory distress. Patient awaiting SNF as per family request. Placement pending.

## 2018-10-12 NOTE — PHYSICIAN QUERY
"PT Name: Elvis Thompson  MR #: [id number]    Physician Query Form - Atrial Fibrillation Specificity     CDS: Emilee Botson RN, CCDS         Contact information :ext [phone]  [email or web address]        This form is a permanent document in the medical record.     Query Date: October 12, 2018    By submitting this query, we are merely seeking further clarification of documentation. Please utilize your independent clinical judgment when addressing the question(s) below.    The medical record contains the following:   Indicators     Supporting Clinical Findings Location in Medical Record   x Atrial Fibrillation "has a history of atrial fibrillation.  He has a pacemaker and takes Coumadin"     ED provider note 10/9/18    EKG results     x Medication warfarin (COUMADIN) 2 MG tablet-take 1 mg po daily   Home Meds per H&P 10/10/18    Treatment     x Other "Cardiovascular: Normal rate, regular rhythm, normal heart sounds and intact distal pulses.   No murmur heard."     H&P 10/10/18       Provider, please further specify the Atrial Fibrillation diagnosis.    [  ] Chronic  [ x ] Paroxysmal  [  ] Permanent  [  ] Persistent  [  ] Other (please specify): _______________________  [  ] Clinically Undetermined    Please document in your progress notes daily for the duration of treatment until resolved, and include in your discharge summary.    "

## 2018-10-12 NOTE — ASSESSMENT & PLAN NOTE
Observe swallow -suspect this occurred during surgery/anesthesia, though  Cont zosyn pending cx results, may be able to de-escalate to PO antibx for 14 days total due to bacteremia. ?Augmentin

## 2018-10-12 NOTE — PLAN OF CARE
Problem: Patient Care Overview  Goal: Plan of Care Review  Outcome: Ongoing (interventions implemented as appropriate)  Vitals stable, afebrile. Complained of burning to skin bilateral sides of abdomen. Turning Q2 to relieved burning. Incision WNL. Still no BM. Stool softeners given. PT and OT. Incontinent. IV abx given.Discussed plan of care with pt, stated understanding

## 2018-10-12 NOTE — SUBJECTIVE & OBJECTIVE
Review of Systems   Constitutional: Negative for chills and fever.   HENT: Negative for congestion, ear pain, postnasal drip, rhinorrhea, sore throat and trouble swallowing.    Eyes: Negative for redness and itching.   Respiratory: Positive for cough. Negative for shortness of breath and wheezing.    Cardiovascular: Negative for chest pain and palpitations.   Gastrointestinal: Negative for abdominal pain, diarrhea, nausea and vomiting.   Genitourinary: Negative for difficulty urinating, dysuria and frequency.   Skin: Negative for rash.   Neurological: Positive for weakness (generalized). Negative for headaches.     Objective:     Vital Signs (Most Recent):  Temp: 96.1 °F (35.6 °C) (10/12/18 0407)  Pulse: 92 (10/12/18 0640)  Resp: 16 (10/12/18 0640)  BP: (!) 149/70 (10/12/18 0407)  SpO2: 97 % (10/12/18 0640) Vital Signs (24h Range):  Temp:  [96.1 °F (35.6 °C)-98 °F (36.7 °C)] 96.1 °F (35.6 °C)  Pulse:  [55-92] 92  Resp:  [16-28] 16  SpO2:  [96 %-98 %] 97 %  BP: (138-165)/(63-74) 149/70     Weight: 92 kg (202 lb 13.2 oz)  Body mass index is 33.75 kg/m².    Physical Exam   Constitutional: He appears well-developed and well-nourished. No distress.   HENT:   Head: Normocephalic and atraumatic.   Right Ear: External ear normal.   Left Ear: External ear normal.   Eyes: Conjunctivae and EOM are normal. Pupils are equal, round, and reactive to light.   Neck: Normal range of motion. Neck supple. No tracheal deviation present.   Cardiovascular: Normal rate, regular rhythm and normal heart sounds.   Pulmonary/Chest: Effort normal and breath sounds normal. No respiratory distress. He has no wheezes.   Abdominal: Soft. He exhibits distension. There is no tenderness.   Decreased BS    Well healing incision to left groin   Genitourinary:   Genitourinary Comments: Woods in place with bleeding/urine around   Musculoskeletal: Normal range of motion. He exhibits no edema.   Neurological: He is alert.   Skin: Skin is warm and dry. No  rash noted.   Incision c/d/i   Psychiatric: He has a normal mood and affect. His behavior is normal.   Nursing note and vitals reviewed.        CRANIAL NERVES     CN III, IV, VI   Pupils are equal, round, and reactive to light.  Extraocular motions are normal.        Significant Labs:   Blood Culture:   No results for input(s): LABBLOO in the last 48 hours.  CBC:   Recent Labs   Lab  10/11/18   0539   WBC  7.30   HGB  11.4*   HCT  34.0*   PLT  235     CMP:   Recent Labs   Lab  10/11/18   0539  10/12/18   0527   NA  136  134*   K  3.9  3.9   CL  102  102   CO2  23  23   GLU  89  91   BUN  15  12   CREATININE  0.8  0.7   CALCIUM  8.4*  8.7   ANIONGAP  11  9   EGFRNONAA  >60  >60       Lactic Acid:   No results for input(s): LACTATE in the last 48 hours.       Significant Imaging: I have reviewed and interpreted all pertinent imaging results/findings within the past 24 hours.     10/10 CXR-No significant interval change of the bilateral pleural effusions and bilateral lower lobe alveolar infiltrates.  Cardiomegaly and suspected interstitial edema..  10/11/18 CXR- Interval worsening of the cardiopulmonary findings.  There is cardiomegaly with pulmonary vascular congestion and pulmonary edema.  Bilateral pleural effusions are suspected.  Left-sided pacemaker device remains in place.  The skeletal structures are intact.  10/11/18 KUB- Contrast material is seen in the colon from recent CT scan.  There is some gaseous distention of the bowel without evidence for definite obstruction.  No free air is seen.  Vascular calcifications are noted.

## 2018-10-12 NOTE — ASSESSMENT & PLAN NOTE
S/p surgery  Now with distention and decreased bowel sounds.  Check KUB.  If no obstruction, will treat with daily softeners and advance diet.  10/12 no obstruction

## 2018-10-12 NOTE — PHYSICIAN QUERY
"PT Name: Elvis Thompson  MR #: [id number]    Physician Query Form - Pneumonia Clarification     CDS: Emilee Boston RN, CCDS         Contact information :ext [phone])  [email or web address]     This form is a permanent document in the medical record.    Query Date:  October 12, 2018        Query closed, Aspiration pneumonitis per Hospital medicine PN 10/11/18        By submitting this query, we are merely seeking further clarification of documentation. Please utilize your independent clinical judgment when addressing the question(s) below.    The Medical record contains the following:   Indicators   Supporting Clinical Findings Location in Medical Record   x "Pneumonia" documented "HCAP (healthcare-associated pneumonia)"    "Aspiration pneumonia"   H&P 10/10/18      ID consult 10/10/18   x Chest X-Ray: "Cardiomegaly and mild vascular congestion.  Bilateral lower lobe infiltrates could represent pulmonary edema or pneumonia.  Small bilateral pleural effusions." /18    PaO2    PaCO2     O2 sat      Cultures      x Treatment  "Vanc and Zosyn day 1   Order nebs BID   downdgrade to floor   Blood Culture"  H&P 10/10/18    Supplemental O2     x Other "Parkinson disease"    "chief complaint of SOB and Wheezing after emergency surgery  for an incarcerated ventral hernia.Pt  After surgery developed bilateral infiltrates Right greater than the Left probably secondary to aspiration."   H&P 10/10/18    ID consult 10/10/18       Provider, please specify type of pneumonia.  Please clarify type of pneumonia.    [  ] Aspiration Pneumonia  [  ] Bacterial, Gram Negative organism Pneumonia  [  ] Bacterial Pneumonia (Specify organism): ______________________  [  ] Other type of pneumonia (please specify): ______________________________________  [  ] Clinically undetermined    Please document in your progress notes daily for the duration of treatment, until resolved, and include in your discharge summary.                     "

## 2018-10-12 NOTE — ASSESSMENT & PLAN NOTE
Due to GNR  Pending identification and sensitivities  Cont Zosyn for now  1 with Gram + cocci however I suspect this will be a contaminate  No fevers, no leukocytosis

## 2018-10-12 NOTE — PLAN OF CARE
Problem: Fall Risk (Adult)  Goal: Identify Related Risk Factors and Signs and Symptoms  Related risk factors and signs and symptoms are identified upon initiation of Human Response Clinical Practice Guideline (CPG)  Outcome: Ongoing (interventions implemented as appropriate)  Bed alarm on and call bell within reach. Perineal care q4H.

## 2018-10-13 LAB
ANION GAP SERPL CALC-SCNC: 6 MMOL/L
BACTERIA BLD CULT: NORMAL
BASOPHILS # BLD AUTO: 0.04 K/UL
BASOPHILS NFR BLD: 0.6 %
BUN SERPL-MCNC: 10 MG/DL
CALCIUM SERPL-MCNC: 8.6 MG/DL
CHLORIDE SERPL-SCNC: 103 MMOL/L
CO2 SERPL-SCNC: 26 MMOL/L
CREAT SERPL-MCNC: 0.7 MG/DL
DIFFERENTIAL METHOD: ABNORMAL
EOSINOPHIL # BLD AUTO: 0.3 K/UL
EOSINOPHIL NFR BLD: 4.6 %
ERYTHROCYTE [DISTWIDTH] IN BLOOD BY AUTOMATED COUNT: 12.3 %
EST. GFR  (AFRICAN AMERICAN): >60 ML/MIN/1.73 M^2
EST. GFR  (NON AFRICAN AMERICAN): >60 ML/MIN/1.73 M^2
GLUCOSE SERPL-MCNC: 99 MG/DL
HCT VFR BLD AUTO: 35.1 %
HGB BLD-MCNC: 11.8 G/DL
INR PPP: 2.7
LYMPHOCYTES # BLD AUTO: 1 K/UL
LYMPHOCYTES NFR BLD: 15.3 %
MCH RBC QN AUTO: 31.5 PG
MCHC RBC AUTO-ENTMCNC: 33.6 G/DL
MCV RBC AUTO: 94 FL
MONOCYTES # BLD AUTO: 0.8 K/UL
MONOCYTES NFR BLD: 12.4 %
NEUTROPHILS # BLD AUTO: 4.2 K/UL
NEUTROPHILS NFR BLD: 67.1 %
PLATELET # BLD AUTO: 275 K/UL
PMV BLD AUTO: 9.8 FL
POTASSIUM SERPL-SCNC: 4.5 MMOL/L
PROTHROMBIN TIME: 26 SEC
RBC # BLD AUTO: 3.75 M/UL
SODIUM SERPL-SCNC: 135 MMOL/L
WBC # BLD AUTO: 6.27 K/UL

## 2018-10-13 PROCEDURE — 99900035 HC TECH TIME PER 15 MIN (STAT)

## 2018-10-13 PROCEDURE — 25000003 PHARM REV CODE 250: Performed by: INTERNAL MEDICINE

## 2018-10-13 PROCEDURE — 25000242 PHARM REV CODE 250 ALT 637 W/ HCPCS: Performed by: INTERNAL MEDICINE

## 2018-10-13 PROCEDURE — 94660 CPAP INITIATION&MGMT: CPT

## 2018-10-13 PROCEDURE — 25000003 PHARM REV CODE 250: Performed by: FAMILY MEDICINE

## 2018-10-13 PROCEDURE — 94799 UNLISTED PULMONARY SVC/PX: CPT

## 2018-10-13 PROCEDURE — 27000221 HC OXYGEN, UP TO 24 HOURS

## 2018-10-13 PROCEDURE — 11000001 HC ACUTE MED/SURG PRIVATE ROOM

## 2018-10-13 PROCEDURE — 80048 BASIC METABOLIC PNL TOTAL CA: CPT

## 2018-10-13 PROCEDURE — 63600175 PHARM REV CODE 636 W HCPCS: Performed by: INTERNAL MEDICINE

## 2018-10-13 PROCEDURE — 25000003 PHARM REV CODE 250: Performed by: SURGERY

## 2018-10-13 PROCEDURE — 99233 SBSQ HOSP IP/OBS HIGH 50: CPT | Mod: ,,, | Performed by: INTERNAL MEDICINE

## 2018-10-13 PROCEDURE — 25000003 PHARM REV CODE 250: Performed by: NURSE PRACTITIONER

## 2018-10-13 PROCEDURE — 94761 N-INVAS EAR/PLS OXIMETRY MLT: CPT

## 2018-10-13 PROCEDURE — 85025 COMPLETE CBC W/AUTO DIFF WBC: CPT

## 2018-10-13 PROCEDURE — 94664 DEMO&/EVAL PT USE INHALER: CPT

## 2018-10-13 PROCEDURE — 94640 AIRWAY INHALATION TREATMENT: CPT

## 2018-10-13 PROCEDURE — 63600175 PHARM REV CODE 636 W HCPCS: Performed by: SURGERY

## 2018-10-13 PROCEDURE — 36415 COLL VENOUS BLD VENIPUNCTURE: CPT

## 2018-10-13 PROCEDURE — 25000242 PHARM REV CODE 250 ALT 637 W/ HCPCS: Performed by: FAMILY MEDICINE

## 2018-10-13 PROCEDURE — 85610 PROTHROMBIN TIME: CPT

## 2018-10-13 RX ORDER — POLYETHYLENE GLYCOL 3350 17 G/17G
17 POWDER, FOR SOLUTION ORAL DAILY
Status: DISCONTINUED | OUTPATIENT
Start: 2018-10-13 | End: 2018-10-15 | Stop reason: HOSPADM

## 2018-10-13 RX ORDER — AMOXICILLIN AND CLAVULANATE POTASSIUM 875; 125 MG/1; MG/1
1 TABLET, FILM COATED ORAL EVERY 12 HOURS
Status: DISCONTINUED | OUTPATIENT
Start: 2018-10-13 | End: 2018-10-15 | Stop reason: HOSPADM

## 2018-10-13 RX ORDER — AMLODIPINE BESYLATE 5 MG/1
5 TABLET ORAL DAILY
Status: DISCONTINUED | OUTPATIENT
Start: 2018-10-13 | End: 2018-10-15 | Stop reason: HOSPADM

## 2018-10-13 RX ORDER — CIPROFLOXACIN 2 MG/ML
400 INJECTION, SOLUTION INTRAVENOUS
Status: DISCONTINUED | OUTPATIENT
Start: 2018-10-13 | End: 2018-10-15 | Stop reason: HOSPADM

## 2018-10-13 RX ADMIN — AMOXICILLIN AND CLAVULANATE POTASSIUM 1 TABLET: 875; 125 TABLET, FILM COATED ORAL at 10:10

## 2018-10-13 RX ADMIN — IPRATROPIUM BROMIDE 0.5 MG: 0.5 SOLUTION RESPIRATORY (INHALATION) at 07:10

## 2018-10-13 RX ADMIN — SENNA 8.6 MG: 8.6 TABLET, COATED ORAL at 10:10

## 2018-10-13 RX ADMIN — AMLODIPINE BESYLATE 5 MG: 5 TABLET ORAL at 10:10

## 2018-10-13 RX ADMIN — POLYETHYLENE GLYCOL 3350 17 G: 17 POWDER, FOR SOLUTION ORAL at 10:10

## 2018-10-13 RX ADMIN — PIPERACILLIN SODIUM AND TAZOBACTAM SODIUM 4.5 G: 4; .5 INJECTION, POWDER, FOR SOLUTION INTRAVENOUS at 06:10

## 2018-10-13 RX ADMIN — DOCUSATE SODIUM 100 MG: 100 CAPSULE, LIQUID FILLED ORAL at 10:10

## 2018-10-13 RX ADMIN — CELECOXIB 200 MG: 200 CAPSULE ORAL at 08:10

## 2018-10-13 RX ADMIN — IPRATROPIUM BROMIDE 0.5 MG: 0.5 SOLUTION RESPIRATORY (INHALATION) at 01:10

## 2018-10-13 RX ADMIN — CIPROFLOXACIN 400 MG: 2 INJECTION, SOLUTION INTRAVENOUS at 09:10

## 2018-10-13 RX ADMIN — IPRATROPIUM BROMIDE 0.5 MG: 0.5 SOLUTION RESPIRATORY (INHALATION) at 12:10

## 2018-10-13 RX ADMIN — AMOXICILLIN AND CLAVULANATE POTASSIUM 1 TABLET: 875; 125 TABLET, FILM COATED ORAL at 08:10

## 2018-10-13 RX ADMIN — PANTOPRAZOLE SODIUM 40 MG: 40 TABLET, DELAYED RELEASE ORAL at 09:10

## 2018-10-13 RX ADMIN — LEVALBUTEROL 1.25 MG: 1.25 SOLUTION, CONCENTRATE RESPIRATORY (INHALATION) at 07:10

## 2018-10-13 RX ADMIN — CIPROFLOXACIN 400 MG: 2 INJECTION, SOLUTION INTRAVENOUS at 07:10

## 2018-10-13 RX ADMIN — CARBIDOPA AND LEVODOPA 1 TABLET: 25; 100 TABLET ORAL at 08:10

## 2018-10-13 RX ADMIN — CARBIDOPA AND LEVODOPA 1 TABLET: 25; 100 TABLET ORAL at 01:10

## 2018-10-13 RX ADMIN — CARBIDOPA AND LEVODOPA 1 TABLET: 25; 100 TABLET ORAL at 05:10

## 2018-10-13 RX ADMIN — CARBIDOPA AND LEVODOPA 1 TABLET: 25; 100 TABLET ORAL at 09:10

## 2018-10-13 RX ADMIN — ONDANSETRON 4 MG: 2 INJECTION INTRAMUSCULAR; INTRAVENOUS at 01:10

## 2018-10-13 NOTE — ASSESSMENT & PLAN NOTE
Observe swallow -suspect this occurred during surgery/anesthesia, though      10/13: based on first set of sensitivites of blood cx growing E. Coli will switch to IV cipro today. Stop Zosyn and switch to PO Augmentin (unfortunately E. Coli resistant to most PCN thus also using cipro). If remains afebrile would do 5 more days of Augmentin (stop date 10/17/18) and 9 more days of cipro (stop date 10/21/18--can be PO at discharge) for bacteremia.  Cont IV antibx pending full culture results

## 2018-10-13 NOTE — PLAN OF CARE
Problem: Patient Care Overview  Goal: Plan of Care Review  Patient tolerating current therapy well.  BiPAP on standby this shift/no distress noted with SaO2 96-98% on 2lpm nasal cannula.  Breath sounds are diminished throughout with fine crackles.  Questions/concerns addressed.

## 2018-10-13 NOTE — ASSESSMENT & PLAN NOTE
Due to GNR  E. Coli, sensitive to cipro  On zosyn on admit    10/13: based on first set of sensitivites of blood cx growing E. Coli will switch to IV cipro today. Stop Zosyn and switch to PO Augmentin (unfortunately E. Coli resistant to most PCN thus also using cipro). If remains afebrile would do 5 more days of Augmentin (stop date 10/17/18) and 9 more days of cipro (stop date 10/21/18--can be PO at discharge) for bacteremia.  Cont IV antibx pending full culture results      1 with Gram + cocci however I suspect this will be a contaminate  No fevers, no leukocytosis

## 2018-10-13 NOTE — ASSESSMENT & PLAN NOTE
S/p surgery  Now with distention and decreased bowel sounds.  Check KUB- no obstruction    Senna, colace, miralax  Consider enema if needed  + BS and tolerating PO diet at this time

## 2018-10-13 NOTE — ASSESSMENT & PLAN NOTE
Resolved  Afefbrile, no leukocytosis  Due to aspiration pneumonia and UTI and E. Coli bacteremia    10/13: based on first set of sensitivites of blood cx growing E. Coli will switch to IV cipro today. Stop Zosyn and switch to PO Augmentin (unfortunately E. Coli resistant to most PCN thus also using cipro). If remains afebrile would do 5 more days of Augmentin (stop date 10/17/18) and 9 more days of cipro (stop date 10/21/18--can be PO at discharge) for bacteremia.  Cont IV antibx pending full culture results

## 2018-10-13 NOTE — ASSESSMENT & PLAN NOTE
GE?  Cdiff?(no recent abx). Ordered in ED     10/13: stopped all stool cx ordered on admit  Resolved now and having constipation  KUB without obstruction

## 2018-10-13 NOTE — PLAN OF CARE
Problem: Patient Care Overview  Goal: Plan of Care Review  Outcome: Ongoing (interventions implemented as appropriate)  Vitals remained stable, afebrile. No complaints of pain. Turning q2. BP meds added. abx changed. Worked with PT. Appetite decreased but eating. Discussed plan of care, stated understanding

## 2018-10-13 NOTE — ASSESSMENT & PLAN NOTE
Vanc and Zosyn on admit for HCAP  Order nebs BID   downdgrade to floor   Blood Culture - GNR   10/11/18 day 3 abx- trough this am  CXR yesterday No significant interval change of the bilateral pleural effusions and bilateral lower lobe alveolar infiltrates.  Cardiomegaly and suspected interstitial edema..  Cont on zosyn only. pulm has d/c'd other abx with + cultures. Seems most c/w aspiration pna    10/12/18 Zosyn day 4- will need 2 weeks ABX for bacteremia pending sensitivites.  Of note, 1 blood cx growing Gram + cocci but I suspect will be a contaminate. Dr. Leach d/c the Vancomycin and for now I will leave off but if any fevers/worsening will resume    10/13: based on first set of sensitivites of blood cx growing E. Coli will switch to IV cipro today. Stop Zosyn and switch to PO Augmentin (unfortunately E. Coli resistant to most PCN thus also using cipro). If remains afebrile would do 5 more days of Augmentin (stop date 10/17/18) and 9 more days of cipro (stop date 10/21/18--can be PO at discharge) for bacteremia.  Cont IV antibx pending full culture results

## 2018-10-13 NOTE — PLAN OF CARE
Problem: Patient Care Overview  Goal: Plan of Care Review  Outcome: Ongoing (interventions implemented as appropriate)  Patient resting in bed with some complaints of pain in back. PRN Celebrex given with some stated relief. Patient also has complaints of a burning type pain throughout trunk and in thighs. Patient wearing BiPAP for part of shift while sleeping but seemed to rest better with 2L NC. VS stable. A&O to person/place/situation. Some confusion when first waking up. IV bad. New IV started in left forearm. No acute changes noted. Plan of care reviewed and agreed upon with patient and family.

## 2018-10-13 NOTE — PROGRESS NOTES
"[facility] Medicine  Progress Note    Patient Name: Elvis Thompson  MRN: [id number]  Patient Class: IP- Inpatient   Admission Date: 10/9/2018  Length of Stay: 4 days  Attending Physician: Dulce Mcmahon MD  Primary Care Provider: Regulo Martínez MD        Subjective:     Principal Problem:Aspiration pneumonitis    HPI:  85 year old male presents to ED with 14 hour h/o diarrhea. No n/v. No fever. Recent inguinal hernia repair Thursday.  He was kept overnight. Went home Fri. Had some wheezing and coughing post op per daughter. CXR with small bilateral infiltrates called by radiologist.  He meets sepsis criteria in ED. Lactate was normal. Admitted overnight for sepsis and started on Zosyn and VANC. Getting fluids at 125/hour. Pressures good, stable. Making urine.     Hospital Course:  10/11/18 afebrile WBC 13.26>10.08> 7.30  Day 3 abx; zosyn, Vanc- trough this am   /74 this am   Pt feeling "so so", states he couldn't breath last night.   BC x 4 sets; 2 of 4 positive GRAM NEGATIVE KALYANI, LACTOSE    Stools pending but pt has not had BM since admission; decreased bowel sounds; check KUB; re- consult general sx  NPO until after KUB viewed   Add PT    10/12/18 KUB yesterday Contrast material is seen in the colon from recent CT scan.  There is some gaseous distention of the bowel without evidence for definite obstruction.  No free air is seen.    Still No BM   Had 4 blood cultures drawn on 10/9 ; 2 are gram negative , one resulting Ecoli- sensitivity pending, one reslting gram +   Pt c/o some left side 'burning"  Day 4 abx  Family in process of evalution for possible nursing home placement    10/13: overall feeling well. Denies SOB, did not tolerate BipaP last night.  Still c/o left side "burning" pain that is intermittent  Still no BM  Had 4 blood cultures drawn on 10/9 ; 2 are gram negative , one resulting Ecoli- sensitive to rocephin, cipro, bactrim; one reslting gram + "   Remains very weak and not able to return home as not back to baseline functional status      Review of Systems   Constitutional: Negative for chills and fever.   HENT: Negative for congestion, ear pain, postnasal drip, rhinorrhea, sore throat and trouble swallowing.    Eyes: Negative for redness and itching.   Respiratory: Positive for cough (improving). Negative for shortness of breath and wheezing.    Cardiovascular: Negative for chest pain and palpitations.   Gastrointestinal: Negative for abdominal pain, diarrhea, nausea and vomiting.   Genitourinary: Negative for difficulty urinating, dysuria and frequency.   Skin: Negative for rash.   Neurological: Positive for weakness (generalized). Negative for headaches.     Objective:     Vital Signs (Most Recent):  Temp: 96.8 °F (36 °C) (10/13/18 0423)  Pulse: (!) 59 (10/13/18 0600)  Resp: 18 (10/13/18 0423)  BP: 135/63 (10/13/18 0423)  SpO2: 99 % (10/13/18 0423) Vital Signs (24h Range):  Temp:  [96.3 °F (35.7 °C)-97.9 °F (36.6 °C)] 96.8 °F (36 °C)  Pulse:  [56-93] 59  Resp:  [14-23] 18  SpO2:  [96 %-99 %] 99 %  BP: (126-183)/(63-85) 135/63     Weight: 92 kg (202 lb 13.2 oz)  Body mass index is 33.75 kg/m².    Physical Exam   Constitutional: He appears well-developed and well-nourished. No distress.   HENT:   Head: Normocephalic and atraumatic.   Right Ear: External ear normal.   Left Ear: External ear normal.   Eyes: Conjunctivae and EOM are normal. Pupils are equal, round, and reactive to light.   Neck: Normal range of motion. Neck supple. No tracheal deviation present.   Cardiovascular: Normal rate, regular rhythm and normal heart sounds.   Pulmonary/Chest: Effort normal and breath sounds normal. No respiratory distress. He has no wheezes.   Abdominal: Soft. He exhibits distension. There is no tenderness.   Decreased BS    Well healing incision to left groin   Musculoskeletal: Normal range of motion. He exhibits no edema.   Neurological: He is alert.   Skin: Skin is  warm and dry. No rash noted.   Incision c/d/i   Psychiatric: He has a normal mood and affect. His behavior is normal.   Nursing note and vitals reviewed.        CRANIAL NERVES     CN III, IV, VI   Pupils are equal, round, and reactive to light.  Extraocular motions are normal.        Significant Labs:   Blood Culture:   No results for input(s): LABBLOO in the last 48 hours.  CBC:   Recent Labs   Lab  10/13/18   0513   WBC  6.27   HGB  11.8*   HCT  35.1*   PLT  275     CMP:   Recent Labs   Lab  10/12/18   0527  10/13/18   0513   NA  134*  135*   K  3.9  4.5   CL  102  103   CO2  23  26   GLU  91  99   BUN  12  10   CREATININE  0.7  0.7   CALCIUM  8.7  8.6*   ANIONGAP  9  6*   EGFRNONAA  >60  >60       Lactic Acid:   No results for input(s): LACTATE in the last 48 hours.       Significant Imaging: I have reviewed and interpreted all pertinent imaging results/findings within the past 24 hours.     10/10 CXR-No significant interval change of the bilateral pleural effusions and bilateral lower lobe alveolar infiltrates.  Cardiomegaly and suspected interstitial edema..  10/11/18 CXR- Interval worsening of the cardiopulmonary findings.  There is cardiomegaly with pulmonary vascular congestion and pulmonary edema.  Bilateral pleural effusions are suspected.  Left-sided pacemaker device remains in place.  The skeletal structures are intact.  10/11/18 KUB- Contrast material is seen in the colon from recent CT scan.  There is some gaseous distention of the bowel without evidence for definite obstruction.  No free air is seen.  Vascular calcifications are noted.     Assessment/Plan:      * Aspiration pneumonitis    Vanc and Zosyn on admit for HCAP  Order nebs BID   downdgrade to floor   Blood Culture - GNR   10/11/18 day 3 abx- trough this am  CXR yesterday No significant interval change of the bilateral pleural effusions and bilateral lower lobe alveolar infiltrates.  Cardiomegaly and suspected interstitial edema..  Cont on  zosyn only. pulm has d/c'd other abx with + cultures. Seems most c/w aspiration pna    10/12/18 Zosyn day 4- will need 2 weeks ABX for bacteremia pending sensitivites.  Of note, 1 blood cx growing Gram + cocci but I suspect will be a contaminate. Dr. Leach d/c the Vancomycin and for now I will leave off but if any fevers/worsening will resume    10/13: based on first set of sensitivites of blood cx growing E. Coli will switch to IV cipro today. Stop Zosyn and switch to PO Augmentin (unfortunately E. Coli resistant to most PCN thus also using cipro). If remains afebrile would do 5 more days of Augmentin (stop date 10/17/18) and 9 more days of cipro (stop date 10/21/18--can be PO at discharge) for bacteremia.  Cont IV antibx pending full culture results          Bacteremia    Due to GNR  E. Coli, sensitive to cipro  On zosyn on admit    10/13: based on first set of sensitivites of blood cx growing E. Coli will switch to IV cipro today. Stop Zosyn and switch to PO Augmentin (unfortunately E. Coli resistant to most PCN thus also using cipro). If remains afebrile would do 5 more days of Augmentin (stop date 10/17/18) and 9 more days of cipro (stop date 10/21/18--can be PO at discharge) for bacteremia.  Cont IV antibx pending full culture results      1 with Gram + cocci however I suspect this will be a contaminate  No fevers, no leukocytosis          Aspiration pneumonia    Observe swallow -suspect this occurred during surgery/anesthesia, though      10/13: based on first set of sensitivites of blood cx growing E. Coli will switch to IV cipro today. Stop Zosyn and switch to PO Augmentin (unfortunately E. Coli resistant to most PCN thus also using cipro). If remains afebrile would do 5 more days of Augmentin (stop date 10/17/18) and 9 more days of cipro (stop date 10/21/18--can be PO at discharge) for bacteremia.  Cont IV antibx pending full culture results            Sepsis    Resolved  Afefbrile, no leukocytosis  Due to  aspiration pneumonia and UTI and E. Coli bacteremia    10/13: based on first set of sensitivites of blood cx growing E. Coli will switch to IV cipro today. Stop Zosyn and switch to PO Augmentin (unfortunately E. Coli resistant to most PCN thus also using cipro). If remains afebrile would do 5 more days of Augmentin (stop date 10/17/18) and 9 more days of cipro (stop date 10/21/18--can be PO at discharge) for bacteremia.  Cont IV antibx pending full culture results          Diarrhea of presumed infectious origin    GE?  Cdiff?(no recent abx). Ordered in ED     10/13: stopped all stool cx ordered on admit  Resolved now and having constipation  KUB without obstruction          Parkinson disease    Continue his home sinemet  Order PT           Incarcerated left inguinal hernia      S/p surgery  Now with distention and decreased bowel sounds.  Check KUB- no obstruction    Senna, colace, miralax  Consider enema if needed  + BS and tolerating PO diet at this time          VTE Risk Mitigation (From admission, onward)    None              Makeda Pham MD  Department of Hospital Medicine   [facility]

## 2018-10-13 NOTE — SUBJECTIVE & OBJECTIVE
Review of Systems   Constitutional: Negative for chills and fever.   HENT: Negative for congestion, ear pain, postnasal drip, rhinorrhea, sore throat and trouble swallowing.    Eyes: Negative for redness and itching.   Respiratory: Positive for cough (improving). Negative for shortness of breath and wheezing.    Cardiovascular: Negative for chest pain and palpitations.   Gastrointestinal: Negative for abdominal pain, diarrhea, nausea and vomiting.   Genitourinary: Negative for difficulty urinating, dysuria and frequency.   Skin: Negative for rash.   Neurological: Positive for weakness (generalized). Negative for headaches.     Objective:     Vital Signs (Most Recent):  Temp: 96.8 °F (36 °C) (10/13/18 0423)  Pulse: (!) 59 (10/13/18 0600)  Resp: 18 (10/13/18 0423)  BP: 135/63 (10/13/18 0423)  SpO2: 99 % (10/13/18 0423) Vital Signs (24h Range):  Temp:  [96.3 °F (35.7 °C)-97.9 °F (36.6 °C)] 96.8 °F (36 °C)  Pulse:  [56-93] 59  Resp:  [14-23] 18  SpO2:  [96 %-99 %] 99 %  BP: (126-183)/(63-85) 135/63     Weight: 92 kg (202 lb 13.2 oz)  Body mass index is 33.75 kg/m².    Physical Exam   Constitutional: He appears well-developed and well-nourished. No distress.   HENT:   Head: Normocephalic and atraumatic.   Right Ear: External ear normal.   Left Ear: External ear normal.   Eyes: Conjunctivae and EOM are normal. Pupils are equal, round, and reactive to light.   Neck: Normal range of motion. Neck supple. No tracheal deviation present.   Cardiovascular: Normal rate, regular rhythm and normal heart sounds.   Pulmonary/Chest: Effort normal and breath sounds normal. No respiratory distress. He has no wheezes.   Abdominal: Soft. He exhibits distension. There is no tenderness.   Decreased BS    Well healing incision to left groin   Musculoskeletal: Normal range of motion. He exhibits no edema.   Neurological: He is alert.   Skin: Skin is warm and dry. No rash noted.   Incision c/d/i   Psychiatric: He has a normal mood and  affect. His behavior is normal.   Nursing note and vitals reviewed.        CRANIAL NERVES     CN III, IV, VI   Pupils are equal, round, and reactive to light.  Extraocular motions are normal.        Significant Labs:   Blood Culture:   No results for input(s): LABBLOO in the last 48 hours.  CBC:   Recent Labs   Lab  10/13/18   0513   WBC  6.27   HGB  11.8*   HCT  35.1*   PLT  275     CMP:   Recent Labs   Lab  10/12/18   0527  10/13/18   0513   NA  134*  135*   K  3.9  4.5   CL  102  103   CO2  23  26   GLU  91  99   BUN  12  10   CREATININE  0.7  0.7   CALCIUM  8.7  8.6*   ANIONGAP  9  6*   EGFRNONAA  >60  >60       Lactic Acid:   No results for input(s): LACTATE in the last 48 hours.       Significant Imaging: I have reviewed and interpreted all pertinent imaging results/findings within the past 24 hours.     10/10 CXR-No significant interval change of the bilateral pleural effusions and bilateral lower lobe alveolar infiltrates.  Cardiomegaly and suspected interstitial edema..  10/11/18 CXR- Interval worsening of the cardiopulmonary findings.  There is cardiomegaly with pulmonary vascular congestion and pulmonary edema.  Bilateral pleural effusions are suspected.  Left-sided pacemaker device remains in place.  The skeletal structures are intact.  10/11/18 KUB- Contrast material is seen in the colon from recent CT scan.  There is some gaseous distention of the bowel without evidence for definite obstruction.  No free air is seen.  Vascular calcifications are noted.

## 2018-10-14 PROBLEM — Z79.01 CHRONIC ANTICOAGULATION: Status: ACTIVE | Noted: 2018-10-14

## 2018-10-14 LAB
ANION GAP SERPL CALC-SCNC: 5 MMOL/L
BACTERIA BLD CULT: NORMAL
BUN SERPL-MCNC: 10 MG/DL
CALCIUM SERPL-MCNC: 8.8 MG/DL
CHLORIDE SERPL-SCNC: 104 MMOL/L
CO2 SERPL-SCNC: 27 MMOL/L
CREAT SERPL-MCNC: 0.7 MG/DL
EST. GFR  (AFRICAN AMERICAN): >60 ML/MIN/1.73 M^2
EST. GFR  (NON AFRICAN AMERICAN): >60 ML/MIN/1.73 M^2
GLUCOSE SERPL-MCNC: 106 MG/DL
INR PPP: 2.5
POTASSIUM SERPL-SCNC: 4.4 MMOL/L
PROTHROMBIN TIME: 24.7 SEC
SODIUM SERPL-SCNC: 136 MMOL/L

## 2018-10-14 PROCEDURE — 25000003 PHARM REV CODE 250: Performed by: NURSE PRACTITIONER

## 2018-10-14 PROCEDURE — 97110 THERAPEUTIC EXERCISES: CPT

## 2018-10-14 PROCEDURE — 25000003 PHARM REV CODE 250: Performed by: FAMILY MEDICINE

## 2018-10-14 PROCEDURE — 80048 BASIC METABOLIC PNL TOTAL CA: CPT

## 2018-10-14 PROCEDURE — 25000003 PHARM REV CODE 250: Performed by: SURGERY

## 2018-10-14 PROCEDURE — 11000001 HC ACUTE MED/SURG PRIVATE ROOM

## 2018-10-14 PROCEDURE — 94799 UNLISTED PULMONARY SVC/PX: CPT

## 2018-10-14 PROCEDURE — 63600175 PHARM REV CODE 636 W HCPCS: Performed by: INTERNAL MEDICINE

## 2018-10-14 PROCEDURE — 36415 COLL VENOUS BLD VENIPUNCTURE: CPT

## 2018-10-14 PROCEDURE — 99232 SBSQ HOSP IP/OBS MODERATE 35: CPT | Mod: ,,, | Performed by: INTERNAL MEDICINE

## 2018-10-14 PROCEDURE — 25000003 PHARM REV CODE 250: Performed by: INTERNAL MEDICINE

## 2018-10-14 PROCEDURE — 85610 PROTHROMBIN TIME: CPT

## 2018-10-14 PROCEDURE — 94640 AIRWAY INHALATION TREATMENT: CPT

## 2018-10-14 PROCEDURE — 25000242 PHARM REV CODE 250 ALT 637 W/ HCPCS: Performed by: FAMILY MEDICINE

## 2018-10-14 PROCEDURE — 27000221 HC OXYGEN, UP TO 24 HOURS

## 2018-10-14 PROCEDURE — 94761 N-INVAS EAR/PLS OXIMETRY MLT: CPT

## 2018-10-14 PROCEDURE — 94664 DEMO&/EVAL PT USE INHALER: CPT

## 2018-10-14 PROCEDURE — 25000242 PHARM REV CODE 250 ALT 637 W/ HCPCS: Performed by: INTERNAL MEDICINE

## 2018-10-14 PROCEDURE — 97116 GAIT TRAINING THERAPY: CPT

## 2018-10-14 PROCEDURE — 97530 THERAPEUTIC ACTIVITIES: CPT

## 2018-10-14 PROCEDURE — 99900035 HC TECH TIME PER 15 MIN (STAT)

## 2018-10-14 RX ADMIN — AMOXICILLIN AND CLAVULANATE POTASSIUM 1 TABLET: 875; 125 TABLET, FILM COATED ORAL at 08:10

## 2018-10-14 RX ADMIN — SENNA 8.6 MG: 8.6 TABLET, COATED ORAL at 08:10

## 2018-10-14 RX ADMIN — CARBIDOPA AND LEVODOPA 1 TABLET: 25; 100 TABLET ORAL at 01:10

## 2018-10-14 RX ADMIN — LEVALBUTEROL 1.25 MG: 1.25 SOLUTION, CONCENTRATE RESPIRATORY (INHALATION) at 07:10

## 2018-10-14 RX ADMIN — AMLODIPINE BESYLATE 5 MG: 5 TABLET ORAL at 08:10

## 2018-10-14 RX ADMIN — ACETAMINOPHEN 650 MG: 325 TABLET ORAL at 11:10

## 2018-10-14 RX ADMIN — CIPROFLOXACIN 400 MG: 2 INJECTION, SOLUTION INTRAVENOUS at 08:10

## 2018-10-14 RX ADMIN — CARBIDOPA AND LEVODOPA 1 TABLET: 25; 100 TABLET ORAL at 08:10

## 2018-10-14 RX ADMIN — PANTOPRAZOLE SODIUM 40 MG: 40 TABLET, DELAYED RELEASE ORAL at 08:10

## 2018-10-14 RX ADMIN — CARBIDOPA AND LEVODOPA 1 TABLET: 25; 100 TABLET ORAL at 05:10

## 2018-10-14 RX ADMIN — IPRATROPIUM BROMIDE 0.5 MG: 0.5 SOLUTION RESPIRATORY (INHALATION) at 07:10

## 2018-10-14 RX ADMIN — CELECOXIB 200 MG: 200 CAPSULE ORAL at 08:10

## 2018-10-14 RX ADMIN — IPRATROPIUM BROMIDE 0.5 MG: 0.5 SOLUTION RESPIRATORY (INHALATION) at 12:10

## 2018-10-14 RX ADMIN — IPRATROPIUM BROMIDE 0.5 MG: 0.5 SOLUTION RESPIRATORY (INHALATION) at 01:10

## 2018-10-14 RX ADMIN — POLYETHYLENE GLYCOL 3350 17 G: 17 POWDER, FOR SOLUTION ORAL at 08:10

## 2018-10-14 RX ADMIN — DOCUSATE SODIUM 100 MG: 100 CAPSULE, LIQUID FILLED ORAL at 08:10

## 2018-10-14 NOTE — ASSESSMENT & PLAN NOTE
Due to GNR  E. Coli, sensitive to cipro  On zosyn on admit    10/13: based on first set of sensitivites of blood cx growing E. Coli will switch to IV cipro today. Stop Zosyn and switch to PO Augmentin (unfortunately E. Coli resistant to most PCN thus also using cipro). If remains afebrile would do 5 more days of Augmentin (stop date 10/17/18) and 9 more days of cipro (stop date 10/21/18--can be PO at discharge) for bacteremia.  Cont IV antibx pending full culture results      1 with Gram + cocci however I suspect this will be a contaminate  No fevers, no leukocytosis    10/14: doing well on current antibx. Cont Augmentin for aspiration (stop date 10/17/18) and cipro for UTI (stop date 10/21/18)

## 2018-10-14 NOTE — PLAN OF CARE
Problem: Patient Care Overview  Goal: Plan of Care Review  Outcome: Ongoing (interventions implemented as appropriate)  Vitals stable, afebrile. Complained of pain that was relieved with positioning. Eating well. IV and PO abx given. Discussed plan of care with pt, stated understanding.

## 2018-10-14 NOTE — PROGRESS NOTES
"[facility] Medicine  Progress Note    Patient Name: Elvis Thompson  MRN: [id number]  Patient Class: IP- Inpatient   Admission Date: 10/9/2018  Length of Stay: 5 days  Attending Physician: Dulce Mcmahon MD  Primary Care Provider: Regulo Martínez MD        Subjective:     Principal Problem:Aspiration pneumonitis    HPI:  85 year old male presents to ED with 14 hour h/o diarrhea. No n/v. No fever. Recent inguinal hernia repair Thursday.  He was kept overnight. Went home Fri. Had some wheezing and coughing post op per daughter. CXR with small bilateral infiltrates called by radiologist.  He meets sepsis criteria in ED. Lactate was normal. Admitted overnight for sepsis and started on Zosyn and VANC. Getting fluids at 125/hour. Pressures good, stable. Making urine.     Hospital Course:  10/11/18 afebrile WBC 13.26>10.08> 7.30  Day 3 abx; zosyn, Vanc- trough this am   /74 this am   Pt feeling "so so", states he couldn't breath last night.   BC x 4 sets; 2 of 4 positive GRAM NEGATIVE KALYANI, LACTOSE    Stools pending but pt has not had BM since admission; decreased bowel sounds; check KUB; re- consult general sx  NPO until after KUB viewed   Add PT    10/12/18 KUB yesterday Contrast material is seen in the colon from recent CT scan.  There is some gaseous distention of the bowel without evidence for definite obstruction.  No free air is seen.    Still No BM   Had 4 blood cultures drawn on 10/9 ; 2 are gram negative , one resulting Ecoli- sensitivity pending, one reslting gram +   Pt c/o some left side 'burning"  Day 4 abx  Family in process of evalution for possible nursing home placement    10/13: overall feeling well. Denies SOB, did not tolerate BipaP last night.  Still c/o left side "burning" pain that is intermittent  Still no BM  Had 4 blood cultures drawn on 10/9 ; 2 are gram negative , one resulting Ecoli- sensitive to rocephin, cipro, bactrim; one reslting gram + "   Remains very weak and not able to return home as not back to baseline functional status    10/14: no complaints this AM. Not requiring BiPaP. Will try to start weaning O2 today.  Still no BM but more gas. No nausea and tolerating PO  He is still very weak and not able to ambulate independently--considering SWING vs NH      Review of Systems   Constitutional: Negative for chills and fever.   HENT: Negative for congestion, ear pain, postnasal drip, rhinorrhea, sore throat and trouble swallowing.    Eyes: Negative for redness and itching.   Respiratory: Positive for cough (improving). Negative for shortness of breath and wheezing.    Cardiovascular: Negative for chest pain and palpitations.   Gastrointestinal: Negative for abdominal pain, diarrhea, nausea and vomiting.   Genitourinary: Negative for difficulty urinating, dysuria and frequency.   Skin: Negative for rash.   Neurological: Positive for weakness (generalized). Negative for headaches.     Objective:     Vital Signs (Most Recent):  Temp: 97.2 °F (36.2 °C) (10/14/18 0725)  Pulse: 80 (10/14/18 0727)  Resp: 18 (10/14/18 0727)  BP: (!) 154/70 (10/14/18 0725)  SpO2: 97 % (10/14/18 0727) Vital Signs (24h Range):  Temp:  [96.4 °F (35.8 °C)-98.1 °F (36.7 °C)] 97.2 °F (36.2 °C)  Pulse:  [59-92] 80  Resp:  [16-24] 18  SpO2:  [97 %-99 %] 97 %  BP: (134-171)/(67-87) 154/70     Weight: 92 kg (202 lb 13.2 oz)  Body mass index is 33.75 kg/m².    Physical Exam   Constitutional: He appears well-developed and well-nourished. No distress.   HENT:   Head: Normocephalic and atraumatic.   Right Ear: External ear normal.   Left Ear: External ear normal.   Eyes: Conjunctivae and EOM are normal. Pupils are equal, round, and reactive to light.   Neck: Normal range of motion. Neck supple. No tracheal deviation present.   Cardiovascular: Normal rate, regular rhythm and normal heart sounds.   Pulmonary/Chest: Effort normal and breath sounds normal. No respiratory distress. He has no  wheezes.   Abdominal: Soft. He exhibits no distension. There is no tenderness.   +BS all 4 quadrants    Well healing incision to left groin   Musculoskeletal: Normal range of motion. He exhibits no edema.   Neurological: He is alert.   Skin: Skin is warm and dry. No rash noted.   Incision c/d/i   Psychiatric: He has a normal mood and affect. His behavior is normal.   Nursing note and vitals reviewed.        CRANIAL NERVES     CN III, IV, VI   Pupils are equal, round, and reactive to light.  Extraocular motions are normal.        Significant Labs:   Blood Culture:   No results for input(s): LABBLOO in the last 48 hours.  CBC:   Recent Labs   Lab  10/13/18   0513   WBC  6.27   HGB  11.8*   HCT  35.1*   PLT  275     CMP:   Recent Labs   Lab  10/13/18   0513  10/14/18   0622   NA  135*  136   K  4.5  4.4   CL  103  104   CO2  26  27   GLU  99  106   BUN  10  10   CREATININE  0.7  0.7   CALCIUM  8.6*  8.8   ANIONGAP  6*  5*   EGFRNONAA  >60  >60       Lactic Acid:   No results for input(s): LACTATE in the last 48 hours.       Significant Imaging: I have reviewed and interpreted all pertinent imaging results/findings within the past 24 hours.     10/10 CXR-No significant interval change of the bilateral pleural effusions and bilateral lower lobe alveolar infiltrates.  Cardiomegaly and suspected interstitial edema..  10/11/18 CXR- Interval worsening of the cardiopulmonary findings.  There is cardiomegaly with pulmonary vascular congestion and pulmonary edema.  Bilateral pleural effusions are suspected.  Left-sided pacemaker device remains in place.  The skeletal structures are intact.  10/11/18 KUB- Contrast material is seen in the colon from recent CT scan.  There is some gaseous distention of the bowel without evidence for definite obstruction.  No free air is seen.  Vascular calcifications are noted.     Assessment/Plan:      * Aspiration pneumonitis    Vanc and Zosyn on admit for HCAP  Order nebs BID   downdgrade to  floor   Blood Culture - GNR   10/11/18 day 3 abx- trough this am  CXR yesterday No significant interval change of the bilateral pleural effusions and bilateral lower lobe alveolar infiltrates.  Cardiomegaly and suspected interstitial edema..  Cont on zosyn only. pulm has d/c'd other abx with + cultures. Seems most c/w aspiration pna    10/12/18 Zosyn day 4- will need 2 weeks ABX for bacteremia pending sensitivites.  Of note, 1 blood cx growing Gram + cocci but I suspect will be a contaminate. Dr. Leach d/c the Vancomycin and for now I will leave off but if any fevers/worsening will resume    10/13: based on first set of sensitivites of blood cx growing E. Coli will switch to IV cipro today. Stop Zosyn and switch to PO Augmentin (unfortunately E. Coli resistant to most PCN thus also using cipro). If remains afebrile would do 5 more days of Augmentin (stop date 10/17/18) and 9 more days of cipro (stop date 10/21/18--can be PO at discharge) for bacteremia.  Cont IV antibx pending full culture results      10/14: doing well on current antibx. Cont Augmentin for aspiration (stop date 10/17/18) and cipro for UTI (stop date 10/21/18)  Wean O2 today        Chronic anticoagulation    INR trending up to 2.7  10/13: held coumadin    10/14: INR 2.5 hold tonight and resume tomorrow AM    Daily INR    No abnormal bleeding          Bacteremia    Due to GNR  E. Coli, sensitive to cipro  On zosyn on admit    10/13: based on first set of sensitivites of blood cx growing E. Coli will switch to IV cipro today. Stop Zosyn and switch to PO Augmentin (unfortunately E. Coli resistant to most PCN thus also using cipro). If remains afebrile would do 5 more days of Augmentin (stop date 10/17/18) and 9 more days of cipro (stop date 10/21/18--can be PO at discharge) for bacteremia.  Cont IV antibx pending full culture results      1 with Gram + cocci however I suspect this will be a contaminate  No fevers, no leukocytosis    10/14: doing well on  current antibx. Cont Augmentin for aspiration (stop date 10/17/18) and cipro for UTI (stop date 10/21/18)          Aspiration pneumonia    Observe swallow -suspect this occurred during surgery/anesthesia, though      10/13: based on first set of sensitivites of blood cx growing E. Coli will switch to IV cipro today. Stop Zosyn and switch to PO Augmentin (unfortunately E. Coli resistant to most PCN thus also using cipro). If remains afebrile would do 5 more days of Augmentin (stop date 10/17/18) and 9 more days of cipro (stop date 10/21/18--can be PO at discharge) for bacteremia.  Cont IV antibx pending full culture results      10/14: doing well on current antibx. Cont Augmentin for aspiration (stop date 10/17/18) and cipro for UTI (stop date 10/21/18). Wean O2 today          Sepsis    Resolved  Afefbrile, no leukocytosis  Due to aspiration pneumonia and UTI and E. Coli bacteremia    10/13: based on first set of sensitivites of blood cx growing E. Coli will switch to IV cipro today. Stop Zosyn and switch to PO Augmentin (unfortunately E. Coli resistant to most PCN thus also using cipro). If remains afebrile would do 5 more days of Augmentin (stop date 10/17/18) and 9 more days of cipro (stop date 10/21/18--can be PO at discharge) for bacteremia.  Cont IV antibx pending full culture results    10/14: doing well on current antibx. Cont Augmentin for aspiration (stop date 10/17/18) and cipro for UTI/bacteremia (stop date 10/21/18)        Diarrhea of presumed infectious origin    GE?  Cdiff?(no recent abx). Ordered in ED     10/14: stopped all stool cx ordered on admit  Resolved now and having constipation  KUB without obstruction          Parkinson disease    Continue his home sinemet  Order PT           Incarcerated left inguinal hernia      S/p surgery  Now with distention and decreased bowel sounds.  Check KUB- no obstruction    Senna, colace, miralax  Consider enema if needed  + BS and tolerating PO diet at this  time          VTE Risk Mitigation (From admission, onward)    None              Makeda Pham MD  Department of Hospital Medicine   [facility]

## 2018-10-14 NOTE — ASSESSMENT & PLAN NOTE
INR trending up to 2.7  10/13: held coumadin    10/14: INR 2.5 hold tonight and resume tomorrow AM    Daily INR    No abnormal bleeding

## 2018-10-14 NOTE — SUBJECTIVE & OBJECTIVE
Review of Systems   Constitutional: Negative for chills and fever.   HENT: Negative for congestion, ear pain, postnasal drip, rhinorrhea, sore throat and trouble swallowing.    Eyes: Negative for redness and itching.   Respiratory: Positive for cough (improving). Negative for shortness of breath and wheezing.    Cardiovascular: Negative for chest pain and palpitations.   Gastrointestinal: Negative for abdominal pain, diarrhea, nausea and vomiting.   Genitourinary: Negative for difficulty urinating, dysuria and frequency.   Skin: Negative for rash.   Neurological: Positive for weakness (generalized). Negative for headaches.     Objective:     Vital Signs (Most Recent):  Temp: 97.2 °F (36.2 °C) (10/14/18 0725)  Pulse: 80 (10/14/18 0727)  Resp: 18 (10/14/18 0727)  BP: (!) 154/70 (10/14/18 0725)  SpO2: 97 % (10/14/18 0727) Vital Signs (24h Range):  Temp:  [96.4 °F (35.8 °C)-98.1 °F (36.7 °C)] 97.2 °F (36.2 °C)  Pulse:  [59-92] 80  Resp:  [16-24] 18  SpO2:  [97 %-99 %] 97 %  BP: (134-171)/(67-87) 154/70     Weight: 92 kg (202 lb 13.2 oz)  Body mass index is 33.75 kg/m².    Physical Exam   Constitutional: He appears well-developed and well-nourished. No distress.   HENT:   Head: Normocephalic and atraumatic.   Right Ear: External ear normal.   Left Ear: External ear normal.   Eyes: Conjunctivae and EOM are normal. Pupils are equal, round, and reactive to light.   Neck: Normal range of motion. Neck supple. No tracheal deviation present.   Cardiovascular: Normal rate, regular rhythm and normal heart sounds.   Pulmonary/Chest: Effort normal and breath sounds normal. No respiratory distress. He has no wheezes.   Abdominal: Soft. He exhibits no distension. There is no tenderness.   +BS all 4 quadrants    Well healing incision to left groin   Musculoskeletal: Normal range of motion. He exhibits no edema.   Neurological: He is alert.   Skin: Skin is warm and dry. No rash noted.   Incision c/d/i   Psychiatric: He has a normal  mood and affect. His behavior is normal.   Nursing note and vitals reviewed.        CRANIAL NERVES     CN III, IV, VI   Pupils are equal, round, and reactive to light.  Extraocular motions are normal.        Significant Labs:   Blood Culture:   No results for input(s): LABBLOO in the last 48 hours.  CBC:   Recent Labs   Lab  10/13/18   0513   WBC  6.27   HGB  11.8*   HCT  35.1*   PLT  275     CMP:   Recent Labs   Lab  10/13/18   0513  10/14/18   0622   NA  135*  136   K  4.5  4.4   CL  103  104   CO2  26  27   GLU  99  106   BUN  10  10   CREATININE  0.7  0.7   CALCIUM  8.6*  8.8   ANIONGAP  6*  5*   EGFRNONAA  >60  >60       Lactic Acid:   No results for input(s): LACTATE in the last 48 hours.       Significant Imaging: I have reviewed and interpreted all pertinent imaging results/findings within the past 24 hours.     10/10 CXR-No significant interval change of the bilateral pleural effusions and bilateral lower lobe alveolar infiltrates.  Cardiomegaly and suspected interstitial edema..  10/11/18 CXR- Interval worsening of the cardiopulmonary findings.  There is cardiomegaly with pulmonary vascular congestion and pulmonary edema.  Bilateral pleural effusions are suspected.  Left-sided pacemaker device remains in place.  The skeletal structures are intact.  10/11/18 KUB- Contrast material is seen in the colon from recent CT scan.  There is some gaseous distention of the bowel without evidence for definite obstruction.  No free air is seen.  Vascular calcifications are noted.

## 2018-10-14 NOTE — PT/OT/SLP PROGRESS
Physical Therapy Treatment    Patient Name:  Elvis Thompson   MRN:  [id number]    Recommendations:     Discharge Recommendations:      Discharge Equipment Recommendations:     Barriers to discharge: None    Assessment:     Elvis Thompson is a 85 y.o. male admitted with a medical diagnosis of Aspiration pneumonitis.  He presents with the following impairments/functional limitations:  weakness, impaired endurance, impaired self care skills, impaired functional mobilty, gait instability, decreased upper extremity function, decreased lower extremity function .    Rehab Prognosis:  good; patient would benefit from acute skilled PT services to address these deficits and reach maximum level of function.      Recent Surgery: * No surgery found *      Plan:     During this hospitalization, patient to be seen (1-2x/day(M-F); PRN(Sat.,Sun.)) to address the above listed problems via therapeutic activities, therapeutic exercises, gait training  · Plan of Care Expires:  (upon d/c from facility)   Plan of Care Reviewed with: patient, family    Subjective     Communicated with patient and family prior to session.  Patient found supine upon PT entry to room, agreeable to treatment.      Chief Complaint: back hurts to lie down upon it  Patient comments/goals: try to be able to walk again  Pain/Comfort:  · Pain Rating 1: 3/10  · Location - Side 1: Bilateral  · Location - Orientation 1: generalized  · Location 1: back  · Pain Addressed 1: Nurse notified  · Pain Rating Post-Intervention 1: 3/10    Patients cultural, spiritual, [employer] conflicts given the current situation:      Objective:     Patient found with: oxygen, telemetry     General Precautions: Standard, fall   Orthopedic Precautions:N/A   Braces:       Functional Mobility:  · Bed Mobility:     · Rolling Left:  moderate assistance  · Rolling Right: moderate assistance  · Supine to Sit: moderate assistance  · Sit to Supine: moderate assistance  · Transfers:     · Sit to  Stand:  minimum assistance with rolling walker  · Gait: 25 ft with RW - decreased step and stride, decreased foot clearance, miin / mod assist for safety      AM-PAC 6 CLICK MOBILITY          Therapeutic Activities and Exercises:   Transfer training  to and from wheelchair for out of bed sitting, with oxygen use.   Remained in wheelchiair 40 min duration with  family present  .Rendered therapeutic exercises for all four extremities for motion and strength ,   Gait training with Rw and assistance     Patient left HOB elevated with all lines intact, call button in reach, bed alarm on, NSG notified and family present..    GOALS:   Multidisciplinary Problems     Physical Therapy Goals        Problem: Physical Therapy Goal    Goal Priority Disciplines Outcome Goal Variances Interventions   Physical Therapy Goal     PT, PT/OT Ongoing (interventions implemented as appropriate)     Description:  Goals to be met by: upon D/C from facility     Patient will increase functional independence with mobility by performin. Supine to sit with Contact Guard Assistance  2. Sit to supine with Contact Guard Assistance  3. Sit to stand transfer with Contact Guard Assistance  4. Bed to chair transfer with Contact Guard Assistance using Rolling Walker  5. Gait  x 50 feet with Minimal Assistance using Rolling Walker.              Problem: Physical Therapy Goal    Goal Priority Disciplines Outcome Goal Variances Interventions   Physical Therapy Goal     PT, PT/OT             Problem: Physical Therapy Goal    Goal Priority Disciplines Outcome Goal Variances Interventions   Physical Therapy Goal     PT, PT/OT      Description:  1.Advance activity level  As tolerated to include  walker training of 50 ft intervals with min / mod assist                    Time Tracking:     PT Received On: 10/14/18  PT Start Time: 1105     PT Stop Time: 1130  PT Total Time (min): 25 min     Billable Minutes: Therapeutic Activity 15 min, Therapeutic Exercise  10 min and Total Time 25 min    Treatment Type: Treatment  PT/PTA: PT           Kalen Georges, PT  10/14/2018

## 2018-10-14 NOTE — ASSESSMENT & PLAN NOTE
Vanc and Zosyn on admit for HCAP  Order nebs BID   downdgrade to floor   Blood Culture - GNR   10/11/18 day 3 abx- trough this am  CXR yesterday No significant interval change of the bilateral pleural effusions and bilateral lower lobe alveolar infiltrates.  Cardiomegaly and suspected interstitial edema..  Cont on zosyn only. pulm has d/c'd other abx with + cultures. Seems most c/w aspiration pna    10/12/18 Zosyn day 4- will need 2 weeks ABX for bacteremia pending sensitivites.  Of note, 1 blood cx growing Gram + cocci but I suspect will be a contaminate. Dr. Leach d/c the Vancomycin and for now I will leave off but if any fevers/worsening will resume    10/13: based on first set of sensitivites of blood cx growing E. Coli will switch to IV cipro today. Stop Zosyn and switch to PO Augmentin (unfortunately E. Coli resistant to most PCN thus also using cipro). If remains afebrile would do 5 more days of Augmentin (stop date 10/17/18) and 9 more days of cipro (stop date 10/21/18--can be PO at discharge) for bacteremia.  Cont IV antibx pending full culture results      10/14: doing well on current antibx. Cont Augmentin for aspiration (stop date 10/17/18) and cipro for UTI (stop date 10/21/18)  Wean O2 today

## 2018-10-14 NOTE — ASSESSMENT & PLAN NOTE
Observe swallow -suspect this occurred during surgery/anesthesia, though      10/13: based on first set of sensitivites of blood cx growing E. Coli will switch to IV cipro today. Stop Zosyn and switch to PO Augmentin (unfortunately E. Coli resistant to most PCN thus also using cipro). If remains afebrile would do 5 more days of Augmentin (stop date 10/17/18) and 9 more days of cipro (stop date 10/21/18--can be PO at discharge) for bacteremia.  Cont IV antibx pending full culture results      10/14: doing well on current antibx. Cont Augmentin for aspiration (stop date 10/17/18) and cipro for UTI (stop date 10/21/18). Wean O2 today

## 2018-10-14 NOTE — ASSESSMENT & PLAN NOTE
GE?  Cdiff?(no recent abx). Ordered in ED     10/14: stopped all stool cx ordered on admit  Resolved now and having constipation  KUB without obstruction

## 2018-10-14 NOTE — PT/OT/SLP PROGRESS
Physical Therapy Treatment    Patient Name:  Elvis Thompson   MRN:  [id number]    Recommendations:     Discharge Recommendations:      Discharge Equipment Recommendations:     Barriers to discharge: None    Assessment:     Elvis Thompson is a 85 y.o. male admitted with a medical diagnosis of Aspiration pneumonitis.  He presents with the following impairments/functional limitations:  weakness, impaired endurance, impaired functional mobilty, impaired self care skills, gait instability, decreased lower extremity function .; participation by patient was adequate today. Increased activity level ..    Rehab Prognosis:  good patient would benefit from acute skilled PT services to address these deficits and reach maximum level of function.      Recent Surgery: * No surgery found *      Plan:     During this hospitalization, patient to be seen (1-2x/day(M-F); PRN(Sat.,Sun.)) to address the above listed problems via gait training, therapeutic activities, therapeutic exercises  · Plan of Care Expires:  (upon d/c from facility)   Plan of Care Reviewed with: patient, family    Subjective     Communicated with patient and family prior to session.  Patient found supine upon PT entry to room, agreeable to treatment.      Chief Complaint: weakness and back pain  Patient comments/goals: I am breathing better  Pain/Comfort:  ·      Patients cultural, spiritual, [employer] conflicts given the current situation:      Objective:     Patient found with: oxygen, telemetry     General Precautions: Standard, fall   Orthopedic Precautions:N/A   Braces:       Functional Mobility:  · Bed Mobility:     · Rolling Left:  moderate assistance  · Rolling Right: moderate assistance  · Supine to Sit: moderate assistance  · Sit to Supine: moderate assistance  · Transfers:     · Sit to Stand:  minimum assistance with rolling walker  · Gait:  initiated 5 - 10 feet of gait activieies with RW and min / mod assist      AM-PAC 6 CLICK MOBILITY           Therapeutic Activities and Exercises:  Multiple bouts of assistive exercises for all four extremities to increase NM tone , motion for alll major joint excursions and functionality.   Sitting endurance task at bed side   Initiating and performance of short distrances walking with RW      Patient left right sidelying with all lines intact, call button in reach, bed alarm on, NSG notified and family members present present..    GOALS:   Multidisciplinary Problems     Physical Therapy Goals        Problem: Physical Therapy Goal    Goal Priority Disciplines Outcome Goal Variances Interventions   Physical Therapy Goal     PT, PT/OT Ongoing (interventions implemented as appropriate)     Description:  Goals to be met by: upon D/C from facility     Patient will increase functional independence with mobility by performin. Supine to sit with Contact Guard Assistance  2. Sit to supine with Contact Guard Assistance  3. Sit to stand transfer with Contact Guard Assistance  4. Bed to chair transfer with Contact Guard Assistance using Rolling Walker  5. Gait  x 50 feet with Minimal Assistance using Rolling Walker.              Problem: Physical Therapy Goal    Goal Priority Disciplines Outcome Goal Variances Interventions   Physical Therapy Goal     PT, PT/OT                      Time Tracking:     PT Received On: 10/13/18  PT Start Time: 0820     PT Stop Time: 0845  PT Total Time (min): 25 min     Billable Minutes: Gait Training 10 min, Therapeutic Exercise 15 min and Total Time 25 min    Treatment Type: Treatment  PT/PTA: PT           Kalen Georges, PT  10/14/2018

## 2018-10-14 NOTE — PLAN OF CARE
Problem: Patient Care Overview  Goal: Plan of Care Review  Outcome: Ongoing (interventions implemented as appropriate)  Patient resting in bed with some complaints of back pain. PRN Celebrex given with stated relief. Incontinent of urine. Family not at bedside but patient did not have any confusion. VS stable, A-fib on tele. VS stable on 3L NC, A&Ox4, No acute changes noted. Plan of care reviewed and agreed upon.

## 2018-10-14 NOTE — ASSESSMENT & PLAN NOTE
Resolved  Afefbrile, no leukocytosis  Due to aspiration pneumonia and UTI and E. Coli bacteremia    10/13: based on first set of sensitivites of blood cx growing E. Coli will switch to IV cipro today. Stop Zosyn and switch to PO Augmentin (unfortunately E. Coli resistant to most PCN thus also using cipro). If remains afebrile would do 5 more days of Augmentin (stop date 10/17/18) and 9 more days of cipro (stop date 10/21/18--can be PO at discharge) for bacteremia.  Cont IV antibx pending full culture results      10/14: doing well on current antibx. Cont Augmentin for aspiration (stop date 10/17/18) and cipro for UTI/bacteremia (stop date 10/21/18)

## 2018-10-15 ENCOUNTER — HOSPITAL ENCOUNTER (INPATIENT)
Facility: HOSPITAL | Age: 83
LOS: 10 days | Discharge: NURSING, REHAB OR LONG-TERM CARE | DRG: 947 | End: 2018-10-25
Attending: INTERNAL MEDICINE | Admitting: INTERNAL MEDICINE
Payer: MEDICARE

## 2018-10-15 VITALS
HEART RATE: 58 BPM | HEIGHT: 65 IN | DIASTOLIC BLOOD PRESSURE: 57 MMHG | WEIGHT: 202.81 LBS | OXYGEN SATURATION: 98 % | BODY MASS INDEX: 33.79 KG/M2 | TEMPERATURE: 97 F | SYSTOLIC BLOOD PRESSURE: 137 MMHG | RESPIRATION RATE: 18 BRPM

## 2018-10-15 DIAGNOSIS — I47.29 VENTRICULAR TACHYCARDIA, NON-SUSTAINED: ICD-10-CM

## 2018-10-15 DIAGNOSIS — A41.9 SEPSIS, DUE TO UNSPECIFIED ORGANISM: ICD-10-CM

## 2018-10-15 DIAGNOSIS — R53.81 DEBILITY: ICD-10-CM

## 2018-10-15 LAB
ANION GAP SERPL CALC-SCNC: 8 MMOL/L
BACTERIA BLD CULT: NORMAL
BASOPHILS # BLD AUTO: 0.04 K/UL
BASOPHILS NFR BLD: 0.5 %
BUN SERPL-MCNC: 11 MG/DL
CALCIUM SERPL-MCNC: 9 MG/DL
CHLORIDE SERPL-SCNC: 104 MMOL/L
CO2 SERPL-SCNC: 24 MMOL/L
CREAT SERPL-MCNC: 0.8 MG/DL
DIFFERENTIAL METHOD: ABNORMAL
EOSINOPHIL # BLD AUTO: 0.4 K/UL
EOSINOPHIL NFR BLD: 4.8 %
ERYTHROCYTE [DISTWIDTH] IN BLOOD BY AUTOMATED COUNT: 12.3 %
EST. GFR  (AFRICAN AMERICAN): >60 ML/MIN/1.73 M^2
EST. GFR  (NON AFRICAN AMERICAN): >60 ML/MIN/1.73 M^2
GLUCOSE SERPL-MCNC: 112 MG/DL
HCT VFR BLD AUTO: 36.1 %
HGB BLD-MCNC: 12.2 G/DL
INR PPP: 1.8
LYMPHOCYTES # BLD AUTO: 1 K/UL
LYMPHOCYTES NFR BLD: 12.8 %
MCH RBC QN AUTO: 31.4 PG
MCHC RBC AUTO-ENTMCNC: 33.8 G/DL
MCV RBC AUTO: 93 FL
MONOCYTES # BLD AUTO: 0.7 K/UL
MONOCYTES NFR BLD: 9.1 %
NEUTROPHILS # BLD AUTO: 5.8 K/UL
NEUTROPHILS NFR BLD: 72.8 %
PLATELET # BLD AUTO: 336 K/UL
PMV BLD AUTO: 9.5 FL
POTASSIUM SERPL-SCNC: 4.4 MMOL/L
PROTHROMBIN TIME: 18.2 SEC
RBC # BLD AUTO: 3.88 M/UL
SODIUM SERPL-SCNC: 136 MMOL/L
WBC # BLD AUTO: 7.91 K/UL

## 2018-10-15 PROCEDURE — 94761 N-INVAS EAR/PLS OXIMETRY MLT: CPT

## 2018-10-15 PROCEDURE — 63600175 PHARM REV CODE 636 W HCPCS: Performed by: INTERNAL MEDICINE

## 2018-10-15 PROCEDURE — 11000001 HC ACUTE MED/SURG PRIVATE ROOM

## 2018-10-15 PROCEDURE — 97116 GAIT TRAINING THERAPY: CPT

## 2018-10-15 PROCEDURE — 99233 SBSQ HOSP IP/OBS HIGH 50: CPT | Mod: ,,, | Performed by: INTERNAL MEDICINE

## 2018-10-15 PROCEDURE — 97110 THERAPEUTIC EXERCISES: CPT

## 2018-10-15 PROCEDURE — 99900035 HC TECH TIME PER 15 MIN (STAT)

## 2018-10-15 PROCEDURE — 97165 OT EVAL LOW COMPLEX 30 MIN: CPT

## 2018-10-15 PROCEDURE — 85025 COMPLETE CBC W/AUTO DIFF WBC: CPT

## 2018-10-15 PROCEDURE — 25000003 PHARM REV CODE 250: Performed by: SURGERY

## 2018-10-15 PROCEDURE — 94799 UNLISTED PULMONARY SVC/PX: CPT

## 2018-10-15 PROCEDURE — 25000242 PHARM REV CODE 250 ALT 637 W/ HCPCS: Performed by: FAMILY MEDICINE

## 2018-10-15 PROCEDURE — 94664 DEMO&/EVAL PT USE INHALER: CPT

## 2018-10-15 PROCEDURE — 25000003 PHARM REV CODE 250: Performed by: INTERNAL MEDICINE

## 2018-10-15 PROCEDURE — 27000221 HC OXYGEN, UP TO 24 HOURS

## 2018-10-15 PROCEDURE — 94760 N-INVAS EAR/PLS OXIMETRY 1: CPT

## 2018-10-15 PROCEDURE — 85610 PROTHROMBIN TIME: CPT

## 2018-10-15 PROCEDURE — 25000242 PHARM REV CODE 250 ALT 637 W/ HCPCS: Performed by: INTERNAL MEDICINE

## 2018-10-15 PROCEDURE — G8982 BODY POS GOAL STATUS: HCPCS | Mod: CL

## 2018-10-15 PROCEDURE — 25000003 PHARM REV CODE 250: Performed by: FAMILY MEDICINE

## 2018-10-15 PROCEDURE — 94640 AIRWAY INHALATION TREATMENT: CPT

## 2018-10-15 PROCEDURE — 97530 THERAPEUTIC ACTIVITIES: CPT

## 2018-10-15 PROCEDURE — G8983 BODY POS D/C STATUS: HCPCS | Mod: CL

## 2018-10-15 PROCEDURE — 36415 COLL VENOUS BLD VENIPUNCTURE: CPT

## 2018-10-15 PROCEDURE — 25000003 PHARM REV CODE 250: Performed by: NURSE PRACTITIONER

## 2018-10-15 PROCEDURE — 80048 BASIC METABOLIC PNL TOTAL CA: CPT

## 2018-10-15 RX ORDER — LEVALBUTEROL 1.25 MG/.5ML
1.25 SOLUTION, CONCENTRATE RESPIRATORY (INHALATION) EVERY 12 HOURS
Status: DISCONTINUED | OUTPATIENT
Start: 2018-10-15 | End: 2018-10-16

## 2018-10-15 RX ORDER — ONDANSETRON 2 MG/ML
4 INJECTION INTRAMUSCULAR; INTRAVENOUS EVERY 8 HOURS PRN
Status: DISCONTINUED | OUTPATIENT
Start: 2018-10-15 | End: 2018-10-25 | Stop reason: HOSPADM

## 2018-10-15 RX ORDER — POLYETHYLENE GLYCOL 3350 17 G/17G
17 POWDER, FOR SOLUTION ORAL DAILY
Status: CANCELLED | OUTPATIENT
Start: 2018-10-16

## 2018-10-15 RX ORDER — CELECOXIB 200 MG/1
200 CAPSULE ORAL DAILY PRN
Status: DISCONTINUED | OUTPATIENT
Start: 2018-10-15 | End: 2018-10-25 | Stop reason: HOSPADM

## 2018-10-15 RX ORDER — AMLODIPINE BESYLATE 5 MG/1
5 TABLET ORAL DAILY
Status: DISCONTINUED | OUTPATIENT
Start: 2018-10-16 | End: 2018-10-22

## 2018-10-15 RX ORDER — CLONIDINE HYDROCHLORIDE 0.1 MG/1
0.1 TABLET ORAL EVERY 6 HOURS PRN
Status: CANCELLED | OUTPATIENT
Start: 2018-10-15

## 2018-10-15 RX ORDER — AMOXICILLIN AND CLAVULANATE POTASSIUM 875; 125 MG/1; MG/1
1 TABLET, FILM COATED ORAL EVERY 12 HOURS
Status: CANCELLED | OUTPATIENT
Start: 2018-10-15

## 2018-10-15 RX ORDER — IPRATROPIUM BROMIDE 0.5 MG/2.5ML
0.5 SOLUTION RESPIRATORY (INHALATION) EVERY 6 HOURS
Status: CANCELLED | OUTPATIENT
Start: 2018-10-15

## 2018-10-15 RX ORDER — LEVALBUTEROL 1.25 MG/.5ML
1.25 SOLUTION, CONCENTRATE RESPIRATORY (INHALATION) EVERY 12 HOURS
Status: CANCELLED | OUTPATIENT
Start: 2018-10-15

## 2018-10-15 RX ORDER — AMLODIPINE BESYLATE 5 MG/1
5 TABLET ORAL DAILY
Status: CANCELLED | OUTPATIENT
Start: 2018-10-16

## 2018-10-15 RX ORDER — SENNOSIDES 8.6 MG/1
8.6 TABLET ORAL DAILY
Status: DISCONTINUED | OUTPATIENT
Start: 2018-10-16 | End: 2018-10-25 | Stop reason: HOSPADM

## 2018-10-15 RX ORDER — CIPROFLOXACIN 2 MG/ML
400 INJECTION, SOLUTION INTRAVENOUS
Status: DISCONTINUED | OUTPATIENT
Start: 2018-10-15 | End: 2018-10-23

## 2018-10-15 RX ORDER — CLONIDINE HYDROCHLORIDE 0.1 MG/1
0.1 TABLET ORAL EVERY 6 HOURS PRN
Status: DISCONTINUED | OUTPATIENT
Start: 2018-10-15 | End: 2018-10-25 | Stop reason: HOSPADM

## 2018-10-15 RX ORDER — ACETAMINOPHEN 325 MG/1
650 TABLET ORAL EVERY 8 HOURS PRN
Status: DISCONTINUED | OUTPATIENT
Start: 2018-10-15 | End: 2018-10-25 | Stop reason: HOSPADM

## 2018-10-15 RX ORDER — PANTOPRAZOLE SODIUM 40 MG/1
40 TABLET, DELAYED RELEASE ORAL DAILY
Status: DISCONTINUED | OUTPATIENT
Start: 2018-10-16 | End: 2018-10-25 | Stop reason: HOSPADM

## 2018-10-15 RX ORDER — CARBIDOPA AND LEVODOPA 25; 100 MG/1; MG/1
1 TABLET ORAL 4 TIMES DAILY
Status: CANCELLED | OUTPATIENT
Start: 2018-10-15

## 2018-10-15 RX ORDER — SENNOSIDES 8.6 MG/1
8.6 TABLET ORAL DAILY
Status: CANCELLED | OUTPATIENT
Start: 2018-10-16

## 2018-10-15 RX ORDER — LEVALBUTEROL 1.25 MG/.5ML
1.25 SOLUTION, CONCENTRATE RESPIRATORY (INHALATION) EVERY 6 HOURS PRN
Status: CANCELLED | OUTPATIENT
Start: 2018-10-15

## 2018-10-15 RX ORDER — ACETAMINOPHEN 325 MG/1
650 TABLET ORAL EVERY 8 HOURS PRN
Status: CANCELLED | OUTPATIENT
Start: 2018-10-15

## 2018-10-15 RX ORDER — AMOXICILLIN AND CLAVULANATE POTASSIUM 875; 125 MG/1; MG/1
1 TABLET, FILM COATED ORAL EVERY 12 HOURS
Status: DISPENSED | OUTPATIENT
Start: 2018-10-15 | End: 2018-10-17

## 2018-10-15 RX ORDER — CARBIDOPA AND LEVODOPA 25; 100 MG/1; MG/1
1 TABLET ORAL 4 TIMES DAILY
Status: DISCONTINUED | OUTPATIENT
Start: 2018-10-15 | End: 2018-10-25 | Stop reason: HOSPADM

## 2018-10-15 RX ORDER — ONDANSETRON 2 MG/ML
4 INJECTION INTRAMUSCULAR; INTRAVENOUS EVERY 8 HOURS PRN
Status: CANCELLED | OUTPATIENT
Start: 2018-10-15

## 2018-10-15 RX ORDER — IPRATROPIUM BROMIDE 0.5 MG/2.5ML
0.5 SOLUTION RESPIRATORY (INHALATION) EVERY 6 HOURS
Status: DISCONTINUED | OUTPATIENT
Start: 2018-10-15 | End: 2018-10-15

## 2018-10-15 RX ORDER — WARFARIN 1 MG/1
1 TABLET ORAL DAILY
Status: CANCELLED | OUTPATIENT
Start: 2018-10-15

## 2018-10-15 RX ORDER — WARFARIN 1 MG/1
1 TABLET ORAL DAILY
Status: DISCONTINUED | OUTPATIENT
Start: 2018-10-15 | End: 2018-10-16

## 2018-10-15 RX ORDER — CELECOXIB 200 MG/1
200 CAPSULE ORAL DAILY PRN
Status: CANCELLED | OUTPATIENT
Start: 2018-10-15

## 2018-10-15 RX ORDER — WARFARIN 1 MG/1
1 TABLET ORAL DAILY
Status: DISCONTINUED | OUTPATIENT
Start: 2018-10-15 | End: 2018-10-15 | Stop reason: HOSPADM

## 2018-10-15 RX ORDER — PANTOPRAZOLE SODIUM 40 MG/1
40 TABLET, DELAYED RELEASE ORAL DAILY
Status: CANCELLED | OUTPATIENT
Start: 2018-10-16

## 2018-10-15 RX ORDER — LEVALBUTEROL 1.25 MG/.5ML
1.25 SOLUTION, CONCENTRATE RESPIRATORY (INHALATION) EVERY 6 HOURS PRN
Status: DISCONTINUED | OUTPATIENT
Start: 2018-10-15 | End: 2018-10-25 | Stop reason: HOSPADM

## 2018-10-15 RX ORDER — IPRATROPIUM BROMIDE 0.5 MG/2.5ML
0.5 SOLUTION RESPIRATORY (INHALATION) EVERY 6 HOURS
Status: DISCONTINUED | OUTPATIENT
Start: 2018-10-16 | End: 2018-10-16

## 2018-10-15 RX ORDER — CIPROFLOXACIN 2 MG/ML
400 INJECTION, SOLUTION INTRAVENOUS
Status: CANCELLED | OUTPATIENT
Start: 2018-10-15

## 2018-10-15 RX ORDER — POLYETHYLENE GLYCOL 3350 17 G/17G
17 POWDER, FOR SOLUTION ORAL DAILY
Status: DISCONTINUED | OUTPATIENT
Start: 2018-10-16 | End: 2018-10-25 | Stop reason: HOSPADM

## 2018-10-15 RX ORDER — DOCUSATE SODIUM 100 MG/1
100 CAPSULE, LIQUID FILLED ORAL DAILY
Status: CANCELLED | OUTPATIENT
Start: 2018-10-16

## 2018-10-15 RX ORDER — DOCUSATE SODIUM 100 MG/1
100 CAPSULE, LIQUID FILLED ORAL DAILY
Status: DISCONTINUED | OUTPATIENT
Start: 2018-10-16 | End: 2018-10-25 | Stop reason: HOSPADM

## 2018-10-15 RX ADMIN — IPRATROPIUM BROMIDE 0.5 MG: 0.5 SOLUTION RESPIRATORY (INHALATION) at 07:10

## 2018-10-15 RX ADMIN — CARBIDOPA AND LEVODOPA 1 TABLET: 25; 100 TABLET ORAL at 02:10

## 2018-10-15 RX ADMIN — CARBIDOPA AND LEVODOPA 1 TABLET: 25; 100 TABLET ORAL at 08:10

## 2018-10-15 RX ADMIN — ACETAMINOPHEN 650 MG: 325 TABLET ORAL at 05:10

## 2018-10-15 RX ADMIN — AMLODIPINE BESYLATE 5 MG: 5 TABLET ORAL at 08:10

## 2018-10-15 RX ADMIN — LEVALBUTEROL 1.25 MG: 1.25 SOLUTION, CONCENTRATE RESPIRATORY (INHALATION) at 07:10

## 2018-10-15 RX ADMIN — SENNA 8.6 MG: 8.6 TABLET, COATED ORAL at 08:10

## 2018-10-15 RX ADMIN — AMOXICILLIN AND CLAVULANATE POTASSIUM 1 TABLET: 875; 125 TABLET, FILM COATED ORAL at 08:10

## 2018-10-15 RX ADMIN — WARFARIN SODIUM 1 MG: 1 TABLET ORAL at 05:10

## 2018-10-15 RX ADMIN — DOCUSATE SODIUM 100 MG: 100 CAPSULE, LIQUID FILLED ORAL at 08:10

## 2018-10-15 RX ADMIN — CIPROFLOXACIN 400 MG: 2 INJECTION, SOLUTION INTRAVENOUS at 08:10

## 2018-10-15 RX ADMIN — CARBIDOPA AND LEVODOPA 1 TABLET: 25; 100 TABLET ORAL at 05:10

## 2018-10-15 RX ADMIN — POLYETHYLENE GLYCOL 3350 17 G: 17 POWDER, FOR SOLUTION ORAL at 08:10

## 2018-10-15 RX ADMIN — PANTOPRAZOLE SODIUM 40 MG: 40 TABLET, DELAYED RELEASE ORAL at 08:10

## 2018-10-15 RX ADMIN — IPRATROPIUM BROMIDE 0.5 MG: 0.5 SOLUTION RESPIRATORY (INHALATION) at 02:10

## 2018-10-15 NOTE — ASSESSMENT & PLAN NOTE
Observe swallow -suspect this occurred during surgery/anesthesia, though      10/13: based on first set of sensitivites of blood cx growing E. Coli will switch to IV cipro today. Stop Zosyn and switch to PO Augmentin (unfortunately E. Coli resistant to most PCN thus also using cipro). If remains afebrile would do 5 more days of Augmentin (stop date 10/17/18) and 9 more days of cipro (stop date 10/21/18--can be PO at discharge) for bacteremia.  Cont IV antibx pending full culture results      10/14: doing well on current antibx. Cont Augmentin for aspiration (stop date 10/17/18) and cipro for UTI (stop date 10/21/18). Wean O2 today    10/15 Cont Augmentin for aspiration (stop date 10/17/18) and cipro for UTI/bacteremia (stop date 10/21/18). Wean O2 today

## 2018-10-15 NOTE — ASSESSMENT & PLAN NOTE
With recent hospitalization from surgery and now bacteremia/asp pneumonia he has became debilitated. Discussing with family swing vs nursing home placement for PT/OT. Case management and  working with family    10/15: discharge to SWING bed today for continued PT. Medically stable for SWING. Family in agreement

## 2018-10-15 NOTE — NURSING
Contacted Reese with Dr. Leach's office for consult. Informed Dr. Leach will be out of office until Thursday.

## 2018-10-15 NOTE — PLAN OF CARE
10/15/18 1409   Discharge Reassessment   Assessment Type Discharge Planning Reassessment         Choice paper signed for swing bed/skilled level of nursing.

## 2018-10-15 NOTE — ASSESSMENT & PLAN NOTE
INR trending up to 2.7  10/13: held coumadin    10/14: INR 2.5 hold tonight and resume tomorrow AM    Daily INR    No abnormal bleeding    10/15 restart coumadin, daily INR

## 2018-10-15 NOTE — SUBJECTIVE & OBJECTIVE
Review of Systems   Constitutional: Negative for chills and fever.   HENT: Negative for congestion, ear pain, postnasal drip, rhinorrhea, sore throat and trouble swallowing.    Eyes: Positive for visual disturbance (right eye blurred vision, chornic due to retina issue). Negative for redness and itching.   Respiratory: Negative for cough, shortness of breath and wheezing.    Cardiovascular: Negative for chest pain and palpitations.   Gastrointestinal: Negative for abdominal pain, diarrhea, nausea and vomiting.   Genitourinary: Negative for difficulty urinating, dysuria and frequency.   Skin: Negative for rash.   Neurological: Positive for weakness (generalized). Negative for headaches.     Objective:     Vital Signs (Most Recent):  Temp: 96.9 °F (36.1 °C) (10/15/18 0734)  Pulse: 61 (10/15/18 0812)  Resp: 17 (10/15/18 0734)  BP: (!) 142/63 (10/15/18 0734)  SpO2: 98 % (10/15/18 0734) Vital Signs (24h Range):  Temp:  [96.9 °F (36.1 °C)-98.4 °F (36.9 °C)] 96.9 °F (36.1 °C)  Pulse:  [60-92] 61  Resp:  [16-19] 17  SpO2:  [97 %-98 %] 98 %  BP: (126-163)/(62-75) 142/63     Weight: 92 kg (202 lb 13.2 oz)  Body mass index is 33.75 kg/m².    Physical Exam   Constitutional: He appears well-developed and well-nourished. No distress.   HENT:   Head: Normocephalic and atraumatic.   Right Ear: External ear normal.   Left Ear: External ear normal.   Eyes: Conjunctivae and EOM are normal. Pupils are equal, round, and reactive to light.   Neck: Normal range of motion. Neck supple. No tracheal deviation present.   Cardiovascular: Normal rate, regular rhythm and normal heart sounds.   Pulmonary/Chest: Effort normal and breath sounds normal. No respiratory distress. He has no wheezes.   Abdominal: Soft. He exhibits no distension. There is no tenderness.   +BS all 4 quadrants    Well healing incision to left groin   Musculoskeletal: Normal range of motion. He exhibits no edema.   Neurological: He is alert.   Skin: Skin is warm and dry.  No rash noted.   Incision c/d/i   Psychiatric: He has a normal mood and affect. His behavior is normal.   Nursing note and vitals reviewed.        CRANIAL NERVES     CN III, IV, VI   Pupils are equal, round, and reactive to light.  Extraocular motions are normal.        Significant Labs:   CBC:   Recent Labs   Lab  10/15/18   0531   WBC  7.91   HGB  12.2*   HCT  36.1*   PLT  336     CMP:   Recent Labs   Lab  10/14/18   0622  10/15/18   0531   NA  136  136   K  4.4  4.4   CL  104  104   CO2  27  24   GLU  106  112*   BUN  10  11   CREATININE  0.7  0.8   CALCIUM  8.8  9.0   ANIONGAP  5*  8   EGFRNONAA  >60  >60        Lab Results   Component Value Date    INR 1.8 (H) 10/15/2018    INR 2.5 (H) 10/14/2018    INR 2.7 (H) 10/13/2018     Lipase 24  Mag 1.8  Lactic acid 2.1>1.2  Recent Labs   Lab  10/09/18   1639   TROPONINI  0.023     BNP  Recent Labs   Lab  10/09/18   1625   BNP  230*     Flu negative  Blood cultures 10/9 - gram + TURICELLA OTITIDIS  Blood cultures 10/9 - gram negative ecoli sensitive to cipro  Blood cultures 10/9 - gram negative BACTEROIDES FRAGILIS   Blood cultures 10/9 NGTD    Significant Imaging:     10/10 CXR-No significant interval change of the bilateral pleural effusions and bilateral lower lobe alveolar infiltrates.  Cardiomegaly and suspected interstitial edema..  10/11/18 CXR- Interval worsening of the cardiopulmonary findings.  There is cardiomegaly with pulmonary vascular congestion and pulmonary edema.  Bilateral pleural effusions are suspected.  Left-sided pacemaker device remains in place.  The skeletal structures are intact.  10/11/18 KUB- Contrast material is seen in the colon from recent CT scan.  There is some gaseous distention of the bowel without evidence for definite obstruction.  No free air is seen.  Vascular calcifications are noted.

## 2018-10-15 NOTE — ASSESSMENT & PLAN NOTE
Due to GNR  E. Coli, sensitive to cipro  On zosyn on admit    10/13: based on first set of sensitivites of blood cx growing E. Coli will switch to IV cipro today. Stop Zosyn and switch to PO Augmentin (unfortunately E. Coli resistant to most PCN thus also using cipro). If remains afebrile would do 5 more days of Augmentin (stop date 10/17/18) and 9 more days of cipro (stop date 10/21/18--can be PO at discharge) for bacteremia.  Cont IV antibx pending full culture results      1 with Gram + cocci however I suspect this will be a contaminate  No fevers, no leukocytosis    10/14: doing well on current antibx. Cont Augmentin for aspiration (stop date 10/17/18) and cipro for UTI (stop date 10/21/18)    10/15 Cont Augmentin for aspiration (stop date 10/17/18) and cipro for UTI/bacteremia (stop date 10/21/18)

## 2018-10-15 NOTE — PLAN OF CARE
Problem: Patient Care Overview  Goal: Plan of Care Review  Outcome: Outcome(s) achieved Date Met: 10/15/18  Patient aware of plan of care. VS stable. Afebrile. IV antibiotics continued. Patient being discharged to swing. Free from falls/injuries. No questions or concerns at this time. Agrees with plan of care.

## 2018-10-15 NOTE — ASSESSMENT & PLAN NOTE
With recent hospitalization from surgery and now bacteremia/asp pneumonia he has became debilitated. Discussing with family swing vs nursing home placement for PT/OT. Case management and  working with family

## 2018-10-15 NOTE — PROGRESS NOTES
"[facility] Medicine  Progress Note    Patient Name: Elvis Thompson  MRN: [id number]  Patient Class: IP- Inpatient   Admission Date: 10/9/2018  Length of Stay: 6 days  Attending Physician: Dulce Mcmahon MD  Primary Care Provider: Regulo Martínez MD        Subjective:     Principal Problem:Aspiration pneumonitis    HPI:  85 year old male presents to ED with 14 hour h/o diarrhea. No n/v. No fever. Recent inguinal hernia repair Thursday.  He was kept overnight. Went home Fri. Had some wheezing and coughing post op per daughter. CXR with small bilateral infiltrates called by radiologist.  He meets sepsis criteria in ED. Lactate was normal. Admitted overnight for sepsis and started on Zosyn and VANC. Getting fluids at 125/hour. Pressures good, stable. Making urine.     Hospital Course:  10/11/18 afebrile WBC 13.26>10.08> 7.30  Day 3 abx; zosyn, Vanc- trough this am   /74 this am   Pt feeling "so so", states he couldn't breath last night.   BC x 4 sets; 2 of 4 positive GRAM NEGATIVE KALYANI, LACTOSE    Stools pending but pt has not had BM since admission; decreased bowel sounds; check KUB; re- consult general sx  NPO until after KUB viewed   Add PT    10/12/18 KUB yesterday Contrast material is seen in the colon from recent CT scan.  There is some gaseous distention of the bowel without evidence for definite obstruction.  No free air is seen.    Still No BM   Had 4 blood cultures drawn on 10/9 ; 2 are gram negative , one resulting Ecoli- sensitivity pending, one reslting gram +   Pt c/o some left side 'burning"  Day 4 abx  Family in process of evalution for possible nursing home placement    10/13: overall feeling well. Denies SOB, did not tolerate BipaP last night.  Still c/o left side "burning" pain that is intermittent  Still no BM  Had 4 blood cultures drawn on 10/9 ; 2 are gram negative , one resulting Ecoli- sensitive to rocephin, cipro, bactrim; one reslting gram + "   Remains very weak and not able to return home as not back to baseline functional status    10/14: no complaints this AM. Not requiring BiPaP. Will try to start weaning O2 today.  Still no BM but more gas. No nausea and tolerating PO  He is still very weak and not able to ambulate independently--considering SWING vs NH    10/15 now on oral augmenting and cipro IV for bacteremia . VSS/afebrile . No elevated WBC. Still on 2L NC. POX 98%.   He is doing well with PT. Progressing towards goals. He is ambulating 25ft using RW with min assist. Goal is 50ft.       Review of Systems   Constitutional: Negative for chills and fever.   HENT: Negative for congestion, ear pain, postnasal drip, rhinorrhea, sore throat and trouble swallowing.    Eyes: Positive for visual disturbance (right eye blurred vision, chornic due to retina issue). Negative for redness and itching.   Respiratory: Negative for cough, shortness of breath and wheezing.    Cardiovascular: Negative for chest pain and palpitations.   Gastrointestinal: Negative for abdominal pain, diarrhea, nausea and vomiting.   Genitourinary: Negative for difficulty urinating, dysuria and frequency.   Skin: Negative for rash.   Neurological: Positive for weakness (generalized). Negative for headaches.     Objective:     Vital Signs (Most Recent):  Temp: 96.9 °F (36.1 °C) (10/15/18 0734)  Pulse: 61 (10/15/18 0812)  Resp: 17 (10/15/18 0734)  BP: (!) 142/63 (10/15/18 0734)  SpO2: 98 % (10/15/18 0734) Vital Signs (24h Range):  Temp:  [96.9 °F (36.1 °C)-98.4 °F (36.9 °C)] 96.9 °F (36.1 °C)  Pulse:  [60-92] 61  Resp:  [16-19] 17  SpO2:  [97 %-98 %] 98 %  BP: (126-163)/(62-75) 142/63     Weight: 92 kg (202 lb 13.2 oz)  Body mass index is 33.75 kg/m².    Physical Exam   Constitutional: He appears well-developed and well-nourished. No distress.   HENT:   Head: Normocephalic and atraumatic.   Right Ear: External ear normal.   Left Ear: External ear normal.   Eyes: Conjunctivae and EOM are  normal. Pupils are equal, round, and reactive to light.   Neck: Normal range of motion. Neck supple. No tracheal deviation present.   Cardiovascular: Normal rate, regular rhythm and normal heart sounds.   Pulmonary/Chest: Effort normal and breath sounds normal. No respiratory distress. He has no wheezes.   Abdominal: Soft. He exhibits no distension. There is no tenderness.   +BS all 4 quadrants    Well healing incision to left groin   Musculoskeletal: Normal range of motion. He exhibits no edema.   Neurological: He is alert.   Skin: Skin is warm and dry. No rash noted.   Incision c/d/i   Psychiatric: He has a normal mood and affect. His behavior is normal.   Nursing note and vitals reviewed.        CRANIAL NERVES     CN III, IV, VI   Pupils are equal, round, and reactive to light.  Extraocular motions are normal.        Significant Labs:   CBC:   Recent Labs   Lab  10/15/18   0531   WBC  7.91   HGB  12.2*   HCT  36.1*   PLT  336     CMP:   Recent Labs   Lab  10/14/18   0622  10/15/18   0531   NA  136  136   K  4.4  4.4   CL  104  104   CO2  27  24   GLU  106  112*   BUN  10  11   CREATININE  0.7  0.8   CALCIUM  8.8  9.0   ANIONGAP  5*  8   EGFRNONAA  >60  >60        Lab Results   Component Value Date    INR 1.8 (H) 10/15/2018    INR 2.5 (H) 10/14/2018    INR 2.7 (H) 10/13/2018     Lipase 24  Mag 1.8  Lactic acid 2.1>1.2  Recent Labs   Lab  10/09/18   1639   TROPONINI  0.023     BNP  Recent Labs   Lab  10/09/18   1625   BNP  230*     Flu negative  Blood cultures 10/9 - gram + TURICELLA OTITIDIS  Blood cultures 10/9 - gram negative ecoli sensitive to cipro  Blood cultures 10/9 - gram negative BACTEROIDES FRAGILIS   Blood cultures 10/9 NGTD    Significant Imaging:     10/10 CXR-No significant interval change of the bilateral pleural effusions and bilateral lower lobe alveolar infiltrates.  Cardiomegaly and suspected interstitial edema..  10/11/18 CXR- Interval worsening of the cardiopulmonary findings.  There is  cardiomegaly with pulmonary vascular congestion and pulmonary edema.  Bilateral pleural effusions are suspected.  Left-sided pacemaker device remains in place.  The skeletal structures are intact.  10/11/18 KUB- Contrast material is seen in the colon from recent CT scan.  There is some gaseous distention of the bowel without evidence for definite obstruction.  No free air is seen.  Vascular calcifications are noted.     Assessment/Plan:      * Aspiration pneumonitis    Vanc and Zosyn on admit for HCAP  Order nebs BID   downdgrade to floor   Blood Culture - GNR   10/11/18 day 3 abx- trough this am  CXR yesterday No significant interval change of the bilateral pleural effusions and bilateral lower lobe alveolar infiltrates.  Cardiomegaly and suspected interstitial edema..  Cont on zosyn only. pulm has d/c'd other abx with + cultures. Seems most c/w aspiration pna    10/12/18 Zosyn day 4- will need 2 weeks ABX for bacteremia pending sensitivites.  Of note, 1 blood cx growing Gram + cocci but I suspect will be a contaminate. Dr. Leach d/c the Vancomycin and for now I will leave off but if any fevers/worsening will resume    10/13: based on first set of sensitivites of blood cx growing E. Coli will switch to IV cipro today. Stop Zosyn and switch to PO Augmentin (unfortunately E. Coli resistant to most PCN thus also using cipro). If remains afebrile would do 5 more days of Augmentin (stop date 10/17/18) and 9 more days of cipro (stop date 10/21/18--can be PO at discharge) for bacteremia.  Cont IV antibx pending full culture results      10/14: doing well on current antibx. Cont Augmentin for aspiration (stop date 10/17/18) and cipro for UTI (stop date 10/21/18)  Wean O2 today    10/15 Cont Augmentin for aspiration (stop date 10/17/18) and cipro for UTI/bacteremia (stop date 10/21/18) cont to try and wean O2          Debility    With recent hospitalization from surgery and now bacteremia/asp pneumonia he has became  debilitated. Discussing with family swing vs nursing home placement for PT/OT. Case management and  working with family          Chronic anticoagulation    INR trending up to 2.7  10/13: held coumadin    10/14: INR 2.5 hold tonight and resume tomorrow AM    Daily INR    No abnormal bleeding    10/15 restart coumadin, daily INR        Bacteremia    Due to GNR  E. Coli, sensitive to cipro  On zosyn on admit    10/13: based on first set of sensitivites of blood cx growing E. Coli will switch to IV cipro today. Stop Zosyn and switch to PO Augmentin (unfortunately E. Coli resistant to most PCN thus also using cipro). If remains afebrile would do 5 more days of Augmentin (stop date 10/17/18) and 9 more days of cipro (stop date 10/21/18--can be PO at discharge) for bacteremia.  Cont IV antibx pending full culture results      1 with Gram + cocci however I suspect this will be a contaminate  No fevers, no leukocytosis    10/14: doing well on current antibx. Cont Augmentin for aspiration (stop date 10/17/18) and cipro for UTI (stop date 10/21/18)    10/15 Cont Augmentin for aspiration (stop date 10/17/18) and cipro for UTI/bacteremia (stop date 10/21/18)          Aspiration pneumonia    Observe swallow -suspect this occurred during surgery/anesthesia, though      10/13: based on first set of sensitivites of blood cx growing E. Coli will switch to IV cipro today. Stop Zosyn and switch to PO Augmentin (unfortunately E. Coli resistant to most PCN thus also using cipro). If remains afebrile would do 5 more days of Augmentin (stop date 10/17/18) and 9 more days of cipro (stop date 10/21/18--can be PO at discharge) for bacteremia.  Cont IV antibx pending full culture results      10/14: doing well on current antibx. Cont Augmentin for aspiration (stop date 10/17/18) and cipro for UTI (stop date 10/21/18). Wean O2 today    10/15 Cont Augmentin for aspiration (stop date 10/17/18) and cipro for UTI/bacteremia (stop  date 10/21/18). Wean O2 today        Sepsis    Resolved  Afefbrile, no leukocytosis  Due to aspiration pneumonia and UTI and E. Coli bacteremia    10/13: based on first set of sensitivites of blood cx growing E. Coli will switch to IV cipro today. Stop Zosyn and switch to PO Augmentin (unfortunately E. Coli resistant to most PCN thus also using cipro). If remains afebrile would do 5 more days of Augmentin (stop date 10/17/18) and 9 more days of cipro (stop date 10/21/18--can be PO at discharge) for bacteremia.  Cont IV antibx pending full culture results    10/14: doing well on current antibx. Cont Augmentin for aspiration (stop date 10/17/18) and cipro for UTI/bacteremia (stop date 10/21/18)    10/15 Cont Augmentin for aspiration (stop date 10/17/18) and cipro for UTI/bacteremia (stop date 10/21/18        Diarrhea of presumed infectious origin    GE?  Cdiff?(no recent abx). Ordered in ED     10/14: stopped all stool cx ordered on admit  Resolved now and having constipation  KUB without obstruction          Parkinson disease    Continue his home sinemet  Order PT           Incarcerated left inguinal hernia    S/p surgery  Now with distention and decreased bowel sounds.  Check KUB- no obstruction    Senna, colace, miralax  Consider enema if needed  + BS and tolerating PO diet at this time          VTE Risk Mitigation (From admission, onward)        Ordered     warfarin (COUMADIN) tablet 1 mg  Daily      10/15/18 8923              Makeda Pham MD  Department of Hospital Medicine   [facility]

## 2018-10-15 NOTE — PT/OT/SLP PROGRESS
"Physical Therapy Treatment    Patient Name:  Elvis Thompson   MRN:  [id number]    Recommendations:     Discharge Recommendations:  nursing facility, skilled, home with home health, home health PT   Discharge Equipment Recommendations: none   Barriers to discharge: None    Assessment:     Elvis Thompson is a 85 y.o. male admitted with a medical diagnosis of Aspiration pneumonitis.  He presents with the following impairments/functional limitations:  weakness, impaired endurance, impaired self care skills, gait instability, impaired functional mobilty, impaired balance, decreased lower extremity function, decreased upper extremity function, decreased safety awareness . Pt progressing well with POC goals. Pt with increased ambulation distance today.    Rehab Prognosis:  Good; patient would benefit from acute skilled PT services to address these deficits and reach maximum level of function.      Recent Surgery: * No surgery found *      Plan:     During this hospitalization, patient to be seen (1-2x/day Monday-Friday; PRN Weekends/Holidays) to address the above listed problems via gait training, therapeutic activities, therapeutic exercises, neuromuscular re-education  · Plan of Care Expires:  (upon D/C from facility)   Plan of Care Reviewed with: patient, family    Subjective     Communicated with patient prior to session.  Patient found supine in bed upon PT entry to room, agreeable to treatment.      Chief Complaint: "Go home"  Patient comments/goals: "Get stronger and walk better"  Pain/Comfort:  · Pain Rating 1: 0/10    Patients cultural, spiritual, Bahai conflicts given the current situation:      Objective:     Patient found with: oxygen, telemetry     General Precautions: Standard, fall   Orthopedic Precautions:N/A   Braces: N/A     Functional Mobility:  · Bed Mobility:     · Rolling Left:  minimum assistance  · Rolling Right: minimum assistance  · Scooting: minimum assistance  · Supine to Sit: minimum " assistance  · Sit to Supine: minimum assistance  · Transfers:     · Sit to Stand:  contact guard assistance with rolling walker  · Bed to Chair: contact guard assistance with  rolling walker  using  Step Transfer  · Gait: Gait training x 50 feet with RW and min assist with cueing for upright posturing and proper use of AD.       AM-PAC 6 CLICK MOBILITY  Turning over in bed (including adjusting bedclothes, sheets and blankets)?: 2  Sitting down on and standing up from a chair with arms (e.g., wheelchair, bedside commode, etc.): 2  Moving from lying on back to sitting on the side of the bed?: 2  Moving to and from a bed to a chair (including a wheelchair)?: 2  Need to walk in hospital room?: 2  Climbing 3-5 steps with a railing?: 1  Basic Mobility Total Score: 11       Patient left up in chair with all lines intact, call button in reach, NSG notified and family present..    GOALS:   Multidisciplinary Problems     Physical Therapy Goals        Problem: Physical Therapy Goal    Goal Priority Disciplines Outcome Goal Variances Interventions   Physical Therapy Goal     PT, PT/OT Ongoing (interventions implemented as appropriate)     Description:  Goals to be met by: upon D/C from facility     Patient will increase functional independence with mobility by performin. Supine to sit with Contact Guard Assistance  2. Sit to supine with Contact Guard Assistance  3. Sit to stand transfer with Contact Guard Assistance  4. Bed to chair transfer with Contact Guard Assistance using Rolling Walker  5. Gait  x 50 feet with Minimal Assistance using Rolling Walker.              Problem: Physical Therapy Goal    Goal Priority Disciplines Outcome Goal Variances Interventions   Physical Therapy Goal     PT, PT/OT             Problem: Physical Therapy Goal    Goal Priority Disciplines Outcome Goal Variances Interventions   Physical Therapy Goal     PT, PT/OT      Description:  1.Advance activity level  As tolerated to include  walker  training of 50 ft intervals with min / mod assist                    Time Tracking:     PT Received On: 10/15/18  PT Start Time: 1127     PT Stop Time: 1150  PT Total Time (min): 23 min     Billable Minutes: Gait Training 13 minutes and Therapeutic Activity 10 minutes    Treatment Type: Treatment  PT/PTA: PT           Agatha Romo, PT  10/15/2018

## 2018-10-15 NOTE — PT/OT/SLP EVAL
Occupational Therapy   Evaluation    Name: Elvis Thompson  MRN: [id number]  Admitting Diagnosis:  Aspiration pneumonitis      Recommendations:     Discharge Recommendations: home with home health, home health PT, home health OT  Discharge Equipment Recommendations:   none  Barriers to discharge:   none    History:     Occupational Profile:  Living Environment: single family home with 24/7 sitter per pt  Previous level of function: required assistance with ADLs and IADLs  Equipment Used at Home:  walker, rolling, wheelchair, cane, quad, bedside commode, rollator  Assistance upon Discharge: dtr assisting in organizing plans once pt leavews hospital    Past Medical History:   Diagnosis Date    Anticoagulant long-term use     Bradycardia     HTN (hypertension)     Parkinson disease 03/24/2017    Pulmonary embolism     Shingles        Past Surgical History:   Procedure Laterality Date    CARDIAC PACEMAKER PLACEMENT      cataract      FOOT SURGERY      HAND SURGERY      HERNIA REPAIR      KNEE SURGERY      right knee replacement    MYELOGRAM N/A 3/27/2017    Performed by [facility] at [facility] OR    PROSTATE SURGERY      REPAIR, HERNIA, INGUINAL WITH PROLENE HERNIA SYSTEM (EXTENDED VERSION) Left 10/4/2018    Performed by Raman Garcia MD at [facility] OR       Subjective     Chief Complaint: burning pain in his sides  Patient/Family Comments/goals: pt states that he is unable to get his own shoes and socks on    Pain/Comfort:  · Pain Rating 1: 7/10(burning pain on bilateral sides of trunk which nurse and doctor have been notified of)    Patients cultural, spiritual, Baptist conflicts given the current situation:  not discussed    Objective:     Communicated with: pt prior to session.  Patient found all lines intact and call button in reach and (supine in bed with call button in reach and all lines intact) upon OT entry to room.    General Precautions: Standard, fall   Orthopedic Precautions:N/A  "  Braces: N/A     Occupational Performance:    Bed Mobility:    · Patient completed Rolling/Turning to Left with  minimum assistance  · Patient completed Rolling/Turning to Right with minimum assistance    Functional Mobility/Transfers:  · Patient completed Sit <> Stand Transfer with contact guard assistance  with  rolling walker  per PT    Activities of Daily Living:  · Feeding:  independence   · Grooming: set up assist in sitting  · Bathing: pt reports receiving bed baths from sitter  · Upper Body Dressing: min A  · Lower Body Dressing: max A for donning doffing socks    Cognitive/Visual Perceptual:  Oriented to person place time    Physical Exam:  BUE Strength: 4/5  BUE ROM: wfl      Treatment & Education:  edu on role and goal of OT and potential adaptive equipment that will help with LE dressing  Education:    Patient left supine with all lines intact call button in reach    Assessment:     Elvis Thompson is a 85 y.o. male with a medical diagnosis of Aspiration pneumonitis.  He presents with the following performance deficits affecting function: impaired endurance, impaired self care skills, weakness, impaired functional mobilty.      Rehab Prognosis: Fair; patient would benefit from acute skilled OT services to address these deficits and reach maximum level of function.         Clinical Decision Makin.  OT Low:  "Pt evaluation falls under low complexity for evaluation coding due to performance deficits noted in 1-3 areas as stated above and 0 co-morbities affecting current functional status. Data obtained from problem focused assessments. No modifications or assistance was required for completion of evaluation. Only brief occupational profile and history review completed."     Plan:     Patient to be seen (3-5 times per week) to address the above listed problems via self-care/home management, therapeutic activities, therapeutic exercises  · Plan of Care Expires: 10/29/18  · Plan of Care Reviewed with: " patient    This Plan of care has been discussed with the patient who was involved in its development and understands and is in agreement with the identified goals and treatment plan    GOALS:   Multidisciplinary Problems     Occupational Therapy Goals        Problem: Occupational Therapy Goal    Goal Priority Disciplines Outcome Interventions   Occupational Therapy Goal     OT, PT/OT     Description:  Goals to be met by: 10/29/18    Patient will increase functional independence with ADLs by performing:    UE Dressing with set up assistance.  LE Dressing with min A.  Grooming at sink with CGA.                        Time Tracking:     OT Date of Treatment: 10/15/18  OT Start Time: 1505  OT Stop Time: 1515  OT Total Time (min): 10 min    Billable Minutes:Evaluation 10 min    Daisy Sawyer OT  10/15/2018

## 2018-10-15 NOTE — DISCHARGE SUMMARY
"[facility] Medicine  Discharge Summary      Patient Name: Elvis Thompson  MRN: [id number]  Admission Date: 10/9/2018  Hospital Length of Stay: 6 days  Discharge Date and Time:  10/15/2018 1:46 PM  Attending Physician: Dulce Mcmahon MD   Discharging Provider: Makeda Pham MD  Primary Care Provider: Regulo Martínez MD      HPI:   85 year old male presents to ED with 14 hour h/o diarrhea. No n/v. No fever. Recent inguinal hernia repair Thursday.  He was kept overnight. Went home Fri. Had some wheezing and coughing post op per daughter. CXR with small bilateral infiltrates called by radiologist.  He meets sepsis criteria in ED. Lactate was normal. Admitted overnight for sepsis and started on Zosyn and VANC. Getting fluids at 125/hour. Pressures good, stable. Making urine.     * No surgery found *      Hospital Course:   10/11/18 afebrile WBC 13.26>10.08> 7.30  Day 3 abx; zosyn, Vanc- trough this am   /74 this am   Pt feeling "so so", states he couldn't breath last night.   BC x 4 sets; 2 of 4 positive GRAM NEGATIVE KALYANI, LACTOSE    Stools pending but pt has not had BM since admission; decreased bowel sounds; check KUB; re- consult general sx  NPO until after KUB viewed   Add PT    10/12/18 KUB yesterday Contrast material is seen in the colon from recent CT scan.  There is some gaseous distention of the bowel without evidence for definite obstruction.  No free air is seen.    Still No BM   Had 4 blood cultures drawn on 10/9 ; 2 are gram negative , one resulting Ecoli- sensitivity pending, one reslting gram +   Pt c/o some left side 'burning"  Day 4 abx  Family in process of evalution for possible nursing home placement    10/13: overall feeling well. Denies SOB, did not tolerate BipaP last night.  Still c/o left side "burning" pain that is intermittent  Still no BM  Had 4 blood cultures drawn on 10/9 ; 2 are gram negative , one resulting Ecoli- sensitive to rocephin, " cipro, bactrim; one reslting gram +   Remains very weak and not able to return home as not back to baseline functional status    10/14: no complaints this AM. Not requiring BiPaP. Will try to start weaning O2 today.  Still no BM but more gas. No nausea and tolerating PO  He is still very weak and not able to ambulate independently--considering SWING vs NH    10/15 now on oral augmenting and cipro IV for bacteremia . VSS/afebrile . No elevated WBC. Still on 2L NC. POX 98%.   He is doing well with PT. Progressing towards goals. He is ambulating 25ft using RW with min assist. Goal is 50ft.      Consults:   Consults (From admission, onward)        Status Ordering Provider     Inpatient consult to General surgery  Once     Provider:  Raman Garcia MD    Acknowledged GRACE ORTIZ     Inpatient consult to General Surgery  Once     Provider:  Raman Garcia MD    Acknowledged GREYSON ESPINOSA     Inpatient consult to Pulmonology  Once     Provider:  Reuben Leach MD    Acknowledged GRACE ORTIZ          * Aspiration pneumonitis    Vanc and Zosyn on admit for HCAP  Order nebs BID   downdgrade to floor   Blood Culture - GNR   10/11/18 day 3 abx- trough this am  CXR yesterday No significant interval change of the bilateral pleural effusions and bilateral lower lobe alveolar infiltrates.  Cardiomegaly and suspected interstitial edema..  Cont on zosyn only. pulm has d/c'd other abx with + cultures. Seems most c/w aspiration pna    10/12/18 Zosyn day 4- will need 2 weeks ABX for bacteremia pending sensitivites.  Of note, 1 blood cx growing Gram + cocci but I suspect will be a contaminate. Dr. Leach d/c the Vancomycin and for now I will leave off but if any fevers/worsening will resume    10/13: based on first set of sensitivites of blood cx growing E. Coli will switch to IV cipro today. Stop Zosyn and switch to PO Augmentin (unfortunately E. Coli resistant to most PCN thus also using cipro). If remains afebrile  would do 5 more days of Augmentin (stop date 10/17/18) and 9 more days of cipro (stop date 10/21/18--can be PO at discharge) for bacteremia.  Cont IV antibx pending full culture results      10/14: doing well on current antibx. Cont Augmentin for aspiration (stop date 10/17/18) and cipro for UTI (stop date 10/21/18)  Wean O2 today    10/15 Cont Augmentin for aspiration (stop date 10/17/18) and cipro for UTI/bacteremia (stop date 10/21/18) cont to try and wean O2          Debility    With recent hospitalization from surgery and now bacteremia/asp pneumonia he has became debilitated. Discussing with family swing vs nursing home placement for PT/OT. Case management and  working with family    10/15: discharge to SWING bed today for continued PT. Medically stable for SWING. Family in agreement          Chronic anticoagulation    INR trending up to 2.7  10/13: held coumadin    10/14: INR 2.5 hold tonight and resume tomorrow AM    Daily INR    No abnormal bleeding    10/15 restart coumadin, daily INR        Bacteremia    Due to GNR  E. Coli, sensitive to cipro  On zosyn on admit    10/13: based on first set of sensitivites of blood cx growing E. Coli will switch to IV cipro today. Stop Zosyn and switch to PO Augmentin (unfortunately E. Coli resistant to most PCN thus also using cipro). If remains afebrile would do 5 more days of Augmentin (stop date 10/17/18) and 9 more days of cipro (stop date 10/21/18--can be PO at discharge) for bacteremia.  Cont IV antibx pending full culture results      1 with Gram + cocci however I suspect this will be a contaminate  No fevers, no leukocytosis    10/14: doing well on current antibx. Cont Augmentin for aspiration (stop date 10/17/18) and cipro for UTI (stop date 10/21/18)    10/15 Cont Augmentin for aspiration (stop date 10/17/18) and cipro for UTI/bacteremia (stop date 10/21/18)          Aspiration pneumonia    Observe swallow -suspect this occurred during  surgery/anesthesia, though      10/13: based on first set of sensitivites of blood cx growing E. Coli will switch to IV cipro today. Stop Zosyn and switch to PO Augmentin (unfortunately E. Coli resistant to most PCN thus also using cipro). If remains afebrile would do 5 more days of Augmentin (stop date 10/17/18) and 9 more days of cipro (stop date 10/21/18--can be PO at discharge) for bacteremia.  Cont IV antibx pending full culture results      10/14: doing well on current antibx. Cont Augmentin for aspiration (stop date 10/17/18) and cipro for UTI (stop date 10/21/18). Wean O2 today    10/15 Cont Augmentin for aspiration (stop date 10/17/18) and cipro for UTI/bacteremia (stop date 10/21/18). Wean O2 today        Sepsis    Resolved  Afefbrile, no leukocytosis  Due to aspiration pneumonia and UTI and E. Coli bacteremia    10/13: based on first set of sensitivites of blood cx growing E. Coli will switch to IV cipro today. Stop Zosyn and switch to PO Augmentin (unfortunately E. Coli resistant to most PCN thus also using cipro). If remains afebrile would do 5 more days of Augmentin (stop date 10/17/18) and 9 more days of cipro (stop date 10/21/18--can be PO at discharge) for bacteremia.  Cont IV antibx pending full culture results    10/14: doing well on current antibx. Cont Augmentin for aspiration (stop date 10/17/18) and cipro for UTI/bacteremia (stop date 10/21/18)    10/15 Cont Augmentin for aspiration (stop date 10/17/18) and cipro for UTI/bacteremia (stop date 10/21/18        Diarrhea of presumed infectious origin    GE?  Cdiff?(no recent abx). Ordered in ED     10/15: stopped all stool cx ordered on admit  Resolved now and having constipation  KUB without obstruction          Parkinson disease    Continue his home sinemet  Order PT           Incarcerated left inguinal hernia    S/p surgery  Now with distention and decreased bowel sounds.  Check KUB- no obstruction    Senna, colace, miralax  Consider enema if  needed  + BS and tolerating PO diet at this time          Final Active Diagnoses:    Diagnosis Date Noted POA    PRINCIPAL PROBLEM:  Aspiration pneumonitis [J69.0] 10/10/2018 Yes    Debility [R53.81] 10/15/2018 Yes    Chronic anticoagulation [Z79.01] 10/14/2018 Not Applicable    Bacteremia [R78.81] 10/12/2018 Yes    Parkinson disease [G20] 10/10/2018 Yes    Diarrhea of presumed infectious origin [R19.7] 10/10/2018 Yes    Sepsis [A41.9] 10/10/2018 Yes    Aspiration pneumonia [J69.0] 10/10/2018 No    Incarcerated left inguinal hernia [K40.30] 10/04/2018 Yes      Problems Resolved During this Admission:    Diagnosis Date Noted Date Resolved POA    Sepsis [A41.9] 10/09/2018 10/10/2018 Yes       Discharged Condition: good    Disposition: Swing Bed    Follow Up:  Follow-up Information     Regulo Martínez MD.    Specialty:  Family Medicine  Why:  Outpatient Services  Contact information:  [address] [place] [id number] [phone]                 Patient Instructions:   No discharge procedures on file.    Significant Diagnostic Studies: Labs:   CMP   Recent Labs   Lab  10/14/18   0622  10/15/18   0531   NA  136  136   K  4.4  4.4   CL  104  104   CO2  27  24   GLU  106  112*   BUN  10  11   CREATININE  0.7  0.8   CALCIUM  8.8  9.0   ANIONGAP  5*  8   ESTGFRAFRICA  >60  >60   EGFRNONAA  >60  >60   , CBC   Recent Labs   Lab  10/15/18   0531   WBC  7.91   HGB  12.2*   HCT  36.1*   PLT  336    and All labs within the past 24 hours have been reviewed    Pending Diagnostic Studies:     None         Medications:  Reconciled Home Medications:      Medication List      ASK your doctor about these medications    amLODIPine 5 MG tablet  Commonly known as:  NORVASC  Take 5 mg by mouth once daily.     carbidopa-levodopa  mg  mg per tablet  Commonly known as:  SINEMET  Take 1 tablet by mouth 4 (four) times daily.     celecoxib 200 MG capsule  Commonly known as:  CeleBREX  Take 200 mg by mouth daily as needed for  Pain.     furosemide 20 MG tablet  Commonly known as:  LASIX  Take 20 mg by mouth daily as needed.     traMADol 50 mg tablet  Commonly known as:  ULTRAM  Take 1 tablet (50 mg total) by mouth every 6 (six) hours as needed for Pain.  Ask about: Should I take this medication?     warfarin 2 MG tablet  Commonly known as:  COUMADIN  Take 1 mg by mouth Daily.            Indwelling Lines/Drains at time of discharge:   Lines/Drains/Airways          None          Time spent on the discharge of patient: 35 minutes  Patient was seen and examined on the date of discharge and determined to be suitable for discharge.         Makeda Pham MD  Department of Hospital Medicine  [facility]

## 2018-10-15 NOTE — PLAN OF CARE
Problem: Patient Care Overview  Goal: Plan of Care Review  Outcome: Ongoing (interventions implemented as appropriate)  Patient aware of plan of care. VS stable. Afebrile. Patient admitted to swing. Free from falls/injuries. No questions or concerns at this time. Agrees with plan of care.

## 2018-10-15 NOTE — PT/OT/SLP PROGRESS
"Physical Therapy Treatment    Patient Name:  Elvis Thompson   MRN:  [id number]    Recommendations:     Discharge Recommendations:  home with home health, home health PT, nursing facility, skilled   Discharge Equipment Recommendations: none   Barriers to discharge: None    Assessment:     Elvis Thompson is a 85 y.o. male admitted with a medical diagnosis of Aspiration pneumonitis.  He presents with the following impairments/functional limitations:  weakness, impaired endurance, impaired self care skills, impaired functional mobilty, gait instability, decreased upper extremity function, decreased lower extremity function, impaired balance, decreased safety awareness . Pt refused to get OOB again this PM secondary to just getting back in bed from AM session. Pt tolerated therapeutic exercises well.    Rehab Prognosis:  Good; patient would benefit from acute skilled PT services to address these deficits and reach maximum level of function.      Recent Surgery: * No surgery found *      Plan:     During this hospitalization, patient to be seen (1-2x/day Monday-Friday; PRN Weekends/Holidays) to address the above listed problems via gait training, therapeutic activities, therapeutic exercises  · Plan of Care Expires:  (upon D/C from facility)   Plan of Care Reviewed with: patient    Subjective     Communicated with patient prior to session.  Patient found supine in bed upon PT entry to room, agreeable to treatment.      Chief Complaint: "I am tired"  Patient comments/goals: "Go home"  Pain/Comfort:  · Pain Rating 1: 0/10    Patients cultural, spiritual, Evangelical conflicts given the current situation:      Objective:     Patient found with: telemetry, oxygen     General Precautions: Standard, fall   Orthopedic Precautions:N/A   Braces: N/A     AM-PAC 6 CLICK MOBILITY  Turning over in bed (including adjusting bedclothes, sheets and blankets)?: 2  Sitting down on and standing up from a chair with arms (e.g., wheelchair, " bedside commode, etc.): 2  Moving from lying on back to sitting on the side of the bed?: 2  Moving to and from a bed to a chair (including a wheelchair)?: 2  Need to walk in hospital room?: 2  Climbing 3-5 steps with a railing?: 1  Basic Mobility Total Score: 11       Therapeutic Activities and Exercises:   Therapeutic exercises performed on BLE supine in bed at all joints in available planes of motion with rest breaks as needed to increased strength and endurance with all gross mobility skills.    Patient left supine with all lines intact, call button in reach and NSG notified..    GOALS:   Multidisciplinary Problems     Physical Therapy Goals        Problem: Physical Therapy Goal    Goal Priority Disciplines Outcome Goal Variances Interventions   Physical Therapy Goal     PT, PT/OT Ongoing (interventions implemented as appropriate)     Description:  Goals to be met by: upon D/C from facility     Patient will increase functional independence with mobility by performin. Supine to sit with Contact Guard Assistance  2. Sit to supine with Contact Guard Assistance  3. Sit to stand transfer with Contact Guard Assistance  4. Bed to chair transfer with Contact Guard Assistance using Rolling Walker  5. Gait  x 50 feet with Minimal Assistance using Rolling Walker.              Problem: Physical Therapy Goal    Goal Priority Disciplines Outcome Goal Variances Interventions   Physical Therapy Goal     PT, PT/OT             Problem: Physical Therapy Goal    Goal Priority Disciplines Outcome Goal Variances Interventions   Physical Therapy Goal     PT, PT/OT      Description:  1.Advance activity level  As tolerated to include  walker training of 50 ft intervals with min / mod assist                    Time Tracking:     PT Received On: 10/15/18  PT Start Time: 1353     PT Stop Time: 1408  PT Total Time (min): 15 min     Billable Minutes: Therapeutic Exercise 15 minutes    Treatment Type: Treatment  PT/PTA: PT            Agatha Romo, PT  10/15/2018

## 2018-10-15 NOTE — PROGRESS NOTES
Staff Handoff    Bedside report received from ROSEMARIE Arenas. VS stable. No distress noted. Afebrile. C/o bilateral side pain/burning sensation. No rash noted. 2L O2 per NC in use. Assessment complete per flowsheet. Call bell in reach. Will continue to monitor for any change in status.  Resident Handoff

## 2018-10-15 NOTE — ASSESSMENT & PLAN NOTE
Vanc and Zosyn on admit for HCAP  Order nebs BID   downdgrade to floor   Blood Culture - GNR   10/11/18 day 3 abx- trough this am  CXR yesterday No significant interval change of the bilateral pleural effusions and bilateral lower lobe alveolar infiltrates.  Cardiomegaly and suspected interstitial edema..  Cont on zosyn only. pulm has d/c'd other abx with + cultures. Seems most c/w aspiration pna    10/12/18 Zosyn day 4- will need 2 weeks ABX for bacteremia pending sensitivites.  Of note, 1 blood cx growing Gram + cocci but I suspect will be a contaminate. Dr. Leach d/c the Vancomycin and for now I will leave off but if any fevers/worsening will resume    10/13: based on first set of sensitivites of blood cx growing E. Coli will switch to IV cipro today. Stop Zosyn and switch to PO Augmentin (unfortunately E. Coli resistant to most PCN thus also using cipro). If remains afebrile would do 5 more days of Augmentin (stop date 10/17/18) and 9 more days of cipro (stop date 10/21/18--can be PO at discharge) for bacteremia.  Cont IV antibx pending full culture results      10/14: doing well on current antibx. Cont Augmentin for aspiration (stop date 10/17/18) and cipro for UTI (stop date 10/21/18)  Wean O2 today    10/15 Cont Augmentin for aspiration (stop date 10/17/18) and cipro for UTI/bacteremia (stop date 10/21/18) cont to try and wean O2

## 2018-10-15 NOTE — ASSESSMENT & PLAN NOTE
GE?  Cdiff?(no recent abx). Ordered in ED     10/15: stopped all stool cx ordered on admit  Resolved now and having constipation  KUB without obstruction

## 2018-10-15 NOTE — PLAN OF CARE
10/15/18 6056   Discharge Reassessment   Assessment Type Discharge Planning Reassessment         Spoke with patient about skilled level of nursing. He states he would like to do it here. Spoke with Erlinda, daughter, in great length and answered all the questions that were asked. She is re assured that he will be getting the same care with the same staff, but is also aware that an MD will not see the patient on a daily basis. She states her understanding and agreement, and states that doing our skilled bed here is the best option for him since he wants to stay. They would ultimately like for him to regain strength enough for him to go home with home health/sitters, or to an assist living, but they are open to long term care in a nursing home as a last resort. CM will continue to follow and assist as needed.

## 2018-10-15 NOTE — PLAN OF CARE
Problem: Patient Care Overview  Goal: Plan of Care Review  Outcome: Ongoing (interventions implemented as appropriate)  Patient resting with some complaints of pain in back and burning sensation across trunk. Some relief stated with Celebrex and tylenol. Incontinent of urine. Patient thought he may be able to have BM but just passed gas. O2 SATs ok with 2L NC. A-fib on tele. VS stable. A&Ox4. No acute changes Plan of care reviewed and agreed upon.

## 2018-10-15 NOTE — ASSESSMENT & PLAN NOTE
Resolved  Afefbrile, no leukocytosis  Due to aspiration pneumonia and UTI and E. Coli bacteremia    10/13: based on first set of sensitivites of blood cx growing E. Coli will switch to IV cipro today. Stop Zosyn and switch to PO Augmentin (unfortunately E. Coli resistant to most PCN thus also using cipro). If remains afebrile would do 5 more days of Augmentin (stop date 10/17/18) and 9 more days of cipro (stop date 10/21/18--can be PO at discharge) for bacteremia.  Cont IV antibx pending full culture results      10/14: doing well on current antibx. Cont Augmentin for aspiration (stop date 10/17/18) and cipro for UTI/bacteremia (stop date 10/21/18)    10/15 Cont Augmentin for aspiration (stop date 10/17/18) and cipro for UTI/bacteremia (stop date 10/21/18

## 2018-10-15 NOTE — PROGRESS NOTES
Patient is currently in session with physical therapy. Will offer pharmacy counseling at a later time.

## 2018-10-16 LAB
ANION GAP SERPL CALC-SCNC: 7 MMOL/L
BASOPHILS # BLD AUTO: 0.05 K/UL
BASOPHILS NFR BLD: 0.7 %
BUN SERPL-MCNC: 14 MG/DL
CALCIUM SERPL-MCNC: 9 MG/DL
CHLORIDE SERPL-SCNC: 104 MMOL/L
CO2 SERPL-SCNC: 24 MMOL/L
CREAT SERPL-MCNC: 0.7 MG/DL
DIFFERENTIAL METHOD: ABNORMAL
EOSINOPHIL # BLD AUTO: 0.4 K/UL
EOSINOPHIL NFR BLD: 5.4 %
ERYTHROCYTE [DISTWIDTH] IN BLOOD BY AUTOMATED COUNT: 12.5 %
EST. GFR  (AFRICAN AMERICAN): >60 ML/MIN/1.73 M^2
EST. GFR  (NON AFRICAN AMERICAN): >60 ML/MIN/1.73 M^2
GLUCOSE SERPL-MCNC: 105 MG/DL
HCT VFR BLD AUTO: 36.4 %
HGB BLD-MCNC: 12 G/DL
INR PPP: 1.6
LYMPHOCYTES # BLD AUTO: 1.2 K/UL
LYMPHOCYTES NFR BLD: 17.6 %
MCH RBC QN AUTO: 30.8 PG
MCHC RBC AUTO-ENTMCNC: 33 G/DL
MCV RBC AUTO: 94 FL
MONOCYTES # BLD AUTO: 0.6 K/UL
MONOCYTES NFR BLD: 8.8 %
NEUTROPHILS # BLD AUTO: 4.5 K/UL
NEUTROPHILS NFR BLD: 67.5 %
PLATELET # BLD AUTO: 347 K/UL
PMV BLD AUTO: 9.4 FL
POTASSIUM SERPL-SCNC: 4.3 MMOL/L
PROTHROMBIN TIME: 15.8 SEC
RBC # BLD AUTO: 3.89 M/UL
SODIUM SERPL-SCNC: 135 MMOL/L
WBC # BLD AUTO: 6.71 K/UL

## 2018-10-16 PROCEDURE — 97116 GAIT TRAINING THERAPY: CPT

## 2018-10-16 PROCEDURE — 86580 TB INTRADERMAL TEST: CPT | Performed by: NURSE PRACTITIONER

## 2018-10-16 PROCEDURE — G8978 MOBILITY CURRENT STATUS: HCPCS | Mod: CL

## 2018-10-16 PROCEDURE — 85025 COMPLETE CBC W/AUTO DIFF WBC: CPT

## 2018-10-16 PROCEDURE — 94761 N-INVAS EAR/PLS OXIMETRY MLT: CPT

## 2018-10-16 PROCEDURE — 63600175 PHARM REV CODE 636 W HCPCS: Performed by: INTERNAL MEDICINE

## 2018-10-16 PROCEDURE — 99900035 HC TECH TIME PER 15 MIN (STAT)

## 2018-10-16 PROCEDURE — G8979 MOBILITY GOAL STATUS: HCPCS | Mod: CK

## 2018-10-16 PROCEDURE — 63600175 PHARM REV CODE 636 W HCPCS: Performed by: NURSE PRACTITIONER

## 2018-10-16 PROCEDURE — 25000003 PHARM REV CODE 250: Performed by: NURSE PRACTITIONER

## 2018-10-16 PROCEDURE — 85610 PROTHROMBIN TIME: CPT

## 2018-10-16 PROCEDURE — 99222 PR INITIAL HOSPITAL CARE,LEVL II: ICD-10-PCS | Mod: AI,,, | Performed by: INTERNAL MEDICINE

## 2018-10-16 PROCEDURE — 80048 BASIC METABOLIC PNL TOTAL CA: CPT

## 2018-10-16 PROCEDURE — 97161 PT EVAL LOW COMPLEX 20 MIN: CPT

## 2018-10-16 PROCEDURE — 94664 DEMO&/EVAL PT USE INHALER: CPT

## 2018-10-16 PROCEDURE — 99222 1ST HOSP IP/OBS MODERATE 55: CPT | Mod: AI,,, | Performed by: INTERNAL MEDICINE

## 2018-10-16 PROCEDURE — 25000003 PHARM REV CODE 250: Performed by: INTERNAL MEDICINE

## 2018-10-16 PROCEDURE — 36415 COLL VENOUS BLD VENIPUNCTURE: CPT

## 2018-10-16 PROCEDURE — 94640 AIRWAY INHALATION TREATMENT: CPT

## 2018-10-16 PROCEDURE — 25000242 PHARM REV CODE 250 ALT 637 W/ HCPCS: Performed by: INTERNAL MEDICINE

## 2018-10-16 PROCEDURE — 11000004 HC SNF PRIVATE

## 2018-10-16 PROCEDURE — 94799 UNLISTED PULMONARY SVC/PX: CPT

## 2018-10-16 PROCEDURE — 97530 THERAPEUTIC ACTIVITIES: CPT

## 2018-10-16 RX ORDER — WARFARIN 1 MG/1
1 TABLET ORAL DAILY
Status: DISCONTINUED | OUTPATIENT
Start: 2018-10-17 | End: 2018-10-17

## 2018-10-16 RX ORDER — WARFARIN 2 MG/1
2 TABLET ORAL ONCE
Status: COMPLETED | OUTPATIENT
Start: 2018-10-16 | End: 2018-10-16

## 2018-10-16 RX ORDER — LEVALBUTEROL 1.25 MG/.5ML
1.25 SOLUTION, CONCENTRATE RESPIRATORY (INHALATION)
Status: DISCONTINUED | OUTPATIENT
Start: 2018-10-17 | End: 2018-10-25 | Stop reason: HOSPADM

## 2018-10-16 RX ORDER — IPRATROPIUM BROMIDE 0.5 MG/2.5ML
0.5 SOLUTION RESPIRATORY (INHALATION)
Status: DISCONTINUED | OUTPATIENT
Start: 2018-10-17 | End: 2018-10-25 | Stop reason: HOSPADM

## 2018-10-16 RX ADMIN — PANTOPRAZOLE SODIUM 40 MG: 40 TABLET, DELAYED RELEASE ORAL at 08:10

## 2018-10-16 RX ADMIN — CARBIDOPA AND LEVODOPA 1 TABLET: 25; 100 TABLET ORAL at 04:10

## 2018-10-16 RX ADMIN — AMOXICILLIN AND CLAVULANATE POTASSIUM 1 TABLET: 875; 125 TABLET, FILM COATED ORAL at 08:10

## 2018-10-16 RX ADMIN — IPRATROPIUM BROMIDE 0.5 MG: 0.5 SOLUTION RESPIRATORY (INHALATION) at 07:10

## 2018-10-16 RX ADMIN — TUBERCULIN PURIFIED PROTEIN DERIVATIVE 5 UNITS: 5 INJECTION INTRADERMAL at 12:10

## 2018-10-16 RX ADMIN — AMLODIPINE BESYLATE 5 MG: 5 TABLET ORAL at 08:10

## 2018-10-16 RX ADMIN — CIPROFLOXACIN 400 MG: 2 INJECTION, SOLUTION INTRAVENOUS at 08:10

## 2018-10-16 RX ADMIN — DOCUSATE SODIUM 100 MG: 100 CAPSULE, LIQUID FILLED ORAL at 08:10

## 2018-10-16 RX ADMIN — IPRATROPIUM BROMIDE 0.5 MG: 0.5 SOLUTION RESPIRATORY (INHALATION) at 01:10

## 2018-10-16 RX ADMIN — SENNA 8.6 MG: 8.6 TABLET, COATED ORAL at 08:10

## 2018-10-16 RX ADMIN — POLYETHYLENE GLYCOL 3350 17 G: 17 POWDER, FOR SOLUTION ORAL at 08:10

## 2018-10-16 RX ADMIN — LEVALBUTEROL 1.25 MG: 1.25 SOLUTION, CONCENTRATE RESPIRATORY (INHALATION) at 07:10

## 2018-10-16 RX ADMIN — CARBIDOPA AND LEVODOPA 1 TABLET: 25; 100 TABLET ORAL at 12:10

## 2018-10-16 RX ADMIN — IPRATROPIUM BROMIDE 0.5 MG: 0.5 SOLUTION RESPIRATORY (INHALATION) at 12:10

## 2018-10-16 RX ADMIN — CARBIDOPA AND LEVODOPA 1 TABLET: 25; 100 TABLET ORAL at 08:10

## 2018-10-16 RX ADMIN — WARFARIN SODIUM 2 MG: 2 TABLET ORAL at 04:10

## 2018-10-16 NOTE — PT/OT/SLP PROGRESS
Physical Therapy      Patient Name:  Elvis Thompson   MRN:  [id number]    Patient not seen today secondary to Unavailable (Comment)(Pt. discharged to SNF). Will follow-up as/if indicated.  Please see PT discharge note.    Lukas Georges, PT

## 2018-10-16 NOTE — PT/OT/SLP DISCHARGE
Physical Therapy Discharge Summary    Name: Elvis Thompson  MRN: [id number]   Principal Problem: Aspiration pneumonitis     Patient Discharged from acute Physical Therapy on 10/16/2018 (11:00 a.m.).  Please refer to prior PT noted date on 10/15/2018 for functional status.     Assessment:     Patient was discharged unexpectedly.  Information required to complete an accurate discharge summary is unknown.  Refer to therapy initial evaluation and last progress note for initial and most recent functional status and goal achievement.  Recommendations made may be found in medical record. Patient appropriate for care in another setting.    Objective:     GOALS:   Multidisciplinary Problems     Physical Therapy Goals        Problem: Physical Therapy Goal    Goal Priority Disciplines Outcome Goal Variances Interventions   Physical Therapy Goal     PT, PT/OT Unable to achieve outcome(s) by discharge     Description:  Goals to be met by: upon D/C from facility     Patient will increase functional independence with mobility by performin. Supine to sit with Contact Guard Assistance  2. Sit to supine with Contact Guard Assistance  3. Sit to stand transfer with Contact Guard Assistance  4. Bed to chair transfer with Contact Guard Assistance using Rolling Walker  5. Gait  x 50 feet with Minimal Assistance using Rolling Walker.              Problem: Physical Therapy Goal    Goal Priority Disciplines Outcome Goal Variances Interventions   Physical Therapy Goal     PT, PT/OT             Problem: Physical Therapy Goal    Goal Priority Disciplines Outcome Goal Variances Interventions   Physical Therapy Goal     PT, PT/OT      Description:  1.Advance activity level  As tolerated to include  walker training of 50 ft intervals with min / mod assist                    Reasons for Discontinuation of Therapy Services  Transfer to alternate level of care.      Plan:     Patient Discharged to: Skilled Nursing Facility.    Lukas Georges  PT  10/16/2018

## 2018-10-16 NOTE — ASSESSMENT & PLAN NOTE
10/14: stopped all stool cx ordered on admit  Resolved now and having constipation  KUB without obstruction    Has senna, colace and m,iralax, no BM, check for impaction

## 2018-10-16 NOTE — PLAN OF CARE
Problem: Patient Care Overview  Goal: Plan of Care Review  Outcome: Ongoing (interventions implemented as appropriate)  Patient rested well during shift. Room air. Tele A-Fib. IV antibiotics continued. Vitals stable. Neuro intact. Free from falls or injury. Plan of care reviewed with patient.

## 2018-10-16 NOTE — PLAN OF CARE
Problem: Patient Care Overview  Goal: Plan of Care Review  Outcome: Ongoing (interventions implemented as appropriate)  Respiratory treatments are Q6 and tolerated well. Pt. Refuses BIPAP. Oxygen was weaned off earlier in the day. Sao2 95% on room air.

## 2018-10-16 NOTE — PLAN OF CARE
Problem: Patient Care Overview  Goal: Plan of Care Review  Outcome: Ongoing (interventions implemented as appropriate)  Patient aware of plan of care. VS stable. Afebrile. A-fib on tele, rate controlled. PPD test to R forearm for possible NH/assisted living, needs to be read Thursday at 1230. sx consult to Dr. Gallagher this am. Still need to collect stool. Free from falls/injuries. No questions or concerns at this time. Agrees with plan of care.

## 2018-10-16 NOTE — PT/OT/SLP EVAL
"Physical Therapy Evaluation    Patient Name:  Elvis Thompson   MRN:  [id number]    Recommendations:     Discharge Recommendations:  home with home health, home health PT   Discharge Equipment Recommendations: none   Barriers to discharge: None    Assessment:     Elvis Thompson is a 85 y.o. male admitted with a medical diagnosis of Debility.  He presents with the following impairments/functional limitations:  weakness, gait instability, impaired balance, impaired endurance, decreased lower extremity function, impaired self care skills, impaired functional mobilty, decreased safety awareness, pain .  Pt. Would benefit from PT to increase independence with ADL's.  Pt. Is a fall risk secondary to PMH, decreased strength and mobility deficits.    Rehab Prognosis:  Fair+; patient would benefit from acute skilled PT services to address these deficits and reach maximum level of function.      Recent Surgery: * No surgery found *      Plan:     During this hospitalization, patient to be seen (1-2x/day(M-F); PRN(Sat.,Sun.)) to address the above listed problems via therapeutic exercises, therapeutic activities, gait training  · Plan of Care Expires:  (upon d/c from facility)   Plan of Care Reviewed with: patient, family    Subjective     Communicated with patient prior to session.  Patient found supine in bed upon PT entry to room, agreeable to evaluation.        Patient comments/goals: "My back hurts."  Pain/Comfort:  · Pain Rating 1: 4/10  · Location - Side 1: Bilateral  · Location - Orientation 1: generalized  · Location 1: back  · Pain Addressed 1: Reposition, Nurse notified  · Pain Rating Post-Intervention 1: 4/10    Patients cultural, spiritual, Mu-ism conflicts given the current situation:      Living Environment:  Pt. Lives at home with family.  [facility] with 24/7 sitter care.  Prior to admission, patients level of function was mod. dependent with ADL's.  Patient has the following equipment: walker, rolling, " wheelchair, cane, quad, bedside commode, rollator.  DME owned (not currently used): none.  Upon discharge, patient will have assistance from family and sitters.    Objective:     Patient found with: telemetry     General Precautions: Standard, fall   Orthopedic Precautions:N/A   Braces: N/A     Exams:  · Cognitive Exam:  Patient is oriented to Person, Place, Time and Situation  · Gross Motor Coordination:  Fair+  · Postural Exam:  Patient presented with the following abnormalities:    · -       Rounded shoulders  · -       Forward head  · RLE ROM: WFL  · RLE Strength: Deficits: Grossly 4/5  · LLE ROM: WFL  · LLE Strength: Deficits: Grossly 4/5    Functional Mobility:  · Bed Mobility:     · Rolling Left:  minimum assistance  · Rolling Right: minimum assistance  · Scooting: minimum assistance  · Bridging: minimum assistance  · Supine to Sit: minimum assistance  · Sit to Supine: minimum assistance  · Transfers:     · Sit to Stand:  minimum assistance with rolling walker  · Bed to Chair: minimum assistance with  rolling walker  using  Stand Pivot  · Toilet Transfer:  minimum assistance with  rolling walker  using  Stand Pivot  · Gait: Gait Training:  Pt. amb. 50' with RW with min. A.  VC's given to pt. for step placement, A.D. placement and postural reminders.  Balance:    Static Sit: FAIR+: Able to take MINIMAL challenges from all directions  Dynamic Sit: FAIR+: Maintains balance through MINIMAL excursions of active trunk motion  Static Stand: FAIR: Maintains without assist but unable to take challenges  · Dynamic stand: POOR+: Needs MIN (minimal ) assist during gait  ·     AM-PAC 6 CLICK MOBILITY  Total Score:11       Patient left up in chair with all lines intact, call button in reach and RN notified.    GOALS:   Multidisciplinary Problems     Physical Therapy Goals        Problem: Physical Therapy Goal    Goal Priority Disciplines Outcome Goal Variances Interventions   Physical Therapy Goal     PT, PT/OT       Description:  Goals to be met by: 10/30/2018     Patient will increase functional independence with mobility by performin. Supine to sit with Stand-by Assistance  2. Sit to supine with Stand-by Assistance  3. Rolling to Left and Right with Stand-by Assistance.  4. Sit to stand transfer with CGA  5. Bed to chair transfer with Contact Guard Assistance using Rolling Walker  6. Gait  x 100 feet with Contact Guard Assistance using Rolling Walker.                       History:     Past Medical History:   Diagnosis Date    Anticoagulant long-term use     Bradycardia     HTN (hypertension)     Parkinson disease 2017    Pulmonary embolism     Shingles        Past Surgical History:   Procedure Laterality Date    CARDIAC PACEMAKER PLACEMENT      cataract      FOOT SURGERY      HAND SURGERY      HERNIA REPAIR      KNEE SURGERY      right knee replacement    MYELOGRAM N/A 3/27/2017    Performed by [facility] at [facility] OR    PROSTATE SURGERY      REPAIR, HERNIA, INGUINAL WITH PROLENE HERNIA SYSTEM (EXTENDED VERSION) Left 10/4/2018    Performed by Raman Garcia MD at [facility] OR       Clinical Decision Making:     History  Co-morbidities and personal factors that may impact the plan of care Examination  Body Structures and Functions, activity limitations and participation restrictions that may impact the plan of care Clinical Presentation   Decision Making/ Complexity Score   Co-morbidities:   [] Time since onset of injury / illness / exacerbation  [] Status of current condition  []Patient's cognitive status and safety concerns    [x] Multiple Medical Problems (see med hx)  Personal Factors:   [] Patient's age  [] Prior Level of function   [] Patient's home situation (environment and family support)  [] Patient's level of motivation  [] Expected progression of patient      HISTORY:(criteria)    [] 19390 - no personal factors/history    [x] 25046 - has 1-2 personal factor/comorbidity     []  68133 - has >3 personal factor/comorbidity     Body Regions:  [] Objective examination findings  [] Head     []  Neck  [] Trunk   [] Upper Extremity  [x] Lower Extremity    Body Systems:  [] For communication ability, affect, cognition, language, and learning style: the assessment of the ability to make needs known, consciousness, orientation (person, place, and time), expected emotional /behavioral responses, and learning preferences (eg, learning barriers, education  needs)  [x] For the neuromuscular system: a general assessment of gross coordinated movement (eg, balance, gait, locomotion, transfers, and transitions) and motor function  (motor control and motor learning)  [] For the musculoskeletal system: the assessment of gross symmetry, gross range of motion, gross strength, height, and weight  [] For the integumentary system: the assessment of pliability(texture), presence of scar formation, skin color, and skin integrity  [] For cardiovascular/pulmonary system: the assessment of heart rate, respiratory rate, blood pressure, and edema     Activity limitations:    [] Patient's cognitive status and saf ety concerns          [] Status of current condition      [] Weight bearing restriction  [] Cardiopulmunary Restriction    Participation Restrictions:   [] Goals and goal agreement with the patient     [] Rehab potential (prognosis) and probable outcome      Examination of Body System: (criteria)    [x] 18608 - addressing 1-2 elements    [] 19988 - addressing a total of 3 or more elements     [] 33199 -  Addressing a total of 4 or more elements         Clinical Presentation: (criteria)  Stable - 83414     On examination of body system using standardized tests and measures patient presents with 1-2 elements from any of the following: body structures and functions, activity limitations, and/or participation restrictions.  Leading to a clinical presentation that is considered stable and/or  uncomplicated                              Clinical Decision Making  (Eval Complexity):  Low- 18824     Time Tracking:     PT Received On: 10/16/18  PT Start Time: 1105     PT Stop Time: 1135  PT Total Time (min): 30 min     Billable Minutes: Evaluation 15 minutes and Gait Training 15 minutes      Lukas Georges, PT  10/16/2018

## 2018-10-16 NOTE — PROGRESS NOTES
[facility]  General Surgery  Progress Note    Subjective:     Interval History:  Patient seen and examined this morning.  He denies any abdominal pain.  He is not having any nausea vomiting.  He has not had a bowel movement in quite a while.  Initially, he came in with severe diarrhea.  He is status post incarcerated left inguinal hernia repair with mesh approximately 10 days ago.  He seems now to be suffering from some constipation.  He is up and ambulating.    Post-Op Info:  * No surgery found *          Medications:  Continuous Infusions:  Scheduled Meds:   amLODIPine  5 mg Oral Daily    amoxicillin-clavulanate 875-125mg  1 tablet Oral Q12H    carbidopa-levodopa  mg  1 tablet Oral QID    ciprofloxacin  400 mg Intravenous Q12H    docusate sodium  100 mg Oral Daily    ipratropium  0.5 mg Nebulization Q6H    levalbuterol  1.25 mg Nebulization Q12H    pantoprazole  40 mg Oral Daily    polyethylene glycol  17 g Oral Daily    senna  8.6 mg Oral Daily    warfarin  1 mg Oral Daily     PRN Meds:acetaminophen, celecoxib, cloNIDine, levalbuterol, ondansetron, promethazine (PHENERGAN) IVPB     Objective:     Vital Signs (Most Recent):  Temp: 97.4 °F (36.3 °C) (10/16/18 0729)  Pulse: 62 (10/16/18 0744)  Resp: 20 (10/16/18 0744)  BP: (!) 148/83 (10/16/18 0729)  SpO2: (!) 94 % (10/16/18 0744) Vital Signs (24h Range):  Temp:  [96.2 °F (35.7 °C)-98.6 °F (37 °C)] 97.4 °F (36.3 °C)  Pulse:  [53-84] 62  Resp:  [16-20] 20  SpO2:  [93 %-99 %] 94 %  BP: (113-148)/(56-83) 148/83       Intake/Output Summary (Last 24 hours) at 10/16/2018 0912  Last data filed at 10/16/2018 0530  Gross per 24 hour   Intake 680 ml   Output 1 ml   Net 679 ml       Physical Exam   Constitutional: He is oriented to person, place, and time. He appears well-developed and well-nourished.   HENT:   Head: Normocephalic.   Eyes: Pupils are equal, round, and reactive to light.   Neck: Normal range of motion.   Pulmonary/Chest:  Effort normal.   Abdominal: Soft.   His incision in the left groin is healing well.  His abdomen is soft and nontender.   Musculoskeletal: Normal range of motion.   Neurological: He is alert and oriented to person, place, and time.   Skin: Skin is warm and dry.   Psychiatric: He has a normal mood and affect.   Nursing note and vitals reviewed.      Significant Labs:  BMP:   Recent Labs   Lab  10/16/18   0434   GLU  105   NA  135*   K  4.3   CL  104   CO2  24   BUN  14   CREATININE  0.7   CALCIUM  9.0     CBC:   Recent Labs   Lab  10/16/18   0434   WBC  6.71   RBC  3.89*   HGB  12.0*   HCT  36.4*   PLT  347   MCV  94   MCH  30.8   MCHC  33.0       Significant Diagnostics:  None    Assessment/Plan:     Active Diagnoses:    Diagnosis Date Noted POA    Sepsis [A41.9] 10/10/2018 Yes      Problems Resolved During this Admission:     Patient seems to be constipated now.  He is getting laxatives.  Continue ambulation.  I do not see any thing surgical at this time.    Calixto Mendenhall Jr, MD  General Surgery  [facility]

## 2018-10-16 NOTE — PT/OT/SLP PROGRESS
Occupational Therapy      Patient Name:  Elvis Thompson   MRN:  [id number]    Patient not seen today secondary to Pain(refused OT due to back pain). Will follow-up tomorrow.    Daisy Sawyer OT  10/16/2018

## 2018-10-16 NOTE — ASSESSMENT & PLAN NOTE
Coumadin held 10/14. Since then has been low although restarting home dose. Will give double dose tonight and resume 1mg daily dose tomorrow, cont to check INR daily

## 2018-10-16 NOTE — SUBJECTIVE & OBJECTIVE
Past Medical History:   Diagnosis Date    Anticoagulant long-term use     Bradycardia     HTN (hypertension)     Parkinson disease 03/24/2017    Pulmonary embolism     Shingles        Past Surgical History:   Procedure Laterality Date    CARDIAC PACEMAKER PLACEMENT      cataract      FOOT SURGERY      HAND SURGERY      HERNIA REPAIR      KNEE SURGERY      right knee replacement    MYELOGRAM N/A 3/27/2017    Performed by [facility] at [facility] OR    PROSTATE SURGERY      REPAIR, HERNIA, INGUINAL WITH PROLENE HERNIA SYSTEM (EXTENDED VERSION) Left 10/4/2018    Performed by Raman Garcia MD at [facility] OR       Review of patient's allergies indicates:   Allergen Reactions    Iodine and iodide containing products Other (See Comments)    Codeine Other (See Comments)     Insomnia      Morphine Nausea And Vomiting       Current Facility-Administered Medications on File Prior to Encounter   Medication    [DISCONTINUED] acetaminophen tablet 650 mg    [DISCONTINUED] amLODIPine tablet 5 mg    [DISCONTINUED] amoxicillin-clavulanate 875-125mg per tablet 1 tablet    [DISCONTINUED] carbidopa-levodopa  mg per tablet 1 tablet    [DISCONTINUED] celecoxib capsule 200 mg    [DISCONTINUED] ciprofloxacin (CIPRO)400mg/200ml D5W IVPB 400 mg    [DISCONTINUED] cloNIDine tablet 0.1 mg    [DISCONTINUED] docusate sodium capsule 100 mg    [DISCONTINUED] ipratropium 0.02 % nebulizer solution 0.5 mg    [DISCONTINUED] levalbuterol nebulizer solution 1.25 mg    [DISCONTINUED] levalbuterol nebulizer solution 1.25 mg    [DISCONTINUED] ondansetron injection 4 mg    [DISCONTINUED] pantoprazole EC tablet 40 mg    [DISCONTINUED] polyethylene glycol packet 17 g    [DISCONTINUED] promethazine (PHENERGAN) 12.5 mg in dextrose 5 % 50 mL IVPB    [DISCONTINUED] senna tablet 8.6 mg    [DISCONTINUED] warfarin (COUMADIN) tablet 1 mg     Current Outpatient Medications on File Prior to Encounter   Medication Sig     amlodipine (NORVASC) 5 MG tablet Take 5 mg by mouth once daily.    carbidopa-levodopa  mg (SINEMET)  mg per tablet Take 1 tablet by mouth 4 (four) times daily.    celecoxib (CELEBREX) 200 MG capsule Take 200 mg by mouth daily as needed for Pain.     furosemide (LASIX) 20 MG tablet Take 20 mg by mouth daily as needed.     warfarin (COUMADIN) 2 MG tablet Take 1 mg by mouth Daily.      Family History     Problem Relation (Age of Onset)    Heart disease Mother        Tobacco Use    Smoking status: Never Smoker    Smokeless tobacco: Never Used   Substance and Sexual Activity    Alcohol use: No    Drug use: Not on file    Sexual activity: Not Currently     Review of Systems   Constitutional: Negative for chills and fever.   HENT: Negative for congestion, ear pain, postnasal drip, rhinorrhea, sore throat and trouble swallowing.    Eyes: Positive for visual disturbance (right eye blurred vision, chornic due to retina issue). Negative for redness and itching.   Respiratory: Negative for cough, shortness of breath and wheezing.    Cardiovascular: Negative for chest pain and palpitations.   Gastrointestinal: Negative for abdominal pain, diarrhea, nausea and vomiting.   Genitourinary: Negative for difficulty urinating, dysuria and frequency.   Skin: Negative for rash.   Neurological: Positive for weakness (generalized). Negative for headaches.     Objective:     Vital Signs (Most Recent):  Temp: 97.4 °F (36.3 °C) (10/16/18 0729)  Pulse: 62 (10/16/18 0744)  Resp: 20 (10/16/18 0744)  BP: (!) 148/83 (10/16/18 0729)  SpO2: (!) 94 % (10/16/18 0744) Vital Signs (24h Range):  Temp:  [96.2 °F (35.7 °C)-98.6 °F (37 °C)] 97.4 °F (36.3 °C)  Pulse:  [53-84] 62  Resp:  [16-20] 20  SpO2:  [93 %-99 %] 94 %  BP: (113-148)/(56-83) 148/83     Weight: 87.3 kg (192 lb 7.4 oz)  Body mass index is 32.03 kg/m².    Physical Exam   Constitutional: He is oriented to person, place, and time. He appears well-developed and  well-nourished.   HENT:   Head: Normocephalic and atraumatic.   Nose: Nose normal.   Mouth/Throat: Oropharynx is clear and moist.   Eyes: Conjunctivae and EOM are normal. Pupils are equal, round, and reactive to light.   Neck: Normal range of motion. Neck supple. No JVD present. No thyromegaly present.   Cardiovascular: Normal rate, regular rhythm, normal heart sounds and intact distal pulses.   Pulmonary/Chest: Effort normal and breath sounds normal.   Abdominal: Soft. Bowel sounds are normal. He exhibits no distension and no mass. There is no tenderness. There is no guarding.   Musculoskeletal: Normal range of motion. He exhibits no edema.   Lymphadenopathy:     He has no cervical adenopathy.   Neurological: He is alert and oriented to person, place, and time. He has normal reflexes. No cranial nerve deficit.   Skin: Skin is warm and dry. No rash noted. No pallor.   Psychiatric: He has a normal mood and affect.   Nursing note and vitals reviewed.        CRANIAL NERVES     CN III, IV, VI   Pupils are equal, round, and reactive to light.  Extraocular motions are normal.        Significant Labs: Significant Labs:   CBC:       Recent Labs   Lab  10/15/18   0531   WBC  7.91   HGB  12.2*   HCT  36.1*   PLT  336      CMP:        Recent Labs   Lab  10/14/18   0622  10/15/18   0531   NA  136  136   K  4.4  4.4   CL  104  104   CO2  27  24   GLU  106  112*   BUN  10  11   CREATININE  0.7  0.8   CALCIUM  8.8  9.0   ANIONGAP  5*  8   EGFRNONAA  >60  >60               Lab Results   Component Value Date     INR 1.8 (H) 10/15/2018     INR 2.5 (H) 10/14/2018     INR 2.7 (H) 10/13/2018      Lipase 24  Mag 1.8  Lactic acid 2.1>1.2      Recent Labs   Lab  10/09/18   1639   TROPONINI  0.023      BNP      Recent Labs   Lab  10/09/18   1625   BNP  230*      Flu negative  Blood cultures 10/9 - gram + TURICELLA OTITIDIS  Blood cultures 10/9 - gram negative ecoli sensitive to cipro  Blood cultures 10/9 - gram negative BACTEROIDES FRAGILIS    Blood cultures 10/9 NGTD     Significant Imaging:      10/10 CXR-No significant interval change of the bilateral pleural effusions and bilateral lower lobe alveolar infiltrates.  Cardiomegaly and suspected interstitial edema..  10/11/18 CXR- Interval worsening of the cardiopulmonary findings.  There is cardiomegaly with pulmonary vascular congestion and pulmonary edema.  Bilateral pleural effusions are suspected.  Left-sided pacemaker device remains in place.  The skeletal structures are intact.  10/11/18 KUB- Contrast material is seen in the colon from recent CT scan.  There is some gaseous distention of the bowel without evidence for definite obstruction.  No free air is seen.  Vascular calcifications are noted.

## 2018-10-16 NOTE — PLAN OF CARE
Skilled level of care patient care conference held. Patient is progressing and wants to discharge to his home.

## 2018-10-16 NOTE — ASSESSMENT & PLAN NOTE
Cont PT/OT    With recent hospitalization from surgery and now bacteremia/asp pneumonia he has became debilitated. Discussing with family swing vs nursing home placement for PT/OT. Case management and  working with family

## 2018-10-16 NOTE — HPI
85 to male patient was admitted originally for asp pneumonia and + blood cultures. He is still on augmentin for the  Pneumonia. Not coughing, no need for O2 supplementation. He is also on cipro for ecoli bacteremia. No fever. No elevated WBC. Repeat blood cultures NGTD. He was placed on SWING for  Debility. He is doing well. Goal; Gait  x 50 feet with Minimal Assistance using Rolling Walker. He is at goal, will need to reassess his goals for functioning at [facility] with minimal help.

## 2018-10-16 NOTE — PROGRESS NOTES
Staff Handoff  Bedside report per ROSEMARIE Garcia. No distress noted. Vital signs stable. Daughter at bedside. Patient awake, alert, oriented. Room air. Tele A-Fib. Patient denies discomfort. Call bell in reach. Encouraged to call for assistance.     Resident Handoff

## 2018-10-16 NOTE — PLAN OF CARE
10/16/18 1220   Discharge Assessment   Assessment Type Discharge Planning Reassessment     Discussed discharge plan with Nu, patient's daughter. She and her sister agree patient may be able to return home where he lives alone and will have sitters around the clock. Patient and family are in agreement that safety is the primary concern for discharge. Daughters are looking into assisted living and possible nursing home placement as plan B.

## 2018-10-16 NOTE — PLAN OF CARE
10/16/18 0819   Final Note   Assessment Type Final Discharge Note   Discharge Disposition SNF   What phone number can be called within the next 1-3 days to see how you are doing after discharge? [phone]   Hospital Follow Up  Appt(s) scheduled? Yes   Discharge plans and expectations educations in teach back method with documentation complete? Yes   Right Care Referral Info   Post Acute Recommendation IRF  (Didin't meet IRF criteria. Went to SNF)   Referral Type SNF   Facility Name [facility] [address], [place] [address]

## 2018-10-16 NOTE — PT/OT/SLP PROGRESS
"Physical Therapy Treatment    Patient Name:  Elvis Thompson   MRN:  [id number]    Recommendations:     Discharge Recommendations:  home with home health, home health PT   Discharge Equipment Recommendations: none   Barriers to discharge: None    Assessment:     Elvis Thompson is a 85 y.o. male admitted with a medical diagnosis of Debility.  He presents with the following impairments/functional limitations:  weakness, gait instability, impaired balance, impaired endurance, impaired self care skills, impaired functional mobilty, pain, decreased safety awareness .  Pt. Is progressing towards PT POC goals.    Rehab Prognosis:  Fair; patient would benefit from acute skilled PT services to address these deficits and reach maximum level of function.      Recent Surgery: * No surgery found *      Plan:     During this hospitalization, patient to be seen (1-2x/day(M-F); PRN(Sat.,Sun.)) to address the above listed problems via gait training, therapeutic activities, therapeutic exercises  · Plan of Care Expires:  (upon d/c from facility)   Plan of Care Reviewed with: patient, family    Subjective     Communicated with patient prior to session.  Patient found seated in bedside chair upon PT entry to room, agreeable to treatment.      Chief Complaint: Back pain  Patient comments/goals: "I'm tired."  Pain/Comfort:  · Pain Rating 1: 4/10  · Location - Side 1: Bilateral  · Location - Orientation 1: generalized  · Location 1: back  · Pain Addressed 1: Nurse notified, Reposition  · Pain Rating Post-Intervention 1: 4/10    Patients cultural, spiritual, Tenriism conflicts given the current situation:      Objective:     Patient found with: telemetry     General Precautions: Standard, fall   Orthopedic Precautions:N/A   Braces: N/A     Functional Mobility:  · Bed Mobility:     · Rolling Left:  minimum assistance  · Rolling Right: minimum assistance  · Scooting: minimum assistance  · Bridging: minimum assistance  · Supine to Sit: " minimum assistance  · Sit to Supine: minimum assistance  · Transfers:     · Sit to Stand:  minimum assistance with rolling walker, x 3 bouts  · Bed to Chair: minimum assistance with  rolling walker  using  Stand Pivot  · Gait: Gait Training:  Pt. amb. 50' x 1 bout, 10' x 1 bout, with RW with min. A.  VC's given to pt. for step placement, A.D. placement and postural reminders.  Pt. demonstrated decreased velocity of gait mechanics, decreased weight shifting ability and decreased velocity.  Balance:    Static Sit: FAIR+: Able to take MINIMAL challenges from all directions  Dynamic Sit: FAIR+: Maintains balance through MINIMAL excursions of active trunk motion  Static Stand: FAIR: Maintains without assist but unable to take challenges  · Dynamic stand: POOR+: Needs MIN (minimal ) assist during gait  ·       AM-PAC 6 CLICK MOBILITY  Turning over in bed (including adjusting bedclothes, sheets and blankets)?: 2  Sitting down on and standing up from a chair with arms (e.g., wheelchair, bedside commode, etc.): 2  Moving from lying on back to sitting on the side of the bed?: 2  Moving to and from a bed to a chair (including a wheelchair)?: 2  Need to walk in hospital room?: 2  Climbing 3-5 steps with a railing?: 1  Basic Mobility Total Score: 11       Therapeutic Activities and Exercises:  There. Act.:  Transfer Training performed with assistance as noted.  VC's and manual guidance given to pt. for sequencing.  Trunk control tasks performed with CGA while pt. seated EOB and in bedside chair.  Weight shifting and weight acceptance tasks performed in standing with min. A.    Patient left HOB elevated with all lines intact, call button in reach and RN notified..    GOALS:   Multidisciplinary Problems     Physical Therapy Goals        Problem: Physical Therapy Goal    Goal Priority Disciplines Outcome Goal Variances Interventions   Physical Therapy Goal     PT, PT/OT Ongoing (interventions implemented as appropriate)      Description:  Goals to be met by: 10/30/2018     Patient will increase functional independence with mobility by performin. Supine to sit with Stand-by Assistance  2. Sit to supine with Stand-by Assistance  3. Rolling to Left and Right with Stand-by Assistance.  4. Sit to stand transfer with CGA  5. Bed to chair transfer with Contact Guard Assistance using Rolling Walker  6. Gait  x 100 feet with Contact Guard Assistance using Rolling Walker.                       Time Tracking:     PT Received On: 10/16/18  PT Start Time: 1259     PT Stop Time: 1324  PT Total Time (min): 25 min     Billable Minutes: Gait Training 15 minutes and Therapeutic Activity 10 minutes    Treatment Type: Treatment  PT/PTA: PT           Lukas Georges, PT  10/16/2018

## 2018-10-17 LAB
ANION GAP SERPL CALC-SCNC: 9 MMOL/L
BASOPHILS # BLD AUTO: 0.04 K/UL
BASOPHILS NFR BLD: 0.5 %
BUN SERPL-MCNC: 14 MG/DL
CALCIUM SERPL-MCNC: 8.8 MG/DL
CHLORIDE SERPL-SCNC: 103 MMOL/L
CO2 SERPL-SCNC: 23 MMOL/L
CREAT SERPL-MCNC: 0.7 MG/DL
DIFFERENTIAL METHOD: ABNORMAL
EOSINOPHIL # BLD AUTO: 0.3 K/UL
EOSINOPHIL NFR BLD: 3.1 %
ERYTHROCYTE [DISTWIDTH] IN BLOOD BY AUTOMATED COUNT: 12.5 %
EST. GFR  (AFRICAN AMERICAN): >60 ML/MIN/1.73 M^2
EST. GFR  (NON AFRICAN AMERICAN): >60 ML/MIN/1.73 M^2
GLUCOSE SERPL-MCNC: 105 MG/DL
HCT VFR BLD AUTO: 36.3 %
HGB BLD-MCNC: 12.3 G/DL
INR PPP: 1.7
LYMPHOCYTES # BLD AUTO: 1.3 K/UL
LYMPHOCYTES NFR BLD: 14.9 %
MCH RBC QN AUTO: 31.4 PG
MCHC RBC AUTO-ENTMCNC: 33.9 G/DL
MCV RBC AUTO: 93 FL
MONOCYTES # BLD AUTO: 0.8 K/UL
MONOCYTES NFR BLD: 9.7 %
NEUTROPHILS # BLD AUTO: 6.1 K/UL
NEUTROPHILS NFR BLD: 71.8 %
PLATELET # BLD AUTO: 342 K/UL
PMV BLD AUTO: 9.1 FL
POTASSIUM SERPL-SCNC: 4.2 MMOL/L
PROTHROMBIN TIME: 16.7 SEC
RBC # BLD AUTO: 3.92 M/UL
SODIUM SERPL-SCNC: 135 MMOL/L
WBC # BLD AUTO: 8.45 K/UL

## 2018-10-17 PROCEDURE — 36415 COLL VENOUS BLD VENIPUNCTURE: CPT

## 2018-10-17 PROCEDURE — 25000003 PHARM REV CODE 250: Performed by: NURSE PRACTITIONER

## 2018-10-17 PROCEDURE — 11000004 HC SNF PRIVATE

## 2018-10-17 PROCEDURE — 94640 AIRWAY INHALATION TREATMENT: CPT

## 2018-10-17 PROCEDURE — 80048 BASIC METABOLIC PNL TOTAL CA: CPT

## 2018-10-17 PROCEDURE — 63600175 PHARM REV CODE 636 W HCPCS: Performed by: INTERNAL MEDICINE

## 2018-10-17 PROCEDURE — 25000242 PHARM REV CODE 250 ALT 637 W/ HCPCS: Performed by: INTERNAL MEDICINE

## 2018-10-17 PROCEDURE — 97530 THERAPEUTIC ACTIVITIES: CPT

## 2018-10-17 PROCEDURE — 94761 N-INVAS EAR/PLS OXIMETRY MLT: CPT

## 2018-10-17 PROCEDURE — 85610 PROTHROMBIN TIME: CPT

## 2018-10-17 PROCEDURE — 25000003 PHARM REV CODE 250: Performed by: INTERNAL MEDICINE

## 2018-10-17 PROCEDURE — 99900035 HC TECH TIME PER 15 MIN (STAT)

## 2018-10-17 PROCEDURE — 97116 GAIT TRAINING THERAPY: CPT

## 2018-10-17 PROCEDURE — 85025 COMPLETE CBC W/AUTO DIFF WBC: CPT

## 2018-10-17 PROCEDURE — 94799 UNLISTED PULMONARY SVC/PX: CPT

## 2018-10-17 PROCEDURE — 94664 DEMO&/EVAL PT USE INHALER: CPT

## 2018-10-17 RX ORDER — WARFARIN 1 MG/1
1 TABLET ORAL DAILY
Status: DISCONTINUED | OUTPATIENT
Start: 2018-10-18 | End: 2018-10-18

## 2018-10-17 RX ORDER — WARFARIN 2 MG/1
2 TABLET ORAL ONCE
Status: COMPLETED | OUTPATIENT
Start: 2018-10-17 | End: 2018-10-17

## 2018-10-17 RX ADMIN — IPRATROPIUM BROMIDE 0.5 MG: 0.5 SOLUTION RESPIRATORY (INHALATION) at 02:10

## 2018-10-17 RX ADMIN — LEVALBUTEROL 1.25 MG: 1.25 SOLUTION, CONCENTRATE RESPIRATORY (INHALATION) at 02:10

## 2018-10-17 RX ADMIN — LEVALBUTEROL 1.25 MG: 1.25 SOLUTION, CONCENTRATE RESPIRATORY (INHALATION) at 07:10

## 2018-10-17 RX ADMIN — CARBIDOPA AND LEVODOPA 1 TABLET: 25; 100 TABLET ORAL at 05:10

## 2018-10-17 RX ADMIN — CARBIDOPA AND LEVODOPA 1 TABLET: 25; 100 TABLET ORAL at 09:10

## 2018-10-17 RX ADMIN — AMLODIPINE BESYLATE 5 MG: 5 TABLET ORAL at 11:10

## 2018-10-17 RX ADMIN — WARFARIN SODIUM 2 MG: 2 TABLET ORAL at 05:10

## 2018-10-17 RX ADMIN — SENNA 8.6 MG: 8.6 TABLET, COATED ORAL at 11:10

## 2018-10-17 RX ADMIN — DOCUSATE SODIUM 100 MG: 100 CAPSULE, LIQUID FILLED ORAL at 11:10

## 2018-10-17 RX ADMIN — CARBIDOPA AND LEVODOPA 1 TABLET: 25; 100 TABLET ORAL at 02:10

## 2018-10-17 RX ADMIN — CELECOXIB 200 MG: 200 CAPSULE ORAL at 09:10

## 2018-10-17 RX ADMIN — IPRATROPIUM BROMIDE 0.5 MG: 0.5 SOLUTION RESPIRATORY (INHALATION) at 07:10

## 2018-10-17 RX ADMIN — CIPROFLOXACIN 400 MG: 2 INJECTION, SOLUTION INTRAVENOUS at 08:10

## 2018-10-17 RX ADMIN — CIPROFLOXACIN 400 MG: 2 INJECTION, SOLUTION INTRAVENOUS at 11:10

## 2018-10-17 RX ADMIN — CARBIDOPA AND LEVODOPA 1 TABLET: 25; 100 TABLET ORAL at 11:10

## 2018-10-17 RX ADMIN — PANTOPRAZOLE SODIUM 40 MG: 40 TABLET, DELAYED RELEASE ORAL at 11:10

## 2018-10-17 RX ADMIN — ACETAMINOPHEN 650 MG: 325 TABLET ORAL at 11:10

## 2018-10-17 RX ADMIN — AMOXICILLIN AND CLAVULANATE POTASSIUM 1 TABLET: 875; 125 TABLET, FILM COATED ORAL at 11:10

## 2018-10-17 RX ADMIN — ACETAMINOPHEN 650 MG: 325 TABLET ORAL at 02:10

## 2018-10-17 RX ADMIN — POLYETHYLENE GLYCOL 3350 17 G: 17 POWDER, FOR SOLUTION ORAL at 11:10

## 2018-10-17 NOTE — PT/OT/SLP PROGRESS
"Physical Therapy Treatment    Patient Name:  Elvis Thompson   MRN:  [id number]    Recommendations:     Discharge Recommendations:  home with home health, home health PT   Discharge Equipment Recommendations: none   Barriers to discharge: None    Assessment:     Elvis Thompson is a 85 y.o. male admitted with a medical diagnosis of Debility.  He presents with the following impairments/functional limitations:  weakness, impaired endurance, impaired self care skills, impaired functional mobilty, gait instability, decreased safety awareness, decreased upper extremity function, decreased lower extremity function, impaired balance . Pt with new onset of flank pain limiting mobility today.    Rehab Prognosis:  Good; patient would benefit from acute skilled PT services to address these deficits and reach maximum level of function.      Recent Surgery: * No surgery found *      Plan:     During this hospitalization, patient to be seen (1-2x/day Monday-Friday; PRN Weekends/Holidays) to address the above listed problems via gait training, therapeutic activities, therapeutic exercises  · Plan of Care Expires:  (upon D/C from facility)   Plan of Care Reviewed with: patient    Subjective     Communicated with patient prior to session.  Patient found supine in bed with ice pack on abdomen secondary to pain upon PT entry to room, agreeable to treatment.      Chief Complaint: "My sides hurt today"  Patient comments/goals: "Go home"  Pain/Comfort:  · Pain Rating 1: 5/10  · Location - Side 1: Bilateral  · Location - Orientation 1: generalized  · Location 1: flank  · Pain Addressed 1: Nurse notified  · Pain Rating Post-Intervention 1: 5/10    Patients cultural, spiritual, Rastafarian conflicts given the current situation:      Objective:     Patient found with: telemetry     General Precautions: Standard, fall   Orthopedic Precautions:N/A   Braces: N/A     Functional Mobility:  · Bed Mobility:     · Rolling Left:  moderate " assistance  · Rolling Right: moderate assistance  · Scooting: moderate assistance  · Supine to Sit: moderate assistance  · Sit to Supine: moderate assistance  · Transfers:     · Sit to Stand:  minimum assistance with rolling walker  · Gait: Lateral stepping at EOB in bilateral directions with RW and min assist with cueing for sequencing and proper use of AD to decrease pt fall risk.       AM-PAC 6 CLICK MOBILITY  Turning over in bed (including adjusting bedclothes, sheets and blankets)?: 2  Sitting down on and standing up from a chair with arms (e.g., wheelchair, bedside commode, etc.): 3  Moving from lying on back to sitting on the side of the bed?: 2  Moving to and from a bed to a chair (including a wheelchair)?: 3  Need to walk in hospital room?: 2  Climbing 3-5 steps with a railing?: 1  Basic Mobility Total Score: 13       Patient left supine with all lines intact, call button in reach and NSG notified..    GOALS:   Multidisciplinary Problems     Physical Therapy Goals        Problem: Physical Therapy Goal    Goal Priority Disciplines Outcome Goal Variances Interventions   Physical Therapy Goal     PT, PT/OT Ongoing (interventions implemented as appropriate)     Description:  Goals to be met by: 10/30/2018     Patient will increase functional independence with mobility by performin. Supine to sit with Stand-by Assistance  2. Sit to supine with Stand-by Assistance  3. Rolling to Left and Right with Stand-by Assistance.  4. Sit to stand transfer with CGA  5. Bed to chair transfer with Contact Guard Assistance using Rolling Walker  6. Gait  x 100 feet with Contact Guard Assistance using Rolling Walker.                       Time Tracking:     PT Received On: 10/17/18  PT Start Time: 1012     PT Stop Time: 1035  PT Total Time (min): 23 min     Billable Minutes: Therapeutic Activity 23 minutes    Treatment Type: Treatment  PT/PTA: PT           Agatha Romo, PT  10/17/2018

## 2018-10-17 NOTE — NURSING
VS stable. Patient with complaints of bilateral flank burning, resolved with Tylenol. Incontinent care provided. AAOx3. Patient aware of plan of care; no questions noted.

## 2018-10-17 NOTE — PLAN OF CARE
Problem: Patient Care Overview  Goal: Plan of Care Review  Outcome: Ongoing (interventions implemented as appropriate)  Vitals remained stable, afebrile. Complained of burning on left side by hernia repair incision. Work with PT. Rested in bed. Pt did not want to do activity today due to burning in abdomen. Eating well. Uneventful shift. Discussed plan of care, stated understanging

## 2018-10-17 NOTE — PT/OT/SLP PROGRESS
"Physical Therapy Treatment    Patient Name:  Elvis Thompson   MRN:  [id number]    Recommendations:     Discharge Recommendations:  home with home health, home health PT   Discharge Equipment Recommendations: none   Barriers to discharge: None    Assessment:     Elvis Thompson is a 85 y.o. male admitted with a medical diagnosis of Debility.  He presents with the following impairments/functional limitations:  weakness, impaired endurance, impaired self care skills, impaired functional mobilty, gait instability, decreased upper extremity function, decreased lower extremity function, impaired balance, decreased safety awareness . Pt progressing well with POC goals.    Rehab Prognosis:  Good; patient would benefit from acute skilled PT services to address these deficits and reach maximum level of function.      Recent Surgery: * No surgery found *      Plan:     During this hospitalization, patient to be seen (1-2x/day Monday-Friday; PRN Weekends/Holidays) to address the above listed problems via gait training, therapeutic activities, therapeutic exercises  · Plan of Care Expires:  (upon D/C from facility)   Plan of Care Reviewed with: patient    Subjective     Communicated with patient prior to session.  Patient found supine in bed upon PT entry to room, agreeable to treatment.      Chief Complaint: Pt with no c/o  Patient comments/goals: "Go home"  Pain/Comfort:  · Pain Rating 1: 0/10  · Location - Side 1: Bilateral  · Location - Orientation 1: generalized  · Location 1: flank  · Pain Addressed 1: Nurse notified  · Pain Rating Post-Intervention 1: 5/10    Patients cultural, spiritual, Quaker conflicts given the current situation:      Objective:     Patient found with: telemetry     General Precautions: Standard, fall   Orthopedic Precautions:N/A   Braces: N/A     Functional Mobility:  · Bed Mobility:     · Rolling Left:  moderate assistance  · Rolling Right: moderate assistance  · Scooting: moderate " assistance  · Supine to Sit: moderate assistance  · Sit to Supine: moderate assistance  · Transfers:     · Sit to Stand:  minimum assistance with rolling walker  · Toilet Transfer: minimum assistance with  rolling walker  using  Step Transfer  · Gait: Gait training x 40 feet with RW and min assist with cueing for gait sequencing and upright posturing to decrease pt fall risk.       AM-PAC 6 CLICK MOBILITY  Turning over in bed (including adjusting bedclothes, sheets and blankets)?: 2  Sitting down on and standing up from a chair with arms (e.g., wheelchair, bedside commode, etc.): 3  Moving from lying on back to sitting on the side of the bed?: 2  Moving to and from a bed to a chair (including a wheelchair)?: 3  Need to walk in hospital room?: 3  Climbing 3-5 steps with a railing?: 1  Basic Mobility Total Score: 14     Patient left up in chair with all lines intact, call button in reach and NSG notified..    GOALS:   Multidisciplinary Problems     Physical Therapy Goals        Problem: Physical Therapy Goal    Goal Priority Disciplines Outcome Goal Variances Interventions   Physical Therapy Goal     PT, PT/OT Ongoing (interventions implemented as appropriate)     Description:  Goals to be met by: 10/30/2018     Patient will increase functional independence with mobility by performin. Supine to sit with Stand-by Assistance  2. Sit to supine with Stand-by Assistance  3. Rolling to Left and Right with Stand-by Assistance.  4. Sit to stand transfer with CGA  5. Bed to chair transfer with Contact Guard Assistance using Rolling Walker  6. Gait  x 100 feet with Contact Guard Assistance using Rolling Walker.                       Time Tracking:     PT Received On: 10/17/18  PT Start Time: 1353     PT Stop Time: 1416  PT Total Time (min): 23 min     Billable Minutes: Gait Training 13 minutes and Therapeutic Activity 10 minutes    Treatment Type: Treatment  PT/PTA: PT           Agatha Romo, PT  10/17/2018

## 2018-10-17 NOTE — PLAN OF CARE
Problem: Patient Care Overview  Goal: Plan of Care Review  Outcome: Ongoing (interventions implemented as appropriate)  Pt doing well on current therapy.  No adverse reactions or acute distress noted at this time.

## 2018-10-17 NOTE — PT/OT/SLP PROGRESS
Occupational Therapy      Patient Name:  Elvis Thompson   MRN:  [id number]    Patient not seen today secondary to Other (Comment)(pt being bathed). Will follow-up tomorrow.    Daisy Sawyer OT  10/17/2018

## 2018-10-18 LAB
ANION GAP SERPL CALC-SCNC: 8 MMOL/L
BASOPHILS # BLD AUTO: 0.03 K/UL
BASOPHILS NFR BLD: 0.5 %
BUN SERPL-MCNC: 15 MG/DL
CALCIUM SERPL-MCNC: 8.8 MG/DL
CHLORIDE SERPL-SCNC: 104 MMOL/L
CO2 SERPL-SCNC: 22 MMOL/L
CREAT SERPL-MCNC: 0.7 MG/DL
DIFFERENTIAL METHOD: ABNORMAL
EOSINOPHIL # BLD AUTO: 0.3 K/UL
EOSINOPHIL NFR BLD: 5 %
ERYTHROCYTE [DISTWIDTH] IN BLOOD BY AUTOMATED COUNT: 12.3 %
EST. GFR  (AFRICAN AMERICAN): >60 ML/MIN/1.73 M^2
EST. GFR  (NON AFRICAN AMERICAN): >60 ML/MIN/1.73 M^2
GLUCOSE SERPL-MCNC: 101 MG/DL
HCT VFR BLD AUTO: 35.8 %
HGB BLD-MCNC: 12 G/DL
INR PPP: 1.9
LYMPHOCYTES # BLD AUTO: 1.1 K/UL
LYMPHOCYTES NFR BLD: 17.2 %
MCH RBC QN AUTO: 30.9 PG
MCHC RBC AUTO-ENTMCNC: 33.5 G/DL
MCV RBC AUTO: 92 FL
MONOCYTES # BLD AUTO: 0.7 K/UL
MONOCYTES NFR BLD: 10.5 %
NEUTROPHILS # BLD AUTO: 4.3 K/UL
NEUTROPHILS NFR BLD: 66.8 %
PLATELET # BLD AUTO: 344 K/UL
PMV BLD AUTO: 9.3 FL
POTASSIUM SERPL-SCNC: 4.1 MMOL/L
PROTHROMBIN TIME: 19.1 SEC
RBC # BLD AUTO: 3.88 M/UL
SODIUM SERPL-SCNC: 134 MMOL/L
WBC # BLD AUTO: 6.39 K/UL

## 2018-10-18 PROCEDURE — 97116 GAIT TRAINING THERAPY: CPT

## 2018-10-18 PROCEDURE — 94664 DEMO&/EVAL PT USE INHALER: CPT

## 2018-10-18 PROCEDURE — 80048 BASIC METABOLIC PNL TOTAL CA: CPT

## 2018-10-18 PROCEDURE — 25000003 PHARM REV CODE 250: Performed by: INTERNAL MEDICINE

## 2018-10-18 PROCEDURE — 25000242 PHARM REV CODE 250 ALT 637 W/ HCPCS: Performed by: INTERNAL MEDICINE

## 2018-10-18 PROCEDURE — 97530 THERAPEUTIC ACTIVITIES: CPT

## 2018-10-18 PROCEDURE — 36415 COLL VENOUS BLD VENIPUNCTURE: CPT

## 2018-10-18 PROCEDURE — 99900035 HC TECH TIME PER 15 MIN (STAT)

## 2018-10-18 PROCEDURE — 94640 AIRWAY INHALATION TREATMENT: CPT

## 2018-10-18 PROCEDURE — 94799 UNLISTED PULMONARY SVC/PX: CPT

## 2018-10-18 PROCEDURE — 85025 COMPLETE CBC W/AUTO DIFF WBC: CPT

## 2018-10-18 PROCEDURE — 63600175 PHARM REV CODE 636 W HCPCS: Performed by: INTERNAL MEDICINE

## 2018-10-18 PROCEDURE — 85610 PROTHROMBIN TIME: CPT

## 2018-10-18 PROCEDURE — 94761 N-INVAS EAR/PLS OXIMETRY MLT: CPT

## 2018-10-18 PROCEDURE — 25000003 PHARM REV CODE 250: Performed by: NURSE PRACTITIONER

## 2018-10-18 PROCEDURE — 11000004 HC SNF PRIVATE

## 2018-10-18 RX ORDER — WARFARIN 2 MG/1
2 TABLET ORAL ONCE
Status: COMPLETED | OUTPATIENT
Start: 2018-10-18 | End: 2018-10-18

## 2018-10-18 RX ORDER — WARFARIN 1 MG/1
1 TABLET ORAL DAILY
Status: DISCONTINUED | OUTPATIENT
Start: 2018-10-19 | End: 2018-10-24

## 2018-10-18 RX ADMIN — WARFARIN SODIUM 2 MG: 2 TABLET ORAL at 10:10

## 2018-10-18 RX ADMIN — IPRATROPIUM BROMIDE 0.5 MG: 0.5 SOLUTION RESPIRATORY (INHALATION) at 07:10

## 2018-10-18 RX ADMIN — ACETAMINOPHEN 650 MG: 325 TABLET ORAL at 03:10

## 2018-10-18 RX ADMIN — CARBIDOPA AND LEVODOPA 1 TABLET: 25; 100 TABLET ORAL at 08:10

## 2018-10-18 RX ADMIN — AMLODIPINE BESYLATE 5 MG: 5 TABLET ORAL at 09:10

## 2018-10-18 RX ADMIN — POLYETHYLENE GLYCOL 3350 17 G: 17 POWDER, FOR SOLUTION ORAL at 09:10

## 2018-10-18 RX ADMIN — LEVALBUTEROL 1.25 MG: 1.25 SOLUTION, CONCENTRATE RESPIRATORY (INHALATION) at 07:10

## 2018-10-18 RX ADMIN — CARBIDOPA AND LEVODOPA 1 TABLET: 25; 100 TABLET ORAL at 09:10

## 2018-10-18 RX ADMIN — CIPROFLOXACIN 400 MG: 2 INJECTION, SOLUTION INTRAVENOUS at 09:10

## 2018-10-18 RX ADMIN — DOCUSATE SODIUM 100 MG: 100 CAPSULE, LIQUID FILLED ORAL at 09:10

## 2018-10-18 RX ADMIN — CARBIDOPA AND LEVODOPA 1 TABLET: 25; 100 TABLET ORAL at 05:10

## 2018-10-18 RX ADMIN — CELECOXIB 200 MG: 200 CAPSULE ORAL at 08:10

## 2018-10-18 RX ADMIN — IPRATROPIUM BROMIDE 0.5 MG: 0.5 SOLUTION RESPIRATORY (INHALATION) at 01:10

## 2018-10-18 RX ADMIN — SENNA 8.6 MG: 8.6 TABLET, COATED ORAL at 09:10

## 2018-10-18 RX ADMIN — CARBIDOPA AND LEVODOPA 1 TABLET: 25; 100 TABLET ORAL at 02:10

## 2018-10-18 RX ADMIN — PANTOPRAZOLE SODIUM 40 MG: 40 TABLET, DELAYED RELEASE ORAL at 09:10

## 2018-10-18 RX ADMIN — ACETAMINOPHEN 650 MG: 325 TABLET ORAL at 10:10

## 2018-10-18 RX ADMIN — CIPROFLOXACIN 400 MG: 2 INJECTION, SOLUTION INTRAVENOUS at 08:10

## 2018-10-18 RX ADMIN — LEVALBUTEROL 1.25 MG: 1.25 SOLUTION, CONCENTRATE RESPIRATORY (INHALATION) at 01:10

## 2018-10-18 NOTE — PLAN OF CARE
10/18/18 1052   Discharge Assessment   Assessment Type Discharge Planning Reassessment     Mr Thompson is progressing toward PT/OT goals. He would like to return home at discharge with sitters. His daughter, Arvin, are in agreement if he continues to improve. Nursing home and assisted living remain options. They have visited [facility] in [place].

## 2018-10-18 NOTE — PLAN OF CARE
Problem: Pressure Ulcer Risk (Umang Scale) (Adult,Obstetrics,Pediatric)  Goal: Identify Related Risk Factors and Signs and Symptoms  Related risk factors and signs and symptoms are identified upon initiation of Human Response Clinical Practice Guideline (CPG)  Outcome: Ongoing (interventions implemented as appropriate)  Perineal care x 4, Three mixed diapers and one wet. Sacral area skin WNL. Patient can also verbally make needs known. Bed free of wrinkles. Bed chucks changed during each perineal  Care.

## 2018-10-18 NOTE — PLAN OF CARE
Problem: Patient Care Overview  Goal: Plan of Care Review  Outcome: Ongoing (interventions implemented as appropriate)  Patient doing well on current therapy.  Room air SaO2 97% at this time/no SOB or dyspnea noted.  Breath sounds are clear and diminished: more diminished in lower lobes.  No questions or concerns at this time.

## 2018-10-18 NOTE — PT/OT/SLP PROGRESS
Occupational Therapy      Patient Name:  Elvis Thompson   MRN:  [id number]    Patient not seen today secondary to Patient fatigue(pt refused OT stating that he is too tired to participate). Will follow-up tomorrow.    Daisy Sawyer, OT  10/18/2018

## 2018-10-18 NOTE — PROGRESS NOTES
I spoke with Mr. Thompson about his current meds, what they are for, how they are given and potential side effects.  We discussed his antibiotic therapy, coumadin therapy, fall precautions and pain control. He reports his pain is recurring more frequently and requested pain medication during our visit.  He states he has been well informed about all medication he has been given.    Kaylee Madsen, PharmD

## 2018-10-18 NOTE — PLAN OF CARE
Problem: Patient Care Overview  Goal: Plan of Care Review  Adheres to plan of care regimen. Given ice packs for c/o hernia incision burning sensation along with PRN pain medication. Turn and position q2h. Perineal care provided 4x w/o any noted skin breakdown. Patient remains alert and oriented x3, able to verbally make needs known. Visited by daughter before shift change with concerns of physical therapy progress.

## 2018-10-18 NOTE — ASSESSMENT & PLAN NOTE
Coumadin held 10/14. Since then has been low although restarting home dose. Will give double dose tonight and resume 1mg daily dose tomorrow, cont to check INR daily  10/19 INR 2.4 this am; coumadin 1m today; monitor arelis with Cipro onboard

## 2018-10-18 NOTE — ASSESSMENT & PLAN NOTE
S/p surgical repair, general surgery still following  Ultracet for pain.  Consider using gabapentin if the burning pain persists.

## 2018-10-18 NOTE — PLAN OF CARE
Problem: Fall Risk (Adult)  Goal: Identify Related Risk Factors and Signs and Symptoms  Related risk factors and signs and symptoms are identified upon initiation of Human Response Clinical Practice Guideline (CPG)  Outcome: Ongoing (interventions implemented as appropriate)  Patient remains free of falls. Bed on lock position and alarm set on.  Side rails up x 2 in addition to frequent room checks.

## 2018-10-18 NOTE — PT/OT/SLP PROGRESS
"Physical Therapy Treatment    Patient Name:  Elvis Thompson   MRN:  [id number]    Recommendations:     Discharge Recommendations:  home with home health, home health PT   Discharge Equipment Recommendations: none   Barriers to discharge: None    Assessment:     Elvis Thompson is a 85 y.o. male admitted with a medical diagnosis of Debility.  He presents with the following impairments/functional limitations:  weakness, gait instability, impaired balance, impaired endurance, impaired self care skills, impaired functional mobilty, decreased safety awareness, decreased lower extremity function .  Pt. demonstrated increased distance with gait training and is progressing towards PT POC goals.    Rehab Prognosis:  Fair+; patient would benefit from acute skilled PT services to address these deficits and reach maximum level of function.      Recent Surgery: * No surgery found *      Plan:     During this hospitalization, patient to be seen (1-2x/day(M-F); PRN(Sat.,Sun.)) to address the above listed problems via therapeutic exercises, therapeutic activities, gait training  · Plan of Care Expires:  (upon d/c from facility)   Plan of Care Reviewed with: patient    Subjective     Communicated with patient prior to session.  Patient found seated in bed upon PT entry to room, agreeable to treatment.        Patient comments/goals: "I'm cold.  I'm a little weak."  Pain/Comfort:  · Pain Rating 1: (U/A to quantify)  · Location - Side 1: Bilateral  · Location - Orientation 1: generalized  · Location 1: flank  · Pain Addressed 1: Nurse notified  · Pain Rating Post-Intervention 1: (U/A to quantify)    Patients cultural, spiritual, Presybeterian conflicts given the current situation:      Objective:     Patient found with: telemetry     General Precautions: Standard, fall   Orthopedic Precautions:N/A   Braces: N/A     Functional Mobility:  · Transfers:     · Sit to Stand:  minimum assistance with rolling walker  · Bed to Chair: minimum " assistance with  rolling walker  using  Stand Pivot  · Gait: v  · Balance: v      AM-PAC 6 CLICK MOBILITY  Turning over in bed (including adjusting bedclothes, sheets and blankets)?: 2  Sitting down on and standing up from a chair with arms (e.g., wheelchair, bedside commode, etc.): 3  Moving from lying on back to sitting on the side of the bed?: 2  Moving to and from a bed to a chair (including a wheelchair)?: 3  Need to walk in hospital room?: 3  Climbing 3-5 steps with a railing?: 1  Basic Mobility Total Score: 14       Therapeutic Activities and Exercises:   Gait Training: Pt. amb. 60' with RW, min. A.  Verbal cues given to pt. for A.D. placement.  Pt. demonstrated decreased step length and decreased velocity.  Patient left up in chair with all lines intact, call button in reach and RN notified.    GOALS:   Multidisciplinary Problems     Physical Therapy Goals        Problem: Physical Therapy Goal    Goal Priority Disciplines Outcome Goal Variances Interventions   Physical Therapy Goal     PT, PT/OT Ongoing (interventions implemented as appropriate)     Description:  Goals to be met by: 10/30/2018     Patient will increase functional independence with mobility by performin. Supine to sit with Stand-by Assistance  2. Sit to supine with Stand-by Assistance  3. Rolling to Left and Right with Stand-by Assistance.  4. Sit to stand transfer with CGA  5. Bed to chair transfer with Contact Guard Assistance using Rolling Walker  6. Gait  x 100 feet with Contact Guard Assistance using Rolling Walker.                       Time Tracking:     PT Received On: 10/18/18  PT Start Time: 1124     PT Stop Time: 1156  PT Total Time (min): 32 min     Billable Minutes: Gait Training 16 minutes and Therapeutic Activity 16 minutes    Treatment Type: Treatment  PT/PTA: PT           Lukas Georges, PT  10/18/2018

## 2018-10-18 NOTE — ASSESSMENT & PLAN NOTE
10/14: stopped all stool cx ordered on admit  Resolved now and having constipation  KUB without obstruction    Has senna, colace and m,iralax, no BM, check for impaction  Repeated KUB yesterday; no obstruction or impaction noted

## 2018-10-18 NOTE — PLAN OF CARE
Problem: Patient Care Overview  Goal: Plan of Care Review  Outcome: Ongoing (interventions implemented as appropriate)  Vitals remained stable, afebrile. Complained of burning to left side around incision. Ice to area with relief. Ambulates with PT. eating well. Discussed plan of care, stated udnerstanding

## 2018-10-18 NOTE — PT/OT/SLP PROGRESS
"Physical Therapy      Patient Name:  Elvis Thompson   MRN:  [id number]    Patient not seen 10/18/2018 p.m. secondary to Patient unwilling to participate("I've had a long day.  Please come back tomorrow."). Will follow-up 10/19/2018.    Lukas Georges PT    "

## 2018-10-18 NOTE — ASSESSMENT & PLAN NOTE
Cont PT/OT    With recent hospitalization from surgery and now bacteremia/asp pneumonia he has became debilitated. Discussing with family swing vs nursing home placement for PT/OT. Case management and  working with family       10/19 Ambulated 60ft yesterday; goal 100ft

## 2018-10-19 PROBLEM — I47.29 NSVT (NONSUSTAINED VENTRICULAR TACHYCARDIA): Status: ACTIVE | Noted: 2018-10-19

## 2018-10-19 PROBLEM — A41.9 SEPSIS: Status: RESOLVED | Noted: 2018-10-10 | Resolved: 2018-10-19

## 2018-10-19 LAB
ANION GAP SERPL CALC-SCNC: 9 MMOL/L
AORTIC VALVE REGURGITATION: ABNORMAL
AORTIC VALVE STENOSIS: ABNORMAL
BACTERIA #/AREA URNS HPF: ABNORMAL /HPF
BASOPHILS # BLD AUTO: 0.04 K/UL
BASOPHILS NFR BLD: 0.7 %
BILIRUB UR QL STRIP: NEGATIVE
BUN SERPL-MCNC: 16 MG/DL
CALCIUM SERPL-MCNC: 8.5 MG/DL
CHLORIDE SERPL-SCNC: 103 MMOL/L
CLARITY UR: CLEAR
CO2 SERPL-SCNC: 23 MMOL/L
COLOR UR: YELLOW
CREAT SERPL-MCNC: 0.7 MG/DL
DIASTOLIC DYSFUNCTION: YES
DIFFERENTIAL METHOD: ABNORMAL
EOSINOPHIL # BLD AUTO: 0.3 K/UL
EOSINOPHIL NFR BLD: 5.8 %
ERYTHROCYTE [DISTWIDTH] IN BLOOD BY AUTOMATED COUNT: 12.5 %
EST. GFR  (AFRICAN AMERICAN): >60 ML/MIN/1.73 M^2
EST. GFR  (NON AFRICAN AMERICAN): >60 ML/MIN/1.73 M^2
ESTIMATED PA SYSTOLIC PRESSURE: 59.91
GLUCOSE SERPL-MCNC: 98 MG/DL
GLUCOSE UR QL STRIP: NEGATIVE
HCT VFR BLD AUTO: 34.2 %
HGB BLD-MCNC: 11.5 G/DL
HGB UR QL STRIP: NEGATIVE
INR PPP: 2.4
KETONES UR QL STRIP: NEGATIVE
LEUKOCYTE ESTERASE UR QL STRIP: ABNORMAL
LYMPHOCYTES # BLD AUTO: 1.2 K/UL
LYMPHOCYTES NFR BLD: 21.6 %
MAGNESIUM SERPL-MCNC: 1.8 MG/DL
MCH RBC QN AUTO: 31.3 PG
MCHC RBC AUTO-ENTMCNC: 33.6 G/DL
MCV RBC AUTO: 93 FL
MICROSCOPIC COMMENT: ABNORMAL
MITRAL VALVE REGURGITATION: ABNORMAL
MONOCYTES # BLD AUTO: 0.5 K/UL
MONOCYTES NFR BLD: 9.4 %
NEUTROPHILS # BLD AUTO: 3.6 K/UL
NEUTROPHILS NFR BLD: 62.5 %
NITRITE UR QL STRIP: NEGATIVE
PH UR STRIP: 5 [PH] (ref 5–8)
PLATELET # BLD AUTO: 309 K/UL
PMV BLD AUTO: 9.3 FL
POTASSIUM SERPL-SCNC: 3.9 MMOL/L
PROT UR QL STRIP: NEGATIVE
PROTHROMBIN TIME: 23.1 SEC
RBC # BLD AUTO: 3.68 M/UL
RBC #/AREA URNS HPF: 0 /HPF (ref 0–4)
RETIRED EF AND QEF - SEE NOTES: 25 (ref 55–65)
SODIUM SERPL-SCNC: 135 MMOL/L
SP GR UR STRIP: >=1.03 (ref 1–1.03)
SQUAMOUS #/AREA URNS HPF: 5 /HPF
TRICUSPID VALVE REGURGITATION: ABNORMAL
TROPONIN I SERPL DL<=0.01 NG/ML-MCNC: 0.06 NG/ML
URN SPEC COLLECT METH UR: ABNORMAL
UROBILINOGEN UR STRIP-ACNC: NEGATIVE EU/DL
WBC # BLD AUTO: 5.73 K/UL
WBC #/AREA URNS HPF: 8 /HPF (ref 0–5)
WBC CLUMPS URNS QL MICRO: ABNORMAL

## 2018-10-19 PROCEDURE — 99900035 HC TECH TIME PER 15 MIN (STAT)

## 2018-10-19 PROCEDURE — 97110 THERAPEUTIC EXERCISES: CPT

## 2018-10-19 PROCEDURE — 25000003 PHARM REV CODE 250: Performed by: NURSE PRACTITIONER

## 2018-10-19 PROCEDURE — 25000242 PHARM REV CODE 250 ALT 637 W/ HCPCS: Performed by: INTERNAL MEDICINE

## 2018-10-19 PROCEDURE — 99232 SBSQ HOSP IP/OBS MODERATE 35: CPT | Mod: ,,, | Performed by: FAMILY MEDICINE

## 2018-10-19 PROCEDURE — 94640 AIRWAY INHALATION TREATMENT: CPT

## 2018-10-19 PROCEDURE — 36415 COLL VENOUS BLD VENIPUNCTURE: CPT

## 2018-10-19 PROCEDURE — 80048 BASIC METABOLIC PNL TOTAL CA: CPT

## 2018-10-19 PROCEDURE — 63600175 PHARM REV CODE 636 W HCPCS: Performed by: INTERNAL MEDICINE

## 2018-10-19 PROCEDURE — 85610 PROTHROMBIN TIME: CPT

## 2018-10-19 PROCEDURE — 93010 EKG 12-LEAD: ICD-10-PCS | Mod: ,,, | Performed by: INTERNAL MEDICINE

## 2018-10-19 PROCEDURE — 94761 N-INVAS EAR/PLS OXIMETRY MLT: CPT

## 2018-10-19 PROCEDURE — 25000003 PHARM REV CODE 250: Performed by: INTERNAL MEDICINE

## 2018-10-19 PROCEDURE — 97116 GAIT TRAINING THERAPY: CPT

## 2018-10-19 PROCEDURE — 93010 ELECTROCARDIOGRAM REPORT: CPT | Mod: ,,, | Performed by: INTERNAL MEDICINE

## 2018-10-19 PROCEDURE — 81000 URINALYSIS NONAUTO W/SCOPE: CPT

## 2018-10-19 PROCEDURE — 93005 ELECTROCARDIOGRAM TRACING: CPT

## 2018-10-19 PROCEDURE — 84484 ASSAY OF TROPONIN QUANT: CPT

## 2018-10-19 PROCEDURE — 99232 PR SUBSEQUENT HOSPITAL CARE,LEVL II: ICD-10-PCS | Mod: ,,, | Performed by: FAMILY MEDICINE

## 2018-10-19 PROCEDURE — 97530 THERAPEUTIC ACTIVITIES: CPT

## 2018-10-19 PROCEDURE — 11000004 HC SNF PRIVATE

## 2018-10-19 PROCEDURE — 93306 TTE W/DOPPLER COMPLETE: CPT

## 2018-10-19 PROCEDURE — 85025 COMPLETE CBC W/AUTO DIFF WBC: CPT

## 2018-10-19 PROCEDURE — 83735 ASSAY OF MAGNESIUM: CPT

## 2018-10-19 PROCEDURE — 94664 DEMO&/EVAL PT USE INHALER: CPT

## 2018-10-19 PROCEDURE — 63600175 PHARM REV CODE 636 W HCPCS: Performed by: FAMILY MEDICINE

## 2018-10-19 PROCEDURE — 94799 UNLISTED PULMONARY SVC/PX: CPT

## 2018-10-19 RX ORDER — AMIODARONE HYDROCHLORIDE 200 MG/1
200 TABLET ORAL DAILY
Status: DISCONTINUED | OUTPATIENT
Start: 2018-10-21 | End: 2018-10-25 | Stop reason: HOSPADM

## 2018-10-19 RX ORDER — TRAMADOL HYDROCHLORIDE AND ACETAMINOPHEN 37.5; 325 MG/1; MG/1
1 TABLET, FILM COATED ORAL EVERY 6 HOURS PRN
Status: DISCONTINUED | OUTPATIENT
Start: 2018-10-19 | End: 2018-10-20

## 2018-10-19 RX ORDER — MAGNESIUM SULFATE 1 G/100ML
1 INJECTION INTRAVENOUS ONCE
Status: COMPLETED | OUTPATIENT
Start: 2018-10-19 | End: 2018-10-19

## 2018-10-19 RX ORDER — AMIODARONE HYDROCHLORIDE 200 MG/1
400 TABLET ORAL 2 TIMES DAILY
Status: COMPLETED | OUTPATIENT
Start: 2018-10-19 | End: 2018-10-20

## 2018-10-19 RX ADMIN — CIPROFLOXACIN 400 MG: 2 INJECTION, SOLUTION INTRAVENOUS at 08:10

## 2018-10-19 RX ADMIN — ACETAMINOPHEN 650 MG: 325 TABLET ORAL at 12:10

## 2018-10-19 RX ADMIN — SENNA 8.6 MG: 8.6 TABLET, COATED ORAL at 10:10

## 2018-10-19 RX ADMIN — AMIODARONE HYDROCHLORIDE 400 MG: 200 TABLET ORAL at 08:10

## 2018-10-19 RX ADMIN — PANTOPRAZOLE SODIUM 40 MG: 40 TABLET, DELAYED RELEASE ORAL at 10:10

## 2018-10-19 RX ADMIN — LEVALBUTEROL 1.25 MG: 1.25 SOLUTION, CONCENTRATE RESPIRATORY (INHALATION) at 01:10

## 2018-10-19 RX ADMIN — CARBIDOPA AND LEVODOPA 1 TABLET: 25; 100 TABLET ORAL at 10:10

## 2018-10-19 RX ADMIN — CARBIDOPA AND LEVODOPA 1 TABLET: 25; 100 TABLET ORAL at 02:10

## 2018-10-19 RX ADMIN — IPRATROPIUM BROMIDE 0.5 MG: 0.5 SOLUTION RESPIRATORY (INHALATION) at 07:10

## 2018-10-19 RX ADMIN — AMIODARONE HYDROCHLORIDE 400 MG: 200 TABLET ORAL at 10:10

## 2018-10-19 RX ADMIN — DOCUSATE SODIUM 100 MG: 100 CAPSULE, LIQUID FILLED ORAL at 10:10

## 2018-10-19 RX ADMIN — LEVALBUTEROL 1.25 MG: 1.25 SOLUTION, CONCENTRATE RESPIRATORY (INHALATION) at 07:10

## 2018-10-19 RX ADMIN — CELECOXIB 200 MG: 200 CAPSULE ORAL at 08:10

## 2018-10-19 RX ADMIN — CARBIDOPA AND LEVODOPA 1 TABLET: 25; 100 TABLET ORAL at 08:10

## 2018-10-19 RX ADMIN — CIPROFLOXACIN 400 MG: 2 INJECTION, SOLUTION INTRAVENOUS at 10:10

## 2018-10-19 RX ADMIN — AMLODIPINE BESYLATE 5 MG: 5 TABLET ORAL at 10:10

## 2018-10-19 RX ADMIN — TRAMADOL HYDROCHLORIDE AND ACETAMINOPHEN 1 TABLET: 37.5; 325 TABLET ORAL at 10:10

## 2018-10-19 RX ADMIN — IPRATROPIUM BROMIDE 0.5 MG: 0.5 SOLUTION RESPIRATORY (INHALATION) at 01:10

## 2018-10-19 RX ADMIN — TRAMADOL HYDROCHLORIDE AND ACETAMINOPHEN 1 TABLET: 37.5; 325 TABLET ORAL at 04:10

## 2018-10-19 RX ADMIN — WARFARIN SODIUM 1 MG: 1 TABLET ORAL at 04:10

## 2018-10-19 RX ADMIN — POLYETHYLENE GLYCOL 3350 17 G: 17 POWDER, FOR SOLUTION ORAL at 10:10

## 2018-10-19 RX ADMIN — MAGNESIUM SULFATE IN DEXTROSE 1 G: 10 INJECTION, SOLUTION INTRAVENOUS at 07:10

## 2018-10-19 RX ADMIN — CARBIDOPA AND LEVODOPA 1 TABLET: 25; 100 TABLET ORAL at 04:10

## 2018-10-19 NOTE — CONSULTS
[facility]  Cardiology  Consult Note    Patient Name: Elvis Thompson  MRN: [id number]  Admission Date: 10/15/2018  Hospital Length of Stay: 4 days  Code Status: Full Code   Attending Provider: Makeda Pham MD   Consulting Provider: ASHLEE Parker  Primary Care Physician: Regulo Martínez MD  Principal Problem:Debility    Patient information was obtained from patient, past medical records and ER records.     Inpatient consult to Cardiology-[facility]  Consult performed by: Mario Mcintosh MD  Consult ordered by: Neva Ring MD        Subjective:     Chief Complaint:  [facility] consulted for WCT    HPI: 85 year old male admitted with pneumonia and positive blood cultures. Improving gradually from respiratory standpoint continues to receive ABX therapy. He is on SWING unit for debility. During chronic A fib. He had 12 beat run of WCT last night. [facility] asked to evaluate. Denies chest pain or palpitations.     Past Medical History:   Diagnosis Date    Anticoagulant long-term use     Bradycardia     HTN (hypertension)     Parkinson disease 03/24/2017    Pulmonary embolism     Shingles        Past Surgical History:   Procedure Laterality Date    CARDIAC PACEMAKER PLACEMENT      cataract      FOOT SURGERY      HAND SURGERY      HERNIA REPAIR      KNEE SURGERY      right knee replacement    MYELOGRAM N/A 3/27/2017    [facility]formed [facility] OR    PROSTATE SURGERY      REPAIR, HERNIA, INGUINAL WITH PROLENE HERNIA SYSTEM (EXTENDED VERSION) Left 10/4/2018    Performed by [facility] at [facility] OR       Review of patient's allergies indicates:   Allergen Reactions    Iodine and iodide containing products Other (See Comments)    Codeine Other (See Comments)     Insomnia      Morphine Nausea And Vomiting       No current facility-administered medications on file prior to encounter.      Current Outpatient Medications on File Prior to Encounter   Medication Sig     amlodipine (NORVASC) 5 MG tablet Take 5 mg by mouth once daily.    carbidopa-levodopa  mg (SINEMET)  mg per tablet Take 1 tablet by mouth 4 (four) times daily.    celecoxib (CELEBREX) 200 MG capsule Take 200 mg by mouth daily as needed for Pain.     furosemide (LASIX) 20 MG tablet Take 20 mg by mouth daily as needed.     warfarin (COUMADIN) 2 MG tablet Take 1 mg by mouth Daily.      Family History     Problem Relation (Age of Onset)    Heart disease Mother        Tobacco Use    Smoking status: Never Smoker    Smokeless tobacco: Never Used   Substance and Sexual Activity    Alcohol use: No    Drug use: Not on file    Sexual activity: Not Currently     ROS   Constitutional: generalized weakness  Eyes: Negative    Respiratory: SOB, cough    Cardiovascular: Negative   Gastrointestinal:ABD discomfort   Genitourinary: Negative   Musculoskeletal: Negative   Skin: Negative .   Neurological: Negative   Objective:     Vital Signs (Most Recent):  Temp: 97.4 °F (36.3 °C) (10/19/18 0735)  Pulse: 66 (10/19/18 0735)  Resp: 16 (10/19/18 0735)  BP: (!) 119/56 (10/19/18 0735)  SpO2: 95 % (10/19/18 0735) Vital Signs (24h Range):  Temp:  [96.7 °F (35.9 °C)-98.7 °F (37.1 °C)] 97.4 °F (36.3 °C)  Pulse:  [61-86] 66  Resp:  [16-24] 16  SpO2:  [94 %-98 %] 95 %  BP: (119-135)/(56-66) 119/56     Weight: 87.3 kg (192 lb 7.4 oz)  Body mass index is 32.03 kg/m².    SpO2: 95 %  O2 Device (Oxygen Therapy): room air      Intake/Output Summary (Last 24 hours) at 10/19/2018 0807  Last data filed at 10/18/2018 2200  Gross per 24 hour   Intake 200 ml   Output 1 ml   Net 199 ml       Lines/Drains/Airways     Peripheral Intravenous Line                 Peripheral IV - Single Lumen 10/18/18 2045 Left Forearm less than 1 day                Physical Exam  General appearance: alert, appears stated age and cooperative  Head: Normocephalic, without obvious abnormality, atraumatic  Eyes: conjunctivae/corneas clear. PERRL  Neck: no carotid  bruit, no JVD and supple, symmetrical, trachea midline  Lungs:BBS coarse, normal respiratory effort  Chest Wall: no tenderness  Heart: regular rate irregular rhythm, S1, S2 normal, no murmur, click, rub or gallop  Abdomen: soft, non-tender; bowel sounds normal; no masses,  no organomegaly  Extremities: Extremities normal, atraumatic, no cyanosis, clubbing, or edema  Pulses: Dorsalis Pedis R: 2+ (normal)/L: 2+ (normal)  Skin: Skin color, texture, turgor normal. No rashes or lesions  Neurologic: Normal mood and affect  Alert and oriented X 3  Significant Labs:   ABG: No results for input(s): PH, PCO2, HCO3, POCSATURATED, BE in the last 48 hours., Blood Culture: No results for input(s): LABBLOO in the last 48 hours., BMP:   Recent Labs   Lab 10/18/18  0537 10/19/18  0415    98   * 135*   K 4.1 3.9    103   CO2 22* 23   BUN 15 16   CREATININE 0.7 0.7   CALCIUM 8.8 8.5*   MG  --  1.8   , CMP   Recent Labs   Lab 10/18/18  0537 10/19/18  0415   * 135*   K 4.1 3.9    103   CO2 22* 23    98   BUN 15 16   CREATININE 0.7 0.7   CALCIUM 8.8 8.5*   ANIONGAP 8 9   ESTGFRAFRICA >60 >60   EGFRNONAA >60 >60   , CBC   Recent Labs   Lab 10/18/18  0537 10/19/18  0415   WBC 6.39 5.73   HGB 12.0* 11.5*   HCT 35.8* 34.2*    309   , INR   Recent Labs   Lab 10/18/18  0537 10/19/18  0415   INR 1.9* 2.4*   , Lipid Panel No results for input(s): CHOL, HDL, LDLCALC, TRIG, CHOLHDL in the last 48 hours.,   Pathology Results  (Last 10 years)    None      , Troponin   Recent Labs   Lab 10/19/18  0415   TROPONINI 0.061*    and All pertinent lab results from the last 24 hours have been reviewed.    Significant Imaging: Cardiac Cath: , CT scan: CT ABDOMEN PELVIS WITH CONTRAST: No results found for this visit on 10/15/18., CT ABDOMEN PELVIS WITHOUT CONTRAST: No results found for this visit on 10/15/18. and , Echocardiogram: 2D echo with color flow doppler: No results found for this or any previous visit.,  EKG: , Stress Test: see below, X-Ray: CXR: X-Ray Chest PA and Lateral (CXR): No results found for this visit on 10/15/18. and   Assessment and Plan:     Active Diagnoses:    Diagnosis Date Noted POA    PRINCIPAL PROBLEM:  Debility [R53.81] 10/15/2018 Yes    Chronic anticoagulation [Z79.01] 10/14/2018 Not Applicable    Bacteremia [R78.81] 10/12/2018 Yes    Sepsis [A41.9] 10/10/2018 Yes    Aspiration pneumonia [J69.0] 10/10/2018 Yes    Diarrhea of presumed infectious origin [R19.7] 10/10/2018 Yes    Parkinson disease [G20] 10/10/2018 Yes    Incarcerated left inguinal hernia [K40.30] 10/04/2018 Yes      Problems Resolved During this Admission:       VTE Risk Mitigation (From admission, onward)        Ordered     warfarin (COUMADIN) tablet 1 mg  Daily      10/18/18 1026        Current Facility-Administered Medications   Medication    acetaminophen tablet 650 mg    amLODIPine tablet 5 mg    carbidopa-levodopa  mg per tablet 1 tablet    celecoxib capsule 200 mg    ciprofloxacin (CIPRO)400mg/200ml D5W IVPB 400 mg    cloNIDine tablet 0.1 mg    docusate sodium capsule 100 mg    ipratropium 0.02 % nebulizer solution 0.5 mg    levalbuterol nebulizer solution 1.25 mg    levalbuterol nebulizer solution 1.25 mg    magnesium sulfate in dextrose IVPB (premix) 1 g    ondansetron injection 4 mg    pantoprazole EC tablet 40 mg    polyethylene glycol packet 17 g    promethazine (PHENERGAN) 12.5 mg in dextrose 5 % 50 mL IVPB    senna tablet 8.6 mg    warfarin (COUMADIN) tablet 1 mg     WCT appears to be VT non sustained 12 beat at 160 bpm / but possibly AF with LBBB- chronic A fib, long term anticoagulation with coumadin. V pacing intermittent.   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   Chronic HFpEF- stable volume status.   HTN well controlled   Debility/pneumonia- receiving ABX therapy. Progressing with physical therapy.   Echo 6/19/2012- EF 45%, moderate to severe LVH, 1+MR, Trace TR, PAP 47  MPI  2010 no stress induced ischemia.     PLAN: DNR confirmed  Echo  Mag supplement  Start amio  Interrogate PM  Continue current meds      ASHLEE Parker  Cardiology   [facility].  I attest that I have personally seen and examined this patient. I have reviewed and discussed the management in detail as outlined above.  I attest that I have personally seen and examined this patient. I have reviewed and discussed the management in detail as outlined above.

## 2018-10-19 NOTE — HOSPITAL COURSE
10/19 Patient on SWING.  pt ambulated 60feet with RW with PT yesterday am; refused PT in PM; goal is now 100feet with RW,   Did have BM yesterday; still noting some burning pain to left side. Palpated area; no significant tenderness but c/o pain to nurse after exam; nirav order Ultracet and get U/A tp rule out UTI/stone   Notes to have some NSVT on monitor; Dr. Mcintosh evaluating; planned for PPM interrogation today and Echo; started on amiodarone.   Already anti-coagulated for PAF; INR therapeutic; 2.4; monitor daily.     10/20.  Patient's main complaint is burning pain in the left side.  I increased his ultracet at to 1 pill every 4 hr, I started gabapentin 100 mg 3 times daily.  I would like surgery to re-evaluate the patient because the pain is in the area of his recent hernia repair.    10/22 he is up in bed this am. Reports that he slept well last night. No pain on gabapentin and ultram. He still complains that he is not having great BM. Nursing reports conflict with that.     He was also seen by Dr Mcintosh as he had irregular beat over the weekend. He was started on amiodarone. Echo ordered which shows EF 25%. Last echo 2012 showed 45%. Has pace maker/denies that he has defibrillator. INR theraputic    He is doing well with PT. Ambulated 50ft with min assist and RW. Goal is 100ft.     10/24/18   INR 1.7; subtherapeutic; received 2mg on 16/17/18; then back to 1mg daily - change to  NOAC/apixaban per cardiology  Afib with SVR- low 50s; amio 200mg coreg 3.125mg - will defer to cards   Pt ambulated 100ft with RW with PT yesterday   Family now considering d/c to skilled nursing home?     10/25/18 Abx completed for asp pneumonia and bacteremia. No fever, nml WBC, Repeat BC negative   Remains on skilled for debility/ PT/strengthening. Has met goal 100feet with RW  Now planned for skilled Nursing home placement today   Changed coumadin to Apixaban yesterday   States feeling alright; denies any further abd pain/burning at  present.  Notes having good bowel movements,

## 2018-10-19 NOTE — PLAN OF CARE
10/19/18 1053   Discharge Reassessment   Assessment Type Discharge Planning Reassessment       Seen patient on rounds today. I encouraged him to work with PT and OT daily. He will stay throughout the weekend to work with PT OT, and will re-evaluate discharge plan on Monday. Spoke with Erlinda, daughter, and educated her on his progress. CM will continue to follow and assist as needed.

## 2018-10-19 NOTE — ASSESSMENT & PLAN NOTE
Episode last pm; Dr. Mcintosh planning PPM interrogation with rep;   Echo today  Added amiodarone

## 2018-10-19 NOTE — PROGRESS NOTES
Staff Handoff  Report received from Jennifer HOUSTON and patient assessed per flowsheet. Generalized weakness, ambulating with PT with walker at bedside. S/P hernia repair incision. Neuro intact. Afib on telemetry. Instructed to call for needs/asst.    Resident Handoff

## 2018-10-19 NOTE — NURSING
Order to have pacemaker interrogated. Tried contacting Harry with [employer]. No answer, left voicemail.

## 2018-10-19 NOTE — PROGRESS NOTES
[facility]  Pulmonology  Progress Note    Patient Name: Elvis Thompson  MRN: [id number]  Admission Date: 10/15/2018  Hospital Length of Stay: 4 days  Code Status: Full Code  Attending Provider: Jorden Brown MD  Primary Care Provider: Regulo Martínez MD   Principal Problem: Debility    Subjective:     Interval History: not feeling well     Objective:     Vital Signs (Most Recent):  Temp: 97.4 °F (36.3 °C) (10/19/18 0735)  Pulse: 73 (10/19/18 0815)  Resp: 16 (10/19/18 0735)  BP: (!) 119/56 (10/19/18 0735)  SpO2: 95 % (10/19/18 0735) Vital Signs (24h Range):  Temp:  [96.7 °F (35.9 °C)-98.7 °F (37.1 °C)] 97.4 °F (36.3 °C)  Pulse:  [61-86] 73  Resp:  [16-24] 16  SpO2:  [94 %-98 %] 95 %  BP: (119-135)/(56-66) 119/56     Weight: 87.3 kg (192 lb 7.4 oz)  Body mass index is 32.03 kg/m².      Intake/Output Summary (Last 24 hours) at 10/19/2018 0939  Last data filed at 10/18/2018 2200  Gross per 24 hour   Intake 200 ml   Output 1 ml   Net 199 ml       Physical Exam   Constitutional: He is oriented to person, place, and time. He appears well-developed and well-nourished. He is cooperative.  Non-toxic appearance. He does not appear ill. No distress.   HENT:   Head: Normocephalic and atraumatic.   Right Ear: Hearing, tympanic membrane, external ear and ear canal normal.   Left Ear: Hearing, tympanic membrane, external ear and ear canal normal.   Nose: Nose normal. No mucosal edema, rhinorrhea or nasal deformity. No epistaxis. Right sinus exhibits no maxillary sinus tenderness and no frontal sinus tenderness. Left sinus exhibits no maxillary sinus tenderness and no frontal sinus tenderness.   Mouth/Throat: Uvula is midline, oropharynx is clear and moist and mucous membranes are normal. No trismus in the jaw. Normal dentition. No uvula swelling. No posterior oropharyngeal erythema.   Eyes: Conjunctivae and lids are normal. No scleral icterus.   Sclera clear bilat   Neck: Trachea normal, full passive range  of motion without pain and phonation normal. Neck supple.   Cardiovascular: Normal rate, regular rhythm, normal heart sounds, intact distal pulses and normal pulses.   Pulmonary/Chest: He is in respiratory distress (mild to moderate). He has decreased breath sounds in the right upper field, the right middle field, the right lower field, the left upper field, the left middle field and the left lower field. He has wheezes in the right middle field, the right lower field, the left middle field and the left lower field. He has rhonchi in the right middle field, the right lower field, the left middle field and the left lower field.   Abdominal: Soft. Normal appearance and bowel sounds are normal. He exhibits no distension. There is no tenderness.   Musculoskeletal: Normal range of motion. He exhibits no edema or deformity.   Neurological: He is alert and oriented to person, place, and time. He exhibits normal muscle tone. Coordination normal.   Skin: Skin is warm, dry and intact. He is not diaphoretic. No pallor.   Psychiatric: He has a normal mood and affect. His speech is normal and behavior is normal. Judgment and thought content normal. Cognition and memory are normal.   Nursing note and vitals reviewed.      Vents:       Lines/Drains/Airways     Peripheral Intravenous Line                 Peripheral IV - Single Lumen 10/18/18 2045 Left Forearm less than 1 day                Significant Labs:    CBC/Anemia Profile:  Recent Labs   Lab 10/18/18  0537 10/19/18  0415   WBC 6.39 5.73   HGB 12.0* 11.5*   HCT 35.8* 34.2*    309   MCV 92 93   RDW 12.3 12.5        Chemistries:  Recent Labs   Lab 10/18/18  0537 10/19/18  0415   * 135*   K 4.1 3.9    103   CO2 22* 23   BUN 15 16   CREATININE 0.7 0.7   CALCIUM 8.8 8.5*   MG  --  1.8       All pertinent labs within the past 24 hours have been reviewed.    Significant Imaging:  I have reviewed all pertinent imaging results/findings within the past 24  hours.    Assessment/Plan:     Aspiration pneumonia    cxr today   Stable slowly improving      CXR today       Reuben Leach MD  Pulmonology  [facility]

## 2018-10-19 NOTE — SUBJECTIVE & OBJECTIVE
Past Medical History:   Diagnosis Date    Anticoagulant long-term use     Bradycardia     HTN (hypertension)     Parkinson disease 03/24/2017    Pulmonary embolism     Shingles        Past Surgical History:   Procedure Laterality Date    CARDIAC PACEMAKER PLACEMENT      cataract      FOOT SURGERY      HAND SURGERY      HERNIA REPAIR      KNEE SURGERY      right knee replacement    MYELOGRAM N/A 3/27/2017    Performed by [facility] OR    PROSTATE SURGERY      REPAIR, HERNIA, INGUINAL WITH PROLENE HERNIA SYSTEM (EXTENDED VERSION) Left 10/4/2018    Performed by Raman Garcia [facility] at [facility] OR       Review of patient's allergies indicates:   Allergen Reactions    Iodine and iodide containing products Other (See Comments)    Codeine Other (See Comments)     Insomnia      Morphine Nausea And Vomiting       No current facility-administered medications on file prior to encounter.      Current Outpatient Medications on File Prior to Encounter   Medication Sig    amlodipine (NORVASC) 5 MG tablet Take 5 mg by mouth once daily.    carbidopa-levodopa  mg (SINEMET)  mg per tablet Take 1 tablet by mouth 4 (four) times daily.    celecoxib (CELEBREX) 200 MG capsule Take 200 mg by mouth daily as needed for Pain.     furosemide (LASIX) 20 MG tablet Take 20 mg by mouth daily as needed.     warfarin (COUMADIN) 2 MG tablet Take 1 mg by mouth Daily.      Family History     Problem Relation (Age of Onset)    Heart disease Mother        Tobacco Use    Smoking status: Never Smoker    Smokeless tobacco: Never Used   Substance and Sexual Activity    Alcohol use: No    Drug use: Not on file    Sexual activity: Not Currently     Review of Systems   Constitutional: Negative for chills and fever.   HENT: Negative for congestion, ear pain, postnasal drip, rhinorrhea, sore throat and trouble swallowing.    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 Negative for redness and itching.   Respiratory: Negative for cough, shortness of breath and wheezing.    Cardiovascular: Negative for chest pain and palpitations.   Gastrointestinal: Positive for abdominal pain. Negative for diarrhea, nausea and vomiting.   Genitourinary: Negative for difficulty urinating, dysuria and frequency.   Skin: Negative for rash.   Neurological: Positive for weakness (generalized). Negative for headaches.     Objective:     Vital Signs (Most Recent):  Temp: 96.7 °F (35.9 °C) (10/19/18 0421)  Pulse: 86 (10/19/18 0718)  Resp: 17 (10/19/18 0718)  BP: 119/61 (10/19/18 0421)  SpO2: 97 % (10/19/18 0718) Vital Signs (24h Range):  Temp:  [96.7 °F (35.9 °C)-98.7 °F (37.1 °C)] 96.7 °F (35.9 °C)  Pulse:  [] 86  Resp:  [14-24] 17  SpO2:  [94 %-98 %] 97 %  BP: (119-135)/(56-66) 119/61     Weight: 87.3 kg (192 lb 7.4 oz)  Body mass index is 32.03 kg/m².    Physical Exam   Constitutional: He is oriented to person, place, and time. He appears well-developed and well-nourished.   HENT:   Head: Normocephalic and atraumatic.   Nose: Nose normal.   Mouth/Throat: Oropharynx is clear and moist.   Eyes: Conjunctivae and EOM are normal. Pupils are equal, round, and reactive to light.   Neck: Normal range of motion. Neck supple. No JVD present. No thyromegaly present.   Cardiovascular: Normal rate, regular rhythm, normal heart sounds and intact distal pulses.   Pulmonary/Chest: Effort normal and breath sounds normal.   Abdominal: Soft. Bowel sounds are normal. He exhibits no distension and no mass. There is tenderness. There is no guarding.   Abd exam normal.  No rash.  No tenderness to palpation.  Suspect post op pain.  Groin wound looks great.     Musculoskeletal: Normal range of motion. He exhibits no edema.   Lymphadenopathy:     He has no cervical adenopathy.   Neurological: He is alert and oriented to person, place, and time. He has normal reflexes. No cranial nerve deficit.   Skin: Skin  is warm and dry. No rash noted. No pallor.   Psychiatric: He has a normal mood and affect.   Nursing note and vitals reviewed.        CRANIAL NERVES     CN III, IV, VI   Pupils are equal, round, and reactive to light.  Extraocular motions are normal.        Significant Labs: Significant Labs:   CBC:       Recent Labs   Lab  10/15/18   0531   WBC  7.91   HGB  12.2*   HCT  36.1*   PLT  336      CMP:        Recent Labs   Lab  10/14/18   0622  10/15/18   0531   NA  136  136   K  4.4  4.4   CL  104  104   CO2  27  24   GLU  106  112*   BUN  10  11   CREATININE  0.7  0.8   CALCIUM  8.8  9.0   ANIONGAP  5*  8   EGFRNONAA  >60  >60               Lab Results   Component Value Date     INR 1.8 (H) 10/15/2018     INR 2.5 (H) 10/14/2018     INR 2.7 (H) 10/13/2018      Lipase 24  Mag 1.8  Lactic acid 2.1>1.2      Recent Labs   Lab  10/09/18   1639   TROPONINI  0.023      BNP      Recent Labs   Lab  10/09/18   1625   BNP  230*      Flu negative  Blood cultures 10/9 - gram + TURICELLA OTITIDIS  Blood cultures 10/9 - gram negative ecoli sensitive to cipro  Blood cultures 10/9 - gram negative BACTEROIDES FRAGILIS   Blood cultures 10/9 NGTD     Significant Imaging:      10/10 CXR-No significant interval change of the bilateral pleural effusions and bilateral lower lobe alveolar infiltrates.  Cardiomegaly and suspected interstitial edema..  10/11/18 CXR- Interval worsening of the cardiopulmonary findings.  There is cardiomegaly with pulmonary vascular congestion and pulmonary edema.  Bilateral pleural effusions are suspected.  Left-sided pacemaker device remains in place.  The skeletal structures are intact.  10/11/18 KUB- Contrast material is seen in the colon from recent CT scan.  There is some gaseous distention of the bowel without evidence for definite obstruction.  No free air is seen.  Vascular calcifications are noted.

## 2018-10-19 NOTE — PLAN OF CARE
Problem: Patient Care Overview  Goal: Plan of Care Review  Outcome: Ongoing (interventions implemented as appropriate)  Patient aware of plan of care. VS stable. Afebrile. Patient had a good day. C/o burning sensation to left lower abd pain controlled with po med. Mag rider given this am. IV antibiotic continued. A-fib on monitor, rate controlled. [employer] representative interrogated pacemaker today. Up with PT today, tolerated well. Free from falls/injuries. No questions or concerns at this time. Agrees with plan of care.

## 2018-10-19 NOTE — PT/OT/SLP PROGRESS
Occupational Therapy   Treatment    Name: Elvis Thompson  MRN: [id number]  Admitting Diagnosis:  Debility       Recommendations:     Discharge Recommendations:  home with home health, home health PT, home health OT  Discharge Equipment Recommendations:    hip kit  Barriers to discharge:   none    Subjective     Communicated with: pt prior to session.  Pain/Comfort: 2/10 upon entry, 8/10 upon exit due to being flat in the bed for OT and nurse to scoot pt to proper positioning in bed  ·      Patients cultural, spiritual, Tenriism conflicts given the current situation:  not discussed    Objective:     Patient found with:  supine in bed, all lines intact, call button in reach    General Precautions: Standard,   fall  Orthopedic Precautions:  n/a  Braces:   n/a    Occupational Performance:    Bed Mobility:    · Patient completed Supine to Sit with moderate assistance   · Sit to supine with mod A  · Scooting/bridging in bed with mod A    Functional Mobility/Transfers:  · Patient completed Sit <> Stand Transfer with moderate assistance  with  rolling walker       Activities of Daily Living:  · Upper Body Dressing: set up assist and increased time    Patient left supine with call button in reach all lines intact      Treatment & Education:  Pt completed scooting/bridging, sit to supine, and supine to sit  Pt completed sit to sstand x 3 reps  Pt completed UB dressing  Pt completed AROM in shoulder flexion and elbow flexion x 10 reps each  Education:    Assessment:     Elvis Thompson is a 85 y.o. male with a medical diagnosis of Debility.  He presents with difficulty wit increased need for assistance during bed mobility due to back pain.  Performance deficits affecting function are increased pain, decreased ROM, muscle weakness, decreased coordination, decreased endurance.    Rehab Prognosis:  fair; patient would benefit from acute skilled OT services to address these deficits and reach maximum level of function.       Plan:      Patient to be seen   to address the above listed problems via    · Plan of Care Expires:    · Plan of Care Reviewed with:      This Plan of care has been discussed with the patient who was involved in its development and understands and is in agreement with the identified goals and treatment plan    GOALS:   Multidisciplinary Problems     Occupational Therapy Goals        Problem: Occupational Therapy Goal    Goal Priority Disciplines Outcome Interventions   Occupational Therapy Goal     OT, PT/OT     Description:  Goals to be met by: 10/26/18    Patient will increase functional independence with ADLs by performing:    LE Dressing with Stand-by Assistance.  Supine to sit with mod I.  Sit to stand with stand by assist.  Grooming at mirror with stand by assist.                      Time Tracking:     OT Date of Treatment:  10/19/18  OT Start Time:  3:15  OT Stop Time:  3:30  OT Total Time (min):  15 min    Billable Minutes:Therapeutic Activity 15 min    Daisy Sawyer OT  10/19/2018

## 2018-10-19 NOTE — SUBJECTIVE & OBJECTIVE
Interval History: tired breathing good side of abdomen stinging     Objective:     Vital Signs (Most Recent):  Temp: 97.5 °F (36.4 °C) (10/18/18 1947)  Pulse: 70 (10/18/18 2000)  Resp: 16 (10/18/18 2000)  BP: 132/60 (10/18/18 1947)  SpO2: 95 % (10/18/18 2000) Vital Signs (24h Range):  Temp:  [97.4 °F (36.3 °C)-98.7 °F (37.1 °C)] 97.5 °F (36.4 °C)  Pulse:  [] 70  Resp:  [14-18] 16  SpO2:  [95 %-99 %] 95 %  BP: (127-135)/(60-62) 132/60     Weight: 87.3 kg (192 lb 7.4 oz)  Body mass index is 32.03 kg/m².    No intake or output data in the 24 hours ending 10/18/18 2057    Physical Exam   Constitutional: He is oriented to person, place, and time. He appears well-developed and well-nourished. He is cooperative.  Non-toxic appearance. He does not appear ill. No distress.   HENT:   Head: Normocephalic and atraumatic.   Right Ear: Hearing, tympanic membrane, external ear and ear canal normal.   Left Ear: Hearing, tympanic membrane, external ear and ear canal normal.   Nose: Nose normal. No mucosal edema, rhinorrhea or nasal deformity. No epistaxis. Right sinus exhibits no maxillary sinus tenderness and no frontal sinus tenderness. Left sinus exhibits no maxillary sinus tenderness and no frontal sinus tenderness.   Mouth/Throat: Uvula is midline, oropharynx is clear and moist and mucous membranes are normal. No trismus in the jaw. Normal dentition. No uvula swelling. No posterior oropharyngeal erythema.   Eyes: Conjunctivae and lids are normal. No scleral icterus.   Sclera clear bilat   Neck: Trachea normal, full passive range of motion without pain and phonation normal. Neck supple.   Cardiovascular: Normal rate, regular rhythm, normal heart sounds, intact distal pulses and normal pulses.   Pulmonary/Chest: He is in respiratory distress (mild to moderate). He has no decreased breath sounds. He has no wheezes. He has no rhonchi.   Abdominal: Soft. Normal appearance and bowel sounds are normal. He exhibits no distension.  There is no hepatosplenomegaly. There is no tenderness. There is no rigidity, no rebound, no guarding, no CVA tenderness, no tenderness at McBurney's point and negative Cramer's sign. No hernia.       Musculoskeletal: Normal range of motion. He exhibits no edema or deformity.   Neurological: He is alert and oriented to person, place, and time. He exhibits normal muscle tone. Coordination normal.   Skin: Skin is warm, dry and intact. He is not diaphoretic. No pallor.   Psychiatric: He has a normal mood and affect. His speech is normal and behavior is normal. Judgment and thought content normal. Cognition and memory are normal.   Nursing note and vitals reviewed.      Vents:       Lines/Drains/Airways     Peripheral Intravenous Line                 Peripheral IV - Single Lumen 10/18/18 2045 Left Forearm less than 1 day                Significant Labs:    CBC/Anemia Profile:  Recent Labs   Lab 10/17/18  0425 10/18/18  0537   WBC 8.45 6.39   HGB 12.3* 12.0*   HCT 36.3* 35.8*    344   MCV 93 92   RDW 12.5 12.3        Chemistries:  Recent Labs   Lab 10/17/18  0425 10/18/18  0537   * 134*   K 4.2 4.1    104   CO2 23 22*   BUN 14 15   CREATININE 0.7 0.7   CALCIUM 8.8 8.8       All pertinent labs within the past 24 hours have been reviewed.    Significant Imaging:  I have reviewed all pertinent imaging results/findings within the past 24 hours.

## 2018-10-19 NOTE — PT/OT/SLP PROGRESS
"Physical Therapy Treatment    Patient Name:  Elvis Thompson   MRN:  [id number]    Recommendations:     Discharge Recommendations:  home with home health, home health PT   Discharge Equipment Recommendations: none   Barriers to discharge: None    Assessment:     Elvis Thompson is a 85 y.o. male admitted with a medical diagnosis of Debility.  He presents with the following impairments/functional limitations:  weakness, impaired endurance, impaired self care skills, impaired functional mobilty, gait instability, decreased upper extremity function, decreased lower extremity function, impaired balance, decreased safety awareness . Pt progressing well with POC goals.    Rehab Prognosis:  Good; patient would benefit from acute skilled PT services to address these deficits and reach maximum level of function.      Recent Surgery: * No surgery found *      Plan:     During this hospitalization, patient to be seen (1-2x/day Monday-Friday; PRN Weekends/Holidays) to address the above listed problems via gait training, therapeutic activities, therapeutic exercises  · Plan of Care Expires:  (upon D/C from facility)   Plan of Care Reviewed with: patient    Subjective     Communicated with patient prior to session.  Patient found supine in bed upon PT entry to room, agreeable to treatment.      Chief Complaint: Pt with no c/o this AM  Patient comments/goals: :"Go home"  Pain/Comfort:  · Pain Rating 1: 0/10    Patients cultural, spiritual, Moravian conflicts given the current situation:      Objective:     Patient found with: telemetry     General Precautions: Standard, fall   Orthopedic Precautions:N/A   Braces: N/A     Functional Mobility:  · Bed Mobility:     · Rolling Left:  moderate assistance  · Rolling Right: moderate assistance  · Scooting: moderate assistance  · Supine to Sit: moderate assistance  · Sit to Supine: moderate assistance  · Transfers:     · Sit to Stand:  minimum assistance with rolling walker  · Bed to " Chair: minimum assistance with  rolling walker  using  Step Transfer  · Gait: Gait training x 115 feet with RW and CGA with cueing for upright posturing and proper use of AD to decrease pt fall risk.       AM-PAC 6 CLICK MOBILITY  Turning over in bed (including adjusting bedclothes, sheets and blankets)?: 2  Sitting down on and standing up from a chair with arms (e.g., wheelchair, bedside commode, etc.): 3  Moving from lying on back to sitting on the side of the bed?: 2  Moving to and from a bed to a chair (including a wheelchair)?: 3  Need to walk in hospital room?: 3  Climbing 3-5 steps with a railing?: 1  Basic Mobility Total Score: 14     Patient left up in chair with all lines intact, call button in reach and NSG notified..    GOALS:   Multidisciplinary Problems     Physical Therapy Goals        Problem: Physical Therapy Goal    Goal Priority Disciplines Outcome Goal Variances Interventions   Physical Therapy Goal     PT, PT/OT Ongoing (interventions implemented as appropriate)     Description:  Goals to be met by: 10/30/2018     Patient will increase functional independence with mobility by performin. Supine to sit with Stand-by Assistance  2. Sit to supine with Stand-by Assistance  3. Rolling to Left and Right with Stand-by Assistance.  4. Sit to stand transfer with CGA  5. Bed to chair transfer with Contact Guard Assistance using Rolling Walker  6. Gait  x 100 feet with Contact Guard Assistance using Rolling Walker.                       Time Tracking:     PT Received On: 10/19/18  PT Start Time: 1052     PT Stop Time: 1115  PT Total Time (min): 23 min     Billable Minutes: Gait Training 13 minutes and Therapeutic Activity 10 minutes    Treatment Type: Treatment  PT/PTA: PT           Agatha Romo, PT  10/19/2018

## 2018-10-19 NOTE — PLAN OF CARE
Problem: Patient Care Overview  Goal: Plan of Care Review  Outcome: Ongoing (interventions implemented as appropriate)  Patient slept on and off. Complaints of burning pain to left side lower abd/groin area above surgical site. 12 beat run V-Tach last night, patient denies chest pain or SOB. EKG done. Afib on telemetry. Will consult [facility] in am. Will continue to monitor. Instructed to call for needs/asst.

## 2018-10-19 NOTE — SUBJECTIVE & OBJECTIVE
Interval History: not feeling well     Objective:     Vital Signs (Most Recent):  Temp: 97.4 °F (36.3 °C) (10/19/18 0735)  Pulse: 73 (10/19/18 0815)  Resp: 16 (10/19/18 0735)  BP: (!) 119/56 (10/19/18 0735)  SpO2: 95 % (10/19/18 0735) Vital Signs (24h Range):  Temp:  [96.7 °F (35.9 °C)-98.7 °F (37.1 °C)] 97.4 °F (36.3 °C)  Pulse:  [61-86] 73  Resp:  [16-24] 16  SpO2:  [94 %-98 %] 95 %  BP: (119-135)/(56-66) 119/56     Weight: 87.3 kg (192 lb 7.4 oz)  Body mass index is 32.03 kg/m².      Intake/Output Summary (Last 24 hours) at 10/19/2018 0939  Last data filed at 10/18/2018 2200  Gross per 24 hour   Intake 200 ml   Output 1 ml   Net 199 ml       Physical Exam   Constitutional: He is oriented to person, place, and time. He appears well-developed and well-nourished. He is cooperative.  Non-toxic appearance. He does not appear ill. No distress.   HENT:   Head: Normocephalic and atraumatic.   Right Ear: Hearing, tympanic membrane, external ear and ear canal normal.   Left Ear: Hearing, tympanic membrane, external ear and ear canal normal.   Nose: Nose normal. No mucosal edema, rhinorrhea or nasal deformity. No epistaxis. Right sinus exhibits no maxillary sinus tenderness and no frontal sinus tenderness. Left sinus exhibits no maxillary sinus tenderness and no frontal sinus tenderness.   Mouth/Throat: Uvula is midline, oropharynx is clear and moist and mucous membranes are normal. No trismus in the jaw. Normal dentition. No uvula swelling. No posterior oropharyngeal erythema.   Eyes: Conjunctivae and lids are normal. No scleral icterus.   Sclera clear bilat   Neck: Trachea normal, full passive range of motion without pain and phonation normal. Neck supple.   Cardiovascular: Normal rate, regular rhythm, normal heart sounds, intact distal pulses and normal pulses.   Pulmonary/Chest: He is in respiratory distress (mild to moderate). He has decreased breath sounds in the right upper field, the right middle field, the right  lower field, the left upper field, the left middle field and the left lower field. He has wheezes in the right middle field, the right lower field, the left middle field and the left lower field. He has rhonchi in the right middle field, the right lower field, the left middle field and the left lower field.   Abdominal: Soft. Normal appearance and bowel sounds are normal. He exhibits no distension. There is no tenderness.   Musculoskeletal: Normal range of motion. He exhibits no edema or deformity.   Neurological: He is alert and oriented to person, place, and time. He exhibits normal muscle tone. Coordination normal.   Skin: Skin is warm, dry and intact. He is not diaphoretic. No pallor.   Psychiatric: He has a normal mood and affect. His speech is normal and behavior is normal. Judgment and thought content normal. Cognition and memory are normal.   Nursing note and vitals reviewed.      Vents:       Lines/Drains/Airways     Peripheral Intravenous Line                 Peripheral IV - Single Lumen 10/18/18 2045 Left Forearm less than 1 day                Significant Labs:    CBC/Anemia Profile:  Recent Labs   Lab 10/18/18  0537 10/19/18  0415   WBC 6.39 5.73   HGB 12.0* 11.5*   HCT 35.8* 34.2*    309   MCV 92 93   RDW 12.3 12.5        Chemistries:  Recent Labs   Lab 10/18/18  0537 10/19/18  0415   * 135*   K 4.1 3.9    103   CO2 22* 23   BUN 15 16   CREATININE 0.7 0.7   CALCIUM 8.8 8.5*   MG  --  1.8       All pertinent labs within the past 24 hours have been reviewed.    Significant Imaging:  I have reviewed all pertinent imaging results/findings within the past 24 hours.

## 2018-10-19 NOTE — NURSING
[facility]. Patient tolerated well. Report in chart. Called Dr. Mcintosh office to make aware.

## 2018-10-19 NOTE — PROGRESS NOTES
[facility]  Pulmonology  Progress Note    Patient Name: Elvis Thompson  MRN: [id number]  Admission Date: 10/15/2018  Hospital Length of Stay: 3 days  Code Status: Full Code  Attending Provider: Makeda Pham MD  Primary Care Provider: Regulo Martínez MD   Principal Problem: Debility    Subjective:     Interval History: tired breathing good side of abdomen stinging     Objective:     Vital Signs (Most Recent):  Temp: 97.5 °F (36.4 °C) (10/18/18 1947)  Pulse: 70 (10/18/18 2000)  Resp: 16 (10/18/18 2000)  BP: 132/60 (10/18/18 1947)  SpO2: 95 % (10/18/18 2000) Vital Signs (24h Range):  Temp:  [97.4 °F (36.3 °C)-98.7 °F (37.1 °C)] 97.5 °F (36.4 °C)  Pulse:  [] 70  Resp:  [14-18] 16  SpO2:  [95 %-99 %] 95 %  BP: (127-135)/(60-62) 132/60     Weight: 87.3 kg (192 lb 7.4 oz)  Body mass index is 32.03 kg/m².    No intake or output data in the 24 hours ending 10/18/18 2057    Physical Exam   Constitutional: He is oriented to person, place, and time. He appears well-developed and well-nourished. He is cooperative.  Non-toxic appearance. He does not appear ill. No distress.   HENT:   Head: Normocephalic and atraumatic.   Right Ear: Hearing, tympanic membrane, external ear and ear canal normal.   Left Ear: Hearing, tympanic membrane, external ear and ear canal normal.   Nose: Nose normal. No mucosal edema, rhinorrhea or nasal deformity. No epistaxis. Right sinus exhibits no maxillary sinus tenderness and no frontal sinus tenderness. Left sinus exhibits no maxillary sinus tenderness and no frontal sinus tenderness.   Mouth/Throat: Uvula is midline, oropharynx is clear and moist and mucous membranes are normal. No trismus in the jaw. Normal dentition. No uvula swelling. No posterior oropharyngeal erythema.   Eyes: Conjunctivae and lids are normal. No scleral icterus.   Sclera clear bilat   Neck: Trachea normal, full passive range of motion without pain and phonation normal. Neck supple.   Cardiovascular:  Normal rate, regular rhythm, normal heart sounds, intact distal pulses and normal pulses.   Pulmonary/Chest: He is in respiratory distress (mild to moderate). He has no decreased breath sounds. He has no wheezes. He has no rhonchi.   Abdominal: Soft. Normal appearance and bowel sounds are normal. He exhibits no distension. There is no hepatosplenomegaly. There is no tenderness. There is no rigidity, no rebound, no guarding, no CVA tenderness, no tenderness at McBurney's point and negative Cramer's sign. No hernia.       Musculoskeletal: Normal range of motion. He exhibits no edema or deformity.   Neurological: He is alert and oriented to person, place, and time. He exhibits normal muscle tone. Coordination normal.   Skin: Skin is warm, dry and intact. He is not diaphoretic. No pallor.   Psychiatric: He has a normal mood and affect. His speech is normal and behavior is normal. Judgment and thought content normal. Cognition and memory are normal.   Nursing note and vitals reviewed.      Vents:       Lines/Drains/Airways     Peripheral Intravenous Line                 Peripheral IV - Single Lumen 10/18/18 2045 Left Forearm less than 1 day                Significant Labs:    CBC/Anemia Profile:  Recent Labs   Lab 10/17/18  0425 10/18/18  0537   WBC 8.45 6.39   HGB 12.3* 12.0*   HCT 36.3* 35.8*    344   MCV 93 92   RDW 12.5 12.3        Chemistries:  Recent Labs   Lab 10/17/18  0425 10/18/18  0537   * 134*   K 4.2 4.1    104   CO2 23 22*   BUN 14 15   CREATININE 0.7 0.7   CALCIUM 8.8 8.8       All pertinent labs within the past 24 hours have been reviewed.    Significant Imaging:  I have reviewed all pertinent imaging results/findings within the past 24 hours.    Assessment/Plan:     Aspiration pneumonia    Stable slowly improving      Parkinson disease    stable     Incarcerated left inguinal hernia    Pain abdomen            Reuben Leach MD  Pulmonology  [facility]

## 2018-10-19 NOTE — PROGRESS NOTES
Staff Handoff    Bedside report received from ROSEMARIE Galan. VS stable. Afebrile. No distress noted. Assessment complete per flowsheet. Call bell in reach. Instructed to call for any needs. Will continue to monitor for any change in status.  Resident Handoff

## 2018-10-19 NOTE — PT/OT/SLP PROGRESS
"Physical Therapy Treatment    Patient Name:  Elvis Thompson   MRN:  [id number]    Recommendations:     Discharge Recommendations:  home with home health, home health PT   Discharge Equipment Recommendations: none   Barriers to discharge: None    Assessment:     Elvis Thompson is a 85 y.o. male admitted with a medical diagnosis of Debility.  He presents with the following impairments/functional limitations:  weakness, impaired endurance, impaired self care skills, impaired functional mobilty, decreased upper extremity function, decreased lower extremity function, impaired balance, decreased safety awareness . Pt progressing well with POC goals. Pt refused OOB activity secondary to reporting he just got back in bed.    Rehab Prognosis:  Good; patient would benefit from acute skilled PT services to address these deficits and reach maximum level of function.      Recent Surgery: * No surgery found *      Plan:     During this hospitalization, patient to be seen (1-2x/day Monday-Friday; PRN Weekends/Holidays) to address the above listed problems via gait training, therapeutic activities, therapeutic exercises  · Plan of Care Expires:  (upon D/C from facility)   Plan of Care Reviewed with: patient    Subjective     Communicated with patient prior to session.  Patient found supine in bed upon PT entry to room, agreeable to treatment.      Chief Complaint: "Go home"  Patient comments/goals: "Go home"  Pain/Comfort:  · Pain Rating 1: 0/10    Patients cultural, spiritual, Muslim conflicts given the current situation:      Objective:     Patient found with: telemetry     General Precautions: Standard, fall   Orthopedic Precautions:N/A   Braces: N/A     AM-PAC 6 CLICK MOBILITY  Turning over in bed (including adjusting bedclothes, sheets and blankets)?: 2  Sitting down on and standing up from a chair with arms (e.g., wheelchair, bedside commode, etc.): 3  Moving from lying on back to sitting on the side of the bed?: " 2  Moving to and from a bed to a chair (including a wheelchair)?: 3  Need to walk in hospital room?: 3  Climbing 3-5 steps with a railing?: 1  Basic Mobility Total Score: 14       Therapeutic Activities and Exercises:   Therapeutic exercises performed on BLE supine in bed at all joints in available planes of motion with rest breaks as needed to increase strength and endurance with all gross mobility skills.     Patient left supine with all lines intact, call button in reach and NSG notified..    GOALS:   Multidisciplinary Problems     Physical Therapy Goals        Problem: Physical Therapy Goal    Goal Priority Disciplines Outcome Goal Variances Interventions   Physical Therapy Goal     PT, PT/OT Ongoing (interventions implemented as appropriate)     Description:  Goals to be met by: 10/30/2018     Patient will increase functional independence with mobility by performin. Supine to sit with Stand-by Assistance  2. Sit to supine with Stand-by Assistance  3. Rolling to Left and Right with Stand-by Assistance.  4. Sit to stand transfer with CGA  5. Bed to chair transfer with Contact Guard Assistance using Rolling Walker  6. Gait  x 100 feet with Contact Guard Assistance using Rolling Walker.                       Time Tracking:     PT Received On: 10/19/18  PT Start Time: 1300     PT Stop Time: 1315  PT Total Time (min): 15 min     Billable Minutes: Therapeutic Exercise 15 minutes    Treatment Type: Treatment  PT/PTA: PT           Agatha Romo, PT  10/19/2018

## 2018-10-19 NOTE — PROGRESS NOTES
Staff Handoff  Dr Ring notified of 12 beat run of V-tach. Patient denies chest pain, SOB, only c/o unrelieved burning pain to left side above hernia surgical site. Labs and history reviewed. New orders noted.    Resident Handoff

## 2018-10-19 NOTE — PROGRESS NOTES
[facility] Medicine  Progress Note    Patient Name: Elvis Thompson  MRN: [id number]  Patient Class: IP- Swing   Admission Date: 10/15/2018  Length of Stay: 4 days  Attending Physician: Jorden Brown MD  Primary Care Provider: Regulo Martínez MD        Subjective:     Principal Problem:Debility    HPI:  85 to male patient was admitted originally for asp pneumonia and + blood cultures. He is still on augmentin for the  Pneumonia. Not coughing, no need for O2 supplementation. He is also on cipro for ecoli bacteremia. No fever. No elevated WBC. Repeat blood cultures NGTD. He was placed on SWING for  Debility. He is doing well. Goal; Gait  x 50 feet with Minimal Assistance using Rolling Walker. He is at goal, will need to reassess his goals for functioning at [facility] with minimal help.     Hospital Course:  10/19 Patient on SWING.  pt ambulated 60feet with RW with PT yesterday am; refused PT in PM; goal is now 100feet with RW,   Did have BM yesterday; still noting some burning pain to left side. Palpated area; no significant tenderness but c/o pain to nurse after exam; nirav order Ultracet and get U/A tp rule out UTI/stone   Notes to have some NSVT on monitor; Dr. Mcintosh evaluating; planned for PPM interrogation today and Echo; started on amiodarone.   Already anti-coagulated for PAF; INR therapeutic; 2.4; monitor daily.     Past Medical History:   Diagnosis Date    Anticoagulant long-term use     Bradycardia     HTN (hypertension)     Parkinson disease 03/24/2017    Pulmonary embolism     Shingles        Past Surgical History:   Procedure Laterality Date    CARDIAC PACEMAKER PLACEMENT      cataract      FOOT SURGERY      HAND SURGERY      HERNIA REPAIR      KNEE SURGERY      right knee replacement    MYELOGRAM N/A 3/27/2017    [facility]    PROSTATE SURGERY      REPAIR, HERNIA, INGUINAL WITH PROLENE HERNIA SYSTEM (EXTENDED VERSION) Left  10/4/2018    Performed by Raman Garcia MD at [facility] OR       Review of patient's allergies indicates:   Allergen Reactions    Iodine and iodide containing products Other (See Comments)    Codeine Other (See Comments)     Insomnia      Morphine Nausea And Vomiting       No current facility-administered medications on file prior to encounter.      Current Outpatient Medications on File Prior to Encounter   Medication Sig    amlodipine (NORVASC) 5 MG tablet Take 5 mg by mouth once daily.    carbidopa-levodopa  mg (SINEMET)  mg per tablet Take 1 tablet by mouth 4 (four) times daily.    celecoxib (CELEBREX) 200 MG capsule Take 200 mg by mouth daily as needed for Pain.     furosemide (LASIX) 20 MG tablet Take 20 mg by mouth daily as needed.     warfarin (COUMADIN) 2 MG tablet Take 1 mg by mouth Daily.      Family History     Problem Relation (Age of Onset)    Heart disease Mother        Tobacco Use    Smoking status: Never Smoker    Smokeless tobacco: Never Used   Substance and Sexual Activity    Alcohol use: No    Drug use: Not on file    Sexual activity: Not Currently     Review of Systems   Constitutional: Negative for chills and fever.   HENT: Negative for congestion, ear pain, postnasal drip, rhinorrhea, sore throat and trouble swallowing.    Eyes: Positive for visual disturbance (right eye blurred vision, chornic due to retina issue). Negative for redness and itching.   Respiratory: Negative for cough, shortness of breath and wheezing.    Cardiovascular: Negative for chest pain and palpitations.   Gastrointestinal: Positive for abdominal pain. Negative for diarrhea, nausea and vomiting.   Genitourinary: Negative for difficulty urinating, dysuria and frequency.   Skin: Negative for rash.   Neurological: Positive for weakness (generalized). Negative for headaches.     Objective:     Vital Signs (Most Recent):  Temp: 96.7 °F (35.9 °C) (10/19/18 0421)  Pulse: 86 (10/19/18 0718)  Resp: 17  (10/19/18 0718)  BP: 119/61 (10/19/18 0421)  SpO2: 97 % (10/19/18 0718) Vital Signs (24h Range):  Temp:  [96.7 °F (35.9 °C)-98.7 °F (37.1 °C)] 96.7 °F (35.9 °C)  Pulse:  [] 86  Resp:  [14-24] 17  SpO2:  [94 %-98 %] 97 %  BP: (119-135)/(56-66) 119/61     Weight: 87.3 kg (192 lb 7.4 oz)  Body mass index is 32.03 kg/m².    Physical Exam   Constitutional: He is oriented to person, place, and time. He appears well-developed and well-nourished.   HENT:   Head: Normocephalic and atraumatic.   Nose: Nose normal.   Mouth/Throat: Oropharynx is clear and moist.   Eyes: Conjunctivae and EOM are normal. Pupils are equal, round, and reactive to light.   Neck: Normal range of motion. Neck supple. No JVD present. No thyromegaly present.   Cardiovascular: Normal rate, regular rhythm, normal heart sounds and intact distal pulses.   Pulmonary/Chest: Effort normal and breath sounds normal.   Abdominal: Soft. Bowel sounds are normal. He exhibits no distension and no mass. There is tenderness. There is no guarding.   Abd exam normal.  No rash.  No tenderness to palpation.  Suspect post op pain.  Groin wound looks great.     Musculoskeletal: Normal range of motion. He exhibits no edema.   Lymphadenopathy:     He has no cervical adenopathy.   Neurological: He is alert and oriented to person, place, and time. He has normal reflexes. No cranial nerve deficit.   Skin: Skin is warm and dry. No rash noted. No pallor.   Psychiatric: He has a normal mood and affect.   Nursing note and vitals reviewed.        CRANIAL NERVES     CN III, IV, VI   Pupils are equal, round, and reactive to light.  Extraocular motions are normal.        Significant Labs: Significant Labs:   CBC:       Recent Labs   Lab  10/15/18   0531   WBC  7.91   HGB  12.2*   HCT  36.1*   PLT  336      CMP:        Recent Labs   Lab  10/14/18   0622  10/15/18   0531   NA  136  136   K  4.4  4.4   CL  104  104   CO2  27  24   GLU  106  112*   BUN  10  11   CREATININE  0.7  0.8    CALCIUM  8.8  9.0   ANIONGAP  5*  8   EGFRNONAA  >60  >60               Lab Results   Component Value Date     INR 1.8 (H) 10/15/2018     INR 2.5 (H) 10/14/2018     INR 2.7 (H) 10/13/2018      Lipase 24  Mag 1.8  Lactic acid 2.1>1.2      Recent Labs   Lab  10/09/18   1639   TROPONINI  0.023      BNP      Recent Labs   Lab  10/09/18   1625   BNP  230*      Flu negative  Blood cultures 10/9 - gram + TURICELLA OTITIDIS  Blood cultures 10/9 - gram negative ecoli sensitive to cipro  Blood cultures 10/9 - gram negative BACTEROIDES FRAGILIS   Blood cultures 10/9 NGTD     Significant Imaging:      10/10 CXR-No significant interval change of the bilateral pleural effusions and bilateral lower lobe alveolar infiltrates.  Cardiomegaly and suspected interstitial edema..  10/11/18 CXR- Interval worsening of the cardiopulmonary findings.  There is cardiomegaly with pulmonary vascular congestion and pulmonary edema.  Bilateral pleural effusions are suspected.  Left-sided pacemaker device remains in place.  The skeletal structures are intact.  10/11/18 KUB- Contrast material is seen in the colon from recent CT scan.  There is some gaseous distention of the bowel without evidence for definite obstruction.  No free air is seen.  Vascular calcifications are noted.              Assessment/Plan:      * Debility    Cont PT/OT    With recent hospitalization from surgery and now bacteremia/asp pneumonia he has became debilitated. Discussing with family swing vs nursing home placement for PT/OT. Case management and  working with family       10/19 Ambulated 60ft yesterday; goal 100ft        NSVT (nonsustained ventricular tachycardia)    Episode last pm; Dr. Mcintosh planning PPM interrogation with rep;   Echo today  Added amiodarone        Chronic anticoagulation    Coumadin held 10/14. Since then has been low although restarting home dose. Will give double dose tonight and resume 1mg daily dose tomorrow, cont to check INR  daily  10/19 INR 2.4 this am; coumadin 1m today; monitor closley with Cipro onboard     Bacteremia    IV cipro for UTI/bacteremia (stop date 10/21/18)       Aspiration pneumonia    S/p vanc and zosyn on acute bed  Cont Augmentin for aspiration (stop date 10/17/18)        Diarrhea of presumed infectious origin    10/14: stopped all stool cx ordered on admit  Resolved now and having constipation  KUB without obstruction    Has senna, colace and m,iralax, no BM, check for impaction  Repeated KUB yesterday; no obstruction or impaction noted      Parkinson disease    Cont home meds        Incarcerated left inguinal hernia    S/p surgical repair, general surgery still following  Ultracet for pain.  Consider using gabapentin if the burning pain persists.       VTE Risk Mitigation (From admission, onward)        Ordered     warfarin (COUMADIN) tablet 1 mg  Daily      10/18/18 1026              Jorden Brown MD  Department of Hospital Medicine   [facility]

## 2018-10-20 LAB
ANION GAP SERPL CALC-SCNC: 7 MMOL/L
BASOPHILS # BLD AUTO: 0.04 K/UL
BASOPHILS NFR BLD: 0.6 %
BUN SERPL-MCNC: 16 MG/DL
CALCIUM SERPL-MCNC: 8.9 MG/DL
CHLORIDE SERPL-SCNC: 102 MMOL/L
CO2 SERPL-SCNC: 24 MMOL/L
CREAT SERPL-MCNC: 0.7 MG/DL
DIFFERENTIAL METHOD: ABNORMAL
EOSINOPHIL # BLD AUTO: 0.2 K/UL
EOSINOPHIL NFR BLD: 2.9 %
ERYTHROCYTE [DISTWIDTH] IN BLOOD BY AUTOMATED COUNT: 12.4 %
EST. GFR  (AFRICAN AMERICAN): >60 ML/MIN/1.73 M^2
EST. GFR  (NON AFRICAN AMERICAN): >60 ML/MIN/1.73 M^2
GLUCOSE SERPL-MCNC: 118 MG/DL
HCT VFR BLD AUTO: 35.7 %
HGB BLD-MCNC: 12.1 G/DL
INR PPP: 2.2
LYMPHOCYTES # BLD AUTO: 1.1 K/UL
LYMPHOCYTES NFR BLD: 15.5 %
MCH RBC QN AUTO: 31.5 PG
MCHC RBC AUTO-ENTMCNC: 33.9 G/DL
MCV RBC AUTO: 93 FL
MONOCYTES # BLD AUTO: 0.6 K/UL
MONOCYTES NFR BLD: 8.5 %
NEUTROPHILS # BLD AUTO: 5 K/UL
NEUTROPHILS NFR BLD: 72.5 %
PLATELET # BLD AUTO: 332 K/UL
PMV BLD AUTO: 9.3 FL
POTASSIUM SERPL-SCNC: 4.2 MMOL/L
PROTHROMBIN TIME: 21.4 SEC
RBC # BLD AUTO: 3.84 M/UL
SODIUM SERPL-SCNC: 133 MMOL/L
WBC # BLD AUTO: 6.95 K/UL

## 2018-10-20 PROCEDURE — 25000003 PHARM REV CODE 250: Performed by: NURSE PRACTITIONER

## 2018-10-20 PROCEDURE — 97530 THERAPEUTIC ACTIVITIES: CPT

## 2018-10-20 PROCEDURE — 94640 AIRWAY INHALATION TREATMENT: CPT

## 2018-10-20 PROCEDURE — 85025 COMPLETE CBC W/AUTO DIFF WBC: CPT

## 2018-10-20 PROCEDURE — 25000003 PHARM REV CODE 250: Performed by: INTERNAL MEDICINE

## 2018-10-20 PROCEDURE — 85610 PROTHROMBIN TIME: CPT

## 2018-10-20 PROCEDURE — 25000003 PHARM REV CODE 250: Performed by: FAMILY MEDICINE

## 2018-10-20 PROCEDURE — 63600175 PHARM REV CODE 636 W HCPCS: Performed by: INTERNAL MEDICINE

## 2018-10-20 PROCEDURE — 97116 GAIT TRAINING THERAPY: CPT

## 2018-10-20 PROCEDURE — 99232 PR SUBSEQUENT HOSPITAL CARE,LEVL II: ICD-10-PCS | Mod: ,,, | Performed by: FAMILY MEDICINE

## 2018-10-20 PROCEDURE — 94799 UNLISTED PULMONARY SVC/PX: CPT

## 2018-10-20 PROCEDURE — 80048 BASIC METABOLIC PNL TOTAL CA: CPT

## 2018-10-20 PROCEDURE — 99900035 HC TECH TIME PER 15 MIN (STAT)

## 2018-10-20 PROCEDURE — 94664 DEMO&/EVAL PT USE INHALER: CPT

## 2018-10-20 PROCEDURE — 99232 SBSQ HOSP IP/OBS MODERATE 35: CPT | Mod: ,,, | Performed by: FAMILY MEDICINE

## 2018-10-20 PROCEDURE — 11000004 HC SNF PRIVATE

## 2018-10-20 PROCEDURE — 25000242 PHARM REV CODE 250 ALT 637 W/ HCPCS: Performed by: INTERNAL MEDICINE

## 2018-10-20 PROCEDURE — 94761 N-INVAS EAR/PLS OXIMETRY MLT: CPT

## 2018-10-20 PROCEDURE — 36415 COLL VENOUS BLD VENIPUNCTURE: CPT

## 2018-10-20 RX ORDER — TRAMADOL HYDROCHLORIDE AND ACETAMINOPHEN 37.5; 325 MG/1; MG/1
1 TABLET, FILM COATED ORAL EVERY 4 HOURS PRN
Status: DISCONTINUED | OUTPATIENT
Start: 2018-10-20 | End: 2018-10-25 | Stop reason: HOSPADM

## 2018-10-20 RX ORDER — GABAPENTIN 100 MG/1
100 CAPSULE ORAL 3 TIMES DAILY
Status: DISCONTINUED | OUTPATIENT
Start: 2018-10-20 | End: 2018-10-25 | Stop reason: HOSPADM

## 2018-10-20 RX ADMIN — CARBIDOPA AND LEVODOPA 1 TABLET: 25; 100 TABLET ORAL at 09:10

## 2018-10-20 RX ADMIN — TRAMADOL HYDROCHLORIDE AND ACETAMINOPHEN 1 TABLET: 37.5; 325 TABLET, FILM COATED ORAL at 07:10

## 2018-10-20 RX ADMIN — CELECOXIB 200 MG: 200 CAPSULE ORAL at 09:10

## 2018-10-20 RX ADMIN — LEVALBUTEROL 1.25 MG: 1.25 SOLUTION, CONCENTRATE RESPIRATORY (INHALATION) at 07:10

## 2018-10-20 RX ADMIN — SENNA 8.6 MG: 8.6 TABLET, COATED ORAL at 09:10

## 2018-10-20 RX ADMIN — GABAPENTIN 100 MG: 100 CAPSULE ORAL at 03:10

## 2018-10-20 RX ADMIN — AMIODARONE HYDROCHLORIDE 400 MG: 200 TABLET ORAL at 09:10

## 2018-10-20 RX ADMIN — GABAPENTIN 100 MG: 100 CAPSULE ORAL at 09:10

## 2018-10-20 RX ADMIN — POLYETHYLENE GLYCOL 3350 17 G: 17 POWDER, FOR SOLUTION ORAL at 09:10

## 2018-10-20 RX ADMIN — CIPROFLOXACIN 400 MG: 2 INJECTION, SOLUTION INTRAVENOUS at 07:10

## 2018-10-20 RX ADMIN — IPRATROPIUM BROMIDE 0.5 MG: 0.5 SOLUTION RESPIRATORY (INHALATION) at 01:10

## 2018-10-20 RX ADMIN — LEVALBUTEROL 1.25 MG: 1.25 SOLUTION, CONCENTRATE RESPIRATORY (INHALATION) at 01:10

## 2018-10-20 RX ADMIN — DOCUSATE SODIUM 100 MG: 100 CAPSULE, LIQUID FILLED ORAL at 09:10

## 2018-10-20 RX ADMIN — IPRATROPIUM BROMIDE 0.5 MG: 0.5 SOLUTION RESPIRATORY (INHALATION) at 07:10

## 2018-10-20 RX ADMIN — WARFARIN SODIUM 1 MG: 1 TABLET ORAL at 04:10

## 2018-10-20 RX ADMIN — AMLODIPINE BESYLATE 5 MG: 5 TABLET ORAL at 09:10

## 2018-10-20 RX ADMIN — TRAMADOL HYDROCHLORIDE AND ACETAMINOPHEN 1 TABLET: 37.5; 325 TABLET, FILM COATED ORAL at 03:10

## 2018-10-20 RX ADMIN — CARBIDOPA AND LEVODOPA 1 TABLET: 25; 100 TABLET ORAL at 04:10

## 2018-10-20 RX ADMIN — ACETAMINOPHEN 650 MG: 325 TABLET ORAL at 02:10

## 2018-10-20 RX ADMIN — TRAMADOL HYDROCHLORIDE AND ACETAMINOPHEN 1 TABLET: 37.5; 325 TABLET, FILM COATED ORAL at 11:10

## 2018-10-20 RX ADMIN — PANTOPRAZOLE SODIUM 40 MG: 40 TABLET, DELAYED RELEASE ORAL at 09:10

## 2018-10-20 RX ADMIN — CARBIDOPA AND LEVODOPA 1 TABLET: 25; 100 TABLET ORAL at 01:10

## 2018-10-20 NOTE — SUBJECTIVE & OBJECTIVE
Interval [id number]: feels better     Objective:     Vital Signs (Most Recent):  Temp: 96.2 °F (35.7 °C) (10/20/18 0737)  Pulse: 63 (10/20/18 0737)  Resp: 17 (10/20/18 0737)  BP: (!) 144/64 (10/20/18 0737)  SpO2: 96 % (10/20/18 0737) Vital Signs (24h Range):  Temp:  [96.1 °F (35.6 °C)-97.6 °F (36.4 °C)] 96.2 °F (35.7 °C)  Pulse:  [59-84] 63  Resp:  [14-18] 17  SpO2:  [94 %-98 %] 96 %  BP: (101-144)/(53-67) 144/64     Weight: 87 kg (191 lb 12.8 oz)  Body mass index is 31.92 kg/m².      Intake/Output Summary (Last 24 hours) at 10/20/2018 0834  Last data filed at 10/19/2018 2019  Gross per 24 hour   Intake 540 ml   Output --   Net 540 ml       Physical Exam   Pulmonary/Chest: He has no decreased breath sounds. He has no wheezes. He has rhonchi in the right lower field.               Vents:       Lines/Drains/Airways     Peripheral Intravenous Line                 Peripheral IV - Single Lumen 10/18/18 2045 Left Forearm 1 day                Significant Labs:    CBC/Anemia Profile:  Recent Labs   Lab 10/19/18  0415 10/20/18  0602   WBC 5.73 6.95   HGB 11.5* 12.1*   HCT 34.2* 35.7*    332   MCV 93 93   RDW 12.5 12.4        Chemistries:  Recent Labs   Lab 10/19/18  0415 10/20/18  0602   * 133*   K 3.9 4.2    102   CO2 23 24   BUN 16 16   CREATININE 0.7 0.7   CALCIUM 8.5* 8.9   MG 1.8  --        All pertinent labs within the past 24 hours have been reviewed.    Significant Imaging:  I have reviewed all pertinent imaging results/findings within the past 24 hours.

## 2018-10-20 NOTE — PROGRESS NOTES
[facility] Medicine  Progress Note    Patient Name: Elvis Thompson  MRN: [id number]  Patient Class: IP- Swing   Admission Date: 10/15/2018  Length of Stay: 5 days  Attending Physician: Jorden Brown MD  Primary Care Provider: Regulo Martínez MD        Subjective:     Principal Problem:Debility    HPI:  85 to male patient was admitted originally for asp pneumonia and + blood cultures. He is still on augmentin for the  Pneumonia. Not coughing, no need for O2 supplementation. He is also on cipro for ecoli bacteremia. No fever. No elevated WBC. Repeat blood cultures NGTD. He was placed on SWING for  Debility. He is doing well. Goal; Gait  x 50 feet with Minimal Assistance using Rolling Walker. He is at goal, will need to reassess his goals for functioning at [facility] with minimal help.     Hospital Course:  10/19 Patient on SWING.  pt ambulated 60feet with RW with PT yesterday am; refused PT in PM; goal is now 100feet with RW,   Did have BM yesterday; still noting some burning pain to left side. Palpated area; no significant tenderness but c/o pain to nurse after exam; nirav order Ultracet and get U/A tp rule out UTI/stone   Notes to have some NSVT on monitor; Dr. Mcintosh evaluating; planned for PPM interrogation today and Echo; started on amiodarone.   Already anti-coagulated for PAF; INR therapeutic; 2.4; monitor daily.     10/20.  Patient's main complaint is burning pain in the left side.  I increased his ultrasound at to 1 pill every 4 hr, I started gabapentin 100 mg 3 times daily.  I would like surgery to re-evaluate the patient because the pain is in the area of his recent hernia repair.    Past Medical History:   Diagnosis Date    Anticoagulant long-term use     Bradycardia     HTN (hypertension)     Parkinson disease 03/24/2017    Pulmonary embolism     Shingles        Past Surgical History:   Procedure Laterality Date    CARDIAC PACEMAKER PLACEMENT      cataract      FOOT  SURGERY      HAND SURGERY      HERNIA REPAIR      KNEE SURGERY      right knee replacement    MYELOGRAM N/A 3/27/2017    Performed by [facility] at [facility] OR    PROSTATE SURGERY      REPAIR, HERNIA, INGUINAL WITH PROLENE HERNIA SYSTEM (EXTENDED VERSION) Left 10/4/2018    Performed by Raman Garcia MD at [facility]       Review of patient's allergies indicates:   Allergen Reactions    Iodine and iodide containing products Other (See Comments)    Codeine Other (See Comments)     Insomnia      Morphine Nausea And Vomiting       No current facility-administered medications on file prior to encounter.      Current Outpatient Medications on File Prior to Encounter   Medication Sig    amlodipine (NORVASC) 5 MG tablet Take 5 mg by mouth once daily.    carbidopa-levodopa  mg (SINEMET)  mg per tablet Take 1 tablet by mouth 4 (four) times daily.    celecoxib (CELEBREX) 200 MG capsule Take 200 mg by mouth daily as needed for Pain.     furosemide (LASIX) 20 MG tablet Take 20 mg by mouth daily as needed.     warfarin (COUMADIN) 2 MG tablet Take 1 mg by mouth Daily.      Family History     Problem Relation (Age of Onset)    Heart disease Mother        Tobacco Use    Smoking status: Never Smoker    Smokeless tobacco: Never Used   Substance and Sexual Activity    Alcohol use: No    Drug use: Not on file    Sexual activity: Not Currently     Review of Systems   Constitutional: Negative for chills and fever.   HENT: Negative for congestion, ear pain, postnasal drip, rhinorrhea, sore throat and trouble swallowing.    Eyes: Positive for visual disturbance (right eye blurred vision, chornic due to retina issue). Negative for redness and itching.   Respiratory: Negative for cough, shortness of breath and wheezing.    Cardiovascular: Negative for chest pain and palpitations.   Gastrointestinal: Positive for abdominal pain. Negative for diarrhea, nausea and vomiting.   Genitourinary: Negative for  difficulty urinating, dysuria and frequency.   Skin: Negative for rash.   Neurological: Positive for weakness (generalized). Negative for headaches.     Objective:     Vital Signs (Most Recent):  Temp: 96.2 °F (35.7 °C) (10/20/18 0737)  Pulse: 60 (10/20/18 1019)  Resp: 17 (10/20/18 0737)  BP: (!) 144/64 (10/20/18 0737)  SpO2: 96 % (10/20/18 0737) Vital Signs (24h Range):  Temp:  [96.1 °F (35.6 °C)-97.6 °F (36.4 °C)] 96.2 °F (35.7 °C)  Pulse:  [59-84] 60  Resp:  [14-18] 17  SpO2:  [94 %-98 %] 96 %  BP: (101-144)/(53-67) 144/64     Weight: 87 kg (191 lb 12.8 oz)  Body mass index is 31.92 kg/m².    Physical Exam   Constitutional: He is oriented to person, place, and time. He appears well-developed and well-nourished.   HENT:   Head: Normocephalic and atraumatic.   Nose: Nose normal.   Mouth/Throat: Oropharynx is clear and moist.   Eyes: Conjunctivae and EOM are normal. Pupils are equal, round, and reactive to light.   Neck: Normal range of motion. Neck supple. No JVD present. No thyromegaly present.   Cardiovascular: Normal rate, regular rhythm, normal heart sounds and intact distal pulses.   Pulmonary/Chest: Effort normal and breath sounds normal.   Abdominal: Soft. Bowel sounds are normal. He exhibits no distension and no mass. There is tenderness. There is no guarding.   Abd exam normal.  No rash.  No tenderness to palpation.  Suspect post op pain.  Groin wound looks great.     Musculoskeletal: Normal range of motion. He exhibits no edema.   Lymphadenopathy:     He has no cervical adenopathy.   Neurological: He is alert and oriented to person, place, and time. He has normal reflexes. No cranial nerve deficit.   Skin: Skin is warm and dry. No rash noted. No pallor.   Psychiatric: He has a normal mood and affect.   Nursing note and vitals reviewed.        CRANIAL NERVES     CN III, IV, VI   Pupils are equal, round, and reactive to light.  Extraocular motions are normal.        Significant Labs: Significant Labs:    CBC:       Recent Labs   Lab  10/15/18   0531   WBC  7.91   HGB  12.2*   HCT  36.1*   PLT  336      CMP:        Recent Labs   Lab  10/14/18   0622  10/15/18   0531   NA  136  136   K  4.4  4.4   CL  104  104   CO2  27  24   GLU  106  112*   BUN  10  11   CREATININE  0.7  0.8   CALCIUM  8.8  9.0   ANIONGAP  5*  8   EGFRNONAA  >60  >60               Lab Results   Component Value Date     INR 1.8 (H) 10/15/2018     INR 2.5 (H) 10/14/2018     INR 2.7 (H) 10/13/2018      Lipase 24  Mag 1.8  Lactic acid 2.1>1.2      Recent Labs   Lab  10/09/18   1639   TROPONINI  0.023      BNP      Recent Labs   Lab  10/09/18   1625   BNP  230*      Flu negative  Blood cultures 10/9 - gram + TURICELLA OTITIDIS  Blood cultures 10/9 - gram negative ecoli sensitive to cipro  Blood cultures 10/9 - gram negative BACTEROIDES FRAGILIS   Blood cultures 10/9 NGTD     Significant Imaging:      10/10 CXR-No significant interval change of the bilateral pleural effusions and bilateral lower lobe alveolar infiltrates.  Cardiomegaly and suspected interstitial edema..  10/11/18 CXR- Interval worsening of the cardiopulmonary findings.  There is cardiomegaly with pulmonary vascular congestion and pulmonary edema.  Bilateral pleural effusions are suspected.  Left-sided pacemaker device remains in place.  The skeletal structures are intact.  10/11/18 KUB- Contrast material is seen in the colon from recent CT scan.  There is some gaseous distention of the bowel without evidence for definite obstruction.  No free air is seen.  Vascular calcifications are noted.              Assessment/Plan:      * Debility    Cont PT/OT    With recent hospitalization from surgery and now bacteremia/asp pneumonia he has became debilitated. Discussing with family swing vs nursing home placement for PT/OT. Case management and  working with family       10/19 Ambulated 60ft yesterday; goal 100ft   10/20 patient is too debilitated to go back home living alone.   Family was present during the evaluation.  They all agree that he needs long-term care.  They will start working on this.       NSVT (nonsustained ventricular tachycardia)    Episode last pm; Dr. Mcintosh planning PPM interrogation with rep;   Echo today  Added amiodarone        Chronic anticoagulation    Lab Results   Component Value Date    INR 2.2 (H) 10/20/2018    INR 2.4 (H) 10/19/2018    INR 1.9 (H) 10/18/2018       Monitor INR daily       Bacteremia    IV cipro for UTI/bacteremia (stop date 10/21/18)       Aspiration pneumonia    No need for antibiotics at this stage.         Diarrhea of presumed infectious origin    10/14: stopped all stool cx ordered on admit  Resolved now and having constipation  KUB without obstruction on 10/14  Continue senna, colace and m,iralax, as needed       Parkinson disease    Cont home meds        Incarcerated left inguinal hernia    S/p surgical repair, general surgery still following  Ultracet for pain every 4 hr.  Gabapentin 100 mg p.o. t.i.d. will be started.  I will increase this as tolerated.       VTE Risk Mitigation (From admission, onward)        Ordered     warfarin (COUMADIN) tablet 1 mg  Daily      10/18/18 1021              Jorden Brown MD  Department of Hospital Medicine   [facility]

## 2018-10-20 NOTE — SUBJECTIVE & OBJECTIVE
Past Medical History:   Diagnosis Date    Anticoagulant long-term use     Bradycardia     HTN (hypertension)     Parkinson disease 03/24/2017    Pulmonary embolism     Shingles        Past Surgical History:   Procedure Laterality Date    CARDIAC PACEMAKER PLACEMENT      cataract      FOOT SURGERY      HAND SURGERY      HERNIA REPAIR      KNEE SURGERY      right knee replacement    MYELOGRAM N/A 3/27/2017    Performed by [facility] OR    PROSTATE SURGERY      REPAIR, HERNIA, INGUINAL WITH PROLENE HERNIA SYSTEM (EXTENDED VERSION) Left 10/4/2018    Performed by Raman Garcia MD at [facility] OR       Review of patient's allergies indicates:   Allergen Reactions    Iodine and iodide containing products Other (See Comments)    Codeine Other (See Comments)     Insomnia      Morphine Nausea And Vomiting       No current facility-administered medications on file prior to encounter.      Current Outpatient Medications on File Prior to Encounter   Medication Sig    amlodipine (NORVASC) 5 MG tablet Take 5 mg by mouth once daily.    carbidopa-levodopa  mg (SINEMET)  mg per tablet Take 1 tablet by mouth 4 (four) times daily.    celecoxib (CELEBREX) 200 MG capsule Take 200 mg by mouth daily as needed for Pain.     furosemide (LASIX) 20 MG tablet Take 20 mg by mouth daily as needed.     warfarin (COUMADIN) 2 MG tablet Take 1 mg by mouth Daily.      Family History     Problem Relation (Age of Onset)    Heart disease Mother        Tobacco Use    Smoking status: Never Smoker    Smokeless tobacco: Never Used   Substance and Sexual Activity    Alcohol use: No    Drug use: Not on file    Sexual activity: Not Currently     Review of Systems   Constitutional: Negative for chills and fever.   HENT: Negative for congestion, ear pain, postnasal drip, rhinorrhea, sore throat and trouble swallowing.    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 Negative for redness and itching.   Respiratory: Negative for cough, shortness of breath and wheezing.    Cardiovascular: Negative for chest pain and palpitations.   Gastrointestinal: Positive for abdominal pain. Negative for diarrhea, nausea and vomiting.   Genitourinary: Negative for difficulty urinating, dysuria and frequency.   Skin: Negative for rash.   Neurological: Positive for weakness (generalized). Negative for headaches.     Objective:     Vital Signs (Most Recent):  Temp: 96.2 °F (35.7 °C) (10/20/18 0737)  Pulse: 60 (10/20/18 1019)  Resp: 17 (10/20/18 0737)  BP: (!) 144/64 (10/20/18 0737)  SpO2: 96 % (10/20/18 0737) Vital Signs (24h Range):  Temp:  [96.1 °F (35.6 °C)-97.6 °F (36.4 °C)] 96.2 °F (35.7 °C)  Pulse:  [59-84] 60  Resp:  [14-18] 17  SpO2:  [94 %-98 %] 96 %  BP: (101-144)/(53-67) 144/64     Weight: 87 kg (191 lb 12.8 oz)  Body mass index is 31.92 kg/m².    Physical Exam   Constitutional: He is oriented to person, place, and time. He appears well-developed and well-nourished.   HENT:   Head: Normocephalic and atraumatic.   Nose: Nose normal.   Mouth/Throat: Oropharynx is clear and moist.   Eyes: Conjunctivae and EOM are normal. Pupils are equal, round, and reactive to light.   Neck: Normal range of motion. Neck supple. No JVD present. No thyromegaly present.   Cardiovascular: Normal rate, regular rhythm, normal heart sounds and intact distal pulses.   Pulmonary/Chest: Effort normal and breath sounds normal.   Abdominal: Soft. Bowel sounds are normal. He exhibits no distension and no mass. There is tenderness. There is no guarding.   Abd exam normal.  No rash.  No tenderness to palpation.  Suspect post op pain.  Groin wound looks great.     Musculoskeletal: Normal range of motion. He exhibits no edema.   Lymphadenopathy:     He has no cervical adenopathy.   Neurological: He is alert and oriented to person, place, and time. He has normal reflexes. No cranial nerve deficit.   Skin:  Skin is warm and dry. No rash noted. No pallor.   Psychiatric: He has a normal mood and affect.   Nursing note and vitals reviewed.        CRANIAL NERVES     CN III, IV, VI   Pupils are equal, round, and reactive to light.  Extraocular motions are normal.        Significant Labs: Significant Labs:   CBC:       Recent Labs   Lab  10/15/18   0531   WBC  7.91   HGB  12.2*   HCT  36.1*   PLT  336      CMP:        Recent Labs   Lab  10/14/18   0622  10/15/18   0531   NA  136  136   K  4.4  4.4   CL  104  104   CO2  27  24   GLU  106  112*   BUN  10  11   CREATININE  0.7  0.8   CALCIUM  8.8  9.0   ANIONGAP  5*  8   EGFRNONAA  >60  >60               Lab Results   Component Value Date     INR 1.8 (H) 10/15/2018     INR 2.5 (H) 10/14/2018     INR 2.7 (H) 10/13/2018      Lipase 24  Mag 1.8  Lactic acid 2.1>1.2      Recent Labs   Lab  10/09/18   1639   TROPONINI  0.023      BNP      Recent Labs   Lab  10/09/18   1625   BNP  230*      Flu negative  Blood cultures 10/9 - gram + TURICELLA OTITIDIS  Blood cultures 10/9 - gram negative ecoli sensitive to cipro  Blood cultures 10/9 - gram negative BACTEROIDES FRAGILIS   Blood cultures 10/9 NGTD     Significant Imaging:      10/10 CXR-No significant interval change of the bilateral pleural effusions and bilateral lower lobe alveolar infiltrates.  Cardiomegaly and suspected interstitial edema..  10/11/18 CXR- Interval worsening of the cardiopulmonary findings.  There is cardiomegaly with pulmonary vascular congestion and pulmonary edema.  Bilateral pleural effusions are suspected.  Left-sided pacemaker device remains in place.  The skeletal structures are intact.  10/11/18 KUB- Contrast material is seen in the colon from recent CT scan.  There is some gaseous distention of the bowel without evidence for definite obstruction.  No free air is seen.  Vascular calcifications are noted.

## 2018-10-20 NOTE — PLAN OF CARE
Problem: Patient Care Overview  Goal: Plan of Care Review  Outcome: Ongoing (interventions implemented as appropriate)  Patient aware of plan of care. VS stable. Afebrile. C/o burning sensation/pain to L lower abd/groin area, controlled with po pain medication. A-fib on tele, rate controlled. Seen by Dr. Velasquez today, will F/U in clinic after discharge. Neuro intact. IV antibiotics continued. Up with PT today, tolerated well. Free from falls/injuries. No questions or concerns at this time. Agrees with plan of care.

## 2018-10-20 NOTE — ASSESSMENT & PLAN NOTE
S/p surgical repair, general surgery still following  Ultracet for pain every 4 hr.  Gabapentin 100 mg p.o. t.i.d. will be started.  I will increase this as tolerated.

## 2018-10-20 NOTE — PROGRESS NOTES
Staff Handoff    Report received from ROSEMARIE Arenas. VS stable. Afebrile. C/o left lateral abdominal pain rated 10/10, tramadol given. Assessment complete per flowsheet. Call bell in reach. Instructed to call for any needs. Will continue to monitor for any change in status.  Resident Handoff

## 2018-10-20 NOTE — PROGRESS NOTES
[facility]  Pulmonology  Progress Note    Patient Name: Elvis Thompson  MRN: [id number]  Admission Date: 10/15/2018  Hospital Length of Stay: 5 days  Code Status: Full Code  Attending Provider: Jorden Brown MD  Primary Care Provider: Regluo Martínez MD   Principal Problem: Debility    Subjective:     Interval [id number]: feels better     Objective:     Vital Signs (Most Recent):  Temp: 96.2 °F (35.7 °C) (10/20/18 0737)  Pulse: 63 (10/20/18 0737)  Resp: 17 (10/20/18 0737)  BP: (!) 144/64 (10/20/18 0737)  SpO2: 96 % (10/20/18 0737) Vital Signs (24h Range):  Temp:  [96.1 °F (35.6 °C)-97.6 °F (36.4 °C)] 96.2 °F (35.7 °C)  Pulse:  [59-84] 63  Resp:  [14-18] 17  SpO2:  [94 %-98 %] 96 %  BP: (101-144)/(53-67) 144/64     Weight: 87 kg (191 lb 12.8 oz)  Body mass index is 31.92 kg/m².      Intake/Output Summary (Last 24 hours) at 10/20/2018 0834  Last data filed at 10/19/2018 2019  Gross per 24 hour   Intake 540 ml   Output --   Net 540 ml       Physical Exam   Pulmonary/Chest: He has no decreased breath sounds. He has no wheezes. He has rhonchi in the right lower field.               Vents:       Lines/Drains/Airways     Peripheral Intravenous Line                 Peripheral IV - Single Lumen 10/18/18 2045 Left Forearm 1 day                Significant Labs:    CBC/Anemia Profile:  Recent Labs   Lab 10/19/18  0415 10/20/18  0602   WBC 5.73 6.95   HGB 11.5* 12.1*   HCT 34.2* 35.7*    332   MCV 93 93   RDW 12.5 12.4        Chemistries:  Recent Labs   Lab 10/19/18  0415 10/20/18  0602   * 133*   K 3.9 4.2    102   CO2 23 24   BUN 16 16   CREATININE 0.7 0.7   CALCIUM 8.5* 8.9   MG 1.8  --        All pertinent labs within the past 24 hours have been reviewed.    Significant Imaging:  I have reviewed all pertinent imaging results/findings within the past 24 hours.    Assessment/Plan:     Aspiration pneumonia    cxr today   Stable slowly improving             Reuben Leach,  MD  Pulmonology  [facility]

## 2018-10-20 NOTE — PLAN OF CARE
Problem: Patient Care Overview  Goal: Plan of Care Review  Outcome: Ongoing (interventions implemented as appropriate)  Patient with some increasing complaints of burning pain on left side. Dr. Brown informed and PRN pain meds changed to Q4H, Gabapentin 100 mg TID, and to consult surgery. Patient stated some relief after PRN med administered. Daughter Erlinda called at patient request and she will be here to visit patient this AM. A-fib on Tele, VS stable, A&Ox4. Afebrile, IV Cipro given. Plan of care reviewed with patient and family.

## 2018-10-20 NOTE — ASSESSMENT & PLAN NOTE
Cont PT/OT    With recent hospitalization from surgery and now bacteremia/asp pneumonia he has became debilitated. Discussing with family swing vs nursing home placement for PT/OT. Case management and  working with family       10/19 Ambulated 60ft yesterday; goal 100ft   10/20 patient is too debilitated to go back home living alone.  Family was present during the evaluation.  They all agree that he needs long-term care.  They will start working on this.

## 2018-10-20 NOTE — PT/OT/SLP PROGRESS
Physical Therapy Treatment    Patient Name:  Elvis Thompson   MRN:  [id number]    Recommendations:     Discharge Recommendations:      Discharge Equipment Recommendations:     Barriers to discharge: None    Assessment:     Elvis Thompson is a 85 y.o. male admitted with a medical diagnosis of Debility.  He presents with the following impairments/functional limitations:  weakness, impaired endurance, impaired self care skills, impaired functional mobilty, gait instability, decreased upper extremity function, decreased lower extremity function, impaired balance, decreased safety awareness .    Rehab Prognosis:  good; patient would benefit from acute skilled PT services to address these deficits and reach maximum level of function.      Recent Surgery: * No surgery found *      Plan:     During this hospitalization, patient to be seen (1-2x/day(M-F); PRN(Sat.,Sun.)) to address the above listed problems via therapeutic activities, gait training, therapeutic exercises  · Plan of Care Expires:  (upon d/c from facility)   Plan of Care Reviewed with: patient    Subjective     Communicated with patient prior to session.  Patient found left side lying upon PT entry to room, agreeable to treatment.      Chief Complaint: left side hurts- burning  where my surgery was  Patient comments/goals:  To be able to go home soon  Pain/Comfort:  · Pain Rating 1: 3/10  · Location - Orientation 1: lower  · Location 1: flank  · Pain Addressed 1: Nurse notified  · Pain Rating Post-Intervention 1: 3/10    Patients cultural, spiritual, Yazdanism conflicts given the current situation:      Objective:     Patient found with: telemetry, peripheral IV     General Precautions: Standard, fall   Orthopedic Precautions:N/A   Braces: N/A     Functional Mobility:  ·       AM-PAC 6 CLICK MOBILITY          Therapeutic Activities and Exercises:   General and motion excursion specific  Therapeutic exercises for all four extremities:  As tolerated with good  participation by patient    Transfer activities and bed mobility training activities   Gait tasks about room for 50 ft interval , with RW and stand by assist for pt safety    Out of bed activities:  Sitting in wheel chair for endurance  training       Patient left left sidelying with all lines intact, call button in reach, bed alarm on and NSG notified..    GOALS:   Multidisciplinary Problems     Physical Therapy Goals        Problem: Physical Therapy Goal    Goal Priority Disciplines Outcome Goal Variances Interventions   Physical Therapy Goal     PT, PT/OT Ongoing (interventions implemented as appropriate)     Description:  Goals to be met by: 10/30/2018     Patient will increase functional independence with mobility by performin. Supine to sit with Stand-by Assistance  2. Sit to supine with Stand-by Assistance  3. Rolling to Left and Right with Stand-by Assistance.  4. Sit to stand transfer with CGA  5. Bed to chair transfer with Contact Guard Assistance using Rolling Walker  6. Gait  x 100 feet with Contact Guard Assistance using Rolling Walker.              Problem: Physical Therapy Goal    Goal Priority Disciplines Outcome Goal Variances Interventions   Physical Therapy Goal     PT, PT/OT      Description:  Advancing  Efforts to achieve stated goals                     Time Tracking:     PT Received On: 10/20/18  PT Start Time: 0900     PT Stop Time: 09  PT Total Time (min): 25 min     Billable Minutes: Gait Training 15 min, Therapeutic Activity 10 min and Total Time 25 min    Treatment Type: Treatment  PT/PTA: PT           Kalen Georges, PT  10/20/2018

## 2018-10-20 NOTE — ASSESSMENT & PLAN NOTE
Lab Results   Component Value Date    INR 2.2 (H) 10/20/2018    INR 2.4 (H) 10/19/2018    INR 1.9 (H) 10/18/2018       Monitor INR daily

## 2018-10-20 NOTE — NURSING
"Offered patient to play board game, word puzzle, or cards. Patient states, I'm not feeling well. I would just like to rest."  "

## 2018-10-20 NOTE — ASSESSMENT & PLAN NOTE
10/14: stopped all stool cx ordered on admit  Resolved now and having constipation  KUB without obstruction on 10/14  Continue senna, colace and m,iralax, as needed

## 2018-10-21 LAB
ANION GAP SERPL CALC-SCNC: 11 MMOL/L
BASOPHILS # BLD AUTO: 0.04 K/UL
BASOPHILS NFR BLD: 0.7 %
BUN SERPL-MCNC: 14 MG/DL
CALCIUM SERPL-MCNC: 8.7 MG/DL
CHLORIDE SERPL-SCNC: 102 MMOL/L
CO2 SERPL-SCNC: 22 MMOL/L
CREAT SERPL-MCNC: 0.7 MG/DL
DIFFERENTIAL METHOD: ABNORMAL
EOSINOPHIL # BLD AUTO: 0.4 K/UL
EOSINOPHIL NFR BLD: 7 %
ERYTHROCYTE [DISTWIDTH] IN BLOOD BY AUTOMATED COUNT: 12.7 %
EST. GFR  (AFRICAN AMERICAN): >60 ML/MIN/1.73 M^2
EST. GFR  (NON AFRICAN AMERICAN): >60 ML/MIN/1.73 M^2
GLUCOSE SERPL-MCNC: 101 MG/DL
HCT VFR BLD AUTO: 35 %
HGB BLD-MCNC: 11.5 G/DL
INR PPP: 2.1
LYMPHOCYTES # BLD AUTO: 1.3 K/UL
LYMPHOCYTES NFR BLD: 23.7 %
MCH RBC QN AUTO: 30.6 PG
MCHC RBC AUTO-ENTMCNC: 32.9 G/DL
MCV RBC AUTO: 93 FL
MONOCYTES # BLD AUTO: 0.5 K/UL
MONOCYTES NFR BLD: 9.5 %
NEUTROPHILS # BLD AUTO: 3.2 K/UL
NEUTROPHILS NFR BLD: 59.1 %
PLATELET # BLD AUTO: 290 K/UL
PMV BLD AUTO: 9 FL
POTASSIUM SERPL-SCNC: 4.4 MMOL/L
PROTHROMBIN TIME: 21.1 SEC
RBC # BLD AUTO: 3.76 M/UL
SODIUM SERPL-SCNC: 135 MMOL/L
WBC # BLD AUTO: 5.45 K/UL

## 2018-10-21 PROCEDURE — 36415 COLL VENOUS BLD VENIPUNCTURE: CPT

## 2018-10-21 PROCEDURE — 85025 COMPLETE CBC W/AUTO DIFF WBC: CPT

## 2018-10-21 PROCEDURE — 97116 GAIT TRAINING THERAPY: CPT

## 2018-10-21 PROCEDURE — 80048 BASIC METABOLIC PNL TOTAL CA: CPT

## 2018-10-21 PROCEDURE — 11000004 HC SNF PRIVATE

## 2018-10-21 PROCEDURE — 25000003 PHARM REV CODE 250: Performed by: NURSE PRACTITIONER

## 2018-10-21 PROCEDURE — 25000242 PHARM REV CODE 250 ALT 637 W/ HCPCS: Performed by: INTERNAL MEDICINE

## 2018-10-21 PROCEDURE — 94761 N-INVAS EAR/PLS OXIMETRY MLT: CPT

## 2018-10-21 PROCEDURE — 97530 THERAPEUTIC ACTIVITIES: CPT

## 2018-10-21 PROCEDURE — 25000003 PHARM REV CODE 250: Performed by: FAMILY MEDICINE

## 2018-10-21 PROCEDURE — 63600175 PHARM REV CODE 636 W HCPCS: Performed by: INTERNAL MEDICINE

## 2018-10-21 PROCEDURE — 94799 UNLISTED PULMONARY SVC/PX: CPT

## 2018-10-21 PROCEDURE — 94640 AIRWAY INHALATION TREATMENT: CPT

## 2018-10-21 PROCEDURE — 85610 PROTHROMBIN TIME: CPT

## 2018-10-21 PROCEDURE — 25000003 PHARM REV CODE 250: Performed by: INTERNAL MEDICINE

## 2018-10-21 PROCEDURE — 94664 DEMO&/EVAL PT USE INHALER: CPT

## 2018-10-21 PROCEDURE — 99900035 HC TECH TIME PER 15 MIN (STAT)

## 2018-10-21 RX ADMIN — LEVALBUTEROL 1.25 MG: 1.25 SOLUTION, CONCENTRATE RESPIRATORY (INHALATION) at 07:10

## 2018-10-21 RX ADMIN — CARBIDOPA AND LEVODOPA 1 TABLET: 25; 100 TABLET ORAL at 10:10

## 2018-10-21 RX ADMIN — TRAMADOL HYDROCHLORIDE AND ACETAMINOPHEN 1 TABLET: 37.5; 325 TABLET, FILM COATED ORAL at 11:10

## 2018-10-21 RX ADMIN — TRAMADOL HYDROCHLORIDE AND ACETAMINOPHEN 1 TABLET: 37.5; 325 TABLET, FILM COATED ORAL at 03:10

## 2018-10-21 RX ADMIN — IPRATROPIUM BROMIDE 0.5 MG: 0.5 SOLUTION RESPIRATORY (INHALATION) at 07:10

## 2018-10-21 RX ADMIN — GABAPENTIN 100 MG: 100 CAPSULE ORAL at 08:10

## 2018-10-21 RX ADMIN — CARBIDOPA AND LEVODOPA 1 TABLET: 25; 100 TABLET ORAL at 01:10

## 2018-10-21 RX ADMIN — IPRATROPIUM BROMIDE 0.5 MG: 0.5 SOLUTION RESPIRATORY (INHALATION) at 08:10

## 2018-10-21 RX ADMIN — LEVALBUTEROL 1.25 MG: 1.25 SOLUTION, CONCENTRATE RESPIRATORY (INHALATION) at 08:10

## 2018-10-21 RX ADMIN — TRAMADOL HYDROCHLORIDE AND ACETAMINOPHEN 1 TABLET: 37.5; 325 TABLET, FILM COATED ORAL at 08:10

## 2018-10-21 RX ADMIN — WARFARIN SODIUM 1 MG: 1 TABLET ORAL at 05:10

## 2018-10-21 RX ADMIN — SENNA 8.6 MG: 8.6 TABLET, COATED ORAL at 10:10

## 2018-10-21 RX ADMIN — PANTOPRAZOLE SODIUM 40 MG: 40 TABLET, DELAYED RELEASE ORAL at 10:10

## 2018-10-21 RX ADMIN — DOCUSATE SODIUM 100 MG: 100 CAPSULE, LIQUID FILLED ORAL at 10:10

## 2018-10-21 RX ADMIN — GABAPENTIN 100 MG: 100 CAPSULE ORAL at 10:10

## 2018-10-21 RX ADMIN — LEVALBUTEROL 1.25 MG: 1.25 SOLUTION, CONCENTRATE RESPIRATORY (INHALATION) at 01:10

## 2018-10-21 RX ADMIN — GABAPENTIN 100 MG: 100 CAPSULE ORAL at 03:10

## 2018-10-21 RX ADMIN — CARBIDOPA AND LEVODOPA 1 TABLET: 25; 100 TABLET ORAL at 08:10

## 2018-10-21 RX ADMIN — IPRATROPIUM BROMIDE 0.5 MG: 0.5 SOLUTION RESPIRATORY (INHALATION) at 01:10

## 2018-10-21 RX ADMIN — CELECOXIB 200 MG: 200 CAPSULE ORAL at 10:10

## 2018-10-21 RX ADMIN — CIPROFLOXACIN 400 MG: 2 INJECTION, SOLUTION INTRAVENOUS at 07:10

## 2018-10-21 RX ADMIN — CIPROFLOXACIN 400 MG: 2 INJECTION, SOLUTION INTRAVENOUS at 08:10

## 2018-10-21 RX ADMIN — AMIODARONE HYDROCHLORIDE 200 MG: 200 TABLET ORAL at 10:10

## 2018-10-21 RX ADMIN — AMLODIPINE BESYLATE 5 MG: 5 TABLET ORAL at 10:10

## 2018-10-21 RX ADMIN — TRAMADOL HYDROCHLORIDE AND ACETAMINOPHEN 1 TABLET: 37.5; 325 TABLET, FILM COATED ORAL at 07:10

## 2018-10-21 RX ADMIN — POLYETHYLENE GLYCOL 3350 17 G: 17 POWDER, FOR SOLUTION ORAL at 10:10

## 2018-10-21 RX ADMIN — CARBIDOPA AND LEVODOPA 1 TABLET: 25; 100 TABLET ORAL at 05:10

## 2018-10-21 NOTE — PROGRESS NOTES
Staff Handoff  Bedside report per ROSEMARIE Garcia. No distress noted. Room air. Tele A-Fib. Awake, alert, oriented. Family at bedside. Call bell in reach. Encouraged to call for assistance.     Resident Handoff

## 2018-10-21 NOTE — PT/OT/SLP PROGRESS
Physical Therapy Treatment    Patient Name:  Elvis Thompson   MRN:  [id number]    Recommendations:     Discharge Recommendations:  home with home health, home health PT   Discharge Equipment Recommendations: none   Barriers to discharge: None    Assessment:     Elvis Thompson is a 85 y.o. male admitted with a medical diagnosis of Debility.  He presents with the following impairments/functional limitations:  impaired endurance, impaired self care skills, impaired functional mobilty, impaired balance, gait instability, decreased lower extremity function, decreased upper extremity function, decreased safety awareness     Rehab Prognosis:  good; patient would benefit from acute skilled PT services to address these deficits and reach maximum level of function.      Recent Surgery: * No surgery found *      Plan:     During this hospitalization, patient to be seen (1-2x/day(M-F); PRN(Sat.,Sun.)) to address the above listed problems via gait training, therapeutic activities, therapeutic exercises  · Plan of Care Expires:  (upon discharge from hospital)   Plan of Care Reviewed with: patient, family    Subjective     Communicated with patient and family prior to session.  Patient found supine upon PT entry to room, agreeable to treatment.      Chief Complaint: weakness  Patient comments/goals: to be able to go home and walk better  Pain/Comfort:  · Pain Rating 1: 2/10  · Location - Side 1: Bilateral  · Location - Orientation 1: lower  · Location 1: flank  · Pain Rating Post-Intervention 1: 2/10    Patients cultural, spiritual, Mandaen conflicts given the current situation:      Objective:     Patient found with: telemetry, bed alarm, peripheral IV     General Precautions: Standard, fall   Orthopedic Precautions:N/A   Braces:       Functional Mobility:  ·       AM-PAC 6 CLICK MOBILITY          Therapeutic Activities and Exercises:   Multiple repetitions of therapeutic exercises designed to strengthen all four extremities  with appropriate patient participation observed this session.    Transfer training to promote increased participation in bed mobility,  transfer to sit / stand and utilizeh RW  to increase walking  and endurance tasks.    Gait with RW and minimal assistance for safety to complete 50 ft of distance . Up in wheelchair with family present    Patient left up in chair with all lines intact, call button in reach, NSG notified and family present..    GOALS:   Multidisciplinary Problems     Physical Therapy Goals        Problem: Physical Therapy Goal    Goal Priority Disciplines Outcome Goal Variances Interventions   Physical Therapy Goal     PT, PT/OT Ongoing (interventions implemented as appropriate)     Description:  Goals to be met by: 10/30/2018     Patient will increase functional independence with mobility by performin. Supine to sit with Stand-by Assistance  2. Sit to supine with Stand-by Assistance  3. Rolling to Left and Right with Stand-by Assistance.  4. Sit to stand transfer with CGA  5. Bed to chair transfer with Contact Guard Assistance using Rolling Walker  6. Gait  x 100 feet with Contact Guard Assistance using Rolling Walker.              Problem: Physical Therapy Goal    Goal Priority Disciplines Outcome Goal Variances Interventions   Physical Therapy Goal     PT, PT/OT      Description:  Advancing  Efforts to achieve stated goals            Problem: Physical Therapy Goal    Goal Priority Disciplines Outcome Goal Variances Interventions   Physical Therapy Goal     PT, PT/OT      Description:  Advancing efforts to achieve  Gait  With AD goal                    Time Tracking:     PT Received On: 10/21/18  PT Start Time: 0840     PT Stop Time: 09  PT Total Time (min): 25 min     Billable Minutes: Gait Training 15 min, Therapeutic Activity 10 min and Total Time 25 min    Treatment Type: Treatment  PT/PTA: PT           Kalen Georges, PT  10/21/2018

## 2018-10-21 NOTE — PLAN OF CARE
Problem: Patient Care Overview  Goal: Plan of Care Review  Outcome: Ongoing (interventions implemented as appropriate)  Tolerating Q6 treatments well. No signs of distress or increase work of breathing. spo2 97 on room air

## 2018-10-21 NOTE — PLAN OF CARE
Problem: Patient Care Overview  Goal: Plan of Care Review  POC reviewed and continued. Pain managed through medications and repositioning. Patient incontinent and briefs changed throughout the day. Tele afib. IV restarted in r FA 20 g.

## 2018-10-21 NOTE — PLAN OF CARE
Problem: Patient Care Overview  Goal: Plan of Care Review  Outcome: Ongoing (interventions implemented as appropriate)  Patient rested well throughout shift. Room air. Tele A-Fib. IV antibiotics continued. Neuro intact. Pain controlled with PO meds. Free from falls or injury. Plan of care reviewed with patient.

## 2018-10-22 LAB
ANION GAP SERPL CALC-SCNC: 10 MMOL/L
BASOPHILS # BLD AUTO: 0.04 K/UL
BASOPHILS NFR BLD: 0.7 %
BUN SERPL-MCNC: 16 MG/DL
CALCIUM SERPL-MCNC: 9 MG/DL
CHLORIDE SERPL-SCNC: 101 MMOL/L
CO2 SERPL-SCNC: 25 MMOL/L
CREAT SERPL-MCNC: 0.8 MG/DL
DIFFERENTIAL METHOD: ABNORMAL
EOSINOPHIL # BLD AUTO: 0.4 K/UL
EOSINOPHIL NFR BLD: 7.1 %
ERYTHROCYTE [DISTWIDTH] IN BLOOD BY AUTOMATED COUNT: 12.7 %
EST. GFR  (AFRICAN AMERICAN): >60 ML/MIN/1.73 M^2
EST. GFR  (NON AFRICAN AMERICAN): >60 ML/MIN/1.73 M^2
GLUCOSE SERPL-MCNC: 100 MG/DL
HCT VFR BLD AUTO: 36 %
HGB BLD-MCNC: 12 G/DL
INR PPP: 2
LYMPHOCYTES # BLD AUTO: 1 K/UL
LYMPHOCYTES NFR BLD: 19.3 %
MCH RBC QN AUTO: 31 PG
MCHC RBC AUTO-ENTMCNC: 33.3 G/DL
MCV RBC AUTO: 93 FL
MONOCYTES # BLD AUTO: 0.6 K/UL
MONOCYTES NFR BLD: 10.2 %
NEUTROPHILS # BLD AUTO: 3.4 K/UL
NEUTROPHILS NFR BLD: 62.7 %
PLATELET # BLD AUTO: 296 K/UL
PMV BLD AUTO: 9.2 FL
POTASSIUM SERPL-SCNC: 4.6 MMOL/L
PROTHROMBIN TIME: 19.6 SEC
RBC # BLD AUTO: 3.87 M/UL
SODIUM SERPL-SCNC: 136 MMOL/L
WBC # BLD AUTO: 5.38 K/UL

## 2018-10-22 PROCEDURE — 99232 SBSQ HOSP IP/OBS MODERATE 35: CPT | Mod: ,,, | Performed by: INTERNAL MEDICINE

## 2018-10-22 PROCEDURE — 25000003 PHARM REV CODE 250: Performed by: INTERNAL MEDICINE

## 2018-10-22 PROCEDURE — 25000003 PHARM REV CODE 250: Performed by: NURSE PRACTITIONER

## 2018-10-22 PROCEDURE — 63600175 PHARM REV CODE 636 W HCPCS: Performed by: INTERNAL MEDICINE

## 2018-10-22 PROCEDURE — 94664 DEMO&/EVAL PT USE INHALER: CPT

## 2018-10-22 PROCEDURE — 99900035 HC TECH TIME PER 15 MIN (STAT)

## 2018-10-22 PROCEDURE — 11000004 HC SNF PRIVATE

## 2018-10-22 PROCEDURE — 25000242 PHARM REV CODE 250 ALT 637 W/ HCPCS: Performed by: INTERNAL MEDICINE

## 2018-10-22 PROCEDURE — 36415 COLL VENOUS BLD VENIPUNCTURE: CPT

## 2018-10-22 PROCEDURE — 80048 BASIC METABOLIC PNL TOTAL CA: CPT

## 2018-10-22 PROCEDURE — 85610 PROTHROMBIN TIME: CPT

## 2018-10-22 PROCEDURE — 94761 N-INVAS EAR/PLS OXIMETRY MLT: CPT

## 2018-10-22 PROCEDURE — 99232 PR SUBSEQUENT HOSPITAL CARE,LEVL II: ICD-10-PCS | Mod: ,,, | Performed by: INTERNAL MEDICINE

## 2018-10-22 PROCEDURE — 25000003 PHARM REV CODE 250: Performed by: FAMILY MEDICINE

## 2018-10-22 PROCEDURE — 85025 COMPLETE CBC W/AUTO DIFF WBC: CPT

## 2018-10-22 PROCEDURE — 94799 UNLISTED PULMONARY SVC/PX: CPT

## 2018-10-22 PROCEDURE — 94640 AIRWAY INHALATION TREATMENT: CPT

## 2018-10-22 PROCEDURE — 97530 THERAPEUTIC ACTIVITIES: CPT

## 2018-10-22 PROCEDURE — 97110 THERAPEUTIC EXERCISES: CPT

## 2018-10-22 PROCEDURE — 97116 GAIT TRAINING THERAPY: CPT

## 2018-10-22 RX ORDER — CARVEDILOL 3.12 MG/1
3.12 TABLET ORAL 2 TIMES DAILY
Status: DISCONTINUED | OUTPATIENT
Start: 2018-10-22 | End: 2018-10-25 | Stop reason: HOSPADM

## 2018-10-22 RX ORDER — LOSARTAN POTASSIUM 50 MG/1
50 TABLET ORAL DAILY
Status: DISCONTINUED | OUTPATIENT
Start: 2018-10-22 | End: 2018-10-25 | Stop reason: HOSPADM

## 2018-10-22 RX ADMIN — GABAPENTIN 100 MG: 100 CAPSULE ORAL at 10:10

## 2018-10-22 RX ADMIN — TRAMADOL HYDROCHLORIDE AND ACETAMINOPHEN 1 TABLET: 37.5; 325 TABLET, FILM COATED ORAL at 11:10

## 2018-10-22 RX ADMIN — LEVALBUTEROL 1.25 MG: 1.25 SOLUTION, CONCENTRATE RESPIRATORY (INHALATION) at 01:10

## 2018-10-22 RX ADMIN — IPRATROPIUM BROMIDE 0.5 MG: 0.5 SOLUTION RESPIRATORY (INHALATION) at 07:10

## 2018-10-22 RX ADMIN — CARBIDOPA AND LEVODOPA 1 TABLET: 25; 100 TABLET ORAL at 04:10

## 2018-10-22 RX ADMIN — CARBIDOPA AND LEVODOPA 1 TABLET: 25; 100 TABLET ORAL at 08:10

## 2018-10-22 RX ADMIN — CARBIDOPA AND LEVODOPA 1 TABLET: 25; 100 TABLET ORAL at 01:10

## 2018-10-22 RX ADMIN — CELECOXIB 200 MG: 200 CAPSULE ORAL at 08:10

## 2018-10-22 RX ADMIN — AMIODARONE HYDROCHLORIDE 200 MG: 200 TABLET ORAL at 10:10

## 2018-10-22 RX ADMIN — CIPROFLOXACIN 400 MG: 2 INJECTION, SOLUTION INTRAVENOUS at 08:10

## 2018-10-22 RX ADMIN — TRAMADOL HYDROCHLORIDE AND ACETAMINOPHEN 1 TABLET: 37.5; 325 TABLET, FILM COATED ORAL at 01:10

## 2018-10-22 RX ADMIN — CIPROFLOXACIN 400 MG: 2 INJECTION, SOLUTION INTRAVENOUS at 10:10

## 2018-10-22 RX ADMIN — IPRATROPIUM BROMIDE 0.5 MG: 0.5 SOLUTION RESPIRATORY (INHALATION) at 01:10

## 2018-10-22 RX ADMIN — LOSARTAN POTASSIUM 50 MG: 50 TABLET, FILM COATED ORAL at 10:10

## 2018-10-22 RX ADMIN — GABAPENTIN 100 MG: 100 CAPSULE ORAL at 04:10

## 2018-10-22 RX ADMIN — LEVALBUTEROL 1.25 MG: 1.25 SOLUTION, CONCENTRATE RESPIRATORY (INHALATION) at 07:10

## 2018-10-22 RX ADMIN — CARVEDILOL 3.12 MG: 3.12 TABLET, FILM COATED ORAL at 10:10

## 2018-10-22 RX ADMIN — SENNA 8.6 MG: 8.6 TABLET, COATED ORAL at 10:10

## 2018-10-22 RX ADMIN — POLYETHYLENE GLYCOL 3350 17 G: 17 POWDER, FOR SOLUTION ORAL at 10:10

## 2018-10-22 RX ADMIN — GABAPENTIN 100 MG: 100 CAPSULE ORAL at 08:10

## 2018-10-22 RX ADMIN — CARBIDOPA AND LEVODOPA 1 TABLET: 25; 100 TABLET ORAL at 10:10

## 2018-10-22 RX ADMIN — CARVEDILOL 3.12 MG: 3.12 TABLET, FILM COATED ORAL at 08:10

## 2018-10-22 RX ADMIN — DOCUSATE SODIUM 100 MG: 100 CAPSULE, LIQUID FILLED ORAL at 10:10

## 2018-10-22 RX ADMIN — PANTOPRAZOLE SODIUM 40 MG: 40 TABLET, DELAYED RELEASE ORAL at 10:10

## 2018-10-22 RX ADMIN — SPIRONOLACTONE 12.5 MG: 25 TABLET, FILM COATED ORAL at 10:10

## 2018-10-22 RX ADMIN — WARFARIN SODIUM 1 MG: 1 TABLET ORAL at 04:10

## 2018-10-22 NOTE — PROGRESS NOTES
[facility]  Cardiology  Progress Note    Patient Name: Elvis Thompson  MRN: [id number]  Admission Date: 10/15/2018  Hospital Length of Stay: 7 days  Code Status: Full Code   Attending Physician: Jorden Brown MD   Primary Care Physician: Regulo Martínez MD  Expected Discharge Date:   Principal Problem:Debility    Subjective:     Hospital Course: 85 year old male admitted with pneumonia and positive blood cultures. Improving gradually from respiratory standpoint continues to receive ABX therapy. He is on SWING unit for debility. During chronic A fib. He had 12 beat run of WCT last night. CIS asked to evaluate. Denies chest pain or palpitations.       ROS   Constitutional: generalized weakness  Eyes: Negative    Respiratory: SOB, cough    Cardiovascular: Negative   Gastrointestinal:ABD discomfort   Genitourinary: Negative   Musculoskeletal: Negative   Skin: Negative .   Neurological: Negative     Objective:     Vital Signs (Most Recent):  Temp: 96.1 °F (35.6 °C) (10/22/18 0743)  Pulse: 62 (10/22/18 0806)  Resp: 18 (10/22/18 0743)  BP: 128/60 (10/22/18 0743)  SpO2: 100 % (10/22/18 0743) Vital Signs (24h Range):  Temp:  [96.1 °F (35.6 °C)-97.5 °F (36.4 °C)] 96.1 °F (35.6 °C)  Pulse:  [53-78] 62  Resp:  [17-18] 18  SpO2:  [93 %-100 %] 100 %  BP: (128-138)/(60-65) 128/60     Weight: 87 kg (191 lb 12.8 oz)  Body mass index is 31.92 kg/m².    SpO2: 100 %  O2 Device (Oxygen Therapy): room air      Intake/Output Summary (Last 24 hours) at 10/22/2018 1004  Last data filed at 10/22/2018 0531  Gross per 24 hour   Intake 440 ml   Output 425 ml   Net 15 ml       Lines/Drains/Airways     Peripheral Intravenous Line                 Peripheral IV - Single Lumen 10/21/18 0803 Right Forearm 1 day                Physical Exam   General appearance: alert, appears stated age and cooperative  Head: Normocephalic, without obvious abnormality, atraumatic  Eyes: conjunctivae/corneas clear. PERRL  Neck: no carotid  bruit, no JVD and supple, symmetrical, trachea midline  Lungs:BBS coarse, normal respiratory effort  Chest Wall: no tenderness  Heart: regular rate irregular rhythm, S1, S2 normal, no murmur, click, rub or gallop  Abdomen: soft, non-tender; bowel sounds normal; no masses,  no organomegaly  Extremities: Extremities normal, atraumatic, no cyanosis, clubbing, or edema  Pulses: Dorsalis Pedis R: 2+ (normal)/L: 2+ (normal)  Skin: Skin color, texture, turgor normal. No rashes or lesions  Neurologic: Normal mood and affect  Alert and oriented X 3        Significant Labs:   BMP:   Recent Labs   Lab 10/21/18  0611 10/22/18  0521    100   * 136   K 4.4 4.6    101   CO2 22* 25   BUN 14 16   CREATININE 0.7 0.8   CALCIUM 8.7 9.0   , CMP   Recent Labs   Lab 10/21/18  0611 10/22/18  0521   * 136   K 4.4 4.6    101   CO2 22* 25    100   BUN 14 16   CREATININE 0.7 0.8   CALCIUM 8.7 9.0   ANIONGAP 11 10   ESTGFRAFRICA >60 >60   EGFRNONAA >60 >60   , CBC   Recent Labs   Lab 10/21/18  0611 10/22/18  0521   WBC 5.45 5.38   HGB 11.5* 12.0*   HCT 35.0* 36.0*    296    and Troponin No results for input(s): TROPONINI in the last 48 hours.      Assessment and Plan:       Active Diagnoses:    Diagnosis Date Noted POA    PRINCIPAL PROBLEM:  Debility [R53.81] 10/15/2018 Yes    NSVT (nonsustained ventricular tachycardia) [I47.2] 10/19/2018 No    Chronic anticoagulation [Z79.01] 10/14/2018 Not Applicable    Bacteremia [R78.81] 10/12/2018 Yes    Aspiration pneumonia [J69.0] 10/10/2018 Yes    Diarrhea of presumed infectious origin [R19.7] 10/10/2018 Yes    Parkinson disease [G20] 10/10/2018 Yes    Incarcerated left inguinal hernia [K40.30] 10/04/2018 Yes      Problems Resolved During this Admission:    Diagnosis Date Noted Date Resolved POA    Sepsis [A41.9] 10/10/2018 10/19/2018 Yes       VTE Risk Mitigation (From admission, onward)        Ordered     warfarin (COUMADIN) tablet 1 mg  Daily       10/18/18 1026        Current Facility-Administered Medications   Medication    acetaminophen tablet 650 mg    amiodarone tablet 200 mg    amLODIPine tablet 5 mg    carbidopa-levodopa  mg per tablet 1 tablet    celecoxib capsule 200 mg    ciprofloxacin (CIPRO)400mg/200ml D5W IVPB 400 mg    cloNIDine tablet 0.1 mg    docusate sodium capsule 100 mg    gabapentin capsule 100 mg    ipratropium 0.02 % nebulizer solution 0.5 mg    levalbuterol nebulizer solution 1.25 mg    levalbuterol nebulizer solution 1.25 mg    ondansetron injection 4 mg    pantoprazole EC tablet 40 mg    polyethylene glycol packet 17 g    promethazine (PHENERGAN) 12.5 mg in dextrose 5 % 50 mL IVPB    senna tablet 8.6 mg    tramadol-acetaminophen 37.5-325 mg per tablet 1 tablet    warfarin (COUMADIN) tablet 1 mg     DX:  WCT appears to be VT non sustained 12 beat at 160 bpm / but possibly AF with LBBB- chronic A fib, long term anticoagulation with coumadin. V pacing intermittent.   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   Chronic HFpEF- stable volume status.   HTN well controlled   Debility/pneumonia- receiving ABX therapy. Progressing with physical therapy.   Echo 10/18 worsening LVEF 25% 2+ MR, mild mod AS PH 59.9  MPI 2010 no stress induced ischemia        Cj Jauregui, NP for Dr Mcintosh  Cardiology  [facility]    PLAN: switch amlodipine to losartan  Start coreg and aldactone low dose  Continue amio  PM rep to interrogate  DNR    I attest that I have personally seen and examined this patient. I have reviewed and discussed the management in detail as outlined above.

## 2018-10-22 NOTE — PLAN OF CARE
10/22/18 1109   Discharge Reassessment   Assessment Type Discharge Planning Reassessment         Seen patient on rounds today. He is set on being able to go home but daughter is still thinking he may need a long term care facility. He is working toward goals with both PT and OT. He is ambulating with PT. It is recommended that he discharge home with home health. He will remain for more for more therapy. CM will continue to follow and assist as needed.

## 2018-10-22 NOTE — ASSESSMENT & PLAN NOTE
Lab Results   Component Value Date    INR 2.0 (H) 10/22/2018    INR 2.1 (H) 10/21/2018    INR 2.2 (H) 10/20/2018       Monitor INR daily

## 2018-10-22 NOTE — PLAN OF CARE
Problem: Patient Care Overview  Goal: Plan of Care Review  Outcome: Ongoing (interventions implemented as appropriate)  Vitals remained stable, afebrile. Ambulating well. Sat in wheel chair. Burning pain controled with PO meds. OT working with pt. Discussed plan of care with pt, stated understanding

## 2018-10-22 NOTE — ASSESSMENT & PLAN NOTE
Cont PT/OT    With recent hospitalization from surgery and now bacteremia/asp pneumonia he has became debilitated. Discussing with family swing vs nursing home placement for PT/OT. Case management and  working with family       10/19 Ambulated 60ft yesterday; goal 100ft   10/20 patient is too debilitated to go back home living alone.  Family was present during the evaluation.  They all agree that he needs long-term care.  They will start working on this.  10/22 cont with PT he is half way to his goal; at 50ft with goal of 100ft

## 2018-10-22 NOTE — SUBJECTIVE & OBJECTIVE
Review of Systems   Constitutional: Negative for chills and fever.   HENT: Negative for congestion, ear pain, postnasal drip, rhinorrhea, sore throat and trouble swallowing.    Eyes: Positive for visual disturbance (right eye blurred vision, chornic due to retina issue). Negative for redness and itching.   Respiratory: Negative for cough, shortness of breath and wheezing.    Cardiovascular: Negative for chest pain and palpitations.   Gastrointestinal: Negative for abdominal pain, diarrhea, nausea and vomiting.   Genitourinary: Negative for difficulty urinating, dysuria and frequency.   Skin: Negative for rash.   Neurological: Positive for weakness (generalized). Negative for headaches.     Objective:     Vital Signs (Most Recent):  Temp: 96.1 °F (35.6 °C) (10/22/18 0743)  Pulse: 62 (10/22/18 0806)  Resp: 18 (10/22/18 0743)  BP: 128/60 (10/22/18 0743)  SpO2: 100 % (10/22/18 0743) Vital Signs (24h Range):  Temp:  [96.1 °F (35.6 °C)-97.5 °F (36.4 °C)] 96.1 °F (35.6 °C)  Pulse:  [53-78] 62  Resp:  [17-18] 18  SpO2:  [93 %-100 %] 100 %  BP: (128-138)/(60-65) 128/60     Weight: 87 kg (191 lb 12.8 oz)  Body mass index is 31.92 kg/m².    Physical Exam   Constitutional: He is oriented to person, place, and time. He appears well-developed and well-nourished.   HENT:   Head: Normocephalic and atraumatic.   Nose: Nose normal.   Mouth/Throat: Oropharynx is clear and moist.   Eyes: Conjunctivae and EOM are normal. Pupils are equal, round, and reactive to light.   Neck: Normal range of motion. Neck supple. No tracheal deviation present.   Cardiovascular: Normal rate, regular rhythm, normal heart sounds and intact distal pulses.   Pulmonary/Chest: Effort normal and breath sounds normal.   Abdominal: Soft. Bowel sounds are normal. He exhibits no distension and no mass. There is no tenderness. There is no guarding.   Abd exam normal.  No rash.  No tenderness to palpation.  Suspect post op pain.  Groin wound looks great.      Musculoskeletal: Normal range of motion. He exhibits no edema.   Lymphadenopathy:     He has no cervical adenopathy.   Neurological: He is alert and oriented to person, place, and time. He has normal reflexes. No cranial nerve deficit.   Skin: Skin is warm and dry. No rash noted. No pallor.   Psychiatric: He has a normal mood and affect.   Nursing note and vitals reviewed.        CRANIAL NERVES     CN III, IV, VI   Pupils are equal, round, and reactive to light.  Extraocular motions are normal.        Significant Labs: Significant Labs:   Lab Results   Component Value Date    WBC 5.38 10/22/2018    HGB 12.0 (L) 10/22/2018    HCT 36.0 (L) 10/22/2018    MCV 93 10/22/2018     10/22/2018          BMP  Lab Results   Component Value Date     10/22/2018    K 4.6 10/22/2018     10/22/2018    CO2 25 10/22/2018    BUN 16 10/22/2018    CREATININE 0.8 10/22/2018    CALCIUM 9.0 10/22/2018    ANIONGAP 10 10/22/2018    ESTGFRAFRICA >60 10/22/2018    EGFRNONAA >60 10/22/2018     Lab Results   Component Value Date    INR 2.0 (H) 10/22/2018    INR 2.1 (H) 10/21/2018    INR 2.2 (H) 10/20/2018        Lipase 24  Mag 1.8  Lactic acid 2.1>1.2    BNP  230*      Flu negative  Blood cultures 10/9 - gram + TURICELLA OTITIDIS  Blood cultures 10/9 - gram negative ecoli sensitive to cipro  Blood cultures 10/9 - gram negative BACTEROIDES FRAGILIS   Blood cultures 10/9 NGTD     Significant Imaging:      10/10 CXR-No significant interval change of the bilateral pleural effusions and bilateral lower lobe alveolar infiltrates.  Cardiomegaly and suspected interstitial edema..  10/11/18 CXR- Interval worsening of the cardiopulmonary findings.  There is cardiomegaly with pulmonary vascular congestion and pulmonary edema.  Bilateral pleural effusions are suspected.  Left-sided pacemaker device remains in place.  The skeletal structures are intact.  10/11/18 KUB- Contrast material is seen in the colon from recent CT scan.  There is  some gaseous distention of the bowel without evidence for definite obstruction.  No free air is seen.  Vascular calcifications are noted.

## 2018-10-22 NOTE — PLAN OF CARE
10/22/18 1142   Discharge Reassessment   Assessment Type Discharge Planning Reassessment     Hip kit delivered to patient. Patient has accepted to pay $33 for the hip kit.    Faraz Jauregui LMSW

## 2018-10-22 NOTE — PT/OT/SLP PROGRESS
"Physical Therapy Treatment    Patient Name:  Elvis Thompson   MRN:  [id number]    Recommendations:     Discharge Recommendations:  home with home health, home health PT   Discharge Equipment Recommendations: none   Barriers to discharge: None    Assessment:     Elvis Thompson is a 85 y.o. male admitted with a medical diagnosis of Debility.  He presents with the following impairments/functional limitations:  weakness, impaired endurance, impaired self care skills, impaired functional mobilty, gait instability, impaired balance, decreased upper extremity function, decreased lower extremity function, decreased safety awareness . Pt with increased abdominal/flank pain 10/10 this AM. Pt refused any OOB activity. Pt agreeable to therapeutic exercises in bed only. NSG notified of pt pain.    Rehab Prognosis:  Good; patient would benefit from acute skilled PT services to address these deficits and reach maximum level of function.      Recent Surgery: * No surgery found *      Plan:     During this hospitalization, patient to be seen (1-2x/day Monday-Friday; PRN Weekends/Holidays) to address the above listed problems via gait training, therapeutic activities, therapeutic exercises  · Plan of Care Expires:  (upon D/C from facility)   Plan of Care Reviewed with: patient    Subjective     Communicated with patient prior to session.  Patient found supine in bed upon PT entry to room, agreeable to treatment.      Chief Complaint: "My stomach and my side is hurting so bad."  Patient comments/goals: "Go home"  Pain/Comfort:  · Pain Rating 1: 10/10  · Location - Orientation 1: generalized  · Location 1: abdomen  · Pain Addressed 1: Nurse notified  · Pain Rating Post-Intervention 1: 10/10    Patients cultural, spiritual, Christianity conflicts given the current situation:      Objective:     Patient found with: peripheral IV, telemetry, bed alarm     General Precautions: Standard, fall   Orthopedic Precautions:N/A   Braces: N/A "       AM-PAC 6 CLICK MOBILITY  Turning over in bed (including adjusting bedclothes, sheets and blankets)?: 2  Sitting down on and standing up from a chair with arms (e.g., wheelchair, bedside commode, etc.): 3  Moving from lying on back to sitting on the side of the bed?: 2  Moving to and from a bed to a chair (including a wheelchair)?: 3  Need to walk in hospital room?: 3  Climbing 3-5 steps with a railing?: 1  Basic Mobility Total Score: 14       Therapeutic Activities and Exercises:   Therapeutic exercises performed on BLE supine in bed at all joints in available planes of motion with rest breaks as needed to increase strength and endurance with all gross mobility skills.    Patient left supine with all lines intact, call button in reach and NSG notified..    GOALS:   Multidisciplinary Problems     Physical Therapy Goals        Problem: Physical Therapy Goal    Goal Priority Disciplines Outcome Goal Variances Interventions   Physical Therapy Goal     PT, PT/OT Ongoing (interventions implemented as appropriate)     Description:  Goals to be met by: 10/30/2018     Patient will increase functional independence with mobility by performin. Supine to sit with Stand-by Assistance  2. Sit to supine with Stand-by Assistance  3. Rolling to Left and Right with Stand-by Assistance.  4. Sit to stand transfer with CGA  5. Bed to chair transfer with Contact Guard Assistance using Rolling Walker  6. Gait  x 100 feet with Contact Guard Assistance using Rolling Walker.              Problem: Physical Therapy Goal    Goal Priority Disciplines Outcome Goal Variances Interventions   Physical Therapy Goal     PT, PT/OT      Description:  Advancing  Efforts to achieve stated goals            Problem: Physical Therapy Goal    Goal Priority Disciplines Outcome Goal Variances Interventions   Physical Therapy Goal     PT, PT/OT      Description:  Advancing efforts to achieve  Gait  With AD goal                    Time Tracking:      PT Received On: 10/22/18  PT Start Time: 0945     PT Stop Time: 1000  PT Total Time (min): 15 min     Billable Minutes: Therapeutic Exercise 15 minutes    Treatment Type: Treatment  PT/PTA: PT           Agatha Romo, PT  10/22/2018

## 2018-10-22 NOTE — PLAN OF CARE
Problem: Patient Care Overview  Goal: Plan of Care Review  Outcome: Ongoing (interventions implemented as appropriate)  Patient rested well throughout shift. Room air. Tele A-Fib. Patient pain controlled with PO meds. IV antibiotics continued. Neuro intact. Free from falls or injury. Plan of care reviewed with patient.

## 2018-10-22 NOTE — ASSESSMENT & PLAN NOTE
10/14: stopped all stool cx ordered on admit  Resolved now and having constipation  KUB without obstruction on 10/19  Continue senna, colace and m,iralax, as needed

## 2018-10-22 NOTE — ASSESSMENT & PLAN NOTE
S/p surgical repair, general surgery still following  Ultracet for pain every 4 hr.  Gabapentin 100 mg p.o. t.i.d. will be started.  I will increase this as tolerated.  Pt reports no pain this am. Has not needed ultram

## 2018-10-22 NOTE — PROGRESS NOTES
Followed up with  Elvis (swing pt) about his medications. Discussed his abx and warfarin, denies side effects. Patient states he had a burning pain relieved with gabapentin. States he wants his daughter to speak with us if she has questions.

## 2018-10-22 NOTE — ASSESSMENT & PLAN NOTE
Episode last pm; Dr. Mcintosh planning PPM interrogation with rep;   Echo shows EF down to 25%, will need to discuss with Dr Mcintosh  Added amiodarone

## 2018-10-22 NOTE — PROGRESS NOTES
Staff Handoff  Bedside report per ROSEMARIE Mcmahon. No distress noted. Room air. Tele A-Fib. Awake, alert, and oriented. Patient denies pain. Call bell in reach. Encouraged to call for assistance.     Resident Handoff

## 2018-10-22 NOTE — PLAN OF CARE
Problem: Patient Care Overview  Goal: Plan of Care Review  Outcome: Ongoing (interventions implemented as appropriate)  Tolerating Q6 while awake treatments well. No signs of distress or increase work of breathing. spo2 95-97% on room air.

## 2018-10-22 NOTE — PROGRESS NOTES
[facility] Medicine  Progress Note    Patient Name: Elvis Thompson  MRN: [id number]  Patient Class: IP- Swing   Admission Date: 10/15/2018  Length of Stay: 7 days  Attending Physician: Jorden Brown MD  Primary Care Provider: Regulo Martínez MD        Subjective:     Principal Problem:Debility    HPI:  85 to male patient was admitted originally for asp pneumonia and + blood cultures. He is still on augmentin for the  Pneumonia. Not coughing, no need for O2 supplementation. He is also on cipro for ecoli bacteremia. No fever. No elevated WBC. Repeat blood cultures NGTD. He was placed on SWING for  Debility. He is doing well. Goal; Gait  x 50 feet with Minimal Assistance using Rolling Walker. He is at goal, will need to reassess his goals for functioning at [facility] with minimal help.     Hospital Course:  10/19 Patient on SWING.  pt ambulated 60feet with RW with PT yesterday am; refused PT in PM; goal is now 100feet with RW,   Did have BM yesterday; still noting some burning pain to left side. Palpated area; no significant tenderness but c/o pain to nurse after exam; nirav order Ultracet and get U/A tp rule out UTI/stone   Notes to have some NSVT on monitor; Dr. Mcintosh evaluating; planned for PPM interrogation today and Echo; started on amiodarone.   Already anti-coagulated for PAF; INR therapeutic; 2.4; monitor daily.     10/20.  Patient's main complaint is burning pain in the left side.  I increased his ultrasound at to 1 pill every 4 hr, I started gabapentin 100 mg 3 times daily.  I would like surgery to re-evaluate the patient because the pain is in the area of his recent hernia repair.    10/22 he is up in bed this am. Reports that he slept well last night. No pain on gabapentin and ultram. He still complains that he is not having great BM. Nursing reports conflict with that.     He was also seen by Dr Mcintosh as he had irregular beat over the weekend. He was started on amiodarone. Echo  ordered which shows EF 25%. Last echo 2012 showed 45%. Has pace maker/denies that he has defibrillator. INR theraputic    He is doing well with PT. Ambulated 50ft with min assist and RW. Goal is 100ft.     Review of Systems   Constitutional: Negative for chills and fever.   HENT: Negative for congestion, ear pain, postnasal drip, rhinorrhea, sore throat and trouble swallowing.    Eyes: Positive for visual disturbance (right eye blurred vision, chornic due to retina issue). Negative for redness and itching.   Respiratory: Negative for cough, shortness of breath and wheezing.    Cardiovascular: Negative for chest pain and palpitations.   Gastrointestinal: Negative for abdominal pain, diarrhea, nausea and vomiting.   Genitourinary: Negative for difficulty urinating, dysuria and frequency.   Skin: Negative for rash.   Neurological: Positive for weakness (generalized). Negative for headaches.     Objective:     Vital Signs (Most Recent):  Temp: 96.1 °F (35.6 °C) (10/22/18 0743)  Pulse: 62 (10/22/18 0806)  Resp: 18 (10/22/18 0743)  BP: 128/60 (10/22/18 0743)  SpO2: 100 % (10/22/18 0743) Vital Signs (24h Range):  Temp:  [96.1 °F (35.6 °C)-97.5 °F (36.4 °C)] 96.1 °F (35.6 °C)  Pulse:  [53-78] 62  Resp:  [17-18] 18  SpO2:  [93 %-100 %] 100 %  BP: (128-138)/(60-65) 128/60     Weight: 87 kg (191 lb 12.8 oz)  Body mass index is 31.92 kg/m².    Physical Exam   Constitutional: He is oriented to person, place, and time. He appears well-developed and well-nourished.   HENT:   Head: Normocephalic and atraumatic.   Nose: Nose normal.   Mouth/Throat: Oropharynx is clear and moist.   Eyes: Conjunctivae and EOM are normal. Pupils are equal, round, and reactive to light.   Neck: Normal range of motion. Neck supple. No tracheal deviation present.   Cardiovascular: Normal rate, regular rhythm, normal heart sounds and intact distal pulses.   Pulmonary/Chest: Effort normal and breath sounds normal.   Abdominal: Soft. Bowel sounds are normal.  He exhibits no distension and no mass. There is no tenderness. There is no guarding.   Abd exam normal.  No rash.  No tenderness to palpation.  Suspect post op pain.  Groin wound looks great.     Musculoskeletal: Normal range of motion. He exhibits no edema.   Lymphadenopathy:     He has no cervical adenopathy.   Neurological: He is alert and oriented to person, place, and time. He has normal reflexes. No cranial nerve deficit.   Skin: Skin is warm and dry. No rash noted. No pallor.   Psychiatric: He has a normal mood and affect.   Nursing note and vitals reviewed.        CRANIAL NERVES     CN III, IV, VI   Pupils are equal, round, and reactive to light.  Extraocular motions are normal.        Significant Labs: Significant Labs:   Lab Results   Component Value Date    WBC 5.38 10/22/2018    HGB 12.0 (L) 10/22/2018    HCT 36.0 (L) 10/22/2018    MCV 93 10/22/2018     10/22/2018          BMP  Lab Results   Component Value Date     10/22/2018    K 4.6 10/22/2018     10/22/2018    CO2 25 10/22/2018    BUN 16 10/22/2018    CREATININE 0.8 10/22/2018    CALCIUM 9.0 10/22/2018    ANIONGAP 10 10/22/2018    ESTGFRAFRICA >60 10/22/2018    EGFRNONAA >60 10/22/2018     Lab Results   Component Value Date    INR 2.0 (H) 10/22/2018    INR 2.1 (H) 10/21/2018    INR 2.2 (H) 10/20/2018        Lipase 24  Mag 1.8  Lactic acid 2.1>1.2    BNP  230*      Flu negative  Blood cultures 10/9 - gram + TURICELLA OTITIDIS  Blood cultures 10/9 - gram negative ecoli sensitive to cipro  Blood cultures 10/9 - gram negative BACTEROIDES FRAGILIS   Blood cultures 10/9 NGTD     Significant Imaging:      10/10 CXR-No significant interval change of the bilateral pleural effusions and bilateral lower lobe alveolar infiltrates.  Cardiomegaly and suspected interstitial edema..  10/11/18 CXR- Interval worsening of the cardiopulmonary findings.  There is cardiomegaly with pulmonary vascular congestion and pulmonary edema.  Bilateral pleural  effusions are suspected.  Left-sided pacemaker device remains in place.  The skeletal structures are intact.  10/11/18 KUB- Contrast material is seen in the colon from recent CT scan.  There is some gaseous distention of the bowel without evidence for definite obstruction.  No free air is seen.  Vascular calcifications are noted.              Assessment/Plan:      * Debility    Cont PT/OT    With recent hospitalization from surgery and now bacteremia/asp pneumonia he has became debilitated. Discussing with family swing vs nursing home placement for PT/OT. Case management and  working with family       10/19 Ambulated 60ft yesterday; goal 100ft   10/20 patient is too debilitated to go back home living alone.  Family was present during the evaluation.  They all agree that he needs long-term care.  They will start working on this.  10/22 cont with PT he is half way to his goal; at 50ft with goal of 100ft     NSVT (nonsustained ventricular tachycardia)    Episode last pm; Dr. Mcintosh planning PPM interrogation with rep;   Echo shows EF down to 25%, will need to discuss with Dr Mcintosh  Added amiodarone        Chronic anticoagulation    Lab Results   Component Value Date    INR 2.0 (H) 10/22/2018    INR 2.1 (H) 10/21/2018    INR 2.2 (H) 10/20/2018       Monitor INR daily       Bacteremia    IV cipro for UTI/bacteremia (stop date 10/21/18)- d/c today       Aspiration pneumonia    No need for antibiotics at this stage.         Diarrhea of presumed infectious origin    10/14: stopped all stool cx ordered on admit  Resolved now and having constipation  KUB without obstruction on 10/19  Continue senna, colace and m,iralax, as needed       Parkinson disease    Cont home meds        Incarcerated left inguinal hernia    S/p surgical repair, general surgery still following  Ultracet for pain every 4 hr.  Gabapentin 100 mg p.o. t.i.d. will be started.  I will increase this as tolerated.  Pt reports no pain this am. Has  not needed ultram       VTE Risk Mitigation (From admission, onward)        Ordered     warfarin (COUMADIN) tablet 1 mg  Daily      10/18/18 1020              Makeda Pham MD  Department of Hospital Medicine   [facility]

## 2018-10-22 NOTE — PT/OT/SLP PROGRESS
"Physical Therapy Treatment    Patient Name:  Elvis Thompson   MRN:  [id number]    Recommendations:     Discharge Recommendations:  home with home health, home health PT   Discharge Equipment Recommendations: none   Barriers to discharge: None    Assessment:     Elvis Thompson is a 85 y.o. male admitted with a medical diagnosis of Debility.  He presents with the following impairments/functional limitations:  weakness, impaired endurance, impaired self care skills, impaired functional mobilty, gait instability, decreased safety awareness, decreased upper extremity function, decreased lower extremity function, impaired balance . Pt progressing well with POC goals.    Rehab Prognosis:  Good; patient would benefit from acute skilled PT services to address these deficits and reach maximum level of function.      Recent Surgery: * No surgery found *      Plan:     During this hospitalization, patient to be seen (1-2x/day Monday-Friday; PRN Weekends/Holidays) to address the above listed problems via gait training, therapeutic activities, therapeutic exercises  · Plan of Care Expires:  (upon D/C from facility)   Plan of Care Reviewed with: patient    Subjective     Communicated with patient prior to session.  Patient found supine in bed upon PT entry to room, agreeable to treatment.      Chief Complaint: Pt with no c/o this PM  Patient comments/goals: "Go home"  Pain/Comfort:  · Pain Rating 1: 0/10  · Location - Orientation 1: generalized  · Location 1: abdomen  · Pain Addressed 1: Nurse notified  · Pain Rating Post-Intervention 1: 10/10    Patients cultural, spiritual, Bahai conflicts given the current situation:      Objective:     Patient found with: bed alarm, telemetry, peripheral IV     General Precautions: Standard, fall   Orthopedic Precautions:N/A   Braces: N/A     Functional Mobility:  · Bed Mobility:     · Rolling Left:  minimum assistance  · Rolling Right: minimum assistance  · Scooting: minimum " assistance  · Supine to Sit: minimum assistance  · Sit to Supine: minimum assistance  · Transfers:     · Sit to Stand:  minimum assistance with rolling walker  · Toilet Transfer: minimum assistance with  rolling walker  using  Step Transfer  · Gait: Gait training x 100 feet with RW and CGA with cueing for upright posturing and decreased fall risk.       AM-PAC 6 CLICK MOBILITY  Turning over in bed (including adjusting bedclothes, sheets and blankets)?: 2  Sitting down on and standing up from a chair with arms (e.g., wheelchair, bedside commode, etc.): 3  Moving from lying on back to sitting on the side of the bed?: 2  Moving to and from a bed to a chair (including a wheelchair)?: 3  Need to walk in hospital room?: 3  Climbing 3-5 steps with a railing?: 1  Basic Mobility Total Score: 14       Patient left seated on toilet with all lines intact, call button in reach and NSG x 2 notified..    GOALS:   Multidisciplinary Problems     Physical Therapy Goals        Problem: Physical Therapy Goal    Goal Priority Disciplines Outcome Goal Variances Interventions   Physical Therapy Goal     PT, PT/OT Ongoing (interventions implemented as appropriate)     Description:  Goals to be met by: 10/30/2018     Patient will increase functional independence with mobility by performin. Supine to sit with Stand-by Assistance  2. Sit to supine with Stand-by Assistance  3. Rolling to Left and Right with Stand-by Assistance.  4. Sit to stand transfer with CGA  5. Bed to chair transfer with Contact Guard Assistance using Rolling Walker  6. Gait  x 100 feet with Contact Guard Assistance using Rolling Walker.              Problem: Physical Therapy Goal    Goal Priority Disciplines Outcome Goal Variances Interventions   Physical Therapy Goal     PT, PT/OT      Description:  Advancing  Efforts to achieve stated goals            Problem: Physical Therapy Goal    Goal Priority Disciplines Outcome Goal Variances Interventions   Physical  Therapy Goal     PT, PT/OT      Description:  Advancing efforts to achieve  Gait  With AD goal                    Time Tracking:     PT Received On: 10/22/18  PT Start Time: 1430     PT Stop Time: 1455  PT Total Time (min): 25 min     Billable Minutes: Gait Training 15 minutes and Therapeutic Activity 10 minutes    Treatment Type: Treatment  PT/PTA: PT           Agatha Romo, PT  10/22/2018

## 2018-10-23 LAB
ANION GAP SERPL CALC-SCNC: 8 MMOL/L
BASOPHILS # BLD AUTO: 0.06 K/UL
BASOPHILS NFR BLD: 1.1 %
BUN SERPL-MCNC: 18 MG/DL
CALCIUM SERPL-MCNC: 8.9 MG/DL
CHLORIDE SERPL-SCNC: 101 MMOL/L
CO2 SERPL-SCNC: 23 MMOL/L
CREAT SERPL-MCNC: 0.8 MG/DL
DIFFERENTIAL METHOD: ABNORMAL
EOSINOPHIL # BLD AUTO: 0.4 K/UL
EOSINOPHIL NFR BLD: 6.5 %
ERYTHROCYTE [DISTWIDTH] IN BLOOD BY AUTOMATED COUNT: 12.9 %
EST. GFR  (AFRICAN AMERICAN): >60 ML/MIN/1.73 M^2
EST. GFR  (NON AFRICAN AMERICAN): >60 ML/MIN/1.73 M^2
GLUCOSE SERPL-MCNC: 96 MG/DL
HCT VFR BLD AUTO: 35.5 %
HGB BLD-MCNC: 11.7 G/DL
INR PPP: 1.8
LYMPHOCYTES # BLD AUTO: 1.3 K/UL
LYMPHOCYTES NFR BLD: 23.1 %
MCH RBC QN AUTO: 30.7 PG
MCHC RBC AUTO-ENTMCNC: 33 G/DL
MCV RBC AUTO: 93 FL
MONOCYTES # BLD AUTO: 0.6 K/UL
MONOCYTES NFR BLD: 11.1 %
NEUTROPHILS # BLD AUTO: 3.2 K/UL
NEUTROPHILS NFR BLD: 58.2 %
PLATELET # BLD AUTO: 286 K/UL
PMV BLD AUTO: 9.6 FL
POTASSIUM SERPL-SCNC: 4.3 MMOL/L
PROTHROMBIN TIME: 18.3 SEC
RBC # BLD AUTO: 3.81 M/UL
SODIUM SERPL-SCNC: 132 MMOL/L
WBC # BLD AUTO: 5.42 K/UL

## 2018-10-23 PROCEDURE — 11000004 HC SNF PRIVATE

## 2018-10-23 PROCEDURE — 97530 THERAPEUTIC ACTIVITIES: CPT

## 2018-10-23 PROCEDURE — 99900035 HC TECH TIME PER 15 MIN (STAT)

## 2018-10-23 PROCEDURE — 25000003 PHARM REV CODE 250: Performed by: NURSE PRACTITIONER

## 2018-10-23 PROCEDURE — 80048 BASIC METABOLIC PNL TOTAL CA: CPT

## 2018-10-23 PROCEDURE — 25000003 PHARM REV CODE 250: Performed by: INTERNAL MEDICINE

## 2018-10-23 PROCEDURE — 94640 AIRWAY INHALATION TREATMENT: CPT

## 2018-10-23 PROCEDURE — 97116 GAIT TRAINING THERAPY: CPT

## 2018-10-23 PROCEDURE — 25000003 PHARM REV CODE 250: Performed by: FAMILY MEDICINE

## 2018-10-23 PROCEDURE — G8988 SELF CARE GOAL STATUS: HCPCS | Mod: CH

## 2018-10-23 PROCEDURE — 94664 DEMO&/EVAL PT USE INHALER: CPT

## 2018-10-23 PROCEDURE — 94761 N-INVAS EAR/PLS OXIMETRY MLT: CPT

## 2018-10-23 PROCEDURE — 97535 SELF CARE MNGMENT TRAINING: CPT

## 2018-10-23 PROCEDURE — 94799 UNLISTED PULMONARY SVC/PX: CPT

## 2018-10-23 PROCEDURE — 36415 COLL VENOUS BLD VENIPUNCTURE: CPT

## 2018-10-23 PROCEDURE — 85025 COMPLETE CBC W/AUTO DIFF WBC: CPT

## 2018-10-23 PROCEDURE — 25000242 PHARM REV CODE 250 ALT 637 W/ HCPCS: Performed by: INTERNAL MEDICINE

## 2018-10-23 PROCEDURE — 85610 PROTHROMBIN TIME: CPT

## 2018-10-23 PROCEDURE — G8987 SELF CARE CURRENT STATUS: HCPCS | Mod: CJ

## 2018-10-23 RX ADMIN — CELECOXIB 200 MG: 200 CAPSULE ORAL at 08:10

## 2018-10-23 RX ADMIN — CARVEDILOL 3.12 MG: 3.12 TABLET, FILM COATED ORAL at 08:10

## 2018-10-23 RX ADMIN — IPRATROPIUM BROMIDE 0.5 MG: 0.5 SOLUTION RESPIRATORY (INHALATION) at 01:10

## 2018-10-23 RX ADMIN — CARVEDILOL 3.12 MG: 3.12 TABLET, FILM COATED ORAL at 10:10

## 2018-10-23 RX ADMIN — LEVALBUTEROL 1.25 MG: 1.25 SOLUTION, CONCENTRATE RESPIRATORY (INHALATION) at 07:10

## 2018-10-23 RX ADMIN — SENNA 8.6 MG: 8.6 TABLET, COATED ORAL at 10:10

## 2018-10-23 RX ADMIN — IPRATROPIUM BROMIDE 0.5 MG: 0.5 SOLUTION RESPIRATORY (INHALATION) at 07:10

## 2018-10-23 RX ADMIN — GABAPENTIN 100 MG: 100 CAPSULE ORAL at 02:10

## 2018-10-23 RX ADMIN — LOSARTAN POTASSIUM 50 MG: 50 TABLET, FILM COATED ORAL at 10:10

## 2018-10-23 RX ADMIN — SPIRONOLACTONE 12.5 MG: 25 TABLET, FILM COATED ORAL at 10:10

## 2018-10-23 RX ADMIN — CARBIDOPA AND LEVODOPA 1 TABLET: 25; 100 TABLET ORAL at 02:10

## 2018-10-23 RX ADMIN — AMIODARONE HYDROCHLORIDE 200 MG: 200 TABLET ORAL at 10:10

## 2018-10-23 RX ADMIN — PANTOPRAZOLE SODIUM 40 MG: 40 TABLET, DELAYED RELEASE ORAL at 10:10

## 2018-10-23 RX ADMIN — CARBIDOPA AND LEVODOPA 1 TABLET: 25; 100 TABLET ORAL at 08:10

## 2018-10-23 RX ADMIN — CARBIDOPA AND LEVODOPA 1 TABLET: 25; 100 TABLET ORAL at 05:10

## 2018-10-23 RX ADMIN — DOCUSATE SODIUM 100 MG: 100 CAPSULE, LIQUID FILLED ORAL at 10:10

## 2018-10-23 RX ADMIN — POLYETHYLENE GLYCOL 3350 17 G: 17 POWDER, FOR SOLUTION ORAL at 10:10

## 2018-10-23 RX ADMIN — WARFARIN SODIUM 1 MG: 1 TABLET ORAL at 05:10

## 2018-10-23 RX ADMIN — TRAMADOL HYDROCHLORIDE AND ACETAMINOPHEN 1 TABLET: 37.5; 325 TABLET, FILM COATED ORAL at 10:10

## 2018-10-23 RX ADMIN — LEVALBUTEROL 1.25 MG: 1.25 SOLUTION, CONCENTRATE RESPIRATORY (INHALATION) at 01:10

## 2018-10-23 RX ADMIN — GABAPENTIN 100 MG: 100 CAPSULE ORAL at 10:10

## 2018-10-23 RX ADMIN — CARBIDOPA AND LEVODOPA 1 TABLET: 25; 100 TABLET ORAL at 10:10

## 2018-10-23 RX ADMIN — GABAPENTIN 100 MG: 100 CAPSULE ORAL at 08:10

## 2018-10-23 RX ADMIN — IPRATROPIUM BROMIDE 0.5 MG: 0.5 SOLUTION RESPIRATORY (INHALATION) at 08:10

## 2018-10-23 RX ADMIN — LEVALBUTEROL 1.25 MG: 1.25 SOLUTION, CONCENTRATE RESPIRATORY (INHALATION) at 08:10

## 2018-10-23 NOTE — PLAN OF CARE
Problem: Patient Care Overview  Goal: Plan of Care Review  Outcome: Ongoing (interventions implemented as appropriate)  Vitals stable, afebrile. Ambulating in halls with PT. Participating with OT. IV meds switched to PO. Pain controled with PO meds. Voiding in urinal. Ambulates to bathroom for BM. Eating well. Watched sports most of the day. Sat in wheelchair this afternoon. Discussed plan of care with pt and daughter, stated understanding.

## 2018-10-23 NOTE — PROGRESS NOTES
[facility]  Cardiology  Progress Note    Patient Name: Elvis Thompson  MRN: [id number]  Admission Date: 10/15/2018  Hospital Length of Stay: 8 days  Code Status: Full Code   Attending Physician: Jorden Brown MD   Primary Care Physician: Regulo Martínez MD  Expected Discharge Date:   Principal Problem:Debility    Subjective:     Interval History:  Awaiting PPM interrogation, no further rhythm disturbance. Tolerating addition of ARB and spironolactone.    Review of Systems   Constitution: Negative.   HENT: Negative.    Eyes: Negative.    Cardiovascular: Negative.    Respiratory: Negative.    Endocrine: Negative.    Hematologic/Lymphatic: Negative.    Skin: Negative.    Musculoskeletal: Negative.    Neurological: Negative.    Psychiatric/Behavioral: Negative.      Objective:     Vital Signs (Most Recent):  Temp: 96.3 °F (35.7 °C) (10/23/18 0749)  Pulse: (!) 54 (10/23/18 0749)  Resp: (!) 22 (10/23/18 0707)  BP: (!) 146/68 (10/23/18 0749)  SpO2: 98 % (10/23/18 0749) Vital Signs (24h Range):  Temp:  [96.3 °F (35.7 °C)-98.5 °F (36.9 °C)] 96.3 °F (35.7 °C)  Pulse:  [50-74] 54  Resp:  [15-22] 22  SpO2:  [94 %-98 %] 98 %  BP: (122-146)/(62-77) 146/68     Weight: 87 kg (191 lb 12.8 oz)  Body mass index is 31.92 kg/m².    SpO2: 98 %  O2 Device (Oxygen Therapy): room air      Intake/Output Summary (Last 24 hours) at 10/23/2018 0834  Last data filed at 10/23/2018 0142  Gross per 24 hour   Intake 720 ml   Output 500 ml   Net 220 ml       Lines/Drains/Airways     Peripheral Intravenous Line                 Peripheral IV - Single Lumen 10/21/18 0803 Right Forearm 2 days                Physical Exam   Constitutional: He is oriented to person, place, and time. He appears well-developed and well-nourished.   HENT:   Head: Normocephalic.   Eyes: Pupils are equal, round, and reactive to light.   Cardiovascular:   Murmur (III/VI) heard.  Pulmonary/Chest: Effort normal and breath sounds normal. He has no rales.    Abdominal: Soft.   Musculoskeletal: Normal range of motion.   Neurological: He is oriented to person, place, and time.   Skin: Skin is warm and dry.   Psychiatric: He has a normal mood and affect. His behavior is normal. Judgment and thought content normal.   Nursing note and vitals reviewed.      Significant Labs:   BMP:   Recent Labs   Lab 10/22/18  0521 10/23/18  0518    96    132*   K 4.6 4.3    101   CO2 25 23   BUN 16 18   CREATININE 0.8 0.8   CALCIUM 9.0 8.9   , CBC   Recent Labs   Lab 10/22/18  0521 10/23/18  0518   WBC 5.38 5.42   HGB 12.0* 11.7*   HCT 36.0* 35.5*    286   , INR   Recent Labs   Lab 10/22/18  0521 10/23/18  0518   INR 2.0* 1.8*    and Troponin No results for input(s): TROPONINI in the last 48 hours.    Significant Imaging: Echocardiogram:   2D echo with color flow doppler:   Results for orders placed or performed during the hospital encounter of 10/15/18   2D echo with color flow doppler   Result Value Ref Range    Calculated EF 25 (A) 55 - 65    Mitral Valve Regurgitation MODERATE (A)     Diastolic Dysfunction Yes (A)     Aortic Valve Regurgitation TRIVIAL     Aortic Valve Stenosis MILD TO MODERATE (A)     Est. PA Systolic Pressure 59.91 (A)     Tricuspid Valve Regurgitation MODERATE TO SEVERE (A)      Assessment and Plan:     WCT appears to be VT non sustained 12 beat at 160 bpm / but possibly AF with LBBB- chronic A fib, long term anticoagulation with coumadin. V pacing intermittent.   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   Chronic HFrEF- new EF 25% but stable volume status.   HTN well controlled   Debility/pneumonia- receiving ABX therapy. Progressing with physical therapy.   Echo 10/18 worsening LVEF 25% 2+ MR, mild mod AS PH 59.9  MPI 2010 no stress induced ischemia     Plan:  Review PPM interrogation  Stable from CV standpoint on amiodarone/coreg/losartan/spironolactone  Defer ASA given coumadin, no known CAD, defer statin due to questionable  benefit at advanced age and no documented CAD  DNR   Outpt f/u    Active Diagnoses:    Diagnosis Date Noted POA    PRINCIPAL PROBLEM:  Debility [R53.81] 10/15/2018 Yes    NSVT (nonsustained ventricular tachycardia) [I47.2] 10/19/2018 No    Chronic anticoagulation [Z79.01] 10/14/2018 Not Applicable    Bacteremia [R78.81] 10/12/2018 Yes    Aspiration pneumonia [J69.0] 10/10/2018 Yes    Diarrhea of presumed infectious origin [R19.7] 10/10/2018 Yes    Parkinson disease [G20] 10/10/2018 Yes    Incarcerated left inguinal hernia [K40.30] 10/04/2018 Yes      Problems Resolved During this Admission:    Diagnosis Date Noted Date Resolved POA    Sepsis [A41.9] 10/10/2018 10/19/2018 Yes       VTE Risk Mitigation (From admission, onward)        Ordered     warfarin (COUMADIN) tablet 1 mg  Daily      10/18/18 1026          Nakia Olivares NP  Cardiology  [facility]  I attest that I have personally seen and examined this patient. I have reviewed and discussed the management in detail as outlined above.

## 2018-10-23 NOTE — PLAN OF CARE
Problem: Patient Care Overview  Goal: Plan of Care Review  Outcome: Ongoing (interventions implemented as appropriate)  Tolerating Q6 treatments well no signs of distress or increase work of breathing. spo2 96% on room air.

## 2018-10-23 NOTE — PLAN OF CARE
10/23/18 1118   Discharge Reassessment   Assessment Type Discharge Planning Reassessment         Spoke with Erlinda, daughter, about discharge plan. Patient is adiment about not going to a nursing home. He wants to be discharged home. PT and OT both say home with home health is appropriate. Spoke with Erlinda about this so they can try and get sitters set up again for discharge. She is still worried about patient going home, but knows that the patient does want to go home. Patient is walking with PT. He is using the restroom and urinal. Erlinda is aware of this as well. She states she will talk with her siblings and try to set up sitters. She is also aware that discharge is planned for Thursday or Friday. CM will continue to follow and assist as needed.

## 2018-10-23 NOTE — PLAN OF CARE
Problem: Patient Care Overview  Goal: Plan of Care Review  Outcome: Ongoing (interventions implemented as appropriate)  Patient resting with some complaints of burning sensation on left side. Relief stated with PO meds. IV cipro administered. Some urine incontinence. VS stable, A&Ox4, A-fib on tele. No acute changes noted. Plan of care reviewed and agreed upon with patient and family.

## 2018-10-23 NOTE — PLAN OF CARE
10/23/18 1052   Discharge Reassessment   Assessment Type Discharge Planning Reassessment       90L completed  And faxed to the state. Awaiting 142.    Faraz Jauregui LMSW

## 2018-10-23 NOTE — PT/OT/SLP PROGRESS
"Physical Therapy Treatment    Patient Name:  Elvis Thompson   MRN:  [id number]    Recommendations:     Discharge Recommendations:  home with home health, nursing facility, skilled   Discharge Equipment Recommendations: none   Barriers to discharge: None    Assessment:     Elvis Thompson is a 85 y.o. male admitted with a medical diagnosis of Debility.  He presents with the following impairments/functional limitations:  weakness, gait instability, impaired endurance, impaired balance, decreased lower extremity function, impaired self care skills, impaired functional mobilty, decreased safety awareness .  Pt. Is progressing towards PT POC goals.    Rehab Prognosis:  Fair; patient would benefit from acute skilled PT services to address these deficits and reach maximum level of function.      Recent Surgery: * No surgery found *      Plan:     During this hospitalization, patient to be seen (1-2x/day(M-F); PRN(Sat.,Sun.)) to address the above listed problems via therapeutic activities, therapeutic exercises, gait training  · Plan of Care Expires:  (upon d/c from facility)   Plan of Care Reviewed with: patient    Subjective     Communicated with patient prior to session.  Patient found supine in bed upon PT entry to room, agreeable to treatment.        Patient comments/goals: "I'd like to get out of the room."  Pain/Comfort:  · Pain Rating 1: 0/10  · Pain Rating Post-Intervention 1: 0/10    Patients cultural, spiritual, Mandaen conflicts given the current situation:      Objective:     Patient found with: peripheral IV     General Precautions: Standard, fall   Orthopedic Precautions:N/A   Braces: N/A     Functional Mobility:  · Bed Mobility:     · Rolling Left:  minimum assistance  · Rolling Right: minimum assistance  · Scooting: minimum assistance  · Bridging: minimum assistance  · Supine to Sit: minimum assistance  · Sit to Supine: minimum assistance  · Transfers:     · Sit to Stand:  minimum assistance with rolling " walker  · Bed to Chair: minimum assistance with  rolling walker  using  Stand Pivot  · Toilet Transfer: minimum assistance with  rolling walker  using  Stand Pivot  · Gait: Gait Training:  Pt. amb. 100' x 1 bout, 20' x 1 bout, with RW, CGA.  VC's given to pt. for step placement and A.D. placement.  Pt. demonstrated decreased velocity of gait mechanics and decreased step length.  Balance:    Static Sit: FAIR+: Able to take MINIMAL challenges from all directions  Dynamic Sit: FAIR+: Maintains balance through MINIMAL excursions of active trunk motion  Static Stand: FAIR+: Takes MINIMAL challenges from all directions  · Dynamic stand: FAIR: Needs CONTACT GUARD during gait  ·       AM-PAC 6 CLICK MOBILITY  Turning over in bed (including adjusting bedclothes, sheets and blankets)?: 3  Sitting down on and standing up from a chair with arms (e.g., wheelchair, bedside commode, etc.): 3  Moving from lying on back to sitting on the side of the bed?: 2  Moving to and from a bed to a chair (including a wheelchair)?: 3  Need to walk in hospital room?: 3  Climbing 3-5 steps with a railing?: 1  Basic Mobility Total Score: 15       Therapeutic Activities and Exercises:   There. Act.:  Weight shifting and weight acceptance tasks performed in standing with min. A.  Trunk control tasks performed with pt. seated EOB with CGA.  Transfer Training performed with assistance as noted.  VC's and manual guidance given to pt. for sequencing.    Patient left up in chair with all lines intact, call button in reach and RN notified..    GOALS:   Multidisciplinary Problems     Physical Therapy Goals        Problem: Physical Therapy Goal    Goal Priority Disciplines Outcome Goal Variances Interventions   Physical Therapy Goal     PT, PT/OT Ongoing (interventions implemented as appropriate)     Description:  Goals to be met by: 10/30/2018     Patient will increase functional independence with mobility by performin. Supine to sit with Stand-by  Assistance  2. Sit to supine with Stand-by Assistance  3. Rolling to Left and Right with Stand-by Assistance.  4. Sit to stand transfer with CGA  5. Bed to chair transfer with Contact Guard Assistance using Rolling Walker  6. Gait  x 100 feet with Contact Guard Assistance using Rolling Walker.              Problem: Physical Therapy Goal    Goal Priority Disciplines Outcome Goal Variances Interventions   Physical Therapy Goal     PT, PT/OT      Description:  Advancing  Efforts to achieve stated goals            Problem: Physical Therapy Goal    Goal Priority Disciplines Outcome Goal Variances Interventions   Physical Therapy Goal     PT, PT/OT      Description:  Advancing efforts to achieve  Gait  With AD goal                    Time Tracking:     PT Received On: 10/23/18  PT Start Time: 1059     PT Stop Time: 1125  PT Total Time (min): 26 min     Billable Minutes: Gait Training 15 minutes and Therapeutic Activity 11 minutes    Treatment Type: Treatment  PT/PTA: PT           Lukas Georges, PT  10/23/2018

## 2018-10-23 NOTE — PT/OT/SLP PROGRESS
"Physical Therapy Treatment    Patient Name:  Elvis Thompson   MRN:  [id number]    Recommendations:     Discharge Recommendations:  home with home health, home health PT   Discharge Equipment Recommendations: none   Barriers to discharge: None    Assessment:     Elvis Thompson is a 85 y.o. male admitted with a medical diagnosis of Debility.  He presents with the following impairments/functional limitations:  weakness, gait instability, impaired balance, impaired endurance, impaired self care skills, impaired functional mobilty, decreased safety awareness, decreased lower extremity function .  Pt. is progressing towards PT POC goals.    Rehab Prognosis:  Fair+; patient would benefit from acute skilled PT services to address these deficits and reach maximum level of function.      Recent Surgery: * No surgery found *      Plan:     During this hospitalization, patient to be seen (1-2x/day(M-F); PRN(Sat.,Sun.)) to address the above listed problems via gait training, therapeutic activities, therapeutic exercises  · Plan of Care Expires:  (upon d/c from facility)   Plan of Care Reviewed with: patient    Subjective     Communicated with patient prior to session.  Patient found seated in bedside chair upon PT entry to room, agreeable to treatment.        Patient comments/goals: "I'm tired."  Pain/Comfort:  · Pain Rating 1: 0/10  · Pain Rating Post-Intervention 1: 0/10    Patients cultural, spiritual, Confucianist conflicts given the current situation:      Objective:     Patient found with: peripheral IV     General Precautions: Standard, fall   Orthopedic Precautions:N/A   Braces: N/A     Functional Mobility:  · Bed Mobility:     · Rolling Left:  minimum assistance  · Rolling Right: minimum assistance  · Scooting: minimum assistance  · Bridging: minimum assistance  · Supine to Sit: minimum assistance  · Sit to Supine: minimum assistance  · Transfers:     · Sit to Stand:  minimum assistance with rolling walker, x 3 " bouts  · Bed to Chair: minimum assistance with  rolling walker  using  Stand Pivot  · Gait: Gait Training:  Pt. amb. 100' with RW, CGA.  VC's given to pt. for step placement and A.D. placement.  Pt. demonstrated decreased velocity of gait mechanics and decreased step length  Balance:   Static Sit: FAIR+: Able to take MINIMAL challenges from all directions  Dynamic Sit: FAIR+: Maintains balance through MINIMAL excursions of active trunk motion  Static Stand: FAIR+: Takes MINIMAL challenges from all directions  · Dynamic stand: FAIR: Needs CONTACT GUARD during gait  ·       AM-PAC 6 CLICK MOBILITY  Turning over in bed (including adjusting bedclothes, sheets and blankets)?: 3  Sitting down on and standing up from a chair with arms (e.g., wheelchair, bedside commode, etc.): 3  Moving from lying on back to sitting on the side of the bed?: 2  Moving to and from a bed to a chair (including a wheelchair)?: 3  Need to walk in hospital room?: 3  Climbing 3-5 steps with a railing?: 1  Basic Mobility Total Score: 15       Therapeutic Activities and Exercises:   There. Act.:  Weight shifting and weight acceptance tasks performed in standing with min. A.  Trunk control tasks performed with pt. seated EOB and in bedside chair with CGA.  Transfer Training and bed mobility tasks performed with assistance as noted.  VC's and manual guidance given to pt. for sequencing.    Patient left HOB elevated with all lines intact, call button in reach and RN notified..    GOALS:   Multidisciplinary Problems     Physical Therapy Goals        Problem: Physical Therapy Goal    Goal Priority Disciplines Outcome Goal Variances Interventions   Physical Therapy Goal     PT, PT/OT Ongoing (interventions implemented as appropriate)     Description:  Goals to be met by: 10/30/2018     Patient will increase functional independence with mobility by performin. Supine to sit with Stand-by Assistance  2. Sit to supine with Stand-by Assistance  3.  Rolling to Left and Right with Stand-by Assistance.  4. Sit to stand transfer with CGA  5. Bed to chair transfer with Contact Guard Assistance using Rolling Walker  6. Gait  x 100 feet with Contact Guard Assistance using Rolling Walker.              Problem: Physical Therapy Goal    Goal Priority Disciplines Outcome Goal Variances Interventions   Physical Therapy Goal     PT, PT/OT      Description:  Advancing  Efforts to achieve stated goals            Problem: Physical Therapy Goal    Goal Priority Disciplines Outcome Goal Variances Interventions   Physical Therapy Goal     PT, PT/OT      Description:  Advancing efforts to achieve  Gait  With AD goal                    Time Tracking:     PT Received On: 10/23/18  PT Start Time: 1215     PT Stop Time: 1245  PT Total Time (min): 30 min     Billable Minutes: Gait Training 11 minutes and Therapeutic Activity 19 minutes    Treatment Type: Treatment  PT/PTA: PT           Lukas Georges, PT  10/23/2018

## 2018-10-23 NOTE — PT/OT/SLP PROGRESS
Occupational Therapy   Treatment    Name: Elvis Thompson  MRN: [id number]  Admitting Diagnosis:  Debility       Recommendations:     Discharge Recommendations: home with home health, nursing facility, skilled  Discharge Equipment Recommendations:   hip kit  Barriers to discharge:   none    Subjective     Communicated with: pt prior to session.  Pain/Comfort:  ·  1/10    Patients cultural, spiritual, Gnosticist conflicts given the current situation: not discussed    Objective:     Patient found with: (seated in w/c with call alarm in reach)    General Precautions: Standard,     Orthopedic Precautions:  n/a  Braces:   n/a    Occupational Performance:    Bed Mobility:    · Patient completed Rolling/Turning to Left with  minimum assistance  · Patient completed Rolling/Turning to Right with minimum assistance  · Patient completed Scooting/Bridging with maximal assistance     Functional Mobility/Transfers:  · Patient completed Sit <> Stand Transfer with moderate assistance  with  rolling walker     Activities of Daily Living:  · Upper Body Dressing: setup assistance, needs max a to button/unbutton  · Lower Body Dressing: min assistance to don/doff socks and shoes    Patient left up in chair with call button in reach and nursing notified        Treatment & Education:  Pt educated on use of hip kit and on use of large amplitude exercises to counteract small movements with Parkinson's.    Pt used reacher and long handled shoe horn to don shoes with mod A  Pt donned sock with set up assist  Pt completed large amplitude exercises to improve endurance and assist with small movements associated with PD     Education:    Assessment:     Elvis Thompson is a 85 y.o. male with a medical diagnosis of Debility.  He presents with increased motivation to participate in OT today.  Performance deficits affecting function are  decreased coordination, weakness, decreased self-care, and muscle weakness.      Rehab Prognosis:  Fair ; patient  would benefit from acute skilled OT services to address these deficits and reach maximum level of function.       Plan:     Patient to be seen   by occupational therapy to address the above listed problems via    · Plan of Care Expires:  10/26/18  · Plan of Care Reviewed with: patient    This Plan of care has been discussed with the patient who was involved in its development and understands and is in agreement with the identified goals and treatment plan    GOALS:   Multidisciplinary Problems     Occupational Therapy Goals        Problem: Occupational Therapy Goal    Goal Priority Disciplines Outcome Interventions   Occupational Therapy Goal     OT, PT/OT     Description:  Goals to be met by: 10/26/18    Patient will increase functional independence with ADLs by performing:    LE Dressing with Stand-by Assistance.  Supine to sit with mod I.  Sit to stand with stand by assist.  Grooming at mirror with stand by assist.                      Time Tracking:     OT Date of Treatment: 10/22/18  OT Start Time: 1550  OT Stop Time: 1605  OT Total Time (min): 15 min    Billable Minutes:Therapeutic Activity 15 min    Daisy Sawyer OT  10/22/18

## 2018-10-23 NOTE — PT/OT/SLP PROGRESS
Occupational Therapy   Treatment    Name: Elvis Thompson  MRN: [id number]  Admitting Diagnosis:  Debility       Recommendations:     Discharge Recommendations: home with home health  Discharge Equipment Recommendations:  none(issued hip kit to take home)  Barriers to discharge:  None    Subjective     Communicated with: nursing prior to session.  Pain/Comfort:  · Pain Rating 1: 2/10(left hip area/ ice pack applied)  · Location - Side 1: Bilateral  · Location - Orientation 1: generalized  · Location 1: hip  · Pain Addressed 1: Nurse notified, Reposition  · Pain Rating Post-Intervention 1: 0/10    Patients cultural, spiritual, Muslim conflicts given the current situation: not discussed    Objective:     Patient found with: peripheral IV tele, bed alarm    General Precautions: Standard, fall   Orthopedic Precautions:N/A   Braces: N/A     Occupational Performance:    Bed Mobility:    · Patient completed Rolling/Turning to Left with  supervision  · Patient completed Rolling/Turning to Right with supervision  · Patient completed Scooting/Bridging with maximal assistance  · Patient completed Supine to Sit with minimum assistance  · Patient completed Sit to Supine with minimum assistance     Functional Mobility/Transfers:  · Patient completed Sit <> Stand Transfer with moderate assistance  with  hand-held assist   · Patient completed Toilet Transfer Stand Pivot technique with moderate assistance with  standard walker    Activities of Daily Living:  · Feeding:  independence .  · Grooming: supervision ,seated EOB  · Bathing: NT  · Upper Body Dressing: minimum assistance gown  · Lower Body Dressing: moderate assistance socks using sock aide, max A for depends   · Toileting: maximal assistance had been incontinent of BM in his depends, took total assist to wipe BM while standing at walker, used toilet to urinate seated with set up    Patient left supine with all lines intact and call button in reach    [facility] 6 Click:  [facility]  Total Score: 15    Treatment & Education:  Education on use of reacher/ sock aide. Reviewed OT POC and client centered goals. NOTE: his phone was ringing several times  Both daughters called) and he did not know how to answer it, he got confused between the telephone and RN call light. He needed a count 1-2-3 to get from sit to stand from the bed and from the toilet, would benefit from RTS over toilet as a riser and LE/core strengthening to come from sit to stand without using momentum (= fall risk).   Education:    Assessment:     Elvis Thompson is a 85 y.o. male with a medical diagnosis of Debility.  He presents with PNA, weakness, incoordination, impaired cognition, festinating gait = fall risk..  Performance deficits affecting function are weakness, impaired endurance, impaired self care skills, impaired functional mobilty, impaired balance, impaired cognition, decreased coordination, decreased lower extremity function, pain, impaired skin.      Rehab Prognosis:  good; patient would benefit from acute skilled OT services to address these deficits and reach maximum level of function.       Plan:     Patient to be seen 5 x/week to address the above listed problems via self-care/home management, therapeutic activities, therapeutic exercises  · Plan of Care Expires:    · Plan of Care Reviewed with: patient    This Plan of care has been discussed with the patient who was involved in its development and understands and is in agreement with the identified goals and treatment plan    GOALS:   Multidisciplinary Problems     Occupational Therapy Goals        Problem: Occupational Therapy Goal    Goal Priority Disciplines Outcome Interventions   Occupational Therapy Goal     OT, PT/OT     Description:  Goals to be met by: 10/26/18    Patient will increase functional independence with ADLs by performing:    LE Dressing with Stand-by Assistance.  Supine to sit with mod I.  Sit to stand with stand by assist.  Grooming at  mirror with stand by assist.                      Time Tracking:     OT Date of Treatment: 10/23/18  OT Start Time: 0330  OT Stop Time: 0415  OT Total Time (min): 45 min    Billable Minutes:Self Care/Home Management 45    Arlen Miller OT  10/23/2018

## 2018-10-24 PROBLEM — I48.91 ATRIAL FIBRILLATION: Status: ACTIVE | Noted: 2018-10-24

## 2018-10-24 PROBLEM — I50.22 CHRONIC SYSTOLIC HEART FAILURE: Status: ACTIVE | Noted: 2018-10-24

## 2018-10-24 LAB
ANION GAP SERPL CALC-SCNC: 8 MMOL/L
BASOPHILS # BLD AUTO: 0.03 K/UL
BASOPHILS NFR BLD: 0.6 %
BUN SERPL-MCNC: 18 MG/DL
CALCIUM SERPL-MCNC: 8.6 MG/DL
CHLORIDE SERPL-SCNC: 103 MMOL/L
CO2 SERPL-SCNC: 23 MMOL/L
CREAT SERPL-MCNC: 0.8 MG/DL
DIFFERENTIAL METHOD: ABNORMAL
EOSINOPHIL # BLD AUTO: 0.3 K/UL
EOSINOPHIL NFR BLD: 5.4 %
ERYTHROCYTE [DISTWIDTH] IN BLOOD BY AUTOMATED COUNT: 12.9 %
EST. GFR  (AFRICAN AMERICAN): >60 ML/MIN/1.73 M^2
EST. GFR  (NON AFRICAN AMERICAN): >60 ML/MIN/1.73 M^2
GLUCOSE SERPL-MCNC: 90 MG/DL
HCT VFR BLD AUTO: 35.2 %
HGB BLD-MCNC: 11.7 G/DL
INR PPP: 1.7
LYMPHOCYTES # BLD AUTO: 1.2 K/UL
LYMPHOCYTES NFR BLD: 23.8 %
MCH RBC QN AUTO: 31 PG
MCHC RBC AUTO-ENTMCNC: 33.2 G/DL
MCV RBC AUTO: 93 FL
MONOCYTES # BLD AUTO: 0.6 K/UL
MONOCYTES NFR BLD: 11.3 %
NEUTROPHILS # BLD AUTO: 2.9 K/UL
NEUTROPHILS NFR BLD: 58.9 %
PLATELET # BLD AUTO: 279 K/UL
PMV BLD AUTO: 9.5 FL
POTASSIUM SERPL-SCNC: 4 MMOL/L
PROTHROMBIN TIME: 16.5 SEC
RBC # BLD AUTO: 3.78 M/UL
SODIUM SERPL-SCNC: 134 MMOL/L
WBC # BLD AUTO: 4.96 K/UL

## 2018-10-24 PROCEDURE — 97802 MEDICAL NUTRITION INDIV IN: CPT

## 2018-10-24 PROCEDURE — 99232 PR SUBSEQUENT HOSPITAL CARE,LEVL II: ICD-10-PCS | Mod: ,,, | Performed by: FAMILY MEDICINE

## 2018-10-24 PROCEDURE — 94640 AIRWAY INHALATION TREATMENT: CPT

## 2018-10-24 PROCEDURE — 99232 SBSQ HOSP IP/OBS MODERATE 35: CPT | Mod: ,,, | Performed by: FAMILY MEDICINE

## 2018-10-24 PROCEDURE — 94664 DEMO&/EVAL PT USE INHALER: CPT

## 2018-10-24 PROCEDURE — 97110 THERAPEUTIC EXERCISES: CPT

## 2018-10-24 PROCEDURE — 85025 COMPLETE CBC W/AUTO DIFF WBC: CPT

## 2018-10-24 PROCEDURE — G8979 MOBILITY GOAL STATUS: HCPCS | Mod: CK

## 2018-10-24 PROCEDURE — 94760 N-INVAS EAR/PLS OXIMETRY 1: CPT

## 2018-10-24 PROCEDURE — 85610 PROTHROMBIN TIME: CPT

## 2018-10-24 PROCEDURE — 25000003 PHARM REV CODE 250: Performed by: NURSE PRACTITIONER

## 2018-10-24 PROCEDURE — 94799 UNLISTED PULMONARY SVC/PX: CPT

## 2018-10-24 PROCEDURE — 11000004 HC SNF PRIVATE

## 2018-10-24 PROCEDURE — 25000003 PHARM REV CODE 250: Performed by: INTERNAL MEDICINE

## 2018-10-24 PROCEDURE — 97530 THERAPEUTIC ACTIVITIES: CPT

## 2018-10-24 PROCEDURE — 25000003 PHARM REV CODE 250: Performed by: FAMILY MEDICINE

## 2018-10-24 PROCEDURE — 94761 N-INVAS EAR/PLS OXIMETRY MLT: CPT

## 2018-10-24 PROCEDURE — 99900035 HC TECH TIME PER 15 MIN (STAT)

## 2018-10-24 PROCEDURE — G8980 MOBILITY D/C STATUS: HCPCS | Mod: CK

## 2018-10-24 PROCEDURE — 25000242 PHARM REV CODE 250 ALT 637 W/ HCPCS: Performed by: INTERNAL MEDICINE

## 2018-10-24 PROCEDURE — 36415 COLL VENOUS BLD VENIPUNCTURE: CPT

## 2018-10-24 PROCEDURE — 80048 BASIC METABOLIC PNL TOTAL CA: CPT

## 2018-10-24 RX ADMIN — POLYETHYLENE GLYCOL 3350 17 G: 17 POWDER, FOR SOLUTION ORAL at 10:10

## 2018-10-24 RX ADMIN — CARVEDILOL 3.12 MG: 3.12 TABLET, FILM COATED ORAL at 08:10

## 2018-10-24 RX ADMIN — CARBIDOPA AND LEVODOPA 1 TABLET: 25; 100 TABLET ORAL at 02:10

## 2018-10-24 RX ADMIN — IPRATROPIUM BROMIDE 0.5 MG: 0.5 SOLUTION RESPIRATORY (INHALATION) at 07:10

## 2018-10-24 RX ADMIN — GABAPENTIN 100 MG: 100 CAPSULE ORAL at 08:10

## 2018-10-24 RX ADMIN — DOCUSATE SODIUM 100 MG: 100 CAPSULE, LIQUID FILLED ORAL at 10:10

## 2018-10-24 RX ADMIN — GABAPENTIN 100 MG: 100 CAPSULE ORAL at 10:10

## 2018-10-24 RX ADMIN — LOSARTAN POTASSIUM 50 MG: 50 TABLET, FILM COATED ORAL at 10:10

## 2018-10-24 RX ADMIN — LEVALBUTEROL 1.25 MG: 1.25 SOLUTION, CONCENTRATE RESPIRATORY (INHALATION) at 07:10

## 2018-10-24 RX ADMIN — CARBIDOPA AND LEVODOPA 1 TABLET: 25; 100 TABLET ORAL at 05:10

## 2018-10-24 RX ADMIN — APIXABAN 5 MG: 5 TABLET, FILM COATED ORAL at 08:10

## 2018-10-24 RX ADMIN — SENNA 8.6 MG: 8.6 TABLET, COATED ORAL at 10:10

## 2018-10-24 RX ADMIN — CELECOXIB 200 MG: 200 CAPSULE ORAL at 08:10

## 2018-10-24 RX ADMIN — CARBIDOPA AND LEVODOPA 1 TABLET: 25; 100 TABLET ORAL at 08:10

## 2018-10-24 RX ADMIN — CARBIDOPA AND LEVODOPA 1 TABLET: 25; 100 TABLET ORAL at 10:10

## 2018-10-24 RX ADMIN — IPRATROPIUM BROMIDE 0.5 MG: 0.5 SOLUTION RESPIRATORY (INHALATION) at 01:10

## 2018-10-24 RX ADMIN — APIXABAN 5 MG: 5 TABLET, FILM COATED ORAL at 10:10

## 2018-10-24 RX ADMIN — SPIRONOLACTONE 12.5 MG: 25 TABLET, FILM COATED ORAL at 10:10

## 2018-10-24 RX ADMIN — TRAMADOL HYDROCHLORIDE AND ACETAMINOPHEN 1 TABLET: 37.5; 325 TABLET, FILM COATED ORAL at 10:10

## 2018-10-24 RX ADMIN — IPRATROPIUM BROMIDE 0.5 MG: 0.5 SOLUTION RESPIRATORY (INHALATION) at 08:10

## 2018-10-24 RX ADMIN — AMIODARONE HYDROCHLORIDE 200 MG: 200 TABLET ORAL at 10:10

## 2018-10-24 RX ADMIN — LEVALBUTEROL 1.25 MG: 1.25 SOLUTION, CONCENTRATE RESPIRATORY (INHALATION) at 01:10

## 2018-10-24 RX ADMIN — LEVALBUTEROL 1.25 MG: 1.25 SOLUTION, CONCENTRATE RESPIRATORY (INHALATION) at 08:10

## 2018-10-24 RX ADMIN — CARVEDILOL 3.12 MG: 3.12 TABLET, FILM COATED ORAL at 10:10

## 2018-10-24 RX ADMIN — GABAPENTIN 100 MG: 100 CAPSULE ORAL at 02:10

## 2018-10-24 RX ADMIN — TRAMADOL HYDROCHLORIDE AND ACETAMINOPHEN 1 TABLET: 37.5; 325 TABLET, FILM COATED ORAL at 02:10

## 2018-10-24 RX ADMIN — TRAMADOL HYDROCHLORIDE AND ACETAMINOPHEN 1 TABLET: 37.5; 325 TABLET, FILM COATED ORAL at 05:10

## 2018-10-24 RX ADMIN — PANTOPRAZOLE SODIUM 40 MG: 40 TABLET, DELAYED RELEASE ORAL at 10:10

## 2018-10-24 NOTE — PLAN OF CARE
10/24/18 1519   Discharge Reassessment   Assessment Type Discharge Planning Reassessment       Pt has been accepted by [facility]. Will be ready for D/C on 10/25/18.    Faraz Jauregui LMSW

## 2018-10-24 NOTE — PROGRESS NOTES
" [facility]  Adult Nutrition  Progress Note    SUMMARY       Recommendations    Recommendation/Intervention: Continue Chaves diet as tolerated encouraging good itnake of all meals. Add Boost Plus once daily for varied intake and recorded wtloss  Goals: adequate intake >=85% EEN by discharge  Nutrition Goal Status: new  Communication of NADIA Recs: (POC)    Nutrition Discharge Planning: Chaves diet with adequate intake to meet EEN/EPN    Reason for Assessment    Reason for Assessment: length of stay  Diagnosis: (Debility)  Relevant Medical History: HTN, Parkinson, Hernia Repair  General Information Comments: Pt admittted to [facility] for debility for PT/OT. Pt s/p recent hernia repair. Per RN documentation, pt with varied intake of meals ranging from %, possible D/C tomorrow with NH placement. Per chart review, pt with ~11# wtloss since inpt admit (2wks). However I question the 10/9 admit wt due to previous wt in chart 18- 85.3kg, 18- 83.9kg, 10/4/18- 83.9kg. MST=0. Attempted visit x2 today for NFPE, however pt with other disciplines at each time. Will complete NFPE at follow up      Nutrition Risk Screen    Nutrition Risk Screen: no indicators present    Nutrition/Diet History    Do you have any cultural, spiritual, Roman Catholic conflicts, given your current situation?: none reported  Food Allergies: (Iodine)  Factors Affecting Nutritional Intake: constipation(hernia repair)    Anthropometrics    Temp: 97.3 °F (36.3 °C)  Height Method: Stated  Height: 5' 5" (165.1 cm)  Height (inches): 65 in  Weight Method: Bed Scale  Weight: 87 kg (191 lb 12.8 oz)(RD reviewed)  Weight (lb): 191.8 lb  Ideal Body Weight (IBW), Male: 136 lb  % Ideal Body Weight, Male (lb): 141.03 lb  BMI (Calculated): 32  BMI Grade: 30 - 34.9- obesity - grade I  Usual Body Weight (UBW), k kg(10/9/2018)  % Usual Body Weight: 94.76  % Weight Change From Usual Weight: -5.44 %       Lab/Procedures/Meds    Pertinent Labs " Reviewed: reviewed  Pertinent Medications Reviewed: reviewed  Scheduled Meds:   amiodarone  200 mg Oral Daily    apixaban  5 mg Oral BID    carbidopa-levodopa  mg  1 tablet Oral QID    carvedilol  3.125 mg Oral BID    docusate sodium  100 mg Oral Daily    gabapentin  100 mg Oral TID    ipratropium  0.5 mg Nebulization Q6H WAKE    levalbuterol  1.25 mg Nebulization Q6H WAKE    losartan  50 mg Oral Daily    pantoprazole  40 mg Oral Daily    polyethylene glycol  17 g Oral Daily    senna  8.6 mg Oral Daily    spironolactone  12.5 mg Oral Daily     Continuous Infusions:  PRN Meds:.acetaminophen, celecoxib, cloNIDine, levalbuterol, ondansetron, promethazine (PHENERGAN) IVPB, tramadol-acetaminophen 37.5-325 mg    Recent Labs   Lab 10/24/18  0515   CALCIUM 8.6*   *   K 4.0   CO2 23      BUN 18   CREATININE 0.8     Physical Findings/Assessment    Overall Physical Appearance: advanced age  Tubes: (-)  Skin: intact(Umang 18)    Estimated/Assessed Needs    Weight Used For Calorie Calculations: 87 kg (191 lb 12.8 oz)  Energy Calorie Requirements (kcal): 1778  Energy Need Method: [place]-St Jeor(x1.2)  Protein Requirements: (1.0-1.2)  Weight Used For Protein Calculations: 87 kg (191 lb 12.8 oz)  Fluid Requirements (mL): 1ml/kcal or per MD     RDA Method (mL): 1778         Nutrition Prescription Ordered    Current Diet Order: [place]    Evaluation of Received Nutrient/Fluid Intake    Energy Calories Required: meeting needs  Protein Required: meeting needs  Fluid Required: meeting needs  Comments: varied intake  Tolerance: tolerating  % Intake of Estimated Energy Needs: 50 - 75 %  % Meal Intake: 50 - 75 %    Nutrition Risk    Level of Risk/Frequency of Follow-up: (F/U 1x/wk)     Assessment and Plan    * Debility    Contributing Nutrition Diagnosis  Unintended Wtloss    Related to (etiology):   Varied intake of meals    Signs and Symptoms (as evidenced by):   % intake reported of meals, 5%wt  since 10/9/18     Interventions  General/Healthful Diet  Medical Food Supplement Therapy    Recommendations (treatment strategy):  Continue Hood Diet  Boost Plus once daily    Nutrition Diagnosis Status:   New            Monitor and Evaluation    Food and Nutrient Intake: food and beverage intake  Food and Nutrient Adminstration: diet order, enteral and parenteral nutrition administration  Knowledge/Beliefs/Attitudes: food and nutrition knowledge/skill, beliefs and attitudes  Physical Activity and Function: nutrition-related ADLs and IADLs  Anthropometric Measurements: weight, weight change  Biochemical Data, Medical Tests and Procedures: electrolyte and renal panel  Nutrition-Focused Physical Findings: overall appearance     Nutrition Follow-Up    RD Follow-up?: Yes

## 2018-10-24 NOTE — ASSESSMENT & PLAN NOTE
Cont PT/OT    With recent hospitalization from surgery and now bacteremia/asp pneumonia he has became debilitated. Discussing with family [facility] vs nursing home placement for PT/OT. Case management and  working with family       10/19 Ambulated 60ft yesterday; goal 100ft   10/20 patient is too debilitated to go back home living alone.  Family was present during the evaluation.  They all agree that he needs long-term care.  They will start working on this.  10/22 cont with PT he is half way to his goal; at 50ft with goal of 100ft  10/24 Met goal yesterday 100ft; pt and family discussing D?C to skilled nursing home

## 2018-10-24 NOTE — PLAN OF CARE
10/24/18 1524   Discharge Reassessment   Assessment Type Discharge Planning Reassessment       Spoke with Erlinda to let her know anticipated discharge is tomorrow to the nursing home.

## 2018-10-24 NOTE — ASSESSMENT & PLAN NOTE
Lab Results   Component Value Date    INR 1.7 (H) 10/24/2018    INR 1.8 (H) 10/23/2018    INR 2.0 (H) 10/22/2018       Monitor INR daily  10/24 Change to apixaban

## 2018-10-24 NOTE — PLAN OF CARE
10/24/18 1111   Discharge Reassessment   Assessment Type Discharge Planning Reassessment       Choice paper signed for [facility].

## 2018-10-24 NOTE — SUBJECTIVE & OBJECTIVE
Review of Systems   Constitutional: Negative for chills and fever.   HENT: Negative for congestion, ear pain, postnasal drip, rhinorrhea, sore throat and trouble swallowing.    Eyes: Positive for visual disturbance (right eye blurred vision, chornic due to retina issue). Negative for redness and itching.   Respiratory: Negative for cough, shortness of breath and wheezing.    Cardiovascular: Negative for chest pain and palpitations.   Gastrointestinal: Negative for abdominal pain, diarrhea, nausea and vomiting.   Genitourinary: Negative for difficulty urinating, dysuria and frequency.   Skin: Negative for rash.   Neurological: Positive for weakness (generalized). Negative for headaches.     Objective:     Vital Signs (Most Recent):  Temp: 98.5 °F (36.9 °C) (10/23/18 1931)  Pulse: (!) 50 (10/24/18 0729)  Resp: 14 (10/24/18 0729)  BP: (!) 117/59 (10/23/18 1931)  SpO2: 97 % (10/24/18 0729) Vital Signs (24h Range):  Temp:  [97 °F (36.1 °C)-98.5 °F (36.9 °C)] 98.5 °F (36.9 °C)  Pulse:  [49-73] 50  Resp:  [14-18] 14  SpO2:  [96 %-97 %] 97 %  BP: (117-140)/(58-72) 117/59     Weight: 87 kg (191 lb 12.8 oz)  Body mass index is 31.92 kg/m².    Physical Exam   Constitutional: He is oriented to person, place, and time. He appears well-developed and well-nourished.   HENT:   Head: Normocephalic and atraumatic.   Nose: Nose normal.   Mouth/Throat: Oropharynx is clear and moist.   Eyes: Conjunctivae and EOM are normal. Pupils are equal, round, and reactive to light.   Neck: Normal range of motion. Neck supple. No tracheal deviation present.   Cardiovascular: Normal rate, regular rhythm, normal heart sounds and intact distal pulses.   Pulmonary/Chest: Effort normal and breath sounds normal.   Abdominal: Soft. Bowel sounds are normal. He exhibits no distension and no mass. There is no tenderness. There is no guarding.   Abd exam normal.  No rash.  No tenderness to palpation.  Suspect post op pain.  Groin wound looks great.      Musculoskeletal: Normal range of motion. He exhibits no edema.   Lymphadenopathy:     He has no cervical adenopathy.   Neurological: He is alert and oriented to person, place, and time. He has normal reflexes. No cranial nerve deficit.   Skin: Skin is warm and dry. No rash noted. No pallor.   Psychiatric: He has a normal mood and affect.   Nursing note and vitals reviewed.        CRANIAL NERVES     CN III, IV, VI   Pupils are equal, round, and reactive to light.  Extraocular motions are normal.        Significant Labs: Significant Labs:   Lab Results   Component Value Date    WBC 4.96 10/24/2018    HGB 11.7 (L) 10/24/2018    HCT 35.2 (L) 10/24/2018    MCV 93 10/24/2018     10/24/2018          BMP  Lab Results   Component Value Date     (L) 10/24/2018    K 4.0 10/24/2018     10/24/2018    CO2 23 10/24/2018    BUN 18 10/24/2018    CREATININE 0.8 10/24/2018    CALCIUM 8.6 (L) 10/24/2018    ANIONGAP 8 10/24/2018    ESTGFRAFRICA >60 10/24/2018    EGFRNONAA >60 10/24/2018     Lab Results   Component Value Date    INR 1.7 (H) 10/24/2018    INR 1.8 (H) 10/23/2018    INR 2.0 (H) 10/22/2018        Lipase 24  Mag 1.8  Lactic acid 2.1>1.2    BNP  230*      Flu negative  Blood cultures 10/9 - gram + TURICELLA OTITIDIS  Blood cultures 10/9 - gram negative ecoli sensitive to cipro  Blood cultures 10/9 - gram negative BACTEROIDES FRAGILIS   Blood cultures 10/9 NGTD     Significant Imaging:      10/10 CXR-No significant interval change of the bilateral pleural effusions and bilateral lower lobe alveolar infiltrates.  Cardiomegaly and suspected interstitial edema..  10/11/18 CXR- Interval worsening of the cardiopulmonary findings.  There is cardiomegaly with pulmonary vascular congestion and pulmonary edema.  Bilateral pleural effusions are suspected.  Left-sided pacemaker device remains in place.  The skeletal structures are intact.  10/11/18 KUB- Contrast material is seen in the colon from recent CT scan.   There is some gaseous distention of the bowel without evidence for definite obstruction.  No free air is seen.  Vascular calcifications are noted.

## 2018-10-24 NOTE — PLAN OF CARE
10/24/18 1134   Discharge Reassessment   Assessment Type Discharge Planning Reassessment         Seen patient on rounds today. He states that after talking with his children yesterday he does agree to go to [facility]. All paperwork sent to [facility], and we are waiting on decision. Erlinda notified of this, and I will call her for any other changes. CM will continue to follow and assist as needed.

## 2018-10-24 NOTE — PLAN OF CARE
Problem: Patient Care Overview  Goal: Plan of Care Review  Outcome: Ongoing (interventions implemented as appropriate)  Patient with minor complaints of pain with stated relief with PO meds and ice packs. Family concerned about patients mental status but patient was alert and oriented through out shift when awake. A-fib with a hr in low 50's to upper 40's on tele otherwise VS stable. Patient uses urinal. No acute changes noted. Plan of care reviewed and agreed upon with patient and family.

## 2018-10-24 NOTE — PLAN OF CARE
10/24/18 1008   Discharge Reassessment   Assessment Type Discharge Planning Reassessment       90L and 142 along with clinicals sent to [facility]. Pt choice. Awaiting acceptance by the NH.    Faraz Jauregui LMSW

## 2018-10-24 NOTE — NURSING
Spoke with BILL Segura at [facility]. Patient's heart rate in lower 50's. Questioning whether to give carvedilol this am. Will give medication.

## 2018-10-24 NOTE — ASSESSMENT & PLAN NOTE
Per cards LBBB- chronic A fib, long term anticoagulation/ V pacing intermittent.   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   Chronic HFrEF- new EF 25% but stable volume status

## 2018-10-24 NOTE — PROGRESS NOTES
[facility] Medicine  Progress Note    Patient Name: Elvis Thompson  MRN: [id number]  Patient Class: IP- Swing   Admission Date: 10/15/2018  Length of Stay: 9 days  Attending Physician: Jorden Brown MD  Primary Care Provider: Regulo Martínez MD        Subjective:     Principal Problem:Debility    HPI:  85 to male patient was admitted originally for asp pneumonia and + blood cultures. He is still on augmentin for the  Pneumonia. Not coughing, no need for O2 supplementation. He is also on cipro for ecoli bacteremia. No fever. No elevated WBC. Repeat blood cultures NGTD. He was placed on SWING for  Debility. He is doing well. Goal; Gait  x 50 feet with Minimal Assistance using Rolling Walker. He is at goal, will need to reassess his goals for functioning at [facility] with minimal help.     Hospital Course:  10/19 Patient on SWING.  pt ambulated 60feet with RW with PT yesterday am; refused PT in PM; goal is now 100feet with RW,   Did have BM yesterday; still noting some burning pain to left side. Palpated area; no significant tenderness but c/o pain to nurse after exam; nirav order Ultracet and get U/A tp rule out UTI/stone   Notes to have some NSVT on monitor; Dr. Mcintosh evaluating; planned for PPM interrogation today and Echo; started on amiodarone.   Already anti-coagulated for PAF; INR therapeutic; 2.4; monitor daily.     10/20.  Patient's main complaint is burning pain in the left side.  I increased his ultracet at to 1 pill every 4 hr, I started gabapentin 100 mg 3 times daily.  I would like surgery to re-evaluate the patient because the pain is in the area of his recent hernia repair.    10/22 he is up in bed this am. Reports that he slept well last night. No pain on gabapentin and ultram. He still complains that he is not having great BM. Nursing reports conflict with that.     He was also seen by Dr Mcintosh as he had irregular beat over the weekend. He was started on amiodarone. Echo  ordered which shows EF 25%. Last echo 2012 showed 45%. Has pace maker/denies that he has defibrillator. INR theraputic    He is doing well with PT. Ambulated 50ft with min assist and RW. Goal is 100ft.     10/24/18   INR 1.7; subtherapeutic; received 2mg on 16/17/18; then back to 1mg daily - change to  NOAC/apixaban per cardiology  Afib with SVR- low 50s; amio 200mg coreg 3.125mg - will defer to cards   Pt ambulated 100ft with RW with PT yesterday   Family now considering d/c to skilled nursing home?       Review of Systems   Constitutional: Negative for chills and fever.   HENT: Negative for congestion, ear pain, postnasal drip, rhinorrhea, sore throat and trouble swallowing.    Eyes: Positive for visual disturbance (right eye blurred vision, chornic due to retina issue). Negative for redness and itching.   Respiratory: Negative for cough, shortness of breath and wheezing.    Cardiovascular: Negative for chest pain and palpitations.   Gastrointestinal: Negative for abdominal pain, diarrhea, nausea and vomiting.   Genitourinary: Negative for difficulty urinating, dysuria and frequency.   Skin: Negative for rash.   Neurological: Positive for weakness (generalized). Negative for headaches.     Objective:     Vital Signs (Most Recent):  Temp: 98.5 °F (36.9 °C) (10/23/18 1931)  Pulse: (!) 50 (10/24/18 0729)  Resp: 14 (10/24/18 0729)  BP: (!) 117/59 (10/23/18 1931)  SpO2: 97 % (10/24/18 0729) Vital Signs (24h Range):  Temp:  [97 °F (36.1 °C)-98.5 °F (36.9 °C)] 98.5 °F (36.9 °C)  Pulse:  [49-73] 50  Resp:  [14-18] 14  SpO2:  [96 %-97 %] 97 %  BP: (117-140)/(58-72) 117/59     Weight: 87 kg (191 lb 12.8 oz)  Body mass index is 31.92 kg/m².    Physical Exam   Constitutional: He is oriented to person, place, and time. He appears well-developed and well-nourished.   HENT:   Head: Normocephalic and atraumatic.   Nose: Nose normal.   Mouth/Throat: Oropharynx is clear and moist.   Eyes: Conjunctivae and EOM are normal. Pupils are  equal, round, and reactive to light.   Neck: Normal range of motion. Neck supple. No tracheal deviation present.   Cardiovascular: Normal rate, regular rhythm, normal heart sounds and intact distal pulses.   Pulmonary/Chest: Effort normal and breath sounds normal.   Abdominal: Soft. Bowel sounds are normal. He exhibits no distension and no mass. There is no tenderness. There is no guarding.   Abd exam normal.  No rash.  No tenderness to palpation.  Suspect post op pain.  Groin wound looks great.     Musculoskeletal: Normal range of motion. He exhibits no edema.   Lymphadenopathy:     He has no cervical adenopathy.   Neurological: He is alert and oriented to person, place, and time. He has normal reflexes. No cranial nerve deficit.   Skin: Skin is warm and dry. No rash noted. No pallor.   Psychiatric: He has a normal mood and affect.   Nursing note and vitals reviewed.        CRANIAL NERVES     CN III, IV, VI   Pupils are equal, round, and reactive to light.  Extraocular motions are normal.        Significant Labs: Significant Labs:   Lab Results   Component Value Date    WBC 4.96 10/24/2018    HGB 11.7 (L) 10/24/2018    HCT 35.2 (L) 10/24/2018    MCV 93 10/24/2018     10/24/2018          BMP  Lab Results   Component Value Date     (L) 10/24/2018    K 4.0 10/24/2018     10/24/2018    CO2 23 10/24/2018    BUN 18 10/24/2018    CREATININE 0.8 10/24/2018    CALCIUM 8.6 (L) 10/24/2018    ANIONGAP 8 10/24/2018    ESTGFRAFRICA >60 10/24/2018    EGFRNONAA >60 10/24/2018     Lab Results   Component Value Date    INR 1.7 (H) 10/24/2018    INR 1.8 (H) 10/23/2018    INR 2.0 (H) 10/22/2018        Lipase 24  Mag 1.8  Lactic acid 2.1>1.2    BNP  230*      Flu negative  Blood cultures 10/9 - gram + TURICELLA OTITIDIS  Blood cultures 10/9 - gram negative ecoli sensitive to cipro  Blood cultures 10/9 - gram negative BACTEROIDES FRAGILIS   Blood cultures 10/9 NGTD     Significant Imaging:      10/10 CXR-No  significant interval change of the bilateral pleural effusions and bilateral lower lobe alveolar infiltrates.  Cardiomegaly and suspected interstitial edema..  10/11/18 CXR- Interval worsening of the cardiopulmonary findings.  There is cardiomegaly with pulmonary vascular congestion and pulmonary edema.  Bilateral pleural effusions are suspected.  Left-sided pacemaker device remains in place.  The skeletal structures are intact.  10/11/18 KUB- Contrast material is seen in the colon from recent CT scan.  There is some gaseous distention of the bowel without evidence for definite obstruction.  No free air is seen.  Vascular calcifications are noted.              Assessment/Plan:      * Debility    Cont PT/OT    With recent hospitalization from surgery and now bacteremia/asp pneumonia he has became debilitated. Discussing with family swing vs nursing home placement for PT/OT. Case management and  working with family       10/19 Ambulated 60ft yesterday; goal 100ft   10/20 patient is too debilitated to go back home living alone.  Family was present during the evaluation.  They all agree that he needs long-term care.  They will start working on this.  10/22 cont with PT he is half way to his goal; at 50ft with goal of 100ft  10/24 Met goal yesterday 100ft; pt and family discussing D?C to skilled nursing home      Chronic systolic heart failure    Per cards LBBB- chronic A fib, long term anticoagulation/ V pacing intermittent.   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   Chronic HFrEF- new EF 25% but stable volume status       Atrial fibrillation    Per cards LBBB- chronic A fib, long term anticoagulation with coumadin. V pacing intermittent.   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   Chronic HFrEF- new EF 25% but stable volume status      SVR this am - 50s; will discuss Rx with Dr. Mcintosh   changing warfarin to eliquis- 2.5;     NSVT (nonsustained ventricular tachycardia)    Episode last pm;   Arnaldo planning PPM interrogation with rep;   Echo shows EF down to 25%, will need to discuss with Dr Mcintosh  Added amiodarone        Chronic anticoagulation    Lab Results   Component Value Date    INR 1.7 (H) 10/24/2018    INR 1.8 (H) 10/23/2018    INR 2.0 (H) 10/22/2018       Monitor INR daily  10/24 Change to apixaban      Bacteremia    IV cipro for UTI/bacteremia (stop date 10/21/18)- d/c today  Cipro discontinued yesterday        Aspiration pneumonia    Resolved - No need for antibiotics at this stage.         Diarrhea of presumed infectious origin    10/14: stopped all stool cx ordered on admit  Resolved now and having constipation  KUB without obstruction on 10/19  Continue senna, colace and m,iralax, as needed       Parkinson disease    Cont home meds        Incarcerated left inguinal hernia    S/p surgical repair, general surgery still following  Ultracet for pain every 4 hr.  Gabapentin 100 mg p.o. t.i.d. will be started.  I will increase this as tolerated.  Pt reports no pain this am. Has not needed ultram       VTE Risk Mitigation (From admission, onward)        Ordered     apixaban tablet 5 mg  2 times daily      10/24/18 0917              Santos Cabrera MD  Department of Hospital Medicine   [facility]

## 2018-10-24 NOTE — PT/OT/SLP PROGRESS
Occupational Therapy   Treatment    Name: Elvis Thompson  MRN: [id number]  Admitting Diagnosis:  Debility       Recommendations:     Discharge Recommendations: home with home health, nursing facility, skilled  Discharge Equipment Recommendations:   none  Barriers to discharge:   none    Subjective     Communicated with: pt and nurse prior to session.  Pain/Comfort:  · Pain Rating Post-Intervention 1: 4/10(burning in R side)    Patients cultural, spiritual, Yazidi conflicts given the current situation: not discussed    Objective:     Patient found with: (seated in w/c with call alarm in reach)    General Precautions: Standard, fall   Orthopedic Precautions:N/A   Braces:       Occupational Performance:    Bed Mobility:    · Patient completed Rolling/Turning to Left with  minimum assistance   · Patient completed sit to supine with mod A  · Pt completed scooting/bridging with max A  · Pt completed sit to supine with min A    Functional Mobility/Transfers:  · Patient completed Sit <> Stand Transfer with contact guard assistance  with  rolling walker       Activities of Daily Living:  · Lower Body Dressing: supervision doffing tennis shoes with velcro straps    Patient left supine with call button in reach, bed alarm on    [facility] 6 Click:  [facility] Total Score:      Treatment & Education:  Pt completed 5 sit to stands with CGA and RW  Pt completed large amplitude exercises in shoulder x 10 reps for improved endurance and to counteract small movements associated with PD  Pt doffed shoes with supervision  Education:    Assessment:     Elvis Thompson is a 85 y.o. male with a medical diagnosis of Debility.  He presents with improved motivation to participate in therapy and increased indep with sit to stands.  Performance deficits affecting function are  decreased coordination, decreased endurance, and muscle weakness.      Rehab Prognosis:  fair; patient would benefit from acute skilled OT services to address these deficits  and reach maximum level of function.       Plan:     Patient to be seen 5 x/week to address the above listed problems via self-care/home management, therapeutic activities, therapeutic exercises  · Plan of Care Expires:  10/26/18  · Plan of Care Reviewed with: patient    This Plan of care has been discussed with the patient who was involved in its development and understands and is in agreement with the identified goals and treatment plan    GOALS:   Multidisciplinary Problems     Occupational Therapy Goals        Problem: Occupational Therapy Goal    Goal Priority Disciplines Outcome Interventions   Occupational Therapy Goal     OT, PT/OT Ongoing (interventions implemented as appropriate)    Description:  Goals to be met by: 10/26/18    Patient will increase functional independence with ADLs by performing:    LE Dressing with Stand-by Assistance.  Supine to sit with mod I.  Sit to stand with stand by assist.  Grooming at mirror with stand by assist.                      Time Tracking:     OT Date of Treatment: 10/24/18  OT Start Time: 1455  OT Stop Time: 1522  OT Total Time (min): 27 min    Billable Minutes:Therapeutic Activity 13 min, therapeutic exercise 14 min    Daisy Sawyer OT  10/24/2018

## 2018-10-24 NOTE — PT/OT/SLP DISCHARGE
Physical Therapy Discharge Summary    Name: Elvis Thompson  MRN: [id number]   Principal Problem: Debility     Patient Discharged from acute Physical Therapy on 10/24/18.  Please refer to prior PT noted date on 10/23/18 for functional status.     Assessment:     Goals partially met.    Objective:     GOALS:   Multidisciplinary Problems     Physical Therapy Goals     Not on file          Multidisciplinary Problems (Resolved)        Problem: Physical Therapy Goal    Goal Priority Disciplines Outcome Goal Variances Interventions   Physical Therapy Goal   (Resolved)     PT, PT/OT Outcome(s) achieved     Description:  Goals to be met by: 10/30/2018     Patient will increase functional independence with mobility by performin. Supine to sit with Stand-by Assistance -- NOT MET  2. Sit to supine with Stand-by Assistance -- NOT MET  3. Rolling to Left and Right with Stand-by Assistance. -- NOT MET  4. Sit to stand transfer with CGA -- GOAL MET  5. Bed to chair transfer with Contact Guard Assistance using Rolling Walker -- GOAL MET  6. Gait  x 100 feet with Contact Guard Assistance using Rolling Walker. -- GOAL MET              Problem: Physical Therapy Goal    Goal Priority Disciplines Outcome Goal Variances Interventions   Physical Therapy Goal   (Resolved)     PT, PT/OT Outcome(s) achieved     Description:  Advancing  Efforts to achieve stated goals  -- GOAL MET           Problem: Physical Therapy Goal    Goal Priority Disciplines Outcome Goal Variances Interventions   Physical Therapy Goal   (Resolved)     PT, PT/OT Outcome(s) achieved     Description:  Advancing efforts to achieve  Gait  With AD goal -- GOAL MET                    Reasons for Discontinuation of Therapy Services  Transfer to alternate level of care.      Plan:     Patient Discharged to: Skilled Nursing Facility.    Agatha Romo, PT  10/24/2018

## 2018-10-24 NOTE — PLAN OF CARE
10/24/18 1110   Medicare Message   Important Message from Medicare regarding Discharge Appeal Rights Given to patient/caregiver;Explained to patient/caregiver;Signed/date by patient/caregiver   Date IMM was signed 10/24/18   Time IMM was signed 1102     NOMNOC signed for discharge.

## 2018-10-24 NOTE — ASSESSMENT & PLAN NOTE
Contributing Nutrition Diagnosis  Unintended Wtloss    Related to (etiology):   Varied intake of meals    Signs and Symptoms (as evidenced by):   % intake reported of meals, 5%wt since 10/9/18     Interventions  General/Healthful Diet  Medical Food Supplement Therapy    Recommendations (treatment strategy):  Continue Hart Diet  Boost Plus once daily    Nutrition Diagnosis Status:   New

## 2018-10-24 NOTE — PROGRESS NOTES
[facility]  Cardiology  Progress Note    Patient Name: Elvis Thompson  MRN: [id number]  Admission Date: 10/15/2018  Hospital Length of Stay: 9 days  Code Status: Full Code   Attending Physician: Jorden Brown MD   Primary Care Physician: Regulo Martínez MD  Expected Discharge Date:   Principal Problem:Debility    Subjective:     Hospital Course: stable from Cardiac standpoint on current CV meds no further arrhythmia. PPM reviewed.     ROS   Constitutional: Negative   Eyes: Negative    Respiratory: Negative    Cardiovascular: Negative   Gastrointestinal: Negative   Genitourinary: Negative   Musculoskeletal: Negative   Skin: Negative .   Neurological: Negative   Objective:     Vital Signs (Most Recent):  Temp: 96.7 °F (35.9 °C) (10/24/18 0808)  Pulse: 64 (10/24/18 0808)  Resp: 17 (10/24/18 0808)  BP: 129/60 (10/24/18 0808)  SpO2: 96 % (10/24/18 0808) Vital Signs (24h Range):  Temp:  [96.7 °F (35.9 °C)-98.5 °F (36.9 °C)] 96.7 °F (35.9 °C)  Pulse:  [49-73] 64  Resp:  [14-18] 17  SpO2:  [96 %-97 %] 96 %  BP: (117-140)/(58-72) 129/60     Weight: 87 kg (191 lb 12.8 oz)  Body mass index is 31.92 kg/m².    SpO2: 96 %  O2 Device (Oxygen Therapy): room air      Intake/Output Summary (Last 24 hours) at 10/24/2018 0833  Last data filed at 10/23/2018 2029  Gross per 24 hour   Intake 240 ml   Output 100 ml   Net 140 ml       Lines/Drains/Airways     Peripheral Intravenous Line                 Peripheral IV - Single Lumen 10/21/18 0803 Right Forearm 3 days                Physical Exam  General appearance: alert, appears stated age and cooperative  Head: Normocephalic, without obvious abnormality, atraumatic  Eyes: conjunctivae/corneas clear. PERRL  Neck: no carotid bruit, no JVD and supple, symmetrical, trachea midline  Lungs: clear to auscultation bilaterally, normal respiratory effort  Chest Wall: no tenderness  Heart: regular rate and rhythm, S1, S2 normal, systolic murmur 3/6, click, rub or  gallop  Abdomen: soft, non-tender; bowel sounds normal; no masses,  no organomegaly  Extremities: Extremities normal, atraumatic, no cyanosis, clubbing, or edema  Pulses: Dorsalis Pedis R: 2+ (normal)/L: 2+ (normal)  Skin: Skin color, texture, turgor normal. No rashes or lesions  Neurologic: Normal mood and affect  Alert and oriented X 3  Significant Labs:   ABG: No results for input(s): PH, PCO2, HCO3, POCSATURATED, BE in the last 48 hours., Blood Culture: No results for input(s): LABBLOO in the last 48 hours., BMP:   Recent Labs   Lab 10/23/18  0518 10/24/18  0515   GLU 96 90   * 134*   K 4.3 4.0    103   CO2 23 23   BUN 18 18   CREATININE 0.8 0.8   CALCIUM 8.9 8.6*   , CMP   Recent Labs   Lab 10/23/18  0518 10/24/18  0515   * 134*   K 4.3 4.0    103   CO2 23 23   GLU 96 90   BUN 18 18   CREATININE 0.8 0.8   CALCIUM 8.9 8.6*   ANIONGAP 8 8   ESTGFRAFRICA >60 >60   EGFRNONAA >60 >60   , CBC   Recent Labs   Lab 10/23/18  0518 10/24/18  0515   WBC 5.42 4.96   HGB 11.7* 11.7*   HCT 35.5* 35.2*    279   , INR   Recent Labs   Lab 10/23/18  0518 10/24/18  0515   INR 1.8* 1.7*   , Lipid Panel No results for input(s): CHOL, HDL, LDLCALC, TRIG, CHOLHDL in the last 48 hours.,   Pathology Results  (Last 10 years)    None      , Troponin No results for input(s): TROPONINI in the last 48 hours. and     Significant Imaging: Cardiac Cath: , CT scan: CT ABDOMEN PELVIS WITH CONTRAST: No results found for this visit on 10/15/18., CT ABDOMEN PELVIS WITHOUT CONTRAST: No results found for this visit on 10/15/18. and , Echocardiogram: , EKG:, Stress Test: , X-Ray:  and   Assessment and Plan:       Active Diagnoses:    Diagnosis Date Noted POA    PRINCIPAL PROBLEM:  Debility [R53.81] 10/15/2018 Yes    NSVT (nonsustained ventricular tachycardia) [I47.2] 10/19/2018 No    Chronic anticoagulation [Z79.01] 10/14/2018 Not Applicable    Bacteremia [R78.81] 10/12/2018 Yes    Aspiration pneumonia [J69.0]  10/10/2018 Yes    Diarrhea of presumed infectious origin [R19.7] 10/10/2018 Yes    Parkinson disease [G20] 10/10/2018 Yes    Incarcerated left inguinal hernia [K40.30] 10/04/2018 Yes      Problems Resolved During this Admission:    Diagnosis Date Noted Date Resolved POA    Sepsis [A41.9] 10/10/2018 10/19/2018 Yes       VTE Risk Mitigation (From admission, onward)        Ordered     warfarin (COUMADIN) tablet 1 mg  Daily      10/18/18 1026        Current Facility-Administered Medications   Medication    acetaminophen tablet 650 mg    amiodarone tablet 200 mg    carbidopa-levodopa  mg per tablet 1 tablet    carvedilol tablet 3.125 mg    celecoxib capsule 200 mg    cloNIDine tablet 0.1 mg    docusate sodium capsule 100 mg    gabapentin capsule 100 mg    ipratropium 0.02 % nebulizer solution 0.5 mg    levalbuterol nebulizer solution 1.25 mg    levalbuterol nebulizer solution 1.25 mg    losartan tablet 50 mg    ondansetron injection 4 mg    pantoprazole EC tablet 40 mg    polyethylene glycol packet 17 g    promethazine (PHENERGAN) 12.5 mg in dextrose 5 % 50 mL IVPB    senna tablet 8.6 mg    spironolactone split tablet 12.5 mg    tramadol-acetaminophen 37.5-325 mg per tablet 1 tablet    warfarin (COUMADIN) tablet 1 mg     WCT appears to be VT non sustained 12 beat at 160 bpm / but possibly AF with LBBB- chronic A fib, long term anticoagulation with coumadin. V pacing intermittent.   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   Chronic HFrEF- new EF 25% but stable volume status.   HTN well controlled   Debility/pneumonia- receiving ABX therapy. Progressing with physical therapy.   Echo 10/18 worsening LVEF 25% 2+ MR, mild mod AS PH 59.9  MPI 2010 no stress induced ischemia  Currently on coumadin. Consider NOAC       Plan:  Reviewed PPM interrogation; inconclusive, but NO sustained VT  Stable from CV standpoint on amiodarone/coreg/losartan/spironolactone  Defer ASA given coumadin, no known  CAD, defer statin due to questionable benefit at advanced age and no documented CAD  DNR   Outpt f/u  Consider switch coumadin to Eliquis or Xarelto         Colton Samaniego, ASHLEE  Cardiology  [facility]  I attest that I have personally seen and examined this patient. I have reviewed and discussed the management in detail as outlined above.

## 2018-10-24 NOTE — ASSESSMENT & PLAN NOTE
Per cards LBBB- chronic A fib, long term anticoagulation with coumadin. V pacing intermittent.   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   Chronic HFrEF- new EF 25% but stable volume status      SVR this am - 50s; will discuss Rx with Dr. Mcintosh   changing warfarin to eliquis- 2.5;

## 2018-10-24 NOTE — PLAN OF CARE
Problem: Patient Care Overview  Goal: Plan of Care Review  Outcome: Ongoing (interventions implemented as appropriate)  Patient aware of plan of care. VS stable. Afebrile. Patient c/o left side pain at times this shift, tramadol given with good results. Ice packs given for 20 minute increments. Will be discharged tomorrow to [facility]. Free from falls/injuries. No questions or concerns at this time. Agrees with plan of care.

## 2018-10-24 NOTE — PLAN OF CARE
Problem: Patient Care Overview  Goal: Plan of Care Review  Outcome: Ongoing (interventions implemented as appropriate)  Nutrition Goals: adequate intake >=85% EEN by discharge  Nutrition Goal Status: new  Communication of RD Recs: (POC)    Nutrition Discharge Planning: Cal [place] Ari diet with adequate intake to meet EEN/EPN

## 2018-10-25 VITALS
HEART RATE: 57 BPM | SYSTOLIC BLOOD PRESSURE: 114 MMHG | DIASTOLIC BLOOD PRESSURE: 56 MMHG | WEIGHT: 191.81 LBS | TEMPERATURE: 98 F | RESPIRATION RATE: 16 BRPM | HEIGHT: 65 IN | OXYGEN SATURATION: 99 % | BODY MASS INDEX: 31.96 KG/M2

## 2018-10-25 PROBLEM — J69.0 ASPIRATION PNEUMONIA: Status: RESOLVED | Noted: 2018-10-10 | Resolved: 2018-10-25

## 2018-10-25 PROBLEM — R19.7 DIARRHEA OF PRESUMED INFECTIOUS ORIGIN: Status: RESOLVED | Noted: 2018-10-10 | Resolved: 2018-10-25

## 2018-10-25 LAB
ANION GAP SERPL CALC-SCNC: 9 MMOL/L
BASOPHILS # BLD AUTO: 0.02 K/UL
BASOPHILS NFR BLD: 0.4 %
BUN SERPL-MCNC: 18 MG/DL
CALCIUM SERPL-MCNC: 8.8 MG/DL
CHLORIDE SERPL-SCNC: 103 MMOL/L
CO2 SERPL-SCNC: 23 MMOL/L
CREAT SERPL-MCNC: 0.8 MG/DL
DIFFERENTIAL METHOD: ABNORMAL
EOSINOPHIL # BLD AUTO: 0.2 K/UL
EOSINOPHIL NFR BLD: 4.5 %
ERYTHROCYTE [DISTWIDTH] IN BLOOD BY AUTOMATED COUNT: 12.7 %
EST. GFR  (AFRICAN AMERICAN): >60 ML/MIN/1.73 M^2
EST. GFR  (NON AFRICAN AMERICAN): >60 ML/MIN/1.73 M^2
GLUCOSE SERPL-MCNC: 91 MG/DL
HCT VFR BLD AUTO: 36.5 %
HGB BLD-MCNC: 12 G/DL
LYMPHOCYTES # BLD AUTO: 1.3 K/UL
LYMPHOCYTES NFR BLD: 27 %
MCH RBC QN AUTO: 30.5 PG
MCHC RBC AUTO-ENTMCNC: 32.9 G/DL
MCV RBC AUTO: 93 FL
MONOCYTES # BLD AUTO: 0.6 K/UL
MONOCYTES NFR BLD: 11.5 %
NEUTROPHILS # BLD AUTO: 2.8 K/UL
NEUTROPHILS NFR BLD: 56.6 %
PLATELET # BLD AUTO: 274 K/UL
PMV BLD AUTO: 9.4 FL
POTASSIUM SERPL-SCNC: 4.3 MMOL/L
RBC # BLD AUTO: 3.94 M/UL
SODIUM SERPL-SCNC: 135 MMOL/L
WBC # BLD AUTO: 4.86 K/UL

## 2018-10-25 PROCEDURE — 25000003 PHARM REV CODE 250: Performed by: NURSE PRACTITIONER

## 2018-10-25 PROCEDURE — 80048 BASIC METABOLIC PNL TOTAL CA: CPT

## 2018-10-25 PROCEDURE — 25000003 PHARM REV CODE 250: Performed by: FAMILY MEDICINE

## 2018-10-25 PROCEDURE — 36415 COLL VENOUS BLD VENIPUNCTURE: CPT

## 2018-10-25 PROCEDURE — 25000003 PHARM REV CODE 250: Performed by: INTERNAL MEDICINE

## 2018-10-25 PROCEDURE — 94761 N-INVAS EAR/PLS OXIMETRY MLT: CPT

## 2018-10-25 PROCEDURE — 94640 AIRWAY INHALATION TREATMENT: CPT

## 2018-10-25 PROCEDURE — 99239 PR HOSPITAL DISCHARGE DAY,>30 MIN: ICD-10-PCS | Mod: ,,, | Performed by: FAMILY MEDICINE

## 2018-10-25 PROCEDURE — 25000242 PHARM REV CODE 250 ALT 637 W/ HCPCS: Performed by: INTERNAL MEDICINE

## 2018-10-25 PROCEDURE — 94664 DEMO&/EVAL PT USE INHALER: CPT

## 2018-10-25 PROCEDURE — 94799 UNLISTED PULMONARY SVC/PX: CPT

## 2018-10-25 PROCEDURE — 85025 COMPLETE CBC W/AUTO DIFF WBC: CPT

## 2018-10-25 PROCEDURE — 99239 HOSP IP/OBS DSCHRG MGMT >30: CPT | Mod: ,,, | Performed by: FAMILY MEDICINE

## 2018-10-25 RX ORDER — GABAPENTIN 100 MG/1
100 CAPSULE ORAL 3 TIMES DAILY
Qty: 90 CAPSULE | Refills: 0 | Status: SHIPPED | OUTPATIENT
Start: 2018-10-25 | End: 2018-12-20

## 2018-10-25 RX ORDER — SPIRONOLACTONE 25 MG/1
12.5 TABLET ORAL DAILY
Qty: 15 TABLET | Refills: 11 | Status: ON HOLD | OUTPATIENT
Start: 2018-10-26 | End: 2019-03-15 | Stop reason: HOSPADM

## 2018-10-25 RX ORDER — AMIODARONE HYDROCHLORIDE 200 MG/1
200 TABLET ORAL DAILY
Qty: 30 TABLET | Refills: 0 | Status: SHIPPED | OUTPATIENT
Start: 2018-10-26 | End: 2018-12-20

## 2018-10-25 RX ORDER — CARVEDILOL 3.12 MG/1
3.12 TABLET ORAL 2 TIMES DAILY
Qty: 60 TABLET | Refills: 11 | Status: SHIPPED | OUTPATIENT
Start: 2018-10-25 | End: 2019-10-29 | Stop reason: SDUPTHER

## 2018-10-25 RX ORDER — LOSARTAN POTASSIUM 50 MG/1
50 TABLET ORAL DAILY
Qty: 90 TABLET | Refills: 3 | Status: SHIPPED | OUTPATIENT
Start: 2018-10-26 | End: 2019-10-29 | Stop reason: SDUPTHER

## 2018-10-25 RX ORDER — TRAMADOL HYDROCHLORIDE AND ACETAMINOPHEN 37.5; 325 MG/1; MG/1
1 TABLET, FILM COATED ORAL EVERY 4 HOURS PRN
Qty: 30 TABLET | Refills: 0 | Status: SHIPPED | OUTPATIENT
Start: 2018-10-25 | End: 2018-11-04

## 2018-10-25 RX ADMIN — APIXABAN 5 MG: 5 TABLET, FILM COATED ORAL at 09:10

## 2018-10-25 RX ADMIN — SPIRONOLACTONE 12.5 MG: 25 TABLET, FILM COATED ORAL at 09:10

## 2018-10-25 RX ADMIN — POLYETHYLENE GLYCOL 3350 17 G: 17 POWDER, FOR SOLUTION ORAL at 09:10

## 2018-10-25 RX ADMIN — MICONAZOLE NITRATE: 20 OINTMENT TOPICAL at 12:10

## 2018-10-25 RX ADMIN — PANTOPRAZOLE SODIUM 40 MG: 40 TABLET, DELAYED RELEASE ORAL at 09:10

## 2018-10-25 RX ADMIN — AMIODARONE HYDROCHLORIDE 200 MG: 200 TABLET ORAL at 09:10

## 2018-10-25 RX ADMIN — DOCUSATE SODIUM 100 MG: 100 CAPSULE, LIQUID FILLED ORAL at 09:10

## 2018-10-25 RX ADMIN — LOSARTAN POTASSIUM 50 MG: 50 TABLET, FILM COATED ORAL at 09:10

## 2018-10-25 RX ADMIN — CARVEDILOL 3.12 MG: 3.12 TABLET, FILM COATED ORAL at 09:10

## 2018-10-25 RX ADMIN — GABAPENTIN 100 MG: 100 CAPSULE ORAL at 09:10

## 2018-10-25 RX ADMIN — CARBIDOPA AND LEVODOPA 1 TABLET: 25; 100 TABLET ORAL at 09:10

## 2018-10-25 RX ADMIN — IPRATROPIUM BROMIDE 0.5 MG: 0.5 SOLUTION RESPIRATORY (INHALATION) at 07:10

## 2018-10-25 RX ADMIN — LEVALBUTEROL 1.25 MG: 1.25 SOLUTION, CONCENTRATE RESPIRATORY (INHALATION) at 07:10

## 2018-10-25 RX ADMIN — SENNA 8.6 MG: 8.6 TABLET, COATED ORAL at 09:10

## 2018-10-25 RX ADMIN — CARBIDOPA AND LEVODOPA 1 TABLET: 25; 100 TABLET ORAL at 12:10

## 2018-10-25 RX ADMIN — LEVALBUTEROL 1.25 MG: 1.25 SOLUTION, CONCENTRATE RESPIRATORY (INHALATION) at 01:10

## 2018-10-25 RX ADMIN — IPRATROPIUM BROMIDE 0.5 MG: 0.5 SOLUTION RESPIRATORY (INHALATION) at 01:10

## 2018-10-25 NOTE — PT/OT/SLP DISCHARGE
Occupational Therapy Discharge Summary    Elvis Thompson  MRN: [id number]   Principal Problem: Debility      Patient Discharged from acute Occupational Therapy on 10/25/18.  Please refer to prior OT note dated 10/24/18 for functional status.    Assessment:      Patient appropriate for care in another setting.    Objective:     GOALS:   Multidisciplinary Problems     Occupational Therapy Goals        Problem: Occupational Therapy Goal    Goal Priority Disciplines Outcome Interventions   Occupational Therapy Goal     OT, PT/OT Ongoing (interventions implemented as appropriate)    Description:  Goals to be met by: 10/26/18    Patient will increase functional independence with ADLs by performing:    LE Dressing with Stand-by Assistance.  Supine to sit with mod I.  Sit to stand with stand by assist.  Grooming at mirror with stand by assist.                      Reasons for Discontinuation of Therapy Services  Transfer to alternate level of care.      Plan:     Patient Discharged to: Skilled Nursing Facility    Daisy Sawyer OT  10/25/2018

## 2018-10-25 NOTE — ASSESSMENT & PLAN NOTE
Per cards LBBB- chronic A fib, long term anticoagulation with coumadin. V pacing intermittent.   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   Chronic HFrEF- new EF 25% but stable volume status      SVR this am - 50s; will discuss Rx with Dr. Mcintosh   changing warfarin to eliquis-5mg bid per Dr. Noland recs  PPM was interrogated and did not appear to be NSVT but Dr. Mcintosh will continue to monitor and F/U as OP

## 2018-10-25 NOTE — ASSESSMENT & PLAN NOTE
Episode last pm; Dr. Mcintosh planning PPM interrogation with rep;   Echo shows EF down to 25%, will need to discuss with Dr Mcintosh  Added amiodarone ; follow up with cards as OP

## 2018-10-25 NOTE — ASSESSMENT & PLAN NOTE
Per cards LBBB- chronic A fib, long term anticoagulation/ V pacing intermittent.   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   Chronic HFrEF- new EF 25% but stable volume status  Continue coreg, losartan,

## 2018-10-25 NOTE — ASSESSMENT & PLAN NOTE
Cont PT/OT    With recent hospitalization from surgery and now bacteremia/asp pneumonia he has became debilitated. Discussing with family swing vs nursing home placement for PT/OT. Case management and  working with family       10/19 Ambulated 60ft yesterday; goal 100ft   10/20 patient is too debilitated to go back home living alone.  Family was present during the evaluation.  They all agree that he needs long-term care.  They will start working on this.  10/22 cont with PT he is half way to his goal; at 50ft with goal of 100ft  10/24 Met goal yesterday 100ft; pt and family discussing D?C to skilled nursing home   10/25 D/C to skilled nursing

## 2018-10-25 NOTE — ASSESSMENT & PLAN NOTE
10/14: stopped all stool cx ordered on admit  Resolved now and having constipation  KUB without obstruction on 10/19  Continue senna, colace and m,iralax, as needed  Having good BMs

## 2018-10-25 NOTE — PLAN OF CARE
Problem: Patient Care Overview  Goal: Plan of Care Review  Outcome: Outcome(s) achieved Date Met: 10/25/18  Patient stable. Denies pain today. Patient has been ambulating in the abbasi with PT. Remains free from falls. Tolerating diet well. Patient to be discharged to nursing home today. Discharge instructions given. Patient & daughter voiced understanding.

## 2018-10-25 NOTE — PLAN OF CARE
Problem: Patient Care Overview  Goal: Plan of Care Review  Outcome: Ongoing (interventions implemented as appropriate)  Patient with minor complaints of pain with stated relief with PO meds and ice packs. A-fib tele otherwise VS stable. Patient uses urinal at times and is incontinent at other times. No acute changes noted. Plan of care reviewed and agreed upon with patient and family.

## 2018-10-25 NOTE — PLAN OF CARE
10/25/18 1042   Discharge Reassessment   Assessment Type Discharge Planning Reassessment         Patient is to be discharged today to [facility] for skilled nursing. The facility transportation will come to get him when ready. He states his understanding and agreement.

## 2018-10-25 NOTE — SUBJECTIVE & OBJECTIVE
Review of Systems   Constitutional: Negative for chills and fever.   HENT: Negative for congestion, ear pain, postnasal drip, rhinorrhea, sore throat and trouble swallowing.    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 Negative for redness and itching.   Respiratory: Negative for cough, shortness of breath and wheezing.    Cardiovascular: Negative for chest pain and palpitations.   Gastrointestinal: Negative for abdominal pain, diarrhea, nausea and vomiting.   Genitourinary: Negative for difficulty urinating, dysuria and frequency.   Skin: Negative for rash.   Neurological: Positive for weakness (generalized). Negative for headaches.     Objective:     Vital Signs (Most Recent):  Temp: 97.9 °F (36.6 °C) (10/25/18 0715)  Pulse: (!) 53 (10/25/18 1023)  Resp: 14 (10/25/18 0741)  BP: (!) 159/73 (10/25/18 0715)  SpO2: 95 % (10/25/18 0741) Vital Signs (24h Range):  Temp:  [96.1 °F (35.6 °C)-97.9 °F (36.6 °C)] 97.9 °F (36.6 °C)  Pulse:  [52-72] 53  Resp:  [14-17] 14  SpO2:  [94 %-95 %] 95 %  BP: (111-163)/(63-78) 159/73     Weight: 87 kg (191 lb 12.8 oz)(RD reviewed)  Body mass index is 31.92 kg/m².    Physical Exam   Constitutional: He is oriented to person, place, and time. He appears well-developed and well-nourished.   HENT:   Head: Normocephalic and atraumatic.   Nose: Nose normal.   Mouth/Throat: Oropharynx is clear and moist.   Eyes: Conjunctivae and EOM are normal. Pupils are equal, round, and reactive to light.   Neck: Normal range of motion. Neck supple. No tracheal deviation present.   Cardiovascular: Normal rate, regular rhythm, normal heart sounds and intact distal pulses.   Pulmonary/Chest: Effort normal and breath sounds normal.   Abdominal: Soft. Bowel sounds are normal. He exhibits no distension and no mass. There is no tenderness. There is no guarding.   Abd exam normal.  No rash.  No tenderness to palpation.  Suspect post op pain.  Groin wound looks  great.     Musculoskeletal: Normal range of motion. He exhibits no edema.   Lymphadenopathy:     He has no cervical adenopathy.   Neurological: He is alert and oriented to person, place, and time. He has normal reflexes. No cranial nerve deficit.   Skin: Skin is warm and dry. No rash noted. No pallor.   Scalloping to groin   Psychiatric: He has a normal mood and affect.   Nursing note and vitals reviewed.        CRANIAL NERVES     CN III, IV, VI   Pupils are equal, round, and reactive to light.  Extraocular motions are normal.        Significant Labs: Significant Labs:   Lab Results   Component Value Date    WBC 4.86 10/25/2018    HGB 12.0 (L) 10/25/2018    HCT 36.5 (L) 10/25/2018    MCV 93 10/25/2018     10/25/2018          BMP  Lab Results   Component Value Date     (L) 10/25/2018    K 4.3 10/25/2018     10/25/2018    CO2 23 10/25/2018    BUN 18 10/25/2018    CREATININE 0.8 10/25/2018    CALCIUM 8.8 10/25/2018    ANIONGAP 9 10/25/2018    ESTGFRAFRICA >60 10/25/2018    EGFRNONAA >60 10/25/2018     Lab Results   Component Value Date    INR 1.7 (H) 10/24/2018    INR 1.8 (H) 10/23/2018    INR 2.0 (H) 10/22/2018        Lipase 24  Mag 1.8  Lactic acid 2.1>1.2    BNP  230*      Flu negative  Blood cultures 10/9 - gram + TURICELLA OTITIDIS  Blood cultures 10/9 - gram negative ecoli sensitive to cipro  Blood cultures 10/9 - gram negative BACTEROIDES FRAGILIS   Blood cultures 10/9 NGTD     Significant Imaging:      10/10 CXR-No significant interval change of the bilateral pleural effusions and bilateral lower lobe alveolar infiltrates.  Cardiomegaly and suspected interstitial edema..  10/11/18 CXR- Interval worsening of the cardiopulmonary findings.  There is cardiomegaly with pulmonary vascular congestion and pulmonary edema.  Bilateral pleural effusions are suspected.  Left-sided pacemaker device remains in place.  The skeletal structures are intact.  10/11/18 KUB- Contrast material is seen in the colon  from recent CT scan.  There is some gaseous distention of the bowel without evidence for definite obstruction.  No free air is seen.  Vascular calcifications are noted.

## 2018-10-25 NOTE — ASSESSMENT & PLAN NOTE
S/p surgical repair, general surgery still following  Ultracet for pain every 4 hr.  Gabapentin 100 mg p.o. t.i.d. will be started.  I will increase this as tolerated.  Pt reports no pain this am. Has not needed ultram  10/25 Denies pain today; seen by Dr. Garcia this am; ok for d/c

## 2018-10-25 NOTE — DISCHARGE SUMMARY
[facility] Medicine  Discharge Summary      Patient Name: Elvis Thompson  MRN: [id number]  Admission Date: 10/15/2018  Hospital Length of Stay: 10 days  Discharge Date and Time:  10/25/2018 11:44 AM  Attending Physician: Jorden Brown MD   Discharging Provider: Lisbeth Ngo NP  Primary Care Provider: Regulo Martínez MD      HPI:   85 to male patient was admitted originally for asp pneumonia and + blood cultures. He is still on augmentin for the  Pneumonia. Not coughing, no need for O2 supplementation. He is also on cipro for ecoli bacteremia. No fever. No elevated WBC. Repeat blood cultures NGTD. He was placed on SWING for  Debility. He is doing well. Goal; Gait  x 50 feet with Minimal Assistance using Rolling Walker. He is at goal, will need to reassess his goals for functioning at [facility] with minimal help.     * No surgery found *      Hospital Course:   10/19 Patient on SWING.  pt ambulated 60feet with RW with PT yesterday am; refused PT in PM; goal is now 100feet with RW,   Did have BM yesterday; still noting some burning pain to left side. Palpated area; no significant tenderness but c/o pain to nurse after exam; nirav order Ultracet and get U/A tp rule out UTI/stone   Notes to have some NSVT on monitor; Dr. Mcintosh evaluating; planned for PPM interrogation today and Echo; started on amiodarone.   Already anti-coagulated for PAF; INR therapeutic; 2.4; monitor daily.     10/20.  Patient's main complaint is burning pain in the left side.  I increased his ultracet at to 1 pill every 4 hr, I started gabapentin 100 mg 3 times daily.  I would like surgery to re-evaluate the patient because the pain is in the area of his recent hernia repair.    10/22 he is up in bed this am. Reports that he slept well last night. No pain on gabapentin and ultram. He still complains that he is not having great BM. Nursing reports conflict with that.     He was also seen by Dr Mcintosh as he had irregular  beat over the weekend. He was started on amiodarone. Echo ordered which shows EF 25%. Last echo 2012 showed 45%. Has pace maker/denies that he has defibrillator. INR theraputic    He is doing well with PT. Ambulated 50ft with min assist and RW. Goal is 100ft.     10/24/18   INR 1.7; subtherapeutic; received 2mg on 16/17/18; then back to 1mg daily - change to  NOAC/apixaban per cardiology  Afib with SVR- low 50s; amio 200mg coreg 3.125mg - will defer to cards   Pt ambulated 100ft with RW with PT yesterday   Family now considering d/c to skilled nursing home?     10/25/18 Abx completed for asp pneumonia and bacteremia. No fever, nml WBC, Repeat BC negative   Remains on skilled for debility/ PT/strengthening. Has met goal 100feet with RW  Now planned for skilled Nursing home placement today   Changed coumadin to Apixaban yesterday   States feeling alright; denies any further abd pain/burning at present.  Notes having good bowel movements,      Consults:   Consults (From admission, onward)        Status Ordering Provider     Inpatient consult to Cardiology-CIS  Once     Provider:  Mario Mcintosh MD    Completed NOEMY VIVAR     Inpatient consult to General surgery  Once     Provider:  Zia Gallaghre MD    Acknowledged JULIO ROE     Inpatient consult to General Surgery  Once     Provider:  Zia Gallagher MD    Acknowledged JULIO ROE     Inpatient consult to General Surgery  Once     Provider:  Raman Garcia MD    Acknowledged KAMAR CHAUDHARI     Inpatient consult to General Surgery  Once     Provider:  Rob Velasquez MD    Acknowledged KAMAR CHAUDHARI     Inpatient consult to Pulmonology  Once     Provider:  Reuben Leach MD    Acknowledged JULIO ROE     Inpatient consult to Social Work  Once     Provider:  (Not yet assigned)    Completed ROSA LEARY     IP consult to case management  Once     Provider:  (Not yet assigned)    Completed JULIO ROE          No new Assessment & Plan  notes have been filed under this hospital service since the last note was generated.  Service: Hospital Medicine    Final Active Diagnoses:    Diagnosis Date Noted POA    PRINCIPAL PROBLEM:  Debility [R53.81] 10/15/2018 Yes    Atrial fibrillation [I48.91] 10/24/2018 Yes    Chronic systolic heart failure [I50.22] 10/24/2018 Yes    NSVT (nonsustained ventricular tachycardia) [I47.2] 10/19/2018 No    Chronic anticoagulation [Z79.01] 10/14/2018 Not Applicable    Bacteremia [R78.81] 10/12/2018 Yes    Parkinson disease [G20] 10/10/2018 Yes    Incarcerated left inguinal hernia [K40.30] 10/04/2018 Yes      Problems Resolved During this Admission:    Diagnosis Date Noted Date Resolved POA    Sepsis [A41.9] 10/10/2018 10/19/2018 Yes    Aspiration pneumonia [J69.0] 10/10/2018 10/25/2018 Yes    Diarrhea of presumed infectious origin [R19.7] 10/10/2018 10/25/2018 Yes       Discharged Condition: good    Disposition: Skilled Nursing Facility    Follow Up:  Follow-up Information     [facility].    Why:  follow up with Nursing home physician   Contact information:  [address] [address] [phone]           Mario Mcintosh MD In 2 weeks.    Specialty:  Cardiology  Contact information:  [address] [place] [address] [place] [address] [phone]                 Patient Instructions:   No discharge procedures on file.    Significant Diagnostic Studies:   Significant Labs: Significant Labs:         Lab Results   Component Value Date     WBC 4.86 10/25/2018     HGB 12.0 (L) 10/25/2018     HCT 36.5 (L) 10/25/2018     MCV 93 10/25/2018      10/25/2018            BMP        Lab Results   Component Value Date      (L) 10/25/2018     K 4.3 10/25/2018      10/25/2018     CO2 23 10/25/2018     BUN 18 10/25/2018     CREATININE 0.8 10/25/2018     CALCIUM 8.8 10/25/2018     ANIONGAP 9 10/25/2018     ESTGFRAFRICA >60 10/25/2018     EGFRNONAA >60 10/25/2018             Lab Results   Component Value Date     INR 1.7 (H) 10/24/2018     INR 1.8 (H) 10/23/2018     INR 2.0 (H) 10/22/2018         Lipase 24  Mag 1.8  Lactic acid 2.1>1.2     BNP  230*      Flu negative  Blood cultures 10/9 - gram + TURICELLA OTITIDIS  Blood cultures 10/9 - gram negative ecoli sensitive to cipro  Blood cultures 10/9 - gram negative BACTEROIDES FRAGILIS   Blood cultures 10/9 NGTD     Significant Imaging:      10/10 CXR-No significant interval change of the bilateral pleural effusions and bilateral lower lobe alveolar infiltrates.  Cardiomegaly and suspected interstitial edema..  10/11/18 CXR- Interval worsening of the cardiopulmonary findings.  There is cardiomegaly with pulmonary vascular congestion and pulmonary edema.  Bilateral pleural effusions are suspected.  Left-sided pacemaker device remains in place.  The skeletal structures are intact.  10/11/18 KUB- Contrast material is seen in the colon from recent CT scan.  There is some gaseous distention of the bowel without evidence for definite obstruction.  No free air is seen.  Vascular calcifications are noted.            Pending Diagnostic Studies:     None         Medications:  Reconciled Home Medications:      Medication List      START taking these medications    amiodarone 200 MG Tab  Commonly known as:  PACERONE  Take 1 tablet (200 mg total) by mouth once daily.  Start taking on:  10/26/2018     apixaban 5 mg Tab  Commonly known as:  ELIQUIS  Take 1 tablet (5 mg total) by mouth 2 (two) times daily.     carvedilol 3.125 MG tablet  Commonly known as:  COREG  Take 1 tablet (3.125 mg total) by mouth 2 (two) times daily.     gabapentin 100 MG capsule  Commonly known as:  NEURONTIN  Take 1 capsule (100 mg total) by mouth 3 (three) times daily.     losartan 50 MG tablet  Commonly known as:  COZAAR  Take 1 tablet (50 mg total) by mouth once daily.  Start taking on:  10/26/2018     miconazole nitrate 2% 2 % Oint  Commonly known as:  MICOTIN  Apply  topically 2 (two) times daily.     spironolactone 25 MG tablet  Commonly known as:  ALDACTONE  Take 0.5 tablets (12.5 mg total) by mouth once daily.  Start taking on:  10/26/2018     tramadol-acetaminophen 37.5-325 mg 37.5-325 mg Tab  Commonly known as:  ULTRACET  Take 1 tablet by mouth every 4 (four) hours as needed.        CONTINUE taking these medications    carbidopa-levodopa  mg  mg per tablet  Commonly known as:  SINEMET  Take 1 tablet by mouth 4 (four) times daily.     celecoxib 200 MG capsule  Commonly known as:  CeleBREX  Take 200 mg by mouth daily as needed for Pain.        STOP taking these medications    amLODIPine 5 MG tablet  Commonly known as:  NORVASC     furosemide 20 MG tablet  Commonly known as:  LASIX     warfarin 2 MG tablet  Commonly known as:  COUMADIN            Indwelling Lines/Drains at time of discharge:   Lines/Drains/Airways          None          Time spent on the discharge of patient: 20 minutes  Patient was seen and examined on the date of discharge and determined to be suitable for discharge.         Lisbeth Ngo NP  Department of Hospital Medicine  [facility]

## 2018-10-25 NOTE — PLAN OF CARE
10/25/18 1303   Discharge Reassessment   Assessment Type Discharge Planning Reassessment         Nenita Forrester confirmed that they received all the paper work they need. Number given to Nga, patient's nurse, to call for report. Erlinda, daughter, will be notified after as well.

## 2018-10-25 NOTE — PROGRESS NOTES
[facility] Medicine  Progress Note    Patient Name: Elvis Thompson  MRN: [id number]  Patient Class: IP- Swing   Admission Date: 10/15/2018  Length of Stay: 10 days  Attending Physician: Jorden Brown MD  Primary Care Provider: Regulo Martínez MD        Subjective:     Principal Problem:Debility    HPI:  85 to male patient was admitted originally for asp pneumonia and + blood cultures. He is still on augmentin for the  Pneumonia. Not coughing, no need for O2 supplementation. He is also on cipro for ecoli bacteremia. No fever. No elevated WBC. Repeat blood cultures NGTD. He was placed on SWING for  Debility. He is doing well. Goal; Gait  x 50 feet with Minimal Assistance using Rolling Walker. He is at goal, will need to reassess his goals for functioning at [facility] with minimal help.     Hospital Course:  10/19 Patient on SWING.  pt ambulated 60feet with RW with PT yesterday am; refused PT in PM; goal is now 100feet with RW,   Did have BM yesterday; still noting some burning pain to left side. Palpated area; no significant tenderness but c/o pain to nurse after exam; nirav order Ultracet and get U/A tp rule out UTI/stone   Notes to have some NSVT on monitor; Dr. Mcintosh evaluating; planned for PPM interrogation today and Echo; started on amiodarone.   Already anti-coagulated for PAF; INR therapeutic; 2.4; monitor daily.     10/20.  Patient's main complaint is burning pain in the left side.  I increased his ultracet at to 1 pill every 4 hr, I started gabapentin 100 mg 3 times daily.  I would like surgery to re-evaluate the patient because the pain is in the area of his recent hernia repair.    10/22 he is up in bed this am. Reports that he slept well last night. No pain on gabapentin and ultram. He still complains that he is not having great BM. Nursing reports conflict with that.     He was also seen by Dr Mcintosh as he had irregular beat over the weekend. He was started on amiodarone. Echo  ordered which shows EF 25%. Last echo 2012 showed 45%. Has pace maker/denies that he has defibrillator. INR theraputic    He is doing well with PT. Ambulated 50ft with min assist and RW. Goal is 100ft.     10/24/18   INR 1.7; subtherapeutic; received 2mg on 16/17/18; then back to 1mg daily - change to  NOAC/apixaban per cardiology  Afib with SVR- low 50s; amio 200mg coreg 3.125mg - will defer to cards   Pt ambulated 100ft with RW with PT yesterday   Family now considering d/c to skilled nursing home?     10/25/18 Abx completed for asp pneumonia and bacteremia. No fever, nml WBC, Repeat BC negative   Remains on skilled for debility/ PT/strengthening. Has met goal 100feet with RW  Now planned for skilled Nursing home placement today   Changed coumadin to Apixaban yesterday   States feeling alright; denies any further abd pain/burning at present.  Notes having good bowel movements,     Review of Systems   Constitutional: Negative for chills and fever.   HENT: Negative for congestion, ear pain, postnasal drip, rhinorrhea, sore throat and trouble swallowing.    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 Negative for redness and itching.   Respiratory: Negative for cough, shortness of breath and wheezing.    Cardiovascular: Negative for chest pain and palpitations.   Gastrointestinal: Negative for abdominal pain, diarrhea, nausea and vomiting.   Genitourinary: Negative for difficulty urinating, dysuria and frequency.   Skin: Negative for rash.   Neurological: Positive for weakness (generalized). Negative for headaches.     Objective:     Vital Signs (Most Recent):  Temp: 97.9 °F (36.6 °C) (10/25/18 0715)  Pulse: (!) 53 (10/25/18 1023)  Resp: 14 (10/25/18 0741)  BP: (!) 159/73 (10/25/18 0715)  SpO2: 95 % (10/25/18 0741) Vital Signs (24h Range):  Temp:  [96.1 °F (35.6 °C)-97.9 °F (36.6 °C)] 97.9 °F (36.6 °C)  Pulse:  [52-72] 53  Resp:  [14-17] 14  SpO2:  [94 %-95 %] 95  %  BP: (111-163)/(63-78) 159/73     Weight: 87 kg (191 lb 12.8 oz)(RD reviewed)  Body mass index is 31.92 kg/m².    Physical Exam   Constitutional: He is oriented to person, place, and time. He appears well-developed and well-nourished.   HENT:   Head: Normocephalic and atraumatic.   Nose: Nose normal.   Mouth/Throat: Oropharynx is clear and moist.   Eyes: Conjunctivae and EOM are normal. Pupils are equal, round, and reactive to light.   Neck: Normal range of motion. Neck supple. No tracheal deviation present.   Cardiovascular: Normal rate, regular rhythm, normal heart sounds and intact distal pulses.   Pulmonary/Chest: Effort normal and breath sounds normal.   Abdominal: Soft. Bowel sounds are normal. He exhibits no distension and no mass. There is no tenderness. There is no guarding.   Abd exam normal.  No rash.  No tenderness to palpation.  Suspect post op pain.  Groin wound looks great.     Musculoskeletal: Normal range of motion. He exhibits no edema.   Lymphadenopathy:     He has no cervical adenopathy.   Neurological: He is alert and oriented to person, place, and time. He has normal reflexes. No cranial nerve deficit.   Skin: Skin is warm and dry. No rash noted. No pallor.   Scalloping to groin   Psychiatric: He has a normal mood and affect.   Nursing note and vitals reviewed.        CRANIAL NERVES     CN III, IV, VI   Pupils are equal, round, and reactive to light.  Extraocular motions are normal.        Significant Labs: Significant Labs:   Lab Results   Component Value Date    WBC 4.86 10/25/2018    HGB 12.0 (L) 10/25/2018    HCT 36.5 (L) 10/25/2018    MCV 93 10/25/2018     10/25/2018          BMP  Lab Results   Component Value Date     (L) 10/25/2018    K 4.3 10/25/2018     10/25/2018    CO2 23 10/25/2018    BUN 18 10/25/2018    CREATININE 0.8 10/25/2018    CALCIUM 8.8 10/25/2018    ANIONGAP 9 10/25/2018    ESTGFRAFRICA >60 10/25/2018    EGFRNONAA >60 10/25/2018     Lab Results    Component Value Date    INR 1.7 (H) 10/24/2018    INR 1.8 (H) 10/23/2018    INR 2.0 (H) 10/22/2018        Lipase 24  Mag 1.8  Lactic acid 2.1>1.2    BNP  230*      Flu negative  Blood cultures 10/9 - gram + TURICELLA OTITIDIS  Blood cultures 10/9 - gram negative ecoli sensitive to cipro  Blood cultures 10/9 - gram negative BACTEROIDES FRAGILIS   Blood cultures 10/9 NGTD     Significant Imaging:      10/10 CXR-No significant interval change of the bilateral pleural effusions and bilateral lower lobe alveolar infiltrates.  Cardiomegaly and suspected interstitial edema..  10/11/18 CXR- Interval worsening of the cardiopulmonary findings.  There is cardiomegaly with pulmonary vascular congestion and pulmonary edema.  Bilateral pleural effusions are suspected.  Left-sided pacemaker device remains in place.  The skeletal structures are intact.  10/11/18 KUB- Contrast material is seen in the colon from recent CT scan.  There is some gaseous distention of the bowel without evidence for definite obstruction.  No free air is seen.  Vascular calcifications are noted.              Assessment/Plan:      * Debility    Cont PT/OT    With recent hospitalization from surgery and now bacteremia/asp pneumonia he has became debilitated. Discussing with family swing vs nursing home placement for PT/OT. Case management and  working with family       10/19 Ambulated 60ft yesterday; goal 100ft   10/20 patient is too debilitated to go back home living alone.  Family was present during the evaluation.  They all agree that he needs long-term care.  They will start working on this.  10/22 cont with PT he is half way to his goal; at 50ft with goal of 100ft  10/24 Met goal yesterday 100ft; pt and family discussing D?C to skilled nursing home   10/25 D/C to skilled nursing     Chronic systolic heart failure    Per cards LBBB- chronic A fib, long term anticoagulation/ V pacing intermittent.   S/P PPM 2012- symptomatic bradycardia,  A fib.   Chronic HFrEF- new EF 25% but stable volume status  Continue coreg, losartan,        Atrial fibrillation    Per cards LBBB- chronic A fib, long term anticoagulation with coumadin. V pacing intermittent.   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   Chronic HFrEF- new EF 25% but stable volume status      SVR this am - 50s; will discuss Rx with Dr. Pineda   changing warfarin to eliquis-5mg bid per Dr. Pinedas recs  PPM was interrogated and did not appear to be NSVT but Dr. Pineda will continue to monitor and F/U as OP     NSVT (nonsustained ventricular tachycardia)    Episode last pm; Dr. Pineda planning PPM interrogation with rep;   Echo shows EF down to 25%, will need to discuss with Dr Pineda  Added amiodarone ; follow up with cards as OP       Chronic anticoagulation    Lab Results   Component Value Date    INR 1.7 (H) 10/24/2018    INR 1.8 (H) 10/23/2018    INR 2.0 (H) 10/22/2018       Monitor INR daily  10/24 Change to apixaban      Bacteremia    IV cipro for UTI/bacteremia (stop date 10/21/18)- d/c today  Cipro discontinued yesterday        Parkinson disease    Cont home meds        Incarcerated left inguinal hernia    S/p surgical repair, general surgery still following  Ultracet for pain every 4 hr.  Gabapentin 100 mg p.o. t.i.d. will be started.  I will increase this as tolerated.  Pt reports no pain this am. Has not needed ultram  10/25 Denies pain today; seen by Dr. Garcia this am; ok for d/c       VTE Risk Mitigation (From admission, onward)        Ordered     apixaban tablet 5 mg  2 times daily      10/24/18 4023              Neva Ring MD  Department of Hospital Medicine   [facility]

## 2018-10-26 NOTE — PLAN OF CARE
10/26/18 0959   Final Note   Assessment Type Final Discharge Note   Anticipated Discharge Disposition SNF   What phone number can be called within the next 1-3 days to see how you are doing after discharge? [phone]   Hospital Follow Up  Appt(s) scheduled? Yes   Discharge plans and expectations educations in teach back method with documentation complete? Yes   Right Care Referral Info   Post Acute Recommendation IRF   Referral Type SNF   Facility Name [address] [address]

## 2018-11-12 ENCOUNTER — TELEPHONE (OUTPATIENT)
Dept: ADMINISTRATIVE | Facility: CLINIC | Age: 83
End: 2018-11-12

## 2018-11-12 NOTE — TELEPHONE ENCOUNTER
Home Health SOC 09/26/2018 - 11/24/2018 with [facility] ([place]) - Dr. Bandar Lopez. Patient received SN, PT, OT and MSW services.

## 2018-12-20 ENCOUNTER — OFFICE VISIT (OUTPATIENT)
Dept: NEUROLOGY | Facility: CLINIC | Age: 83
End: 2018-12-20
Payer: MEDICARE

## 2018-12-20 VITALS
RESPIRATION RATE: 14 BRPM | HEART RATE: 56 BPM | BODY MASS INDEX: 31.92 KG/M2 | SYSTOLIC BLOOD PRESSURE: 106 MMHG | HEIGHT: 65 IN | DIASTOLIC BLOOD PRESSURE: 60 MMHG

## 2018-12-20 DIAGNOSIS — I48.0 PAROXYSMAL ATRIAL FIBRILLATION: ICD-10-CM

## 2018-12-20 DIAGNOSIS — I50.22 CHRONIC SYSTOLIC HEART FAILURE: ICD-10-CM

## 2018-12-20 DIAGNOSIS — I10 HYPERTENSION, UNSPECIFIED TYPE: ICD-10-CM

## 2018-12-20 DIAGNOSIS — R53.81 DEBILITY: Primary | ICD-10-CM

## 2018-12-20 DIAGNOSIS — G20.A1 PARKINSON DISEASE: ICD-10-CM

## 2018-12-20 DIAGNOSIS — Z79.01 CHRONIC ANTICOAGULATION: ICD-10-CM

## 2018-12-20 DIAGNOSIS — E04.2 MULTIPLE THYROID NODULES: ICD-10-CM

## 2018-12-20 PROBLEM — R78.81 BACTEREMIA: Status: RESOLVED | Noted: 2018-10-12 | Resolved: 2018-12-20

## 2018-12-20 PROBLEM — J69.0 ASPIRATION PNEUMONITIS: Status: RESOLVED | Noted: 2018-10-10 | Resolved: 2018-12-20

## 2018-12-20 PROBLEM — K40.30 INCARCERATED LEFT INGUINAL HERNIA: Status: RESOLVED | Noted: 2018-10-04 | Resolved: 2018-12-20

## 2018-12-20 PROCEDURE — 99214 OFFICE O/P EST MOD 30 MIN: CPT | Mod: PBBFAC | Performed by: NURSE PRACTITIONER

## 2018-12-20 PROCEDURE — 99214 OFFICE O/P EST MOD 30 MIN: CPT | Mod: S$PBB | Performed by: NURSE PRACTITIONER

## 2018-12-20 PROCEDURE — 99999 PR PBB SHADOW E&M-EST. PATIENT-LVL IV: CPT | Mod: PBBFAC,,, | Performed by: NURSE PRACTITIONER

## 2018-12-20 PROCEDURE — 99999 PR STA SHADOW: CPT | Mod: PBBFAC,,, | Performed by: NURSE PRACTITIONER

## 2018-12-20 RX ORDER — MUPIROCIN 20 MG/G
OINTMENT TOPICAL 3 TIMES DAILY
COMMUNITY
End: 2019-01-07

## 2018-12-20 RX ORDER — POLYETHYLENE GLYCOL 3350 17 G/17G
17 POWDER, FOR SOLUTION ORAL DAILY PRN
COMMUNITY

## 2018-12-20 RX ORDER — TRAMADOL HYDROCHLORIDE AND ACETAMINOPHEN 37.5; 325 MG/1; MG/1
1 TABLET, FILM COATED ORAL EVERY 4 HOURS PRN
COMMUNITY

## 2018-12-20 RX ORDER — DOCUSATE SODIUM 100 MG/1
100 CAPSULE, LIQUID FILLED ORAL DAILY
COMMUNITY

## 2018-12-20 NOTE — PROGRESS NOTES
HPI: Elvis Thompson is a 85 y.o. male with tremor consistent with parkinson's disease. Vague memory loss noted at times. He has A-fib with history of PE and shingles, as well as thyroid nodules, followed by his PCP.     He presents today for a follow up visit. His Sinemet was increased to qid at his last visit, given his frequent falls. He was also referred back to his PCP for his complaint of generalized weakness.     His gait is worse since his last visit. He was admitted for an incarcerated hernia in 10/2018, followed by a UTI sepsis and pneumonia. He was on the skilled nursing chart, then was transferred to [facility] for rehab. He continues rehab at this time for his gait. He is not walking at all at this time, and is wheelchair bound.     Tremor is unchanged per patient.     He is in heart failure at this time. Cardiology plans to revise his pacemaker and add a defibrillator.     Review of Systems  Review of Systems   Constitutional: Negative for fever.   HENT: Negative for nosebleeds.    Eyes: Negative for double vision.   Respiratory: Negative for hemoptysis.    Cardiovascular: Negative for leg swelling.   Gastrointestinal: Positive for constipation.        Uses OTC fiber for constipation with good relief   Genitourinary: Negative for dysuria and hematuria.   Musculoskeletal: Negative for falls.        Not walking currently.    Skin: Negative for rash.   Neurological: Positive for tremors and weakness.   Psychiatric/Behavioral: Positive for memory loss.        Memory stable, doing well. Daughter states this is stable       Objective:     Exam:    Gen Appearance, well developed/nourished in no apparent distress  CV: 2+ distal pulses with no edema or swelling  Neuro:  MS: Awake, alert, oriented to place, person, time, situation. Sustains attention. Recent/remote memory intact, Language is full to spontaneous speech/repetition/naming/comprehension. Fund of Knowledge is full  CN: Optic  discs are flat with normal vasculature, PERRL, Extraoccular movements and visual fields are full. Normal facial sensation and strength, Hearing symmetric, Tongue and Palate are midline and strong. Shoulder Shrug symmetric and strong.  Motor: Normal bulk, tone increased throughout, and tremor throughout the arms at rest. 5/5 strength bilateral upper/lower extremities with 1+ reflexes and no clonus. And there is moderate bradykinesia  Sensory: symmetric to temp and vibration. Romberg negative  Cerebellar: Finger-nose,Heal-shin, Rapid alternating movements intact  Gait: Normal stance, no ataxia, but stooped posture, mild difficulty with rise from chair and slowed gait with mild-moderate posture reflex impairment.     Imagin/2018 CT head:   Multilevel degenerative changes of the lumbar spine contributing to central canal stenosis and neural foraminal narrowing as detailed in the above level by level description.    2017 CT head: No acute intracranial findings.    2017: CT head unremarkable    2018 CT L spine:   Chronic compression fracture of L1 with loss of approximately 30% superior vertebral body height.  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  No acute fracture or subluxation is seen.Multilevel degenerative changes of the lumbar spine contributing to central canal stenosis and neural foraminal narrowing as detailed in the above level by level description.    2018 CT C spine:   Multilevel degenerative changes of the cervical spine, most pronounced at C5-6, where there is likely a degree of central canal stenosis and neural foraminal narrowing.    Heterogenous and enlarged thyroid gland.  Consider further evaluation with a thyroid ultrasound as clinically warranted.    Age-appropriate cerebral volume loss with mild moderate patchy decreased attenuation supratentorial white matter while nonspecific suggestive for chronic ischemic change.    No evidence for acute intracranial  hemorrhage. Clinical correlation and further evaluation as warranted.     7/2017 CT C spine: Multilevel degenerative changes of the cervical spine without fracture or subluxation.    Labs:   10/2018 CBC and CMP overall unremarkable other than borderline hyponatremia and mild anemia.     Assessment/ Recommendations:    Elvis Thompson is a 85 y.o. male with tremor consistent with parkinson's disease. Vague memory loss noted at times. He has A-fib with history of PE and shingles, as well as thyroid nodules, followed by his PCP.     I recommend:  1. Unable to evaluate effects of Sinemet increase to qid, given his deconditioning from admit for urosepsis and pneumonia after admit for incarcerated hernia.  2. He will continue Rehab in nursing home at this time, as he is non ambulatory currently.   3. Updated CT head showed no changes in 2018 and updated CT C spine showed spinal stenosis. (can't have MRI due to pacemaker). CT was again unremarkable after a fall this summer- he has had increased falls. 2. Patient was referred to PCP for generalized weakness. He was found to have thyroid lesions followed by PCP.  4. Continue Sinemet to QID.   5. He completed LSVT Big Therapy for Parkinson's disease which did reduce his falls-prior.   6. Memory loss is rare and not bothersome- he is functioning well. Consider treating with exelon in the future if worse.   7. He treats constipation with OTC agents PRN.     FU as scheduled with Dr. Lopez in 4/2018.

## 2019-01-07 PROBLEM — J20.9 ACUTE BRONCHITIS: Status: ACTIVE | Noted: 2019-01-07

## 2019-01-23 PROBLEM — I25.5 ISCHEMIC CARDIOMYOPATHY: Status: ACTIVE | Noted: 2019-01-23

## 2019-01-23 PROBLEM — I42.8 NON-ISCHEMIC CARDIOMYOPATHY: Status: ACTIVE | Noted: 2019-01-23

## 2019-01-23 PROBLEM — I50.42 CHRONIC COMBINED SYSTOLIC AND DIASTOLIC HF (HEART FAILURE), NYHA CLASS 3: Status: ACTIVE | Noted: 2019-01-23

## 2019-01-23 PROBLEM — I11.9 HHD (HYPERTENSIVE HEART DISEASE): Status: ACTIVE | Noted: 2019-01-23

## 2019-01-23 PROBLEM — I44.7 LBBB (LEFT BUNDLE BRANCH BLOCK): Status: ACTIVE | Noted: 2019-01-23

## 2019-01-23 PROBLEM — I50.22 CHF (CONGESTIVE HEART FAILURE), NYHA CLASS III, CHRONIC, SYSTOLIC: Status: ACTIVE | Noted: 2019-01-23

## 2019-03-05 PROBLEM — S32.402A CLOSED NONDISPLACED FRACTURE OF LEFT ACETABULUM: Status: ACTIVE | Noted: 2019-03-05

## 2019-03-13 ENCOUNTER — TELEPHONE (OUTPATIENT)
Dept: NEUROLOGY | Facility: CLINIC | Age: 84
End: 2019-03-13

## 2019-03-13 NOTE — TELEPHONE ENCOUNTER
----- Message from Shannan Culp sent at 3/13/2019  9:39 AM CDT -----  Contact: Erlinda Martínez, Daughter  Elvis Thompson  MRN: [id number]  : 1933  PCP: Mitchell Nayak  Home Phone      [phone]  Work Phone      Not on file.  Mobile          [phone]      MESSAGE:   Pt's daughter called regarding pt. States he fell at the nursing home Tuesday and broke his hip. Since the fall he has been confused and not making any sense. Thought it may be from the meds, but hasn't taken anything since 3/6/19 and still no change in his mental state.   Would like to speak to a nurse, just for advice. Pt has appt scheduled for 19. Please advise.  Erlinda Martínez, Daughter, [phone]

## 2019-03-13 NOTE — TELEPHONE ENCOUNTER
Patient daughter calling with concerns of the patient being confused. He recently fell and broke his hip and is bed bound for the next 8 weeks. Daughter Erlinda mentions that the patient did hit his head when he fell and was given Demerol and Hydrocodone in the ED. Erlinda states that he has been confused ever since and this is not his norm, even though today, his confusion is a little better than it was on Saturday. He was given Tramadol to take at the nursing home as needed for pain but he only took it once stating that it did not work. Daughter states that he has no symptoms of UTI. Recent urine done on 3/5/19 and results in chart. She doesn't feel like anyone at the home is showing much concern regarding the confusion and is asking for advice. He does have a follow up scheduled with you for 4/2/19. He will have to come by ambulance because he is not able to ambulate. Please advise.

## 2019-03-14 PROBLEM — R41.0 DELIRIUM: Status: ACTIVE | Noted: 2019-03-14

## 2019-03-14 NOTE — TELEPHONE ENCOUNTER
His CT head was ok in the ER on 3/5/19.  I can't make any more recommendations without evaluating him myself.  Please place on list to see him sooner.

## 2019-03-14 NOTE — TELEPHONE ENCOUNTER
Spoke with patients daughter to inform Mr. Leal was placed on the waiting list for an earlier appointment. She verbalized understanding.

## 2019-04-02 ENCOUNTER — OFFICE VISIT (OUTPATIENT)
Dept: NEUROLOGY | Facility: CLINIC | Age: 84
End: 2019-04-02
Payer: MEDICARE

## 2019-04-02 VITALS
DIASTOLIC BLOOD PRESSURE: 72 MMHG | SYSTOLIC BLOOD PRESSURE: 128 MMHG | HEART RATE: 76 BPM | HEIGHT: 60 IN | RESPIRATION RATE: 18 BRPM | WEIGHT: 160 LBS | BODY MASS INDEX: 31.41 KG/M2

## 2019-04-02 DIAGNOSIS — G20.A1 PARKINSON DISEASE: Primary | ICD-10-CM

## 2019-04-02 DIAGNOSIS — R41.3 MEMORY LOSS: ICD-10-CM

## 2019-04-02 DIAGNOSIS — Z79.01 CHRONIC ANTICOAGULATION: ICD-10-CM

## 2019-04-02 DIAGNOSIS — R13.19 OTHER DYSPHAGIA: ICD-10-CM

## 2019-04-02 DIAGNOSIS — R53.81 DEBILITY: ICD-10-CM

## 2019-04-02 PROCEDURE — 99999 PR STA SHADOW: CPT | Mod: PBBFAC,,, | Performed by: PSYCHIATRY & NEUROLOGY

## 2019-04-02 PROCEDURE — 99214 OFFICE O/P EST MOD 30 MIN: CPT | Mod: S$PBB | Performed by: PSYCHIATRY & NEUROLOGY

## 2019-04-02 PROCEDURE — 99213 OFFICE O/P EST LOW 20 MIN: CPT | Mod: PBBFAC | Performed by: PSYCHIATRY & NEUROLOGY

## 2019-04-02 PROCEDURE — 99999 PR PBB SHADOW E&M-EST. PATIENT-LVL III: ICD-10-PCS | Mod: PBBFAC,,, | Performed by: PSYCHIATRY & NEUROLOGY

## 2019-04-02 PROCEDURE — 99999 PR PBB SHADOW E&M-EST. PATIENT-LVL III: CPT | Mod: PBBFAC,,, | Performed by: PSYCHIATRY & NEUROLOGY

## 2019-04-02 RX ORDER — RIVASTIGMINE TARTRATE 1.5 MG/1
CAPSULE ORAL
Qty: 60 CAPSULE | Refills: 11 | Status: SHIPPED | OUTPATIENT
Start: 2019-04-02 | End: 2019-10-08

## 2019-04-02 NOTE — PROGRESS NOTES
HPI: Elvis Thompson is a 86 y.o. male with tremor consistent with parkinson's disease. Vague memory loss noted at times  At the last visit:  complaining of generalized  Weakness and exam shows slower gait    Since the last visit with me, the patient fell and had a hip fracture.  He now resides in an NH  Daughter brought him into the ER and he was admitted to [facility] after she called about his confusion.  He was not observed to be confused, but thought to have some symptoms consistent with the onset of dementia.   Prior to all of that, he did see neurology NP, Salome late last year for gait worsening after he was admitted for an incarcerated hernia in 10/2018, followed by a UTI sepsis and pneumonia and he was no ambulatory at that point.     Family (daughter) here today states he had 2 head impacts with falls prior but no LOC. He is forgetful for example of this appointment which is unusual for him. He has had a few episodes of thinking he slept in his shed and once tooks his diaper off inappropriately.    He has less movement of the neck over time but denies pain.  He is in the NH and in rehab.  He is not crying and has had some adjustment in the NH  He seems to have some choking with drinking water and he does have ST.     His tremor is stable. He has a pacemaker    Review of Systems  Review of Systems   Constitutional: Negative for fever.   HENT: Negative for nosebleeds.    Eyes: Negative for double vision.   Respiratory: Negative for hemoptysis.    Cardiovascular: Negative for leg swelling.   Gastrointestinal: Positive for constipation.        Uses OTC fiber for constipation with good relief   Genitourinary: Negative for dysuria and hematuria.   Musculoskeletal: Positive for falls.   Skin: Negative for rash.   Neurological: Positive for tremors.   Psychiatric/Behavioral: Positive for memory loss.        Memory stable, doing well. Daughter states this is stable             Objective:           Exam:    Gen  Appearance, well developed/nourished in no apparent distress  CV: 2+ distal pulses with no edema or swelling  Neuro:  MS: Awake, alert, oriented to place, person, but not  time, situation. Sustains attention. Recent/remote memory intact, Language is full to spontaneous speech/repetition/naming/comprehension. Fund of Knowledge is full  CN: Optic discs are flat with normal vasculature, PERRL, Extraoccular movements and visual fields are full. Normal facial sensation and strength, Hearing symmetric, Tongue and Palate are midline and strong. Shoulder Shrug symmetric and strong.  Motor: Normal bulk, tone increased throughout, and tremor throughout the arms at rest. 5/5 strength bilateral upper/lower extremities with 1+ reflexes and no clonus. And there is moderate bradykinesia  Sensory: symmetric to temp and vibration. Romberg negative  Cerebellar: Finger-nose,Heal-shin, Rapid alternating movements intact  Gait: Not done- in stretcher today but in rehab  Cervical dystonia      [id number]:  CT head unremarkable  2018 CT L spine: Chronic compression fracture of L1 with loss of approximately 30% superior vertebral body height.  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  No acute fracture or subluxation is seen.Multilevel degenerative changes of the lumbar spine contributing to central canal stenosis and neural foraminal narrowing as detailed in the above level by level description.    2018 CT C spine: Multilevel degenerative changes of the cervical spine, most pronounced at C5-6, where there is likely a degree of central canal stenosis and neural foraminal narrowing.    Heterogenous and enlarged thyroid gland.  Consider further evaluation with a thyroid ultrasound as clinically warranted.    3/2019 CT C spine: No cervical spine fracture or dislocation.      2018 CT head: Multilevel degenerative changes of the lumbar spine contributing to central canal stenosis and neural foraminal narrowing  as detailed in the above level by level description.      7/2017 CT head: No acute intracranial findings.    3/2019 CT head: No evidence of an acute intracranial abnormality.    Chronic brain parenchymal changes as above.    Age-appropriate cerebral volume loss with mild moderate patchy decreased attenuation supratentorial white matter while nonspecific suggestive for chronic ischemic change.    No evidence for acute intracranial hemorrhage.  Clinical correlation and further evaluation as warranted.   7/2017 CT C spine: Multilevel degenerative changes of the cervical spine without fracture or subluxation.        2018: CMP, CBC, TSH, B12, CK, ESR unremarkable/ stable    Assessment/ Recommendations:    Elvis Thompson is a 86 y.o. male with tremor consistent with parkinson's disease. Vague memory loss noted at times  At the last visit:  complaining of generalized  Weakness and exam shows slower gait  I recommend:  1. Patient is recently s/p hospital evaluation for report of confusion noted by daughter. No confusion was noted in house, CT head was unremarkable, but patient was considered to have onset of dementia at this point. He has has some long standing memory loss and with head trauma from falls and age along with PD some dementia is not unexpected.   -he now resides in an NH after hip fracture and is not ambulatory.   -Exelon trial per orders unless light headedness or GI upset (lower dose- he has a pacemaker) but this may benefit his gait and memory longer.   -Patient is a likely a hospice candidate if any worsening discussed with patient and daughter.   2. Updated CT C spine showed spinal stenosis in 2639-6556. (can't have MRI due to pacemaker). CT head has been unremarkable was again unremarkable  after a fall this summer- he has had increased falls. Daughter will notify me for any further significant falls or worsening- can repeat imaging at that time given his NOAC use.  3. Patient was referred to PCP for  generalized weakness. He was found to have thyroid lesions followed by PCP  4. Raised dose of sinemet to QID unless side effects in 2018.   -Unable to evaluate effects of Sinemet increase to qid, given his deconditioning from admit for urosepsis and pneumonia after admit for incarcerated hernia in 2018 shortly after this was increased.  5. He completed LSVT Big Therapy for Parkinson's disease which did reduce his falls-prior.   -He is also having ST for dysphagia in the NH at this time  6. He treats constipation with OTC agents PRN.     RTC 6 months

## 2019-05-09 PROBLEM — R13.12 OROPHARYNGEAL DYSPHAGIA: Status: ACTIVE | Noted: 2019-05-09

## 2019-09-03 PROBLEM — J69.0 ASPIRATION PNEUMONIA OF RIGHT UPPER LOBE: Status: ACTIVE | Noted: 2019-09-03

## 2019-09-03 PROBLEM — J45.909 ACUTE ASTHMATIC BRONCHITIS: Status: ACTIVE | Noted: 2019-09-03

## 2019-10-08 ENCOUNTER — OFFICE VISIT (OUTPATIENT)
Dept: NEUROLOGY | Facility: CLINIC | Age: 84
End: 2019-10-08
Payer: MEDICARE

## 2019-10-08 VITALS
HEIGHT: 72 IN | RESPIRATION RATE: 16 BRPM | HEART RATE: 72 BPM | SYSTOLIC BLOOD PRESSURE: 122 MMHG | WEIGHT: 166 LBS | DIASTOLIC BLOOD PRESSURE: 78 MMHG | BODY MASS INDEX: 22.48 KG/M2

## 2019-10-08 DIAGNOSIS — R41.3 MEMORY LOSS: ICD-10-CM

## 2019-10-08 DIAGNOSIS — Z79.01 CHRONIC ANTICOAGULATION: ICD-10-CM

## 2019-10-08 DIAGNOSIS — R13.19 OTHER DYSPHAGIA: ICD-10-CM

## 2019-10-08 DIAGNOSIS — G20.A1 PARKINSON DISEASE: Primary | ICD-10-CM

## 2019-10-08 DIAGNOSIS — R53.81 DEBILITY: ICD-10-CM

## 2019-10-08 PROCEDURE — 99213 OFFICE O/P EST LOW 20 MIN: CPT | Mod: PBBFAC | Performed by: PSYCHIATRY & NEUROLOGY

## 2019-10-08 PROCEDURE — 99999 PR STA SHADOW: ICD-10-PCS | Mod: PBBFAC,,, | Performed by: PSYCHIATRY & NEUROLOGY

## 2019-10-08 PROCEDURE — 99999 PR PBB SHADOW E&M-EST. PATIENT-LVL III: CPT | Mod: PBBFAC,,, | Performed by: PSYCHIATRY & NEUROLOGY

## 2019-10-08 PROCEDURE — 99999 PR STA SHADOW: CPT | Mod: PBBFAC,,, | Performed by: PSYCHIATRY & NEUROLOGY

## 2019-10-08 PROCEDURE — 99214 OFFICE O/P EST MOD 30 MIN: CPT | Mod: S$PBB | Performed by: PSYCHIATRY & NEUROLOGY

## 2019-10-08 RX ORDER — IPRATROPIUM BROMIDE AND ALBUTEROL SULFATE 2.5; .5 MG/3ML; MG/3ML
3 SOLUTION RESPIRATORY (INHALATION) EVERY 6 HOURS PRN
COMMUNITY

## 2019-10-08 NOTE — PROGRESS NOTES
HPI: Elvis Thompson is a 86 y.o. male with tremor consistent with parkinson's disease. Vague memory loss noted at times  At a prior visit:  complaining of generalized  Weakness and exam shows slower gait    He was hospitalized for pneumonia since the last visit.   He is recovered well from this.     Tried exelon since the last visit. NO GI side effects, He is confused with day and night and he sleeps a good bit during the day. Not sure if this started after exelon  He only takes tramadol rarely if at all    Tremor is not very bothersome. He is trying to stand with PT only. ST completed prior    Son is here today (son is from [place])      Review of Systems  Review of Systems   Constitutional: Negative for fever.   HENT: Negative for nosebleeds.    Eyes: Negative for double vision.   Respiratory: Negative for hemoptysis.    Cardiovascular: Negative for leg swelling.   Gastrointestinal: Positive for constipation.        Uses OTC fiber for constipation with good relief   Genitourinary: Negative for dysuria and hematuria.   Musculoskeletal: Positive for falls.   Skin: Negative for rash.   Neurological: Positive for tremors.   Psychiatric/Behavioral: Positive for memory loss.        Memory stable, doing well. Daughter states this is stable             Objective:           Exam:    Gen Appearance, well developed/nourished in no apparent distress  CV: 2+ distal pulses with no edema or swelling  Neuro:  MS: Awake, alert, oriented to place, person, but not  time, situation. Sustains attention. Recent recall reduced/remote memory intact, Language is full to spontaneous speech/comprehension. Fund of Knowledge is full  CN: Optic discs are flat with normal vasculature, PERRL, Extraoccular movements and visual fields are full. Normal facial sensation and strength, Hearing symmetric, Tongue and Palate are midline and strong. Shoulder Shrug symmetric and strong.  Motor: Normal bulk, tone increased throughout, and tremor  throughout the arms at rest. 5/5 strength bilateral upper/lower extremities with 1+ reflexes and no clonus. And there is moderate bradykinesia  Sensory: symmetric to temp and vibration. Romberg negative  Cerebellar: Finger-nose,Heal-shin, Rapid alternating movements intact  Gait: Not done-in wheelchair today  Cervical dystonia      [id number]:  CT head unremarkable  2018 CT L spine: Chronic compression fracture of L1 with loss of approximately 30% superior vertebral body height.  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  No acute fracture or subluxation is seen.Multilevel degenerative changes of the lumbar spine contributing to central canal stenosis and neural foraminal narrowing as detailed in the above level by level description.    2018 CT C spine: Multilevel degenerative changes of the cervical spine, most pronounced at C5-6, where there is likely a degree of central canal stenosis and neural foraminal narrowing.    Heterogenous and enlarged thyroid gland.  Consider further evaluation with a thyroid ultrasound as clinically warranted.    3/2019 CT C spine: No cervical spine fracture or dislocation.      2018 CT head: Multilevel degenerative changes of the lumbar spine contributing to central canal stenosis and neural foraminal narrowing as detailed in the above level by level description.      2017 CT head: No acute intracranial findings.    3/2019 CT head: No evidence of an acute intracranial abnormality.    Chronic brain parenchymal changes as above.    Age-appropriate cerebral volume loss with mild moderate patchy decreased attenuation supratentorial white matter while nonspecific suggestive for chronic ischemic change.    No evidence for acute intracranial hemorrhage.  Clinical correlation and further evaluation as warranted.   2017 CT C spine: Multilevel degenerative changes of the cervical spine without fracture or subluxation.        2018: CMP, CBC, TSH, B12, CK, ESR  unremarkable/ stable    Assessment/ Recommendations:    Elvis Thompson is a 86 y.o. male with tremor consistent with parkinson's disease. Vague memory loss noted at times  At a prior visit:  complaining of generalized  Weakness and exam shows slower gait  I recommend:  1. Patient is recently s/p hospital evaluation for pneumonia. Earlier in 2019 he was hospitalized for confusion-CT head was unremarkable, but patient was considered to have onset of dementia at this point. He has has some long standing memory loss and with head trauma from falls and age along with PD some dementia is not unexpected.   -he now resides in an NH after hip fracture and is not ambulatory.   -Exelon at lower dose (he has a pacemaker) may be causing some drowsiness. Stop use. If not better or if confusion worsen, could always resume   -Patient is a likely a hospice candidate if any worsening discussed with patient and son today.   2. Updated CT C spine showed spinal stenosis in [id number]. (can't have MRI due to pacemaker).    3 Raised dose of sinemet to QID  in 2018.   -Unable to evaluate effects of Sinemet increase to qid, given his deconditioning from admit for urosepsis and pneumonia after admit for incarcerated hernia in 2018 shortly after this was increased. He has had 2 more hospitalizations this year in his continued decline not unexpected for PD  - he feels tremor is tolerable  4. He completed LSVT Big Therapy for Parkinson's disease and speech therapy for dysphagia  5. He treats constipation with OTC agents PRN.     RTC 6 months

## 2019-12-24 ENCOUNTER — HOSPITAL ENCOUNTER (INPATIENT)
Facility: HOSPITAL | Age: 84
LOS: 6 days | Discharge: NURSING, REHAB OR LONG-TERM CARE | DRG: 085 | End: 2019-12-30
Attending: EMERGENCY MEDICINE | Admitting: PSYCHIATRY & NEUROLOGY
Payer: MEDICARE

## 2019-12-24 DIAGNOSIS — S06.5XAA SDH (SUBDURAL HEMATOMA): Primary | ICD-10-CM

## 2019-12-24 DIAGNOSIS — Z79.01 CHRONIC ANTICOAGULATION: ICD-10-CM

## 2019-12-24 DIAGNOSIS — S06.4XAA EPIDURAL HEMATOMA: ICD-10-CM

## 2019-12-24 DIAGNOSIS — J69.0 ASPIRATION PNEUMONIA, UNSPECIFIED ASPIRATION PNEUMONIA TYPE, UNSPECIFIED LATERALITY, UNSPECIFIED PART OF LUNG: ICD-10-CM

## 2019-12-24 DIAGNOSIS — I48.11 LONGSTANDING PERSISTENT ATRIAL FIBRILLATION: ICD-10-CM

## 2019-12-24 DIAGNOSIS — R53.81 DEBILITY: ICD-10-CM

## 2019-12-24 DIAGNOSIS — Z95.810 ICD (IMPLANTABLE CARDIOVERTER-DEFIBRILLATOR) IN PLACE: ICD-10-CM

## 2019-12-24 DIAGNOSIS — I50.30 DIASTOLIC HEART FAILURE: ICD-10-CM

## 2019-12-24 DIAGNOSIS — Z79.01 ANTICOAGULATED ON COUMADIN: ICD-10-CM

## 2019-12-24 DIAGNOSIS — S06.4X0A TRAUMATIC EPIDURAL HEMATOMA WITHOUT LOSS OF CONSCIOUSNESS, INITIAL ENCOUNTER: ICD-10-CM

## 2019-12-24 DIAGNOSIS — I10 ESSENTIAL HYPERTENSION: ICD-10-CM

## 2019-12-24 PROBLEM — R13.10 DYSPHAGIA: Status: ACTIVE | Noted: 2019-05-09

## 2019-12-24 PROBLEM — I50.32 CHRONIC DIASTOLIC CONGESTIVE HEART FAILURE: Status: ACTIVE | Noted: 2019-12-24

## 2019-12-24 LAB
ABO + RH BLD: NORMAL
APTT BLDCRRT: 24.5 SEC (ref 21–32)
ASCENDING AORTA: 3.42 CM
AV INDEX (PROSTH): 0.25
AV MEAN GRADIENT: 18 MMHG
AV PEAK GRADIENT: 31 MMHG
AV VALVE AREA: 0.99 CM2
AV VELOCITY RATIO: 0.27
BLD GP AB SCN CELLS X3 SERPL QL: NORMAL
BSA FOR ECHO PROCEDURE: 1.95 M2
CHOLEST SERPL-MCNC: 160 MG/DL (ref 120–199)
CHOLEST/HDLC SERPL: 3.3 {RATIO} (ref 2–5)
CV ECHO LV RWT: 0.33 CM
DOP CALC AO PEAK VEL: 2.77 M/S
DOP CALC AO VTI: 61.08 CM
DOP CALC LVOT AREA: 4 CM2
DOP CALC LVOT DIAMETER: 2.27 CM
DOP CALC LVOT PEAK VEL: 0.76 M/S
DOP CALC LVOT STROKE VOLUME: 60.68 CM3
DOP CALCLVOT PEAK VEL VTI: 15 CM
E WAVE DECELERATION TIME: 168.1 MSEC
E/A RATIO: 3.35
E/E' RATIO: 9.67 M/S
ECHO LV POSTERIOR WALL: 0.92 CM (ref 0.6–1.1)
ESTIMATED AVG GLUCOSE: 108 MG/DL (ref 68–131)
FRACTIONAL SHORTENING: 26 % (ref 28–44)
HBA1C MFR BLD HPLC: 5.4 % (ref 4–5.6)
HDLC SERPL-MCNC: 48 MG/DL (ref 40–75)
HDLC SERPL: 30 % (ref 20–50)
INR PPP: 1.1 (ref 0.8–1.2)
INR PPP: 1.2 (ref 0.8–1.2)
INTERVENTRICULAR SEPTUM: 1.2 CM (ref 0.6–1.1)
IVRT: 0.06 MSEC
LA MAJOR: 5.27 CM
LA MINOR: 5.43 CM
LA WIDTH: 4.21 CM
LDLC SERPL CALC-MCNC: 101.4 MG/DL (ref 63–159)
LEFT ATRIUM SIZE: 5.22 CM
LEFT ATRIUM VOLUME INDEX: 51.3 ML/M2
LEFT ATRIUM VOLUME: 99.91 CM3
LEFT INTERNAL DIMENSION IN SYSTOLE: 4.08 CM (ref 2.1–4)
LEFT VENTRICLE DIASTOLIC VOLUME INDEX: 77.22 ML/M2
LEFT VENTRICLE DIASTOLIC VOLUME: 150.42 ML
LEFT VENTRICLE MASS INDEX: 120 G/M2
LEFT VENTRICLE SYSTOLIC VOLUME INDEX: 37.6 ML/M2
LEFT VENTRICLE SYSTOLIC VOLUME: 73.2 ML
LEFT VENTRICULAR INTERNAL DIMENSION IN DIASTOLE: 5.55 CM (ref 3.5–6)
LEFT VENTRICULAR MASS: 233.78 G
LV LATERAL E/E' RATIO: 8.7 M/S
LV SEPTAL E/E' RATIO: 10.88 M/S
MV PEAK A VEL: 0.26 M/S
MV PEAK E VEL: 0.87 M/S
NONHDLC SERPL-MCNC: 112 MG/DL
PISA TR MAX VEL: 3.54 M/S
PROTHROMBIN TIME: 11.3 SEC (ref 9–12.5)
PROTHROMBIN TIME: 12 SEC (ref 9–12.5)
PULM VEIN S/D RATIO: 0.55
PV PEAK D VEL: 0.62 M/S
PV PEAK S VEL: 0.34 M/S
RA MAJOR: 4.94 CM
RA PRESSURE: 8 MMHG
RA WIDTH: 3.89 CM
RIGHT VENTRICULAR END-DIASTOLIC DIMENSION: 3.85 CM
RV TISSUE DOPPLER FREE WALL SYSTOLIC VELOCITY 1 (APICAL 4 CHAMBER VIEW): 10.94 CM/S
SINUS: 3.03 CM
STJ: 3.25 CM
TDI LATERAL: 0.1 M/S
TDI SEPTAL: 0.08 M/S
TDI: 0.09 M/S
TR MAX PG: 50 MMHG
TRICUSPID ANNULAR PLANE SYSTOLIC EXCURSION: 1.5 CM
TRIGL SERPL-MCNC: 53 MG/DL (ref 30–150)
TSH SERPL DL<=0.005 MIU/L-ACNC: 1.41 UIU/ML (ref 0.4–4)
TV REST PULMONARY ARTERY PRESSURE: 58 MMHG

## 2019-12-24 PROCEDURE — 25000003 PHARM REV CODE 250: Performed by: STUDENT IN AN ORGANIZED HEALTH CARE EDUCATION/TRAINING PROGRAM

## 2019-12-24 PROCEDURE — 99222 1ST HOSP IP/OBS MODERATE 55: CPT | Mod: GC,,, | Performed by: NEUROLOGICAL SURGERY

## 2019-12-24 PROCEDURE — 86850 RBC ANTIBODY SCREEN: CPT

## 2019-12-24 PROCEDURE — 83036 HEMOGLOBIN GLYCOSYLATED A1C: CPT

## 2019-12-24 PROCEDURE — 92523 SPEECH SOUND LANG COMPREHEN: CPT

## 2019-12-24 PROCEDURE — 99291 PR CRITICAL CARE, E/M 30-74 MINUTES: ICD-10-PCS | Mod: GC,,, | Performed by: NURSE PRACTITIONER

## 2019-12-24 PROCEDURE — 96374 THER/PROPH/DIAG INJ IV PUSH: CPT

## 2019-12-24 PROCEDURE — 99291 CRITICAL CARE FIRST HOUR: CPT | Mod: GC,,, | Performed by: NURSE PRACTITIONER

## 2019-12-24 PROCEDURE — 99285 EMERGENCY DEPT VISIT HI MDM: CPT | Mod: 25

## 2019-12-24 PROCEDURE — 80061 LIPID PANEL: CPT

## 2019-12-24 PROCEDURE — 99291 PR CRITICAL CARE, E/M 30-74 MINUTES: ICD-10-PCS | Mod: ,,, | Performed by: EMERGENCY MEDICINE

## 2019-12-24 PROCEDURE — 99291 CRITICAL CARE FIRST HOUR: CPT | Mod: ,,, | Performed by: EMERGENCY MEDICINE

## 2019-12-24 PROCEDURE — 25000003 PHARM REV CODE 250: Performed by: NURSE PRACTITIONER

## 2019-12-24 PROCEDURE — 84443 ASSAY THYROID STIM HORMONE: CPT

## 2019-12-24 PROCEDURE — 85610 PROTHROMBIN TIME: CPT | Mod: 91

## 2019-12-24 PROCEDURE — 97165 OT EVAL LOW COMPLEX 30 MIN: CPT

## 2019-12-24 PROCEDURE — 97161 PT EVAL LOW COMPLEX 20 MIN: CPT

## 2019-12-24 PROCEDURE — 99222 PR INITIAL HOSPITAL CARE,LEVL II: ICD-10-PCS | Mod: GC,,, | Performed by: NEUROLOGICAL SURGERY

## 2019-12-24 PROCEDURE — 93010 ELECTROCARDIOGRAM REPORT: CPT | Mod: ,,, | Performed by: INTERNAL MEDICINE

## 2019-12-24 PROCEDURE — 63600175 PHARM REV CODE 636 W HCPCS: Performed by: NURSE PRACTITIONER

## 2019-12-24 PROCEDURE — 20000000 HC ICU ROOM

## 2019-12-24 PROCEDURE — 84597 ASSAY OF VITAMIN K: CPT

## 2019-12-24 PROCEDURE — 93010 EKG 12-LEAD: ICD-10-PCS | Mod: ,,, | Performed by: INTERNAL MEDICINE

## 2019-12-24 PROCEDURE — 93005 ELECTROCARDIOGRAM TRACING: CPT

## 2019-12-24 PROCEDURE — 85730 THROMBOPLASTIN TIME PARTIAL: CPT | Mod: 91

## 2019-12-24 PROCEDURE — 63600175 PHARM REV CODE 636 W HCPCS: Performed by: PSYCHIATRY & NEUROLOGY

## 2019-12-24 PROCEDURE — 92610 EVALUATE SWALLOWING FUNCTION: CPT

## 2019-12-24 RX ORDER — SODIUM,POTASSIUM PHOSPHATES 280-250MG
2 POWDER IN PACKET (EA) ORAL
Status: DISCONTINUED | OUTPATIENT
Start: 2019-12-24 | End: 2019-12-29

## 2019-12-24 RX ORDER — POTASSIUM CHLORIDE 20 MEQ/15ML
40 SOLUTION ORAL
Status: DISCONTINUED | OUTPATIENT
Start: 2019-12-24 | End: 2019-12-29

## 2019-12-24 RX ORDER — MAGNESIUM SULFATE HEPTAHYDRATE 40 MG/ML
2 INJECTION, SOLUTION INTRAVENOUS ONCE
Status: COMPLETED | OUTPATIENT
Start: 2019-12-24 | End: 2019-12-24

## 2019-12-24 RX ORDER — POTASSIUM CHLORIDE 20 MEQ/15ML
60 SOLUTION ORAL
Status: DISCONTINUED | OUTPATIENT
Start: 2019-12-24 | End: 2019-12-29

## 2019-12-24 RX ORDER — SODIUM CHLORIDE 9 MG/ML
INJECTION, SOLUTION INTRAVENOUS CONTINUOUS
Status: DISCONTINUED | OUTPATIENT
Start: 2019-12-24 | End: 2019-12-25

## 2019-12-24 RX ORDER — LANOLIN ALCOHOL/MO/W.PET/CERES
800 CREAM (GRAM) TOPICAL
Status: DISCONTINUED | OUTPATIENT
Start: 2019-12-24 | End: 2019-12-29

## 2019-12-24 RX ORDER — SODIUM CHLORIDE 0.9 % (FLUSH) 0.9 %
10 SYRINGE (ML) INJECTION
Status: DISCONTINUED | OUTPATIENT
Start: 2019-12-24 | End: 2019-12-30 | Stop reason: HOSPADM

## 2019-12-24 RX ORDER — LEVETIRACETAM 250 MG/1
250 TABLET ORAL 2 TIMES DAILY
Status: DISCONTINUED | OUTPATIENT
Start: 2019-12-24 | End: 2019-12-26

## 2019-12-24 RX ORDER — NICARDIPINE HYDROCHLORIDE 0.2 MG/ML
INJECTION INTRAVENOUS
Status: COMPLETED
Start: 2019-12-24 | End: 2019-12-24

## 2019-12-24 RX ORDER — CARVEDILOL 3.12 MG/1
3.12 TABLET ORAL 2 TIMES DAILY
Status: DISCONTINUED | OUTPATIENT
Start: 2019-12-24 | End: 2019-12-26

## 2019-12-24 RX ORDER — LABETALOL HCL 20 MG/4 ML
10 SYRINGE (ML) INTRAVENOUS EVERY 4 HOURS PRN
Status: DISCONTINUED | OUTPATIENT
Start: 2019-12-24 | End: 2019-12-30 | Stop reason: HOSPADM

## 2019-12-24 RX ORDER — ACETAMINOPHEN 325 MG/1
650 TABLET ORAL EVERY 6 HOURS PRN
Status: DISCONTINUED | OUTPATIENT
Start: 2019-12-24 | End: 2019-12-30 | Stop reason: HOSPADM

## 2019-12-24 RX ORDER — AMOXICILLIN 250 MG
1 CAPSULE ORAL DAILY
Status: DISCONTINUED | OUTPATIENT
Start: 2019-12-24 | End: 2019-12-30 | Stop reason: HOSPADM

## 2019-12-24 RX ORDER — CARBIDOPA AND LEVODOPA 25; 100 MG/1; MG/1
1 TABLET ORAL 4 TIMES DAILY
Status: DISCONTINUED | OUTPATIENT
Start: 2019-12-24 | End: 2019-12-30 | Stop reason: HOSPADM

## 2019-12-24 RX ORDER — ONDANSETRON 2 MG/ML
4 INJECTION INTRAMUSCULAR; INTRAVENOUS EVERY 8 HOURS PRN
Status: DISCONTINUED | OUTPATIENT
Start: 2019-12-24 | End: 2019-12-30 | Stop reason: HOSPADM

## 2019-12-24 RX ORDER — LOSARTAN POTASSIUM 50 MG/1
50 TABLET ORAL DAILY
Status: DISCONTINUED | OUTPATIENT
Start: 2019-12-24 | End: 2019-12-26

## 2019-12-24 RX ORDER — NICARDIPINE HYDROCHLORIDE 0.2 MG/ML
5 INJECTION INTRAVENOUS CONTINUOUS
Status: DISCONTINUED | OUTPATIENT
Start: 2019-12-24 | End: 2019-12-26

## 2019-12-24 RX ADMIN — CARBIDOPA AND LEVODOPA 1 TABLET: 25; 100 TABLET ORAL at 08:12

## 2019-12-24 RX ADMIN — SODIUM CHLORIDE: 0.9 INJECTION, SOLUTION INTRAVENOUS at 05:12

## 2019-12-24 RX ADMIN — CARBIDOPA AND LEVODOPA 1 TABLET: 25; 100 TABLET ORAL at 10:12

## 2019-12-24 RX ADMIN — CARVEDILOL 3.12 MG: 3.12 TABLET, FILM COATED ORAL at 08:12

## 2019-12-24 RX ADMIN — CARBIDOPA AND LEVODOPA 1 TABLET: 25; 100 TABLET ORAL at 05:12

## 2019-12-24 RX ADMIN — NICARDIPINE HYDROCHLORIDE 5 MG/HR: 0.2 INJECTION, SOLUTION INTRAVENOUS at 05:12

## 2019-12-24 RX ADMIN — LOSARTAN POTASSIUM 50 MG: 50 TABLET, FILM COATED ORAL at 10:12

## 2019-12-24 RX ADMIN — LEVETIRACETAM 250 MG: 250 TABLET ORAL at 08:12

## 2019-12-24 RX ADMIN — MAGNESIUM SULFATE IN WATER 2 G: 40 INJECTION, SOLUTION INTRAVENOUS at 11:12

## 2019-12-24 RX ADMIN — LEVETIRACETAM 250 MG: 250 TABLET ORAL at 10:12

## 2019-12-24 RX ADMIN — CARBIDOPA AND LEVODOPA 1 TABLET: 25; 100 TABLET ORAL at 01:12

## 2019-12-24 NOTE — ED TRIAGE NOTES
86 year old male with history of Parkinson's and Atrial Fibrillation on Coumadin presents to the ED for Neuro Surgical evaluation after sustaining a fall. No LOC. Hematoma to right scalp. PCC given at other hospital. Neuro intact.

## 2019-12-24 NOTE — ED PROVIDER NOTES
Source of History:  Patient, EMS    Chief complaint:  Transfer (ICH from [facility] for NS)      HPI:  Elvis Thompson is a 86 y.o. male presenting with an epidural and subdural hemorrhage from outside hospital.  According to notes the patient slipped and fell, hit his head.  No loss of consciousness.  He had an INR of 1.9 and was given K centra.  Did not require any other medications while at the other hospital.  Patient denies any complaint arrival here.    ROS: As per HPI and below:    General: No fever.  No chills.  Eyes: No visual changes.  Head: No headache.    Integument: No rashes or lesions.  Chest: No shortness of breath.  Cardiovascular: No chest pain.  Abdomen: No abdominal pain.  No nausea or vomiting.  Urinary: No abnormal urination.  Neurologic: No focal weakness.  No numbness.  Hematologic: No easy bruising.  Endocrine: No excessive thirst or urination.      Review of patient's allergies indicates:   Allergen Reactions    Iodine and iodide containing products Other (See Comments)    Codeine Other (See Comments)     Insomnia      Morphine Nausea And Vomiting       Current Facility-Administered Medications on File Prior to Encounter   Medication Dose Route Frequency Provider Last Rate Last Dose    [COMPLETED] human prothrombin complex (PCC) (KCENTRA) 500 unit (400-620 unit) injection 1,917.5 Units  25 Units/kg Intravenous ED 1 Time Rob Mayer MD   1,917.5 Units at 12/24/19 0219     Current Outpatient Medications on File Prior to Encounter   Medication Sig Dispense Refill    albuterol-ipratropium (DUO-NEB) 2.5 mg-0.5 mg/3 mL nebulizer solution Take 3 mLs by nebulization every 6 (six) hours as needed for Wheezing. Rescue      aspirin (ECOTRIN) 81 MG EC tablet Take 1 tablet (81 mg total) by mouth once daily.  0    carbidopa-levodopa  mg (SINEMET)  mg per tablet Take 1 tablet by mouth 4 (four) times daily. 360 tablet 3    carvedilol (COREG) 3.125 MG tablet Take 3.125 mg  by mouth 2 (two) times daily.      docusate sodium (COLACE) 100 MG capsule Take 100 mg by mouth once daily.      furosemide (LASIX) 40 MG tablet Take 40 mg by mouth daily as needed (or shortness of breath).       losartan (COZAAR) 50 MG tablet Take 50 mg by mouth once daily.      ondansetron (ZOFRAN) 8 MG tablet Take 8 mg by mouth every 6 (six) hours as needed for Nausea.      polyethylene glycol (MIRALAX) 17 gram PwPk Take 17 g by mouth daily as needed. Mix in 8 ounces of water      pseudoephedrine-dextromethorphan-guaifenesin (ROBITUSSIN-PE) 30- mg/5 mL solution Take 10 mLs by mouth every 6 (six) hours as needed for Cough.       tramadol-acetaminophen 37.5-325 mg (ULTRACET) 37.5-325 mg Tab Take 1 tablet by mouth every 4 (four) hours as needed for Pain.      warfarin (COUMADIN) 2 MG tablet Take 2 mg by mouth once daily.         PMH:  As per HPI and below:  Past Medical History:   Diagnosis Date    Anticoagulant long-term use     Bradycardia     CHF (congestive heart failure)     Chronic atrial fibrillation     Chronic combined systolic and diastolic CHF (congestive heart failure)     HHD (hypertensive heart disease)     HTN (hypertension)     LBBB (left bundle branch block)     Left bundle-branch block, unspecified     NSVT (nonsustained ventricular tachycardia)     Parkinson disease 03/24/2017    Parkinson's disease     Pneumonia     Pulmonary embolism     Shingles     Thyroid disease     nodules    Ventricular tachycardia      Past Surgical History:   Procedure Laterality Date    CARDIAC PACEMAKER PLACEMENT      cataract      FOOT SURGERY      HAND SURGERY      HERNIA REPAIR      INSERTION OF BIVENTRICULAR IMPLANTABLE CARDIOVERTER-DEFIBRILLATOR (ICD) N/A 1/23/2019    Procedure: INSERTION, ICD, BIVENTRICULAR, upgrade;  Surgeon: Luiz Soliman MD;  Location: [facility] CATH;  Service: Cardiology;  Laterality: N/A;    KNEE SURGERY      right knee replacement    LEFT HEART  CATHETERIZATION Left 1/23/2019    Procedure: Left heart cath;  Surgeon: Luiz Soliman MD;  Location: [facility] CATH;  Service: Cardiology;  Laterality: Left;    PROSTATE SURGERY         Social History     Socioeconomic History    Marital status:      Spouse name: Not on file    Number of children: Not on file    Years of education: Not on file    Highest education level: Not on file   Occupational History    Not on file   Social Needs    Financial resource strain: Not on file    Food insecurity:     Worry: Not on file     Inability: Not on file    Transportation needs:     Medical: Not on file     Non-medical: Not on file   Tobacco Use    Smoking status: Never Smoker    Smokeless tobacco: Never Used   Substance and Sexual Activity    Alcohol use: No    Drug use: Not on file    Sexual activity: Not Currently   Lifestyle    Physical activity:     Days per week: Not on file     Minutes per session: Not on file    Stress: Not on file   Relationships    Social connections:     Talks on phone: Not on file     Gets together: Not on file     Attends Voodoo service: Not on file     Active member of club or organization: Not on file     Attends meetings of clubs or organizations: Not on file     Relationship status: Not on file   Other Topics Concern    Not on file   Social History Narrative    Not on file       Family History   Problem Relation Age of Onset    Heart disease Mother        Physical Exam:    Vitals:    12/24/19 0500   BP: (!) 154/73   Pulse: 79   Resp: 20   Temp:      Appearance: No acute distress.  Skin: No rashes seen.  Good turgor.  No abrasions.  No ecchymoses.  Head:  Small abrasion to right forehead  Eyes: No conjunctival injection. EOMI, PERRL.  ENT: Oropharynx clear.    Chest: Clear to auscultation bilaterally.  Good air movement.  No wheezes.  No rhonchi.  Cardiovascular: Regular rate and rhythm.  No murmurs. No gallops. No rubs.  Abdomen: Soft.  Not distended.  Nontender.   No guarding.  No rebound. No Masses  Musculoskeletal: Good range of motion all joints.  No deformities.  Neck supple.  No meningismus.  Neurologic: Moves all extremities.  Normal sensation.  No facial droop.  Normal speech.    Mental Status:  Alert and oriented x 3.  Appropriate, conversant.          Laboratory Studies:  Labs Reviewed   PROTIME-INR   APTT   HEMOGLOBIN A1C   LIPID PANEL   TSH   URINALYSIS, REFLEX TO URINE CULTURE   TYPE & SCREEN       I decided to obtain the old medical records.  Reviewed and summarized the old medical record and it showed subdural hemorrhage, epidural hemorrhage diagnosed at outside hospital      Imaging Results    None         Medications Given:  Medications   carbidopa-levodopa  mg per tablet 1 tablet (has no administration in time range)   carvedilol tablet 3.125 mg (has no administration in time range)   sodium chloride 0.9% flush 10 mL (has no administration in time range)   labetalol 20 mg/4 mL (5 mg/mL) IV syring (has no administration in time range)   potassium chloride 10% oral solution 40 mEq (has no administration in time range)   potassium chloride 10% oral solution 40 mEq (has no administration in time range)   potassium chloride 10% oral solution 60 mEq (has no administration in time range)   magnesium oxide tablet 800 mg (has no administration in time range)   magnesium oxide tablet 800 mg (has no administration in time range)   potassium, sodium phosphates 280-160-250 mg packet 2 packet (has no administration in time range)   potassium, sodium phosphates 280-160-250 mg packet 2 packet (has no administration in time range)   potassium, sodium phosphates 280-160-250 mg packet 2 packet (has no administration in time range)   ondansetron injection 4 mg (has no administration in time range)   acetaminophen tablet 650 mg (has no administration in time range)   0.9%  NaCl infusion (has no administration in time range)   senna-docusate 8.6-50 mg per tablet 1 tablet  (has no administration in time range)   niCARdipine (CARDENE) 40 mg/200 mL infusion (  Return to Cabinet 12/24/19 5546)       Discussed with:  Neurosurgery, neuro critical care    MDM:    86 y.o. male with head bleed noted after fall.  Patient remains neurologically intact.  Repeat INR was drawn after K centra and down to 1.1.  Neurosurgery, neuro critical care consulted and down to see patient, recommended admission to ICU for further treatment and care.  Patient remained stable while in the ED.    Critical Care Time    Critical care time was provided for 35 minutes exclusive of other billable procedures and teaching time for the support of neurological organ system where the potential for death, shock, or further decline was possible. Critical care time can include documentation, discussion with consultants, developing a care plan, as well as direct patient care.      Diagnostic Impression:    1. Epidural hematoma    2. Diastolic heart failure               Sha Buenrostro MD  12/24/19 2972

## 2019-12-24 NOTE — PLAN OF CARE
CM met with patient in room for [place] Planning Assessment.  Per patient,  patient resides at [facility].  Patient stated that he has lived in that facility for about one yeat.   Per patient he was independent with feeding, but needed assistance with ADLS and used a wheelchair and rolling walker for ambulation.  Per the patient, he will return to [facility] upon discharge, but stated that he is not happy there.   Discharge Planning Booklet given to patient and discussed.  All questions addressed.       12/24/19 1048   Discharge Assessment   Assessment Type Discharge Planning Assessment   Confirmed/corrected address and phone number on facesheet? Yes   Assessment information obtained from? Patient   Expected Length of Stay (days) 5   Communicated expected length of stay with patient/caregiver yes   Prior to hospitilization cognitive status: Alert/Oriented   Prior to hospitalization functional status: Assistive Equipment;Needs Assistance   Current cognitive status: Alert/Oriented   Current Functional Status: Assistive Equipment;Needs Assistance   Facility Arrived From: [facility]   Lives With facility resident  ([facility])   Able to Return to Prior Arrangements yes   Is patient able to care for self after discharge? No   Who are your caregiver(s) and their phone number(s)? Erlinda Martínez (daughter) [phone]   Patient's perception of discharge disposition skilled nursing facility   Readmission Within the Last 30 Days no previous admission in last 30 days   Patient currently being followed by outpatient case management? No   Patient currently receives any other outside agency services? No   Equipment Currently Used at Home walker, rolling;wheelchair   Part D Coverage yes   Do you have any problems affording any of your prescribed medications? No   Is the patient taking medications as prescribed? yes   Does the patient have transportation home? Yes   Transportation Anticipated agency   Does  the patient receive services at the [facility]? No   Discharge Plan A Return to nursing home   Discharge Plan B Return to Nursing Home   DME Needed Upon Discharge  none   Patient/Family in Agreement with Plan yes                PCP:  Mitchell Nayak MD        Pharmacy:    [facility]/pharm[facility] #[id number] - [place] - [address]  [address]  [place] [address]  Phone: [phone] Fax: [phone]        Emergency Contacts:  Extended Emergency Contact Information  Primary Emergency Contact: Erlinda Martínez   [facility]  Home Phone: [phone]  Mobile Phone: [phone]  Relation: Daughter      Insurance:    Payor: MEDICARE / Plan: MEDICARE PART A & B / Product Type: Nichole /       Heydi Del Rio RN, CCRN-K, [facility]  Neuro-Critical Care   X [id number]    12/24/2019  11:03 AM

## 2019-12-24 NOTE — ASSESSMENT & PLAN NOTE
CTH with trace right frontal SDH, and right EDH after fall  NSGY following  Coumadin reveresed with PCC prior to transport, current INR 1.1. Coags otherwise WNL  Hold coumadin, discussed risk of ischemic stroke while coumadin held with family   Admit to NCC, Q1hr Neuro Checks, SBP<140  Keppra ppx  Repeat CTH pending to eval for increase in bleed  PT/OT/SLP

## 2019-12-24 NOTE — PROGRESS NOTES
Patient arrived to [facility] from [facility] via [facility]    Type of stroke/diagnosis:  Epidural and subdural hematoma    Current symptoms: none    Skin assessment done: Y  Wounds noted: skin tear right forearm, hematoma right forehead  ERNIE Armband Applied: Yes    NCC notified: Fletcher Samuel NP

## 2019-12-24 NOTE — PLAN OF CARE
Plan of Care:  Discharge Recommendation: NH.  Please continue Progressive Mobility Protocol as appropriate.  Appropriate transfer level with nursing staff: Bed <> Chair:  Stand Pivot with moderate assistance with Rolling Walker.    Nan Mcmanus PT, DPT  2019  Pager: [phone]    Physical Therapy Treatment Goals:  Multidisciplinary Problems       Physical Therapy Goals          Problem: Physical Therapy Goal    Goal Priority Disciplines Outcome Goal Variances Interventions   Physical Therapy Goal     PT, PT/OT Ongoing, Progressing     Description:  Goals to be met by: 2020    Patient will increase functional independence with mobility by performin. Supine <> sit with Moderate Assistance.  2. Sit <> stand transfer with Contact Guard Assistance using Rolling Walker.  3. Bed <> chair transfer via Squat Pivot with Minimal Assistance using Rolling Walker.  4. Gait  x 25 feet with Minimal Assistance using Rolling Walker to prepare for community ambulation and endurance activities.  5. Able to tolerate exercise for 15-20 reps with independence.

## 2019-12-24 NOTE — HPI
85 yo M, PMH Afib &  parkinson, presents as transfer for eval of SDH/EDH. He originally presented to OSH after falling at [facility], after losing his balance while walking. He denies LOC. Of note, he is on Coumadin for Afib, INR 1.9. He was given PCC prior to transfer, INR currently 1.1. CTH there showed trace right frontal SDH, with right EDH. [place] [facility] [place]. NCC to admit.

## 2019-12-24 NOTE — SUBJECTIVE & OBJECTIVE
Past Medical History:   Diagnosis Date    Anticoagulant long-term use     Bradycardia     CHF (congestive heart failure)     Chronic atrial fibrillation     Chronic combined systolic and diastolic CHF (congestive heart failure)     HHD (hypertensive heart disease)     HTN (hypertension)     LBBB (left bundle branch block)     Left bundle-branch block, unspecified     NSVT (nonsustained ventricular tachycardia)     Parkinson disease 03/24/2017    Parkinson's disease     Pneumonia     Pulmonary embolism     Shingles     Thyroid disease     nodules    Ventricular tachycardia      Past Surgical History:   Procedure Laterality Date    CARDIAC PACEMAKER PLACEMENT      cataract      FOOT SURGERY      HAND SURGERY      HERNIA REPAIR      INSERTION OF BIVENTRICULAR IMPLANTABLE CARDIOVERTER-DEFIBRILLATOR (ICD) N/A 1/23/2019    Procedure: INSERTION, ICD, BIVENTRICULAR, upgrade;  Surgeon: Luiz Soliman MD;  Location: [facility] CATH;  Service: Cardiology;  Laterality: N/A;    KNEE SURGERY      right knee replacement    LEFT HEART CATHETERIZATION Left 1/23/2019    Procedure: Left heart cath;  Surgeon: Luiz Soliman MD;  Location: [facility] CATH;  Service: Cardiology;  Laterality: Left;    PROSTATE SURGERY        Current Facility-Administered Medications on File Prior to Encounter   Medication Dose Route Frequency Provider Last Rate Last Dose    [COMPLETED] human prothrombin complex (PCC) (KCENTRA) 500 unit (400-620 unit) injection 1,917.5 Units  25 Units/kg Intravenous ED 1 Time Rob Mayer MD   1,917.5 Units at 12/24/19 0219     Current Outpatient Medications on File Prior to Encounter   Medication Sig Dispense Refill    albuterol-ipratropium (DUO-NEB) 2.5 mg-0.5 mg/3 mL nebulizer solution Take 3 mLs by nebulization every 6 (six) hours as needed for Wheezing. Rescue      aspirin (ECOTRIN) 81 MG EC tablet Take 1 tablet (81 mg total) by mouth once daily.  0    carbidopa-levodopa  mg  (SINEMET)  mg per tablet Take 1 tablet by mouth 4 (four) times daily. 360 tablet 3    carvedilol (COREG) 3.125 MG tablet Take 3.125 mg by mouth 2 (two) times daily.      docusate sodium (COLACE) 100 MG capsule Take 100 mg by mouth once daily.      furosemide (LASIX) 40 MG tablet Take 40 mg by mouth daily as needed (or shortness of breath).       losartan (COZAAR) 50 MG tablet Take 50 mg by mouth once daily.      ondansetron (ZOFRAN) 8 MG tablet Take 8 mg by mouth every 6 (six) hours as needed for Nausea.      polyethylene glycol (MIRALAX) 17 gram PwPk Take 17 g by mouth daily as needed. Mix in 8 ounces of water      pseudoephedrine-dextromethorphan-guaifenesin (ROBITUSSIN-PE) 30- mg/5 mL solution Take 10 mLs by mouth every 6 (six) hours as needed for Cough.       tramadol-acetaminophen 37.5-325 mg (ULTRACET) 37.5-325 mg Tab Take 1 tablet by mouth every 4 (four) hours as needed for Pain.      warfarin (COUMADIN) 2 MG tablet Take 2 mg by mouth once daily.        Allergies: Iodine and iodide containing products; Codeine; and Morphine    Family History   Problem Relation Age of Onset    Heart disease Mother      Social History     Tobacco Use    Smoking status: Never Smoker    Smokeless tobacco: Never Used   Substance Use Topics    Alcohol use: No    Drug use: Not on file     Review of Systems   Constitutional: Negative for fatigue.   HENT: Negative for ear pain.    Respiratory: Negative for apnea.    Cardiovascular: Negative for chest pain.   Gastrointestinal: Negative for nausea and vomiting.   Genitourinary: Negative for difficulty urinating.   Musculoskeletal: Positive for neck stiffness.   Skin: Negative for color change.   Neurological: Negative for dizziness.   Psychiatric/Behavioral: Negative for confusion.     Objective:     Vitals:    Temp: 98 °F (36.7 °C)  Pulse: 70  Rhythm: paced rhythm  BP: (!) 143/65  MAP (mmHg): 94  Resp: 20  SpO2: 96 %  O2 Device (Oxygen Therapy): room  air    Temp  Min: 98 °F (36.7 °C)  Max: 99.4 °F (37.4 °C)  Pulse  Min: 69  Max: 84  BP  Min: 143/65  Max: 180/82  MAP (mmHg)  Min: 94  Max: 114  Resp  Min: 17  Max: 20  SpO2  Min: 96 %  Max: 99 %    No intake/output data recorded.           Physical Exam   Cardiovascular: Normal rate and intact distal pulses.   irregular rhythm    Pulmonary/Chest: Effort normal and breath sounds normal.   Abdominal: Soft. Bowel sounds are normal.   Neurological:   E V M  AAO x  PERRLA,  EOMI  RUE  LUE  RLE  LLE     Skin: Skin is warm. Capillary refill takes less than 2 seconds.   Today I personally reviewed pertinent medications, lines/drains/airways, imaging, cardiology results, laboratory results, microbiology results,

## 2019-12-24 NOTE — H&P
[facility]  Neurocritical Care  History & Physical    Admit Date: 12/24/2019  Service Date: 12/24/2019  Length of Stay: 0    Subjective:     Chief Complaint: <principal problem not specified>    History of Present Illness:   87 yo M, PMH Afib &  parkinson, presents as transfer for eval of SDH/EDH. He originally presented to OSH after falling at [facility], after losing his balance while walking. He denies LOC. Of note, he is on Coumadin for Afib, INR 1.9. He was given PCC prior to transfer, INR currently 1.1. CTH there showed trace right frontal SDH, with right EDH. Transferred to [facility] for NSGY Eval. NCC to admit.       Past Medical History:   Diagnosis Date    Anticoagulant long-term use     Bradycardia     CHF (congestive heart failure)     Chronic atrial fibrillation     Chronic combined systolic and diastolic CHF (congestive heart failure)     HHD (hypertensive heart disease)     HTN (hypertension)     LBBB (left bundle branch block)     Left bundle-branch block, unspecified     NSVT (nonsustained ventricular tachycardia)     Parkinson disease 03/24/2017    Parkinson's disease     Pneumonia     Pulmonary embolism     Shingles     Thyroid disease     nodules    Ventricular tachycardia      Past Surgical History:   Procedure Laterality Date    CARDIAC PACEMAKER PLACEMENT      cataract      FOOT SURGERY      HAND SURGERY      HERNIA REPAIR      INSERTION OF BIVENTRICULAR IMPLANTABLE CARDIOVERTER-DEFIBRILLATOR (ICD) N/A 1/23/2019    Procedure: INSERTION, ICD, BIVENTRICULAR, upgrade;  Surgeon: Luiz Soliman MD;  Location: [facility] CATH;  Service: Cardiology;  Laterality: N/A;    KNEE SURGERY      right knee replacement    LEFT HEART CATHETERIZATION Left 1/23/2019    Procedure: Left heart cath;  Surgeon: Luiz Soliman MD;  Location: [facility] CATH;  Service: Cardiology;  Laterality: Left;    PROSTATE SURGERY        Current Facility-Administered Medications on File  Prior to Encounter   Medication Dose Route Frequency Provider Last Rate Last Dose    [COMPLETED] human prothrombin complex (PCC) (KCENTRA) 500 unit (400-620 unit) injection 1,917.5 Units  25 Units/kg Intravenous ED 1 Time Rob Mayer MD   1,917.5 Units at 12/24/19 0219     Current Outpatient Medications on File Prior to Encounter   Medication Sig Dispense Refill    albuterol-ipratropium (DUO-NEB) 2.5 mg-0.5 mg/3 mL nebulizer solution Take 3 mLs by nebulization every 6 (six) hours as needed for Wheezing. Rescue      aspirin (ECOTRIN) 81 MG EC tablet Take 1 tablet (81 mg total) by mouth once daily.  0    carbidopa-levodopa  mg (SINEMET)  mg per tablet Take 1 tablet by mouth 4 (four) times daily. 360 tablet 3    carvedilol (COREG) 3.125 MG tablet Take 3.125 mg by mouth 2 (two) times daily.      docusate sodium (COLACE) 100 MG capsule Take 100 mg by mouth once daily.      furosemide (LASIX) 40 MG tablet Take 40 mg by mouth daily as needed (or shortness of breath).       losartan (COZAAR) 50 MG tablet Take 50 mg by mouth once daily.      ondansetron (ZOFRAN) 8 MG tablet Take 8 mg by mouth every 6 (six) hours as needed for Nausea.      polyethylene glycol (MIRALAX) 17 gram PwPk Take 17 g by mouth daily as needed. Mix in 8 ounces of water      pseudoephedrine-dextromethorphan-guaifenesin (ROBITUSSIN-PE) 30- mg/5 mL solution Take 10 mLs by mouth every 6 (six) hours as needed for Cough.       tramadol-acetaminophen 37.5-325 mg (ULTRACET) 37.5-325 mg Tab Take 1 tablet by mouth every 4 (four) hours as needed for Pain.      warfarin (COUMADIN) 2 MG tablet Take 2 mg by mouth once daily.        Allergies: Iodine and iodide containing products; Codeine; and Morphine    Family History   Problem Relation Age of Onset    Heart disease Mother      Social History     Tobacco Use    Smoking status: Never Smoker    Smokeless tobacco: Never Used   Substance Use Topics    Alcohol use: No    Drug  use: Not on file     Review of Systems   Constitutional: Negative for fatigue.   Respiratory: Negative for apnea.    Cardiovascular: Negative for chest pain.   Gastrointestinal: Negative for nausea and vomiting.     Objective:     Vitals:    Temp: 98 °F (36.7 °C)  Pulse: 70  Rhythm: paced rhythm  BP: (!) 143/65  MAP (mmHg): 94  Resp: 20  SpO2: 96 %  O2 Device (Oxygen Therapy): room air    Temp  Min: 98 °F (36.7 °C)  Max: 99.4 °F (37.4 °C)  Pulse  Min: 69  Max: 84  BP  Min: 143/65  Max: 180/82  MAP (mmHg)  Min: 94  Max: 114  Resp  Min: 17  Max: 20  SpO2  Min: 96 %  Max: 99 %    No intake/output data recorded.           Physical Exam   Cardiovascular: Normal rate and intact distal pulses.   Pulmonary/Chest: Effort normal and breath sounds normal.   Abdominal: Soft. Bowel sounds are normal.   Neurological:   E V M  AAO x  PERRLA,  EOMI  RUE  LUE  RLE  LLE     Skin: Skin is warm. Capillary refill takes less than 2 seconds.   Today I personally reviewed pertinent medications, lines/drains/airways, imaging, cardiology results, laboratory results, microbiology results,         Assessment/Plan:     Neuro  SDH (subdural hematoma)  Trace right frontal SDH  NSGY following,   See EDH    Traumatic epidural hematoma without loss of consciousness  CTH with trace right frontal SDH, and right EDH after fall  NSGY following  Coumadin reveresed with PCC prior to transport, current INR 1.1. Coags otherwise WNL  Hold coumadin, discussed risk of ischemic stroke while coumadin held with family   Admit to NCC, Q1hr Neuro Checks, SBP<140  Keppra ppx  Repeat CTH pending to eval for increase in bleed  PT/OT/SLP      Parkinson disease  levadopa cont,     Cardiac/Vascular  ICD (implantable cardioverter-defibrillator) in place  ICD/PPM  Will need interrogation given recent fall,     Chronic diastolic congestive heart failure  Echo 2018 with diastolic dysfunction, EF 25%  Will repeat echo given fall earlier      Hypertension  SBP <140  EKG/Echo  pending  PRN labetalol, Cardene  Home coreg continued    Atrial fibrillation  Irregular HR, PPM    Hematology  Chronic anticoagulation  On coumadin for Atrial Fib, INR 1.9 at OSH, given PCC before transport   INR currently 1.1  Will hold coumadin at this time, given acute head bleed.     GI  Dysphagia  [facility] soft diet at NH  Will get SLP eval     Other  Debility  Fall after losing balance, denies LOC  PT/OT        The patient is being Prophylaxed for:  Venous Thromboembolism with: Mechanical  Stress Ulcer with: None  Ventilator Pneumonia with: not applicable    Activity Orders          None        Prior   Critical care: 40 min    Fletcher Samuel NP  Neurocritical Care  [facility]

## 2019-12-24 NOTE — PLAN OF CARE
Eval and POC set 12/24/19    Problem: Occupational Therapy Goal  Goal: Occupational Therapy Goal  Description  Goals to be met by: 7 days (12/31/19)     Patient will increase functional independence with ADLs by performing:    Feeding with Stand-by Assistance.  UE Dressing with Contact Guard Assistance.  Supine to sit with Minimal Assistance.  Toilet transfer to toilet with Minimal Assistance.     Outcome: Ongoing, Progressing

## 2019-12-24 NOTE — PLAN OF CARE
Nutrition assessment completed. Please see RD note for details.    Recommendations  1. If able to advance diet, recommend Regular with texture per SLP recommendations.     2. If unable to advance diet and enteral access gained, recommend starting TF.   Isosouce 1.5 @ goal rate 50mL/hr.   - Initiate @ 10mL/hr and increase by 10mL q4hrs, or as tolerated, until goal rate is reached.   - Provides 1800kcals, 82g protein and 917mL free water.     RD to monitor.    Goals: Pt to receive nutrition by RD follow up  Nutrition Goal Status: new  Communication of RD Recs: reviewed with RN  Problem: Oral Intake Inadequate  Goal: Improved Oral Intake  Outcome: Ongoing, Progressing

## 2019-12-24 NOTE — ED NOTES
Patient transported to [facility] access hospital on monitor by RN. NaCl and Cardene infusing. Family sent to NICU lobby with patient's personal belongings.

## 2019-12-24 NOTE — HOSPITAL COURSE
12/24: Arrived [facility], CTH with EDH/SDH  12/25: NAEON. Tolerating PO medications. EEG and CXR  12/26: Hypotensive overnight, responded to a fluid bolus. EEG showed mild slowing, no seizure activity.   12/27: NAEON. Remains NPO, speech following. Stepdown today.

## 2019-12-24 NOTE — HPI
86 M with an EDH/SDH. PMH of Parkinson's, A Fib on coumadin, he was going to the bathroom when he tripped and fell, hitting his head.He denies LOC. He denies numbness, weakness, blurred vision, neck pain. He received K Centra in the emergency department.

## 2019-12-24 NOTE — PT/OT/SLP EVAL
Speech Language Pathology Evaluation  Cognitive/Bedside Swallow    Patient Name:  Elvis Thompson   MRN:  [id number]   [id number]    Admitting Diagnosis: <principal problem not specified>    Recommendations:                  General Recommendations:  Dysphagia therapy  Diet recommendations:  Puree, Nectar Thick; crushed po medications  Aspiration Precautions:   · 1 SMALL bite/sip at a time,   · Alternating bites/sips,   · Assistance with meals and Assistance with thickening liquids,   · Feed only when awake/alert,   · Frequent oral care,   · No straw  · HOB to 90 degrees  · Continue to monitor for signs and symptoms of aspiration and discontinue oral feeding should you notice any of the following: watery eyes, reddened facial area, wet vocal quality, increased work of breathing, change in respiratory status, increased congestion, coughing, fever, etc.  General Precautions: Standard, aspiration, fall, nectar thick, pureed diet  Communication strategies:  provide increased time to answer    History:     Past Medical History:   Diagnosis Date    Anticoagulant long-term use     Bradycardia     CHF (congestive heart failure)     Chronic atrial fibrillation     Chronic combined systolic and diastolic CHF (congestive heart failure)     HHD (hypertensive heart disease)     HTN (hypertension)     LBBB (left bundle branch block)     Left bundle-branch block, unspecified     NSVT (nonsustained ventricular tachycardia)     Parkinson disease 03/24/2017    Parkinson's disease     Pneumonia     Pulmonary embolism     Shingles     Thyroid disease     nodules    Ventricular tachycardia        Past Surgical History:   Procedure Laterality Date    CARDIAC PACEMAKER PLACEMENT      cataract      FOOT SURGERY      HAND SURGERY      HERNIA REPAIR      INSERTION OF BIVENTRICULAR IMPLANTABLE CARDIOVERTER-DEFIBRILLATOR (ICD) N/A 1/23/2019    Procedure: INSERTION, ICD, BIVENTRICULAR, upgrade;  Surgeon: Luiz Soliman,  "MD;  Location: [facility] CATH;  Service: Cardiology;  Laterality: N/A;    KNEE SURGERY      right knee replacement    LEFT HEART CATHETERIZATION Left 1/23/2019    Procedure: Left heart cath;  Surgeon: Luiz Soliman MD;  Location: [facility] CATH;  Service: Cardiology;  Laterality: Left;    PROSTATE SURGERY         Social History: Patient [facility] in [facility]    Modified Barium Swallow 5/9/19: The patient presented with moderate oropharyngeal dysphagia at this time characterized by increased oral transit time on the chopped meat due to decreased bolus formation and control.  He had a delayed initiation of the pharyngeal swallow characterized by pooling into the vallecula on pudding and honey thick liquids.  He had mild residue in the vallecula after the swallow due to decreased base of tongue posterior movement.  On thin liquids, he had inconsistent penetration due to decreased laryngeal lift and anterior excursion.  Tracheal aspiration was not noted during this study.   The patient and his daughter were educated on the findings of this assessment.  They indicated understanding and educational handouts were given for the nursing home and family.    It was thought if the patient would be supervised, take small bites, and keep his head in an upright position, that he would possibly be safe for thin liquids.  At this time he is not supervised.  Due to these concerns, his safest liquid is nectar thick liquids.    Chest X-Rays: none for current admission    Prior diet: patient with inconsistent reports      Subjective     Patient awake, cooperative, and reporting feeling tired.    Objective:     Bqqazc-Oedccane-Orpwvhbgr Evaluation initiated. Patient reporting feeling at baseline for speech, language and cognition. ST to confirm with family. Eval limited 2/2 patient falling asleep. He reports minimal sleep. He states, "When I got brought to the ICU, everyone wanted to see me at the same time. I didn't get a lot of " "sleep."    COGNITIVE STATUS:  Behavioral Observations: alert, appropriate and cooperative-  Memory:  · Immediate: WFL  · Short Term: impaired  · Long Term: impaired; patient reports impairment at baseline  Orientation: WFL; oriented x4  Attention: WFL.  Problem Solving: WFL  Insight Awareness: WFL  Sequencing:   · Functional Events: WFL  · Mental Manipulation: tba as appropriate  Simple Money/Time Management: tba as appropriate    LANGUAGE:   Receptive Language:  Simple y/n Questions: wfl  Complex y/n Questions: wfl  Identification: wfl  1 Step Directions: wfl  2 Step Directions: impaired  Complex Directions: impaired    Expressive Language:   Naming:   · Divergent: tba as appropriate  · Convergent: wfl  · Confrontational: wfl  Automatic Speech: wfl  Sentence Completion: wfl  Responsive Speech: wfl  Repetition: wfl    Motor Speech: wnl    Voice: low volume c/w parkinson's dx    Augmentative Alternative Communication: no    Oral Musculature Evaluation  · Oral Musculature: general weakness  · Dentition: present and adequate  · Oral Labial Strength and Mobility: left weakness  · Lingual Strength and Mobility: WFL  · Buccal Strength and Mobility: impaired, decreased tone  · Volitional Cough: elicited  · Volitional Swallow: delayed  · Voice Prior to PO Intake: clear    Bedside Swallow Eval:     SLP communicated with RN prior to entry who reports immediate coughing/choking with thin liquids this AM and delayed coughing after meds crushed in puree. Patient reports nectar thick liquids at baseline Nectar thick liquids per MBSS completed in May 2019.     Consistencies Assessed:  · Nectar thick liquids sips of nectar thick water via cup x4 ounces  · Puree bites of apple sauce x whole container  · Soft solids 1 bite of peach     Oral Phase:   With initial cup sip of nectar thick water only  · Anterior loss  With peach trial only:  · Excess chewing  · Prolonged mastication  · Oral residue  · Slow oral transit time    Pharyngeal " Phase:   · wet vocal quality after swallow-inconsistent and cleared with volitional swallow    Treatment: SLP provided education on SLP recommendations, SLP role, s/s and risks of aspiration, safe swallow precautions, and SLP POC., Patient verbalized understanding of all discussed and is in agreement with POC.     Assessment:     Elvis Thompson is a 86 y.o. male with an SLP diagnosis of Dysphagia. Recommend: Pureed diet, nectar thick liquids, crushed po medications, NO STRAWS, small bites and sips, aspiration precautions. ST will continue to follow.     Goals:   Multidisciplinary Problems     SLP Goals        Problem: SLP Goal    Goal Priority Disciplines Outcome   SLP Goal     SLP Ongoing, Progressing   Description:  Speech Therapy Short Term Goals  Goal expected to be met by 1/6  1. Pt will tolerate puree diet and NECTAR thick liquids with no overt s/s of aspiration noted.   2. Pt will participate in an ongoing assessment to determine the least restrictive and safest diet with possible updated goals to follow pending results.  3. SLP will discuss baseline vs. current presentation to determine further acute ST needs for speech, language, and cognition.                     Plan:     · Patient to be seen:  4 x/week   · Plan of Care expires:     · Plan of Care reviewed with:  patient   · SLP Follow-Up:  Yes       Discharge recommendations:  Discharge Facility/Level of Care Needs: (tbd)   Barriers to Discharge:  None    Time Tracking:     SLP Treatment Date:   12/24/19  Speech Start Time:  1053  Speech Stop Time:  1123     Speech Total Time (min):  30 min    Billable Minutes: Eval 15  and Eval Swallow and Oral Function 15    TERRY Zavala, CCC-SLP  12/24/2019

## 2019-12-24 NOTE — ASSESSMENT & PLAN NOTE
On coumadin for Atrial Fib, INR 1.9 at OSH, given PCC before transport   INR currently 1.1  Will hold coumadin at this time, given acute head bleed.

## 2019-12-24 NOTE — CONSULTS
Inpatient consult to Physical Medicine Rehab  Consult performed by: ASHLEE Frost  Consult ordered by: Fletcher Samuel NP  Reason for consult: assess rehab needs      Consult received.  Reviewed patient history and current admission.  Rehab team following.  Full consult to follow.    STEVEN Frost, ASLHEE-C  Physical Medicine & Rehabilitation   12/24/2019  Spectralink: [phone]

## 2019-12-24 NOTE — ASSESSMENT & PLAN NOTE
86 M w/ PMH of Parkinson's, A Fib on Coumadin, who presents with a R frontal extraaxial hemorrhage now s/p K Centra:    --Recommend admission to Neuro-ICU on telemetry      -q1h neurochecks in ICU  --All labs and diagnostics reviewed  --Repeat CTH 4 hours after the first  --Coumadin reversed with K Centra, repeat INR 1.1. Recommend Vitamin K  --SBP <140  --Na >135  --Recommend keppra 500 BID x7 days  --HOB >30  --Follow-up full pre-op labs (CBC/CMP/PT-INR/PTT/T&S)  --NPO  --Continue to monitor clinically, notify NSGY immediately with any changes in neuro status    Plan already communicated to the emergency department over the phone.    Dispo: ICU

## 2019-12-24 NOTE — PLAN OF CARE
Problem: SLP Goal  Goal: SLP Goal  Description  Speech Therapy Short Term Goals  Goal expected to be met by 1/6  1. Pt will tolerate puree diet and NECTAR thick liquids with no overt s/s of aspiration noted.   2. Pt will participate in an ongoing assessment to determine the least restrictive and safest diet with possible updated goals to follow pending results.  3. SLP will discuss baseline vs. current presentation to determine further acute ST needs for speech, language, and cognition.    Outcome: Ongoing, Progressing  Bedside swallow study completed and Qwzqan-Zdiasxaw-Opjrzqvin Evaluation initiated. Recommend: Pureed diet, nectar thick liquids, crushed po medications, NO STRAWS, small bites and sips, aspiration precautions. ST will continue to follow.   MILA Pereira., CCC-SLP  Pager: [phone]  12/24/2019

## 2019-12-24 NOTE — PLAN OF CARE
Traumatic sdh  Neuro exam stable  Goal sbp 100 -160  inr reversed and given vit k  Continue sinemet  Restart ARB  keppra ppx x7 days  Wean cardene

## 2019-12-24 NOTE — SUBJECTIVE & OBJECTIVE
(Not in a hospital admission)    Review of patient's allergies indicates:   Allergen Reactions    Iodine and iodide containing products Other (See Comments)    Codeine Other (See Comments)     Insomnia      Morphine Nausea And Vomiting       Past Medical History:   Diagnosis Date    Anticoagulant long-term use     Bradycardia     CHF (congestive heart failure)     Chronic atrial fibrillation     Chronic combined systolic and diastolic CHF (congestive heart failure)     HHD (hypertensive heart disease)     HTN (hypertension)     LBBB (left bundle branch block)     Left bundle-branch block, unspecified     NSVT (nonsustained ventricular tachycardia)     Parkinson disease 03/24/2017    Parkinson's disease     Pneumonia     Pulmonary embolism     Shingles     Thyroid disease     nodules    Ventricular tachycardia      Past Surgical History:   Procedure Laterality Date    CARDIAC PACEMAKER PLACEMENT      cataract      FOOT SURGERY      HAND SURGERY      HERNIA REPAIR      INSERTION OF BIVENTRICULAR IMPLANTABLE CARDIOVERTER-DEFIBRILLATOR (ICD) N/A 1/23/2019    Procedure: INSERTION, ICD, BIVENTRICULAR, upgrade;  Surgeon: Luiz Soliman MD;  Location: [facility] CATH;  Service: Cardiology;  Laterality: N/A;    KNEE SURGERY      right knee replacement    LEFT HEART CATHETERIZATION Left 1/23/2019    Procedure: Left heart cath;  Surgeon: Luiz Soliman MD;  Location: [facility] CATH;  Service: Cardiology;  Laterality: Left;    PROSTATE SURGERY       Family History     Problem Relation (Age of Onset)    Heart disease Mother        Tobacco Use    Smoking status: Never Smoker    Smokeless tobacco: Never Used   Substance and Sexual Activity    Alcohol use: No    Drug use: Not on file    Sexual activity: Not Currently     Review of Systems   Constitutional: Negative for chills and fever.   HENT: Negative for congestion and dental problem.    Eyes: Negative for discharge and itching.   Respiratory:  Negative for chest tightness and shortness of breath.    Cardiovascular: Negative for chest pain and leg swelling.   Gastrointestinal: Negative for nausea and vomiting.   Endocrine: Negative for cold intolerance and heat intolerance.   Musculoskeletal: Negative for back pain and joint swelling.   Neurological: Positive for headaches. Negative for dizziness.     Objective:     Weight: 77.1 kg (170 lb)  Body mass index is 33.2 kg/m².  Vital Signs (Most Recent):  Temp: 98 °F (36.7 °C) (12/24/19 0334)  Pulse: 69 (12/24/19 0334)  Resp: 17 (12/24/19 0334)  BP: (!) 180/82(Cardene Started) (12/24/19 0334)  SpO2: 98 % (12/24/19 0334) Vital Signs (24h Range):  Temp:  [98 °F (36.7 °C)-99.4 °F (37.4 °C)] 98 °F (36.7 °C)  Pulse:  [69-84] 69  Resp:  [17-20] 17  SpO2:  [98 %-99 %] 98 %  BP: (161-180)/(79-85) 180/82                          Neurosurgery Physical Exam   GCS [id number] AOx2  CN2-12 intact  FCx4 full strength  SILT  No pronator drift    Significant Labs:  Recent Labs   Lab 12/24/19 0131   *      K 4.4      CO2 25   BUN 20   CREATININE 0.70*   CALCIUM 9.6     Recent Labs   Lab 12/24/19 0131   WBC 8.10   HGB 13.0*   HCT 39.7*        Recent Labs   Lab 12/23/19  0608 12/24/19 0131   LABPT 21.3* 21.5*   INR 1.9* 1.9*   APTT  --  34.7     Microbiology Results (last 7 days)     ** No results found for the last 168 hours. **        All pertinent labs from the last 24 hours have been reviewed.    Significant Diagnostics:  I have reviewed all pertinent imaging results/findings within the past 24 hours.

## 2019-12-24 NOTE — PT/OT/SLP EVAL
Physical Therapy Evaluation    Patient Name:  Elvis Thompson   MRN:  [id number]  Admit Date: 12/24/2019  Admitting Diagnosis:  SDH   Length of Stay: 0 days  Recent Surgery: * No surgery found *      Recommendations:     Discharge Recommendations:  nursing facility, basic(return to [facility])   Discharge Equipment Recommendations: none   Barriers to discharge: Decreased caregiver support    Assessment:     Elvis Thompson is a 86 y.o. male admitted with a medical diagnosis of SDH.  He presents with the following impairments/functional limitations: decreased functional mobility and balance. Pt with noted intention tremor, due to Parkinson's. Pt presents with mild confusion as well. Elvis Thompson's deficits effect their ability to safely and independently participate in self-care tasks and functional mobility which increases their caregiver burden. Due to his physical therapy diagnosis of debility and deconditioning, they continue to benefit from acute PT services to address the following limitations: high fall risk, weakness, instability, and the need for supervised instruction of exercise. These deficits effect their roles and responsibilities in which they were able to complete prior to admit. Pt would benefit from an intensive and collaborative PT/OT/SLP therapy program to improve quality of life and focus on recovery of impairments.     Problem List: weakness, impaired self care skills, impaired endurance, impaired functional mobilty, impaired cardiopulmonary response to activity, impaired balance  Rehab Prognosis: Fair; patient would benefit from acute skilled PT services to address these deficits and reach maximum level of function.      Plan:     During this hospitalization, patient to be seen 3 x/week to address the identified rehab impairments via gait training, therapeutic activities, therapeutic exercises, neuromuscular re-education and progress towards the established goals.    · Plan of Care  "Expires:  01/23/20    Subjective   Communicated with RN prior to session.  Patient found sitting in cardiac chair upon PT entry to room, agreeable to evaluation. Elvis Thompson's alone present during session.    Chief Complaint: fatigue  Patient/Family Comments/goals: "I have to go back to [facility] after 4. I came home to sleep."  Pain/Comfort:  · Pain Rating 1: 0/10  · Pain Rating Post-Intervention 1: 0/10    Living Environment:  No family present, per chart review pt is a NH resident primarily using w/c for mobility.    Objective:   Patient found with: blood pressure cuff, pulse ox (continuous), telemetry, Condom Catheter, peripheral IV     General Precautions: Standard, Cardiac aspiration, fall   Orthopedic Precautions:N/A   Braces: N/A   Oxygen Device: Room Air  Vitals: /62   Pulse 69   Temp 98.7 °F (37.1 °C) (Oral)   Resp (!) 21   Ht 5' 10" (1.778 m)   Wt 77.1 kg (170 lb)   SpO2 95%   BMI 24.39 kg/m²     Exams:  · Cognition: Pt is Alert and Cooperative during session. Patient is AxOx1 and follows all single-step verbal commands with repeated cues.   · Skin Integrity: Bruising of BUE  · Edema: None noted  · Sensation: Intact  · Hearing: Intact  · Vision:  Intact  · Postural Assessment: slouched posture and rounded shoulders  · Range of Motion:  · RUE: WFL  · LUE: WFL  · RLE: WFL, limited knee extension  · LLE: WFL, limited knee extension  · Strength Exam:  · Bilateral Lower Extremity Strength: grossly 3/5    Outcome Measures:  AM-PAC 6 CLICK MOBILITY  Turning over in bed (including adjusting bedclothes, sheets and blankets)?: 3  Sitting down on and standing up from a chair with arms (e.g., wheelchair, bedside commode, etc.): 3  Moving from lying on back to sitting on the side of the bed?: 3  Moving to and from a bed to a chair (including a wheelchair)?: 3  Need to walk in hospital room?: 2  Climbing 3-5 steps with a railing?: 1  Basic Mobility Total Score: 15     Functional Mobility:  Additional " staff present: OT  Bed Mobility:  · Pt found/returned to cardiac chair    Transfers:   · Sit <> Stand Transfer: minimum assistance with rolling walker   · Stand <> Sit Transfer: minimum assistance with rolling walker   · [id number] from cardiac chair  · Elevated surface, pt required min A to scoot to edge of chair and back into chair due to height      Gait:   · Patient ambulated: 10ft   · Patient required: contact guard  · Patient used:  rolling walker   · Gait Pattern observed: 3-point gait  · Gait Deviation(s): unsteady gait, flexed posture and decreased petra  · Impairments due to: impaired balance, decreased strength and decreased endurance  · Comments: pt fatiguing quickly, kyphotic posture    Therapeutic Activities & Exercises:     Education:  Patient provided with daily orientation and goals of this PT session. Patient agreed to participate in session. They were educated to transfer to bedside chair/bedside commode/bathroom with RN/PCT present. Encouraged patient to perform daily exercises & mobility to increase endurance and decrease effects of bedrest. Time provided for therapeutic counseling and discussion of current health disposition. All questions answered to patient's satisfaction, within scope of PT practice; voiced no other concerns. White board updated in patient's room, RN notified of session.    Patient left up in chair, with head in midline, neutral pelvis & heels floated for skin protection with all lines intact, call button in reach and RN notified.    GOALS:   Multidisciplinary Problems     Physical Therapy Goals        Problem: Physical Therapy Goal    Goal Priority Disciplines Outcome Goal Variances Interventions   Physical Therapy Goal     PT, PT/OT Ongoing, Progressing     Description:  Goals to be met by: 2020    Patient will increase functional independence with mobility by performin. Supine <> sit with Moderate Assistance.  2. Sit <> stand transfer with Contact Guard  Assistance using Rolling Walker.  3. Bed <> chair transfer via Squat Pivot with Minimal Assistance using Rolling Walker.  4. Gait  x 25 feet with Minimal Assistance using Rolling Walker to prepare for community ambulation and endurance activities.  5. Able to tolerate exercise for 15-20 reps with independence.                        History:     Past Medical History:   Diagnosis Date    Anticoagulant long-term use     Bradycardia     CHF (congestive heart failure)     Chronic atrial fibrillation     Chronic combined systolic and diastolic CHF (congestive heart failure)     HHD (hypertensive heart disease)     HTN (hypertension)     LBBB (left bundle branch block)     Left bundle-branch block, unspecified     NSVT (nonsustained ventricular tachycardia)     Parkinson disease 03/24/2017    Parkinson's disease     Pneumonia     Pulmonary embolism     Shingles     Thyroid disease     nodules    Ventricular tachycardia        Past Surgical History:   Procedure Laterality Date    CARDIAC PACEMAKER PLACEMENT      cataract      FOOT SURGERY      HAND SURGERY      HERNIA REPAIR      INSERTION OF BIVENTRICULAR IMPLANTABLE CARDIOVERTER-DEFIBRILLATOR (ICD) N/A 1/23/2019    Procedure: INSERTION, ICD, BIVENTRICULAR, upgrade;  Surgeon: Luiz Soliman MD;  Location: [facility] CATH;  Service: Cardiology;  Laterality: N/A;    KNEE SURGERY      right knee replacement    LEFT HEART CATHETERIZATION Left 1/23/2019    Procedure: Left heart cath;  Surgeon: Luiz Soliman MD;  Location: [facility] CATH;  Service: Cardiology;  Laterality: Left;    PROSTATE SURGERY         Time Tracking:     PT Received On: 12/24/19  PT Start Time: 1343     PT Stop Time: 1400  PT Total Time (min): 17 min   Co-eval with OT  Billable Minutes: Evaluation 17    Nan Mcmanus PT, DPT  12/24/2019  Pager: [phone]

## 2019-12-24 NOTE — PLAN OF CARE
12/24/19 1102   Post-Acute Status   Post-Acute Authorization Placement   Post-Acute Placement Status Referrals Sent  (resident at [facility])     Per CM, Pt is a resident at [facility]. SW sent referral via  for Pt to potentially return there once ready.    Lou Ward LCSW  Neurocritical Care   [facility]  [id number]

## 2019-12-24 NOTE — PT/OT/SLP EVAL
Occupational Therapy   Evaluation    Name: Elvis Thompson  MRN: [id number]  Admitting Diagnosis: SDH/EDH s/p fall    Recommendations:     Discharge Recommendations: nursing facility, basic (return to NH)  Discharge Equipment Recommendations:  (TBD)  Barriers to discharge:  None    Assessment:     Elvis Thompson is a 86 y.o. male with a medical diagnosis of SDH/EDH. He presents with performance deficits including weakness, impaired endurance, impaired self care skills, impaired functional mobilty, gait instability, impaired balance, decreased coordination, decreased safety awareness. Pt would continue to benefit from OT to increase functional independence and safety-- pt is near baseline functionally. Recommend return to NH with 24 hr care upon D/C.    Rehab Prognosis: Good; patient would benefit from acute skilled OT services to address these deficits and reach maximum level of function.       Plan:     Patient to be seen 3 x/week to address the above listed problems via self-care/home management, therapeutic activities, therapeutic exercises, neuromuscular re-education  · Plan of Care Expires: 01/24/20  · Plan of Care Reviewed with: patient    Subjective     Chief Complaint: Unable to sleep overnight  Patient/Family Comments/goals: None stated    Occupational Profile:  Living Environment: History obtained from chart as pt is an inaccurate historian; pt is a NH resident (for ~1 year).  Previous level of function: Uses RW for short distances and W/C for mobility; requires assist and supervision for ADLs  Equipment Used at Home:  walker, rolling, wheelchair  Assistance upon Discharge: NH staff    Pain/Comfort:  · Pain Rating 1: (Did not rate)  · Location - Side 1: Left  · Location 1: knee  · Pain Addressed 1: Reposition    Patients cultural, spiritual, Restorationist conflicts given the current situation: no    Objective:     Communicated with: RN prior to session. Patient found seated in medichair with Condom Catheter,  "telemetry, blood pressure cuff, pulse ox (continuous), peripheral IV upon OT entry to room.  Pt stated several times "I was at the hospital. I came back home because I didn't get any sleep overnight. I'm going back to [facility] at 4:00." Pt was re-oriented several times.    General Precautions: Standard, fall, aspiration   Orthopedic Precautions:N/A   Braces: N/A     Occupational Performance:    Bed Mobility:    · NT, pt sitting up in chair  · Scooting hips toward edge of gisell with Min A    Functional Mobility/Transfers:  · Patient completed Sit <> Stand Transfer with minimum assistance with rolling walker from medichair  · Functional Mobility: Few forward/backward steps with CGA-Min A using RW; forward flexed posture, shuffled gait-- pt with Parkinson's    Activities of Daily Living:  · Not assessed this date    Cognitive/Visual Perceptual:  Cognitive/Psychosocial Skills:     -       Oriented to: Person and Situation (states it's January and 2018 although aware it's Sarah); aware he fell and has a brain bleed  -       Follows Commands/attention: Follows one-step commands  -       Communication: clear/fluent  -       Memory: Impaired STM and Impaired LTM  -       Safety awareness/insight to disability: impaired   Visual/Perceptual:      -Intact      Physical Exam:  Balance:    -       good sitting, fair standing balance  Postural examination/scapula alignment:    -       Rounded shoulders  -       Forward head  Skin integrity: Thin  Sensation:    -       Intact  Upper Extremity Range of Motion:     -       Right Upper Extremity: WFL  -       Left Upper Extremity: WFL  Upper Extremity Strength:    -       Right Upper Extremity: WFL  -       Left Upper Extremity: WFL   Strength:    -       Right Upper Extremity: WFL  -       Left Upper Extremity: WFL  Fine Motor Coordination: impaired and pt with resting tremor at baseline    AMPAC 6 Click ADL:  AMPAC Total Score: 15    Treatment & Education:  OT eval; educated " on OT role and POC and will require ongoing education and reinforcement  Education:    Patient left seated in medichair with seat belt buckled with all lines intact, call button in reach and RN present    GOALS:   Multidisciplinary Problems     Occupational Therapy Goals        Problem: Occupational Therapy Goal    Goal Priority Disciplines Outcome Interventions   Occupational Therapy Goal     OT, PT/OT Ongoing, Progressing    Description:  Goals to be met by: 7 days (12/31/19)     Patient will increase functional independence with ADLs by performing:    Feeding with Stand-by Assistance.  UE Dressing with Contact Guard Assistance.  Supine to sit with Minimal Assistance.  Toilet transfer to toilet with Minimal Assistance.                      History:     Past Medical History:   Diagnosis Date    Anticoagulant long-term use     Bradycardia     CHF (congestive heart failure)     Chronic atrial fibrillation     Chronic combined systolic and diastolic CHF (congestive heart failure)     HHD (hypertensive heart disease)     HTN (hypertension)     LBBB (left bundle branch block)     Left bundle-branch block, unspecified     NSVT (nonsustained ventricular tachycardia)     Parkinson disease 03/24/2017    Parkinson's disease     Pneumonia     Pulmonary embolism     Shingles     Thyroid disease     nodules    Ventricular tachycardia        Past Surgical History:   Procedure Laterality Date    CARDIAC PACEMAKER PLACEMENT      cataract      FOOT SURGERY      HAND SURGERY      HERNIA REPAIR      INSERTION OF BIVENTRICULAR IMPLANTABLE CARDIOVERTER-DEFIBRILLATOR (ICD) N/A 1/23/2019    Procedure: INSERTION, ICD, BIVENTRICULAR, upgrade;  Surgeon: Luiz Soliman MD;  Location: [facility] CATH;  Service: Cardiology;  Laterality: N/A;    KNEE SURGERY      right knee replacement    LEFT HEART CATHETERIZATION Left 1/23/2019    Procedure: Left heart cath;  Surgeon: Luiz Soliman MD;  Location: [facility] CATH;  Service:  Cardiology;  Laterality: Left;    PROSTATE SURGERY         Time Tracking:     OT Date of Treatment: 12/24/19  OT Start Time: 1343  OT Stop Time: 1401  OT Total Time (min): 18 min    Billable Minutes:Evaluation 18 minutes    SONIA Love  12/24/2019

## 2019-12-24 NOTE — CONSULTS
[facility]  Neurosurgery  Consult Note    Consults  Subjective:     Chief Complaint/Reason for Admission: Extraaxial hemorrhage    History of Present Illness: 86 M with an EDH/SDH. PMH of Parkinson's, A Fib on coumadin, he was going to the bathroom when he tripped and fell, hitting his head.He denies LOC. He denies numbness, weakness, blurred vision, neck pain. He received K Centra in the emergency department.      (Not in a hospital admission)    Review of patient's allergies indicates:   Allergen Reactions    Iodine and iodide containing products Other (See Comments)    Codeine Other (See Comments)     Insomnia      Morphine Nausea And Vomiting       Past Medical History:   Diagnosis Date    Anticoagulant long-term use     Bradycardia     CHF (congestive heart failure)     Chronic atrial fibrillation     Chronic combined systolic and diastolic CHF (congestive heart failure)     HHD (hypertensive heart disease)     HTN (hypertension)     LBBB (left bundle branch block)     Left bundle-branch block, unspecified     NSVT (nonsustained ventricular tachycardia)     Parkinson disease 03/24/2017    Parkinson's disease     Pneumonia     Pulmonary embolism     Shingles     Thyroid disease     nodules    Ventricular tachycardia      Past Surgical History:   Procedure Laterality Date    CARDIAC PACEMAKER PLACEMENT      cataract      FOOT SURGERY      HAND SURGERY      HERNIA REPAIR      INSERTION OF BIVENTRICULAR IMPLANTABLE CARDIOVERTER-DEFIBRILLATOR (ICD) N/A 1/23/2019    Procedure: INSERTION, ICD, BIVENTRICULAR, upgrade;  Surgeon: Luiz Soliman MD;  Location: [facility] CATH;  Service: Cardiology;  Laterality: N/A;    KNEE SURGERY      right knee replacement    LEFT HEART CATHETERIZATION Left 1/23/2019    Procedure: Left heart cath;  Surgeon: Luiz Soliman MD;  Location: [facility] CATH;  Service: Cardiology;  Laterality: Left;    PROSTATE SURGERY       Family History      Problem Relation (Age of Onset)    Heart disease Mother        Tobacco Use    Smoking status: Never Smoker    Smokeless tobacco: Never Used   Substance and Sexual Activity    Alcohol use: No    Drug use: Not on file    Sexual activity: Not Currently     Review of Systems   Constitutional: Negative for chills and fever.   HENT: Negative for congestion and dental problem.    Eyes: Negative for discharge and itching.   Respiratory: Negative for chest tightness and shortness of breath.    Cardiovascular: Negative for chest pain and leg swelling.   Gastrointestinal: Negative for nausea and vomiting.   Endocrine: Negative for cold intolerance and heat intolerance.   Musculoskeletal: Negative for back pain and joint swelling.   Neurological: Positive for headaches. Negative for dizziness.     Objective:     Weight: 77.1 kg (170 lb)  Body mass index is 33.2 kg/m².  Vital Signs (Most Recent):  Temp: 98 °F (36.7 °C) (12/24/19 0334)  Pulse: 69 (12/24/19 0334)  Resp: 17 (12/24/19 0334)  BP: (!) 180/82(Cardene Started) (12/24/19 0334)  SpO2: 98 % (12/24/19 0334) Vital Signs (24h Range):  Temp:  [98 °F (36.7 °C)-99.4 °F (37.4 °C)] 98 °F (36.7 °C)  Pulse:  [69-84] 69  Resp:  [17-20] 17  SpO2:  [98 %-99 %] 98 %  BP: (161-180)/(79-85) 180/82                          Neurosurgery Physical Exam   GCS [id number] AOx2  CN2-12 intact  FCx4 full strength  SILT  No pronator drift    Significant Labs:  Recent Labs   Lab 12/24/19 0131   *      K 4.4      CO2 25   BUN 20   CREATININE 0.70*   CALCIUM 9.6     Recent Labs   Lab 12/24/19 0131   WBC 8.10   HGB 13.0*   HCT 39.7*        Recent Labs   Lab 12/23/19  0608 12/24/19 0131   LABPT 21.3* 21.5*   INR 1.9* 1.9*   APTT  --  34.7     Microbiology Results (last 7 days)     ** No results found for the last 168 hours. **        All pertinent labs from the last 24 hours have been reviewed.    Significant Diagnostics:  I have reviewed all pertinent imaging  results/findings within the past 24 hours.    Assessment/Plan:     Parkinson disease  86 M w/ PMH of Parkinson's, A Fib on Coumadin, who presents with a R frontal extraaxial hemorrhage now s/p K Centra:    --Recommend admission to Neuro-ICU on telemetry      -q1h neurochecks in ICU  --All labs and diagnostics reviewed  --Repeat CTH 4 hours after the first  --Coumadin reversed with K Centra, repeat INR 1.1. Recommend Vitamin K  --SBP <140  --Na >135  --Recommend keppra 500 BID x7 days  --HOB >30  --Follow-up full pre-op labs (CBC/CMP/PT-INR/PTT/T&S)  --NPO  --Continue to monitor clinically, notify NSGY immediately with any changes in neuro status    Plan already communicated to the emergency department over the phone.    Dispo: ICU        Thank you for your consult. I will follow-up with patient. Please contact us if you have any additional questions.    Go Sanches MD  Neurosurgery  [facility]

## 2019-12-25 LAB
ALBUMIN SERPL BCP-MCNC: 3.2 G/DL (ref 3.5–5.2)
ALP SERPL-CCNC: 129 U/L (ref 55–135)
ALT SERPL W/O P-5'-P-CCNC: <5 U/L (ref 10–44)
ANION GAP SERPL CALC-SCNC: 6 MMOL/L (ref 8–16)
AST SERPL-CCNC: 16 U/L (ref 10–40)
BACTERIA #/AREA URNS AUTO: ABNORMAL /HPF
BASOPHILS # BLD AUTO: 0.04 K/UL (ref 0–0.2)
BASOPHILS NFR BLD: 0.7 % (ref 0–1.9)
BILIRUB SERPL-MCNC: 1.7 MG/DL (ref 0.1–1)
BILIRUB UR QL STRIP: NEGATIVE
BUN SERPL-MCNC: 10 MG/DL (ref 8–23)
CALCIUM SERPL-MCNC: 8.6 MG/DL (ref 8.7–10.5)
CHLORIDE SERPL-SCNC: 105 MMOL/L (ref 95–110)
CLARITY UR REFRACT.AUTO: ABNORMAL
CO2 SERPL-SCNC: 22 MMOL/L (ref 23–29)
COLOR UR AUTO: YELLOW
CREAT SERPL-MCNC: 0.7 MG/DL (ref 0.5–1.4)
DIFFERENTIAL METHOD: ABNORMAL
EOSINOPHIL # BLD AUTO: 0.3 K/UL (ref 0–0.5)
EOSINOPHIL NFR BLD: 5.4 % (ref 0–8)
ERYTHROCYTE [DISTWIDTH] IN BLOOD BY AUTOMATED COUNT: 13 % (ref 11.5–14.5)
EST. GFR  (AFRICAN AMERICAN): >60 ML/MIN/1.73 M^2
EST. GFR  (NON AFRICAN AMERICAN): >60 ML/MIN/1.73 M^2
GLUCOSE SERPL-MCNC: 89 MG/DL (ref 70–110)
GLUCOSE UR QL STRIP: NEGATIVE
HCT VFR BLD AUTO: 36.1 % (ref 40–54)
HGB BLD-MCNC: 12 G/DL (ref 14–18)
HGB UR QL STRIP: ABNORMAL
IMM GRANULOCYTES # BLD AUTO: 0.01 K/UL (ref 0–0.04)
IMM GRANULOCYTES NFR BLD AUTO: 0.2 % (ref 0–0.5)
INR PPP: 1.5 (ref 0.8–1.2)
KETONES UR QL STRIP: ABNORMAL
LEUKOCYTE ESTERASE UR QL STRIP: NEGATIVE
LYMPHOCYTES # BLD AUTO: 1 K/UL (ref 1–4.8)
LYMPHOCYTES NFR BLD: 17.9 % (ref 18–48)
MAGNESIUM SERPL-MCNC: 2 MG/DL (ref 1.6–2.6)
MCH RBC QN AUTO: 30.8 PG (ref 27–31)
MCHC RBC AUTO-ENTMCNC: 33.2 G/DL (ref 32–36)
MCV RBC AUTO: 93 FL (ref 82–98)
MICROSCOPIC COMMENT: ABNORMAL
MONOCYTES # BLD AUTO: 0.6 K/UL (ref 0.3–1)
MONOCYTES NFR BLD: 11.3 % (ref 4–15)
NEUTROPHILS # BLD AUTO: 3.6 K/UL (ref 1.8–7.7)
NEUTROPHILS NFR BLD: 64.5 % (ref 38–73)
NITRITE UR QL STRIP: NEGATIVE
NRBC BLD-RTO: 0 /100 WBC
PH UR STRIP: 5 [PH] (ref 5–8)
PHOSPHATE SERPL-MCNC: 2.8 MG/DL (ref 2.7–4.5)
PLATELET # BLD AUTO: 158 K/UL (ref 150–350)
PMV BLD AUTO: 9.5 FL (ref 9.2–12.9)
POTASSIUM SERPL-SCNC: 4.2 MMOL/L (ref 3.5–5.1)
PROCALCITONIN SERPL IA-MCNC: 0.18 NG/ML
PROT SERPL-MCNC: 6.2 G/DL (ref 6–8.4)
PROT UR QL STRIP: NEGATIVE
PROTHROMBIN TIME: 14.8 SEC (ref 9–12.5)
RBC # BLD AUTO: 3.9 M/UL (ref 4.6–6.2)
RBC #/AREA URNS AUTO: 17 /HPF (ref 0–4)
SODIUM SERPL-SCNC: 133 MMOL/L (ref 136–145)
SODIUM SERPL-SCNC: 134 MMOL/L (ref 136–145)
SP GR UR STRIP: 1.02 (ref 1–1.03)
SQUAMOUS #/AREA URNS AUTO: 0 /HPF
URN SPEC COLLECT METH UR: ABNORMAL
WBC # BLD AUTO: 5.6 K/UL (ref 3.9–12.7)
WBC #/AREA URNS AUTO: 2 /HPF (ref 0–5)

## 2019-12-25 PROCEDURE — 85610 PROTHROMBIN TIME: CPT

## 2019-12-25 PROCEDURE — 99232 SBSQ HOSP IP/OBS MODERATE 35: CPT | Mod: GC,,, | Performed by: NEUROLOGICAL SURGERY

## 2019-12-25 PROCEDURE — 99233 PR SUBSEQUENT HOSPITAL CARE,LEVL III: ICD-10-PCS | Mod: GC,,, | Performed by: PSYCHIATRY & NEUROLOGY

## 2019-12-25 PROCEDURE — 20000000 HC ICU ROOM

## 2019-12-25 PROCEDURE — 99233 SBSQ HOSP IP/OBS HIGH 50: CPT | Mod: GC,,, | Performed by: PSYCHIATRY & NEUROLOGY

## 2019-12-25 PROCEDURE — 94640 AIRWAY INHALATION TREATMENT: CPT

## 2019-12-25 PROCEDURE — 27000221 HC OXYGEN, UP TO 24 HOURS

## 2019-12-25 PROCEDURE — 83735 ASSAY OF MAGNESIUM: CPT

## 2019-12-25 PROCEDURE — 25000003 PHARM REV CODE 250: Performed by: NURSE PRACTITIONER

## 2019-12-25 PROCEDURE — 87040 BLOOD CULTURE FOR BACTERIA: CPT | Mod: 59

## 2019-12-25 PROCEDURE — 80053 COMPREHEN METABOLIC PANEL: CPT

## 2019-12-25 PROCEDURE — 84145 PROCALCITONIN (PCT): CPT

## 2019-12-25 PROCEDURE — 25000003 PHARM REV CODE 250: Performed by: STUDENT IN AN ORGANIZED HEALTH CARE EDUCATION/TRAINING PROGRAM

## 2019-12-25 PROCEDURE — 85025 COMPLETE CBC W/AUTO DIFF WBC: CPT

## 2019-12-25 PROCEDURE — 99232 PR SUBSEQUENT HOSPITAL CARE,LEVL II: ICD-10-PCS | Mod: GC,,, | Performed by: NEUROLOGICAL SURGERY

## 2019-12-25 PROCEDURE — 81001 URINALYSIS AUTO W/SCOPE: CPT

## 2019-12-25 PROCEDURE — 25000242 PHARM REV CODE 250 ALT 637 W/ HCPCS: Performed by: PHYSICIAN ASSISTANT

## 2019-12-25 PROCEDURE — 84100 ASSAY OF PHOSPHORUS: CPT

## 2019-12-25 PROCEDURE — 36415 COLL VENOUS BLD VENIPUNCTURE: CPT

## 2019-12-25 PROCEDURE — 94761 N-INVAS EAR/PLS OXIMETRY MLT: CPT

## 2019-12-25 PROCEDURE — 84295 ASSAY OF SERUM SODIUM: CPT

## 2019-12-25 RX ORDER — IPRATROPIUM BROMIDE AND ALBUTEROL SULFATE 2.5; .5 MG/3ML; MG/3ML
3 SOLUTION RESPIRATORY (INHALATION) EVERY 4 HOURS
Status: DISCONTINUED | OUTPATIENT
Start: 2019-12-26 | End: 2019-12-29

## 2019-12-25 RX ORDER — PHENYLEPHRINE HCL IN 0.9% NACL 1 MG/10 ML
SYRINGE (ML) INTRAVENOUS
Status: DISPENSED
Start: 2019-12-25 | End: 2019-12-25

## 2019-12-25 RX ORDER — IPRATROPIUM BROMIDE AND ALBUTEROL SULFATE 2.5; .5 MG/3ML; MG/3ML
3 SOLUTION RESPIRATORY (INHALATION) EVERY 4 HOURS PRN
Status: DISCONTINUED | OUTPATIENT
Start: 2019-12-25 | End: 2019-12-30 | Stop reason: HOSPADM

## 2019-12-25 RX ADMIN — CARBIDOPA AND LEVODOPA 1 TABLET: 25; 100 TABLET ORAL at 08:12

## 2019-12-25 RX ADMIN — CARBIDOPA AND LEVODOPA 1 TABLET: 25; 100 TABLET ORAL at 05:12

## 2019-12-25 RX ADMIN — LEVETIRACETAM 250 MG: 250 TABLET ORAL at 08:12

## 2019-12-25 RX ADMIN — CARVEDILOL 3.12 MG: 3.12 TABLET, FILM COATED ORAL at 08:12

## 2019-12-25 RX ADMIN — LOSARTAN POTASSIUM 50 MG: 50 TABLET, FILM COATED ORAL at 08:12

## 2019-12-25 RX ADMIN — IPRATROPIUM BROMIDE AND ALBUTEROL SULFATE 3 ML: .5; 3 SOLUTION RESPIRATORY (INHALATION) at 11:12

## 2019-12-25 RX ADMIN — ACETAMINOPHEN 650 MG: 325 TABLET ORAL at 04:12

## 2019-12-25 RX ADMIN — SENNOSIDES AND DOCUSATE SODIUM 1 TABLET: 8.6; 5 TABLET ORAL at 08:12

## 2019-12-25 RX ADMIN — ACETAMINOPHEN 650 MG: 325 TABLET ORAL at 07:12

## 2019-12-25 RX ADMIN — CARBIDOPA AND LEVODOPA 1 TABLET: 25; 100 TABLET ORAL at 12:12

## 2019-12-25 RX ADMIN — IPRATROPIUM BROMIDE AND ALBUTEROL SULFATE 3 ML: .5; 3 SOLUTION RESPIRATORY (INHALATION) at 04:12

## 2019-12-25 NOTE — SUBJECTIVE & OBJECTIVE
Interval History:  NAEON. Lethargic but arousable on exam. Plan for spot EEG and CXR this am. Continue home meds and monitor in ICU.     Review of Systems   Unable to perform ROS: Mental status change   Constitutional: Negative for fatigue.   HENT: Negative for ear pain.    Respiratory: Negative for apnea.    Cardiovascular: Negative for chest pain.   Gastrointestinal: Negative for nausea and vomiting.   Genitourinary: Negative for difficulty urinating.   Musculoskeletal: Negative for neck pain and neck stiffness.   Skin: Negative for color change.   Neurological: Negative for dizziness.   Psychiatric/Behavioral: Negative for confusion.       Objective:     Vitals:  Temp: 98.1 °F (36.7 °C)  Pulse: 82  Rhythm: normal sinus rhythm, paced rhythm  BP: (!) 166/78  MAP (mmHg): 112  Resp: (!) 25  SpO2: (!) 94 %  O2 Device (Oxygen Therapy): room air    Temp  Min: 97.6 °F (36.4 °C)  Max: 100.3 °F (37.9 °C)  Pulse  Min: 69  Max: 82  BP  Min: 107/66  Max: 166/78  MAP (mmHg)  Min: 81  Max: 115  Resp  Min: 18  Max: 28  SpO2  Min: 94 %  Max: 97 %    12/24 0701 - 12/25 0700  In: 1491.7 [I.V.:1491.7]  Out: 500 [Urine:500]   Unmeasured Output  Urine Occurrence: 1  Stool Occurrence: 0  Pad Count: 2       Physical Exam   Constitutional: No distress.   HENT:   Head: Normocephalic and atraumatic.   Eyes: Pupils are equal, round, and reactive to light. EOM are normal.   Cardiovascular: Normal rate, normal heart sounds and intact distal pulses.   irregular rhythm    Pulmonary/Chest: Effort normal and breath sounds normal.   Abdominal: Soft. Bowel sounds are normal.   Musculoskeletal: Normal range of motion.   Tremulous    Neurological: He is alert.   E4 V4 M6  AAO x 2 (states he is 51 years old and in unsure of where he is)  PERRLA,  EOMI  Parkinsonian tremor in all extremities    Skin: Skin is warm. Capillary refill takes less than 2 seconds. He is not diaphoretic.   Nursing note and vitals  reviewed.      Medications:  Continuous  niCARdipine Last Rate: 2.5 mg/hr (12/25/19 1257)   Scheduled  carbidopa-levodopa  mg 1 tablet QID   carvedilol 3.125 mg BID   levETIRAcetam 250 mg BID   losartan 50 mg Daily   phenylephrine HCl in 0.9% NaCl     senna-docusate 8.6-50 mg 1 tablet Daily   PRN  acetaminophen 650 mg Q6H PRN   albuterol-ipratropium 3 mL Q4H PRN   labetalol 10 mg Q4H PRN   magnesium oxide 800 mg PRN   magnesium oxide 800 mg PRN   ondansetron 4 mg Q8H PRN   potassium chloride 10% 40 mEq PRN   potassium chloride 10% 40 mEq PRN   potassium chloride 10% 60 mEq PRN   potassium, sodium phosphates 2 packet PRN   potassium, sodium phosphates 2 packet PRN   potassium, sodium phosphates 2 packet PRN   sodium chloride 0.9% 10 mL PRN     Today I personally reviewed pertinent medications, lines/drains/airways, imaging, cardiology results, laboratory results, microbiology results, notably:    CT Head WO 12/25/19 0925  Evolving extra-axial hyperdense collection overlying the right cerebral convexity, consistent with acute subdural hemorrhage.  No significant change or new extra-axial hemorrhage.    No parenchymal hemorrhage or acute major vascular distribution infarct.    Generalized cerebral volume loss.  Chronic microvascular ischemic changes.    Diet  Diet Adult Regular (IDDSI Level 7) [facility]; Pureed; [place] Thick  Diet Adult Regular (IDDSI Level 7) [facility]; Pureed; [place] Thick

## 2019-12-25 NOTE — PROGRESS NOTES
[facility]  Neurocritical Care  Progress Note    Admit Date: 12/24/2019  Service Date: 12/25/2019  Length of Stay: 1    Subjective:     Chief Complaint: <principal problem not specified>    History of Present Illness:   87 yo M, PMH Afib &  parkinson, presents as transfer for eval of SDH/EDH. He originally presented to OSH after falling at [facility], after losing his balance while walking. He denies LOC. Of note, he is on Coumadin for Afib, INR 1.9. He was given PCC prior to transfer, INR currently 1.1. CTH there showed trace right frontal SDH, with right EDH. Transferred to [facility] for NSGY Eval. NCC to admit.       Hospital Course: 12/24: Arrived [facility], CTH with EDH/SDH  12/25: NAEON. Tolerating PO medications. EEG and CXR    Interval History:  NAEON. Lethargic but arousable on exam. Plan for spot EEG and CXR this am. Continue home meds and monitor in ICU.     Review of Systems   Unable to perform ROS: Mental status change   Constitutional: Negative for fatigue.   HENT: Negative for ear pain.    Respiratory: Negative for apnea.    Cardiovascular: Negative for chest pain.   Gastrointestinal: Negative for nausea and vomiting.   Genitourinary: Negative for difficulty urinating.   Musculoskeletal: Negative for neck pain and neck stiffness.   Skin: Negative for color change.   Neurological: Negative for dizziness.   Psychiatric/Behavioral: Negative for confusion.       Objective:     Vitals:  Temp: 98.1 °F (36.7 °C)  Pulse: 82  Rhythm: normal sinus rhythm, paced rhythm  BP: (!) 166/78  MAP (mmHg): 112  Resp: (!) 25  SpO2: (!) 94 %  O2 Device (Oxygen Therapy): room air    Temp  Min: 97.6 °F (36.4 °C)  Max: 100.3 °F (37.9 °C)  Pulse  Min: 69  Max: 82  BP  Min: 107/66  Max: 166/78  MAP (mmHg)  Min: 81  Max: 115  Resp  Min: 18  Max: 28  SpO2  Min: 94 %  Max: 97 %    12/24 0701 - 12/25 0700  In: 1491.7 [I.V.:1491.7]  Out: 500 [Urine:500]   Unmeasured Output  Urine Occurrence: 1  Stool Occurrence:  0  Pad Count: 2       Physical Exam   Constitutional: No distress.   HENT:   Head: Normocephalic and atraumatic.   Eyes: Pupils are equal, round, and reactive to light. EOM are normal.   Cardiovascular: Normal rate, normal heart sounds and intact distal pulses.   irregular rhythm    Pulmonary/Chest: Effort normal and breath sounds normal.   Abdominal: Soft. Bowel sounds are normal.   Musculoskeletal: Normal range of motion.   Tremulous    Neurological: He is alert.   E4 V4 M6  AAO x 2 (states he is 51 years old and in unsure of where he is)  PERRLA,  EOMI  Parkinsonian tremor in all extremities    Skin: Skin is warm. Capillary refill takes less than 2 seconds. He is not diaphoretic.   Nursing note and vitals reviewed.      Medications:  Continuous  niCARdipine Last Rate: 2.5 mg/hr (12/25/19 1257)   Scheduled  carbidopa-levodopa  mg 1 tablet QID   carvedilol 3.125 mg BID   levETIRAcetam 250 mg BID   losartan 50 mg Daily   phenylephrine HCl in 0.9% NaCl     senna-docusate 8.6-50 mg 1 tablet Daily   PRN  acetaminophen 650 mg Q6H PRN   albuterol-ipratropium 3 mL Q4H PRN   labetalol 10 mg Q4H PRN   magnesium oxide 800 mg PRN   magnesium oxide 800 mg PRN   ondansetron 4 mg Q8H PRN   potassium chloride 10% 40 mEq PRN   potassium chloride 10% 40 mEq PRN   potassium chloride 10% 60 mEq PRN   potassium, sodium phosphates 2 packet PRN   potassium, sodium phosphates 2 packet PRN   potassium, sodium phosphates 2 packet PRN   sodium chloride 0.9% 10 mL PRN     Today I personally reviewed pertinent medications, lines/drains/airways, imaging, cardiology results, laboratory results, microbiology results, notably:    CT Head WO 12/25/19 0925  Evolving extra-axial hyperdense collection overlying the right cerebral convexity, consistent with acute subdural hemorrhage.  No significant change or new extra-axial hemorrhage.    No parenchymal hemorrhage or acute major vascular distribution infarct.    Generalized cerebral volume  loss.  Chronic microvascular ischemic changes.    Diet  Diet Adult Regular (IDDSI Level 7) [facility]; Pureed; [place] Thick  Diet Adult Regular (IDDSI Level 7) [facility]; Pureed; Nectar Thick    Assessment/Plan:     Neuro  SDH (subdural hematoma)  - Trace right frontal SDH  - NSGY following  - See EDH    Traumatic epidural hematoma without loss of consciousness  - CTH with trace right frontal SDH, and right EDH after fall  - NSGY following, no acute intervention  - Coumadin reveresed with PCC prior to transport, current INR 1.1. Coags otherwise WNL  - Hold coumadin, discussed risk of ischemic stroke while coumadin held with family   - Admit to NCC, Q1hr Neuro Checks, SBP<140  - Keppra ppx  - Repeat CTH stable  - PT/OT/SLP when appropriate       Parkinson disease  - Continue carbidopa-levodopa     Cardiac/Vascular  ICD (implantable cardioverter-defibrillator) in place  - ICD/PPM  - Will need interrogation given recent fall    Chronic diastolic congestive heart failure  - See HTN    Hypertension  - SBP <140  - Continue home coreg and losartan   - Echo on admit  · Mild left ventricular enlargement.  · Normal left ventricular systolic function. The estimated ejection fraction is 65%  · Eccentric left ventricular hypertrophy.  · Indeterminate left ventricular diastolic function.  · Normal right ventricular systolic function.  · Severe left atrial enlargement.  · Mild to moderate tricuspid regurgitation.  · Mild mitral regurgitation.  · Low flow Moderate to severe aortic valve stenosis. Aortic valve area is 0.99 cm2; peak velocity is 2.77 m/s; mean gradient is 18 mmHg.  · The estimated PA systolic pressure is 58 mm Hg. Pulmonary hypertension present.  · Intermediate central venous pressure (8 mm Hg).         Atrial fibrillation  - Irregular HR, PPM    Hematology  Chronic anticoagulation  - On coumadin for Atrial Fib, INR 1.9 at OSH, given PCC before transport   - INR currently 1.1  - Will hold coumadin at  this time, given acute head bleed.     GI  Dysphagia  - Pureed diet with nectar thick per SLP    Other  Debility  - Fall after losing balance, denies LOC  - PT/OT          The patient is being Prophylaxed for:  Venous Thromboembolism with: Mechanical  Stress Ulcer with: None  Ventilator Pneumonia with: not applicable    Activity Orders          Diet Adult Regular (IDDSI Level 7) [facility]; Pureed; N[place] Thick: Regular starting at 12/25 1042    Commode at bedside starting at 12/24 3346        Full Code    Harry Brandon MD  Neurocritical Care  [facility]

## 2019-12-25 NOTE — ASSESSMENT & PLAN NOTE
86 M w/ PMH of Parkinson's, A Fib on Coumadin, who presents with a R frontal extraaxial hemorrhage now s/p K Centra:    --Repeat CT head this am  --SBP less than 150  --INR goal of less than 1.3  --Discuss with cards appropriate rate control as off coumadin  --Keppra  --HOB 30-40  --Will follow.

## 2019-12-25 NOTE — ASSESSMENT & PLAN NOTE
- On coumadin for Atrial Fib, INR 1.9 at OSH, given PCC before transport   - INR currently 1.1  - Will hold coumadin at this time, given acute head bleed.

## 2019-12-25 NOTE — PROGRESS NOTES
[facility]  Neurosurgery  Progress Note    Subjective:     History of Present Illness: 86 M with an EDH/SDH. PMH of Parkinson's, A Fib on coumadin, he was going to the bathroom when he tripped and fell, hitting his head.He denies LOC. He denies numbness, weakness, blurred vision, neck pain. He received K Centra in the emergency department.    Post-Op Info:  * No surgery found *         Interval History: Lethargic this am.    Medications:  Continuous Infusions:   sodium chloride 0.9% 50 mL/hr at 12/25/19 0605    niCARdipine Stopped (12/25/19 0605)     Scheduled Meds:   carbidopa-levodopa  mg  1 tablet Oral QID    carvedilol  3.125 mg Oral BID    levETIRAcetam  250 mg Oral BID    losartan  50 mg Oral Daily    senna-docusate 8.6-50 mg  1 tablet Oral Daily     PRN Meds:acetaminophen, albuterol-ipratropium, labetalol, magnesium oxide, magnesium oxide, ondansetron, potassium chloride 10%, potassium chloride 10%, potassium chloride 10%, potassium, sodium phosphates, potassium, sodium phosphates, potassium, sodium phosphates, sodium chloride 0.9%     Review of Systems  Objective:     Weight: 77.1 kg (169 lb 15.9 oz)  Body mass index is 24.39 kg/m².  Vital Signs (Most Recent):  Temp: 100.3 °F (37.9 °C) (12/25/19 0429)  Pulse: 72 (12/25/19 0505)  Resp: 20 (12/25/19 0505)  BP: (!) 120/58 (12/25/19 0505)  SpO2: 95 % (12/25/19 0505) Vital Signs (24h Range):  Temp:  [97.7 °F (36.5 °C)-100.3 °F (37.9 °C)] 100.3 °F (37.9 °C)  Pulse:  [] 72  Resp:  [18-26] 20  SpO2:  [75 %-97 %] 95 %  BP: (107-144)/(58-79) 120/58                     Male External Urinary Catheter 12/24/19 0700 (Active)   Output (mL) 25 mL 12/25/2019  6:05 AM       Neurosurgery Physical Exam   Lethargic  Pupils anisocoric but reactive  Localizing x4    Significant Labs:  Recent Labs   Lab 12/24/19  0131 12/25/19  0457   * 89    133*   K 4.4 4.2    105   CO2 25 22*   BUN 20 10   CREATININE 0.70* 0.7   CALCIUM  9.6 8.6*   MG  --  2.0     Recent Labs   Lab 12/24/19  0131 12/25/19  0456   WBC 8.10 5.60   HGB 13.0* 12.0*   HCT 39.7* 36.1*    158     Recent Labs   Lab 12/24/19  0131 12/24/19  0343 12/24/19  0707 12/25/19  0456   LABPT 21.5*  --   --   --    INR 1.9* 1.1 1.2 1.5*   APTT 34.7 24.5  --   --      Microbiology Results (last 7 days)     ** No results found for the last 168 hours. **            Significant Diagnostics:      Assessment/Plan:     Parkinson disease  86 M w/ PMH of Parkinson's, A Fib on Coumadin, who presents with a R frontal extraaxial hemorrhage now s/p K Centra:    --Repeat CT head this am  --SBP less than 150  --INR goal of less than 1.3  --Discuss with cards appropriate rate control as off coumadin  --Keppra  --HOB 30-40  --Will follow.        Adelso Dee, DO  Neurosurgery  [facility]

## 2019-12-25 NOTE — ASSESSMENT & PLAN NOTE
- SBP <140  - Continue home coreg and losartan   - Echo on admit  · Mild left ventricular enlargement.  · Normal left ventricular systolic function. The estimated ejection fraction is 65%  · Eccentric left ventricular hypertrophy.  · Indeterminate left ventricular diastolic function.  · Normal right ventricular systolic function.  · Severe left atrial enlargement.  · Mild to moderate tricuspid regurgitation.  · Mild mitral regurgitation.  · Low flow Moderate to severe aortic valve stenosis. Aortic valve area is 0.99 cm2; peak velocity is 2.77 m/s; mean gradient is 18 mmHg.  · The estimated PA systolic pressure is 58 mm Hg. Pulmonary hypertension present.  · Intermediate central venous pressure (8 mm Hg).

## 2019-12-25 NOTE — PLAN OF CARE
POC reviewed with pt and his daughter  at 0500. Pt's daughter verbalized understanding. Questions and concerns addressed. Had 1:1 sitter d/t tried to get out the bed. No acute events overnight. SBP goal maintained, neuro status stabled. Bath given, linens changed  Pt progressing toward goals. Will continue to monitor. See flowsheets for full assessment and VS info .

## 2019-12-25 NOTE — ASSESSMENT & PLAN NOTE
- CTH with trace right frontal SDH, and right EDH after fall  - NSGY following, no acute intervention  - Coumadin reveresed with PCC prior to transport, current INR 1.1. Coags otherwise WNL  - Hold coumadin, discussed risk of ischemic stroke while coumadin held with family   - Admit to NCC, Q1hr Neuro Checks, SBP<140  - Keppra ppx  - Repeat CTH stable  - PT/OT/SLP when appropriate

## 2019-12-25 NOTE — SUBJECTIVE & OBJECTIVE
Interval History: Lethargic this am.    Medications:  Continuous Infusions:   sodium chloride 0.9% 50 mL/hr at 12/25/19 0605    niCARdipine Stopped (12/25/19 0605)     Scheduled Meds:   carbidopa-levodopa  mg  1 tablet Oral QID    carvedilol  3.125 mg Oral BID    levETIRAcetam  250 mg Oral BID    losartan  50 mg Oral Daily    senna-docusate 8.6-50 mg  1 tablet Oral Daily     PRN Meds:acetaminophen, albuterol-ipratropium, labetalol, magnesium oxide, magnesium oxide, ondansetron, potassium chloride 10%, potassium chloride 10%, potassium chloride 10%, potassium, sodium phosphates, potassium, sodium phosphates, potassium, sodium phosphates, sodium chloride 0.9%     Review of Systems  Objective:     Weight: 77.1 kg (169 lb 15.9 oz)  Body mass index is 24.39 kg/m².  Vital Signs (Most Recent):  Temp: 100.3 °F (37.9 °C) (12/25/19 0429)  Pulse: 72 (12/25/19 0505)  Resp: 20 (12/25/19 0505)  BP: (!) 120/58 (12/25/19 0505)  SpO2: 95 % (12/25/19 0505) Vital Signs (24h Range):  Temp:  [97.7 °F (36.5 °C)-100.3 °F (37.9 °C)] 100.3 °F (37.9 °C)  Pulse:  [] 72  Resp:  [18-26] 20  SpO2:  [75 %-97 %] 95 %  BP: (107-144)/(58-79) 120/58                     Male External Urinary Catheter 12/24/19 0700 (Active)   Output (mL) 25 mL 12/25/2019  6:05 AM       Neurosurgery Physical Exam   Lethargic  Pupils anisocoric but reactive  Localizing x4    Significant Labs:  Recent Labs   Lab 12/24/19  0131 12/25/19  0457   * 89    133*   K 4.4 4.2    105   CO2 25 22*   BUN 20 10   CREATININE 0.70* 0.7   CALCIUM 9.6 8.6*   MG  --  2.0     Recent Labs   Lab 12/24/19  0131 12/25/19  0456   WBC 8.10 5.60   HGB 13.0* 12.0*   HCT 39.7* 36.1*    158     Recent Labs   Lab 12/24/19  0131 12/24/19  0343 12/24/19  0707 12/25/19  0456   LABPT 21.5*  --   --   --    INR 1.9* 1.1 1.2 1.5*   APTT 34.7 24.5  --   --      Microbiology Results (last 7 days)     ** No results found for the last 168 hours. **             Significant Diagnostics:

## 2019-12-25 NOTE — PLAN OF CARE
POC reviewed with pt and family at 1400. Pt verbalized understanding. Questions and concerns addressed. No acute events today. Cardene @ 2.5. NS @ 50. CT completed. Neuro exam unchanged.  Pt progressing toward goals. Will continue to monitor. See flowsheets for full assessment and VS info.

## 2019-12-26 PROBLEM — S06.5XAA SUBDURAL HEMATOMA: Status: ACTIVE | Noted: 2019-12-26

## 2019-12-26 LAB
ALBUMIN SERPL BCP-MCNC: 3.1 G/DL (ref 3.5–5.2)
ALLENS TEST: ABNORMAL
ALP SERPL-CCNC: 118 U/L (ref 55–135)
ALT SERPL W/O P-5'-P-CCNC: <5 U/L (ref 10–44)
ANION GAP SERPL CALC-SCNC: 6 MMOL/L (ref 8–16)
AST SERPL-CCNC: 12 U/L (ref 10–40)
BACTERIA #/AREA URNS AUTO: ABNORMAL /HPF
BASOPHILS # BLD AUTO: 0.03 K/UL (ref 0–0.2)
BASOPHILS NFR BLD: 0.4 % (ref 0–1.9)
BILIRUB SERPL-MCNC: 1.9 MG/DL (ref 0.1–1)
BILIRUB UR QL STRIP: NEGATIVE
BUN SERPL-MCNC: 15 MG/DL (ref 8–23)
CALCIUM SERPL-MCNC: 8.7 MG/DL (ref 8.7–10.5)
CHLORIDE SERPL-SCNC: 103 MMOL/L (ref 95–110)
CLARITY UR REFRACT.AUTO: CLEAR
CO2 SERPL-SCNC: 25 MMOL/L (ref 23–29)
COLOR UR AUTO: ABNORMAL
CREAT SERPL-MCNC: 0.8 MG/DL (ref 0.5–1.4)
DELSYS: ABNORMAL
DIFFERENTIAL METHOD: ABNORMAL
EOSINOPHIL # BLD AUTO: 0.1 K/UL (ref 0–0.5)
EOSINOPHIL NFR BLD: 0.9 % (ref 0–8)
ERYTHROCYTE [DISTWIDTH] IN BLOOD BY AUTOMATED COUNT: 13 % (ref 11.5–14.5)
EST. GFR  (AFRICAN AMERICAN): >60 ML/MIN/1.73 M^2
EST. GFR  (NON AFRICAN AMERICAN): >60 ML/MIN/1.73 M^2
GLUCOSE SERPL-MCNC: 92 MG/DL (ref 70–110)
GLUCOSE UR QL STRIP: NEGATIVE
HCO3 UR-SCNC: 23.4 MMOL/L (ref 24–28)
HCT VFR BLD AUTO: 35.9 % (ref 40–54)
HGB BLD-MCNC: 11.5 G/DL (ref 14–18)
HGB UR QL STRIP: ABNORMAL
IMM GRANULOCYTES # BLD AUTO: 0.01 K/UL (ref 0–0.04)
IMM GRANULOCYTES NFR BLD AUTO: 0.1 % (ref 0–0.5)
INR PPP: 1.6 (ref 0.8–1.2)
KETONES UR QL STRIP: ABNORMAL
LEUKOCYTE ESTERASE UR QL STRIP: NEGATIVE
LYMPHOCYTES # BLD AUTO: 1.3 K/UL (ref 1–4.8)
LYMPHOCYTES NFR BLD: 16.8 % (ref 18–48)
MAGNESIUM SERPL-MCNC: 2 MG/DL (ref 1.6–2.6)
MCH RBC QN AUTO: 29.9 PG (ref 27–31)
MCHC RBC AUTO-ENTMCNC: 32 G/DL (ref 32–36)
MCV RBC AUTO: 93 FL (ref 82–98)
MICROSCOPIC COMMENT: ABNORMAL
MODE: ABNORMAL
MONOCYTES # BLD AUTO: 1 K/UL (ref 0.3–1)
MONOCYTES NFR BLD: 12.7 % (ref 4–15)
NEUTROPHILS # BLD AUTO: 5.3 K/UL (ref 1.8–7.7)
NEUTROPHILS NFR BLD: 69.1 % (ref 38–73)
NITRITE UR QL STRIP: NEGATIVE
NRBC BLD-RTO: 0 /100 WBC
PCO2 BLDA: 40.1 MMHG (ref 35–45)
PH SMN: 7.37 [PH] (ref 7.35–7.45)
PH UR STRIP: 5 [PH] (ref 5–8)
PHOSPHATE SERPL-MCNC: 3.2 MG/DL (ref 2.7–4.5)
PLATELET # BLD AUTO: 151 K/UL (ref 150–350)
PMV BLD AUTO: 9.7 FL (ref 9.2–12.9)
PO2 BLDA: 25 MMHG (ref 40–60)
POC BE: -2 MMOL/L
POC SATURATED O2: 44 % (ref 95–100)
POC TCO2: 25 MMOL/L (ref 24–29)
POCT GLUCOSE: 88 MG/DL (ref 70–110)
POCT GLUCOSE: 89 MG/DL (ref 70–110)
POCT GLUCOSE: 99 MG/DL (ref 70–110)
POTASSIUM SERPL-SCNC: 4 MMOL/L (ref 3.5–5.1)
PROT SERPL-MCNC: 6.2 G/DL (ref 6–8.4)
PROT UR QL STRIP: NEGATIVE
PROTHROMBIN TIME: 16 SEC (ref 9–12.5)
RBC # BLD AUTO: 3.85 M/UL (ref 4.6–6.2)
RBC #/AREA URNS AUTO: 2 /HPF (ref 0–4)
SAMPLE: ABNORMAL
SITE: ABNORMAL
SODIUM SERPL-SCNC: 134 MMOL/L (ref 136–145)
SP GR UR STRIP: 1.02 (ref 1–1.03)
URN SPEC COLLECT METH UR: ABNORMAL
WBC # BLD AUTO: 7.72 K/UL (ref 3.9–12.7)
WBC #/AREA URNS AUTO: 2 /HPF (ref 0–5)

## 2019-12-26 PROCEDURE — 87205 SMEAR GRAM STAIN: CPT

## 2019-12-26 PROCEDURE — 85025 COMPLETE CBC W/AUTO DIFF WBC: CPT

## 2019-12-26 PROCEDURE — 84100 ASSAY OF PHOSPHORUS: CPT

## 2019-12-26 PROCEDURE — 94761 N-INVAS EAR/PLS OXIMETRY MLT: CPT

## 2019-12-26 PROCEDURE — 81001 URINALYSIS AUTO W/SCOPE: CPT

## 2019-12-26 PROCEDURE — 87070 CULTURE OTHR SPECIMN AEROBIC: CPT

## 2019-12-26 PROCEDURE — 82803 BLOOD GASES ANY COMBINATION: CPT

## 2019-12-26 PROCEDURE — 25000242 PHARM REV CODE 250 ALT 637 W/ HCPCS: Performed by: NURSE PRACTITIONER

## 2019-12-26 PROCEDURE — 20000000 HC ICU ROOM

## 2019-12-26 PROCEDURE — 95951 PR EEG MONITORING/VIDEORECORD: CPT | Mod: 26,,, | Performed by: PSYCHIATRY & NEUROLOGY

## 2019-12-26 PROCEDURE — 25000003 PHARM REV CODE 250: Performed by: NURSE PRACTITIONER

## 2019-12-26 PROCEDURE — 63600175 PHARM REV CODE 636 W HCPCS: Performed by: NURSE PRACTITIONER

## 2019-12-26 PROCEDURE — 95951 PR EEG MONITORING/VIDEORECORD: ICD-10-PCS | Mod: 26,,, | Performed by: PSYCHIATRY & NEUROLOGY

## 2019-12-26 PROCEDURE — 87186 SC STD MICRODIL/AGAR DIL: CPT

## 2019-12-26 PROCEDURE — 63600175 PHARM REV CODE 636 W HCPCS: Performed by: PSYCHIATRY & NEUROLOGY

## 2019-12-26 PROCEDURE — 80053 COMPREHEN METABOLIC PANEL: CPT

## 2019-12-26 PROCEDURE — 99232 SBSQ HOSP IP/OBS MODERATE 35: CPT | Mod: GC,,, | Performed by: NEUROLOGICAL SURGERY

## 2019-12-26 PROCEDURE — 99900035 HC TECH TIME PER 15 MIN (STAT)

## 2019-12-26 PROCEDURE — 27000221 HC OXYGEN, UP TO 24 HOURS

## 2019-12-26 PROCEDURE — 94640 AIRWAY INHALATION TREATMENT: CPT

## 2019-12-26 PROCEDURE — 83735 ASSAY OF MAGNESIUM: CPT

## 2019-12-26 PROCEDURE — 92526 ORAL FUNCTION THERAPY: CPT

## 2019-12-26 PROCEDURE — 99232 PR SUBSEQUENT HOSPITAL CARE,LEVL II: ICD-10-PCS | Mod: GC,,, | Performed by: NEUROLOGICAL SURGERY

## 2019-12-26 PROCEDURE — 85610 PROTHROMBIN TIME: CPT

## 2019-12-26 PROCEDURE — 87077 CULTURE AEROBIC IDENTIFY: CPT

## 2019-12-26 PROCEDURE — 95951 HC EEG MONITORING/VIDEO RECORD: CPT

## 2019-12-26 PROCEDURE — 99223 PR INITIAL HOSPITAL CARE,LEVL III: ICD-10-PCS | Mod: ,,, | Performed by: NURSE PRACTITIONER

## 2019-12-26 PROCEDURE — 99223 1ST HOSP IP/OBS HIGH 75: CPT | Mod: ,,, | Performed by: NURSE PRACTITIONER

## 2019-12-26 RX ORDER — SODIUM CHLORIDE 9 MG/ML
INJECTION, SOLUTION INTRAVENOUS CONTINUOUS
Status: DISCONTINUED | OUTPATIENT
Start: 2019-12-26 | End: 2019-12-30

## 2019-12-26 RX ORDER — INSULIN ASPART 100 [IU]/ML
0-5 INJECTION, SOLUTION INTRAVENOUS; SUBCUTANEOUS EVERY 4 HOURS PRN
Status: DISCONTINUED | OUTPATIENT
Start: 2019-12-26 | End: 2019-12-30 | Stop reason: HOSPADM

## 2019-12-26 RX ORDER — GLUCAGON 1 MG
1 KIT INJECTION
Status: DISCONTINUED | OUTPATIENT
Start: 2019-12-26 | End: 2019-12-30 | Stop reason: HOSPADM

## 2019-12-26 RX ORDER — PHENYLEPHRINE HCL IN 0.9% NACL 20MG/250ML
0.5 PLASTIC BAG, INJECTION (ML) INTRAVENOUS CONTINUOUS
Status: DISCONTINUED | OUTPATIENT
Start: 2019-12-26 | End: 2019-12-27

## 2019-12-26 RX ORDER — DEXTROSE MONOHYDRATE 100 MG/ML
12.5 INJECTION, SOLUTION INTRAVENOUS
Status: DISCONTINUED | OUTPATIENT
Start: 2019-12-26 | End: 2019-12-30 | Stop reason: HOSPADM

## 2019-12-26 RX ADMIN — VANCOMYCIN HYDROCHLORIDE 1500 MG: 1.5 INJECTION, POWDER, LYOPHILIZED, FOR SOLUTION INTRAVENOUS at 08:12

## 2019-12-26 RX ADMIN — ACETAMINOPHEN 650 MG: 325 TABLET ORAL at 05:12

## 2019-12-26 RX ADMIN — CARBIDOPA AND LEVODOPA 1 TABLET: 25; 100 TABLET ORAL at 04:12

## 2019-12-26 RX ADMIN — IPRATROPIUM BROMIDE AND ALBUTEROL SULFATE 3 ML: .5; 3 SOLUTION RESPIRATORY (INHALATION) at 03:12

## 2019-12-26 RX ADMIN — CARBIDOPA AND LEVODOPA 1 TABLET: 25; 100 TABLET ORAL at 01:12

## 2019-12-26 RX ADMIN — PIPERACILLIN AND TAZOBACTAM 4.5 G: 4; .5 INJECTION, POWDER, FOR SOLUTION INTRAVENOUS at 04:12

## 2019-12-26 RX ADMIN — IPRATROPIUM BROMIDE AND ALBUTEROL SULFATE 3 ML: .5; 3 SOLUTION RESPIRATORY (INHALATION) at 12:12

## 2019-12-26 RX ADMIN — CARBIDOPA AND LEVODOPA 1 TABLET: 25; 100 TABLET ORAL at 09:12

## 2019-12-26 RX ADMIN — PIPERACILLIN AND TAZOBACTAM 4.5 G: 4; .5 INJECTION, POWDER, FOR SOLUTION INTRAVENOUS at 08:12

## 2019-12-26 RX ADMIN — PIPERACILLIN AND TAZOBACTAM 4.5 G: 4; .5 INJECTION, POWDER, FOR SOLUTION INTRAVENOUS at 11:12

## 2019-12-26 RX ADMIN — IPRATROPIUM BROMIDE AND ALBUTEROL SULFATE 3 ML: .5; 3 SOLUTION RESPIRATORY (INHALATION) at 11:12

## 2019-12-26 RX ADMIN — SODIUM CHLORIDE: 0.9 INJECTION, SOLUTION INTRAVENOUS at 11:12

## 2019-12-26 RX ADMIN — LEVETIRACETAM 250 MG: 100 INJECTION, SOLUTION, CONCENTRATE INTRAVENOUS at 09:12

## 2019-12-26 RX ADMIN — IPRATROPIUM BROMIDE AND ALBUTEROL SULFATE 3 ML: .5; 3 SOLUTION RESPIRATORY (INHALATION) at 04:12

## 2019-12-26 RX ADMIN — ACETAMINOPHEN 650 MG: 325 TABLET ORAL at 11:12

## 2019-12-26 RX ADMIN — SODIUM CHLORIDE: 0.9 INJECTION, SOLUTION INTRAVENOUS at 09:12

## 2019-12-26 RX ADMIN — IPRATROPIUM BROMIDE AND ALBUTEROL SULFATE 3 ML: .5; 3 SOLUTION RESPIRATORY (INHALATION) at 08:12

## 2019-12-26 RX ADMIN — IPRATROPIUM BROMIDE AND ALBUTEROL SULFATE 3 ML: .5; 3 SOLUTION RESPIRATORY (INHALATION) at 07:12

## 2019-12-26 NOTE — PT/OT/SLP PROGRESS
Speech Language Pathology Treatment    Patient Name:  Elvis Thompson   MRN:  [id number]  Admitting Diagnosis: Traumatic epidural hematoma without loss of consciousness    Recommendations:                 General Recommendations:  Dysphagia therapy  Diet recommendations:  Puree, Liquid Diet Level: Nectar Thick   · 1 SMALL bite/sip at a time,   · Alternating bites/sips,   · Assistance with meals and Assistance with thickening liquids,   · Feed only when awake/alert,   · Frequent oral care,   · No straw  · HOB to 90 degrees  · Continue to monitor for signs and symptoms of aspiration and discontinue oral feeding should you notice any of the following: watery eyes, reddened facial area, wet vocal quality, increased work of breathing, change in respiratory status, increased congestion, coughing, fever, etc.  Aspiration Precautions: Meds crushed in puree   General Precautions: Standard, aspiration, fall, nectar thick, pureed diet  Communication strategies:  none    Subjective   Pt lethargic yet cooperative     Pain/Comfort:  · Pain Rating 1: 0/10  · Pain Rating Post-Intervention 1: 0/10    Objective:     Has the patient been evaluated by SLP for swallowing?   Yes  Keep patient NPO? Yes   Current Respiratory Status: room air      Team requested SLP re-assess pt at the  bedside. Pt currently on diet of nectar thick liquids and purees and RN reports consistent coughing and concern for aspiration. No overt coughing observed in speech presence during trials this date. Pt with strong vocal quality post trials and no increased congestion or subtle signs of aspiration appreciated. However, r, when SLP stepped out of room to get thickened liquids post puree trials RN and MD at the bedside report delayed coughing present and concern for aspiration remains. No additional coughing appreciated in SLP presence across ~ 3oz of nectar thick liquids after team reported coughing event.  Team would prefer to keep pt PO except meds crushed in  puree at this time and for the next 24 hrs. Speech to continue to follow.     Assessment:     Elvis Thompson is a 86 y.o. male with an SLP diagnosis of Dysphagia.      Goals:   Multidisciplinary Problems     SLP Goals        Problem: SLP Goal    Goal Priority Disciplines Outcome   SLP Goal     SLP Ongoing, Not Progressing   Description:  Speech Therapy Short Term Goals  Goal expected to be met by 1/6  1. Pt will tolerate puree diet and NECTAR thick liquids with no overt s/s of aspiration noted.   2. Pt will participate in an ongoing assessment to determine the least restrictive and safest diet with possible updated goals to follow pending results.  3. SLP will discuss baseline vs. current presentation to determine further acute ST needs for speech, language, and cognition.                     Plan:     · Patient to be seen:  4 x/week   · Plan of Care expires:     · Plan of Care reviewed with:  patient   · SLP Follow-Up:  Yes       Discharge recommendations:  (return to NH )   Barriers to Discharge:  Level of Skilled Assistance Needed      Time Tracking:     SLP Treatment Date:   12/26/19  Speech Start Time:  0804  Speech Stop Time:  0816     Speech Total Time (min):  12 min    Billable Minutes: Treatment Swallowing Dysfunction 12    Shanti Marlow CCC-SLP  12/26/2019

## 2019-12-26 NOTE — SUBJECTIVE & OBJECTIVE
Interval History: mentation improved this am.    Medications:  Continuous Infusions:   niCARdipine Stopped (12/25/19 2105)     Scheduled Meds:   albuterol-ipratropium  3 mL Nebulization Q4H    carbidopa-levodopa  mg  1 tablet Oral QID    carvedilol  3.125 mg Oral BID    levETIRAcetam  250 mg Oral BID    losartan  50 mg Oral Daily    senna-docusate 8.6-50 mg  1 tablet Oral Daily     PRN Meds:acetaminophen, albuterol-ipratropium, labetalol, magnesium oxide, magnesium oxide, ondansetron, potassium chloride 10%, potassium chloride 10%, potassium chloride 10%, potassium, sodium phosphates, potassium, sodium phosphates, potassium, sodium phosphates, sodium chloride 0.9%     Review of Systems  Objective:     Weight: 77.1 kg (169 lb 15.9 oz)  Body mass index is 24.39 kg/m².  Vital Signs (Most Recent):  Temp: (!) 100.9 °F (38.3 °C) (12/26/19 0558)  Pulse: 72 (12/26/19 0605)  Resp: 20 (12/26/19 0605)  BP: (!) 113/56 (12/26/19 0605)  SpO2: 95 % (12/26/19 0605) Vital Signs (24h Range):  Temp:  [97.6 °F (36.4 °C)-100.9 °F (38.3 °C)] 100.9 °F (38.3 °C)  Pulse:  [68-86] 72  Resp:  [19-32] 20  SpO2:  [92 %-99 %] 95 %  BP: ()/(51-92) 113/56                Oxygen Concentration (%):  [28] 28    Male External Urinary Catheter 12/24/19 0700 (Active)   Collection Container Urimeter 12/26/2019  3:05 AM   Securement Method secured to top of thigh w/ adhesive device 12/26/2019  3:05 AM   Skin no redness;no breakdown 12/26/2019  3:05 AM   Tolerance no signs/symptoms of discomfort 12/26/2019  3:05 AM   Output (mL) 0 mL 12/26/2019  6:05 AM   Catheter Change Date 12/25/19 12/26/2019  3:05 AM   Catheter Change Time 0700 12/26/2019  3:05 AM       Neurosurgery Physical Exam   AO person/place  PERRL  FCX4    Significant Labs:  Recent Labs   Lab 12/25/19  0457 12/25/19  1048 12/26/19  0057   GLU 89  --  92   * 134* 134*   K 4.2  --  4.0     --  103   CO2 22*  --  25   BUN 10  --  15   CREATININE 0.7  --  0.8   CALCIUM  8.6*  --  8.7   MG 2.0  --  2.0     Recent Labs   Lab 12/25/19 0456 12/26/19 0057   WBC 5.60 7.72   HGB 12.0* 11.5*   HCT 36.1* 35.9*    151     Recent Labs   Lab 12/25/19 0456 12/26/19 0057   INR 1.5* 1.6*     Microbiology Results (last 7 days)     Procedure Component Value Units Date/Time    Blood culture [[id number]] Collected:  12/25/19 2153    Order Status:  Completed Specimen:  Blood from Peripheral, Forearm, Left Updated:  12/26/19 0545     Blood Culture, Routine No Growth to date    Narrative:       Blood cultures x 2 different sites. 4 bottles total. Please  draw cultures before administering antibiotics.    Blood culture [[id number]] Collected:  12/25/19 2150    Order Status:  Completed Specimen:  Blood from Peripheral, Hand, Right Updated:  12/26/19 0545     Blood Culture, Routine No Growth to date    Narrative:       Blood cultures from 2 different sites. 4 bottles total.  Please draw before starting antibiotics.            Significant Diagnostics:  CT head: reviewed, stable right convexity SDH from prior.

## 2019-12-26 NOTE — PLAN OF CARE
Patient not medically ready for discharge per MD.  Plan is for patient to return to [facility] when medically ready.        12/26/19 1442   Discharge Reassessment   Assessment Type Discharge Planning Reassessment   Provided patient/caregiver education on the expected discharge date and the discharge plan No   Do you have any problems affording any of your prescribed medications? No   Discharge Plan A Return to nursing home   Discharge Plan B Return to Nursing Home   DME Needed Upon Discharge  none   Patient choice form signed by patient/caregiver N/A   Anticipated Discharge Disposition [facility]   Can the patient answer the patient profile reliably? No, cognitively impaired   Describe the patient's ability to walk at the present time. Major restrictions/daily assistance from another person   How often would a person be available to care for the patient? Whenever needed   Number of comorbid conditions (as recorded on the chart) Five or more   Post-Acute Status   Post-Acute Authorization Placement   Post-Acute Placement Status Awaiting Internal Medical Clearance   Discharge Delays None known at this time       Heydi Del Rio RN, CCRN-K, [facility]  Neuro-Critical Care   [phone]

## 2019-12-26 NOTE — PLAN OF CARE
POC reviewed with pt at 0500. Pt needed reinforcement . Questions and concerns addressed. Had fever 100.9 - 101.4. Sent Blood, urine cultured and UA. VS goals maintained. Will continue to monitor. See flowsheets for full assessment and VS info.

## 2019-12-26 NOTE — PROGRESS NOTES
[facility]  Neurosurgery  Progress Note    Subjective:     History of Present Illness: 86 M with an EDH/SDH. PMH of Parkinson's, A Fib on coumadin, he was going to the bathroom when he tripped and fell, hitting his head.He denies LOC. He denies numbness, weakness, blurred vision, neck pain. He received K Centra in the emergency department.    Post-Op Info:  * No surgery found *         Interval History: mentation improved this am.    Medications:  Continuous Infusions:   niCARdipine Stopped (12/25/19 2105)     Scheduled Meds:   albuterol-ipratropium  3 mL Nebulization Q4H    carbidopa-levodopa  mg  1 tablet Oral QID    carvedilol  3.125 mg Oral BID    levETIRAcetam  250 mg Oral BID    losartan  50 mg Oral Daily    senna-docusate 8.6-50 mg  1 tablet Oral Daily     PRN Meds:acetaminophen, albuterol-ipratropium, labetalol, magnesium oxide, magnesium oxide, ondansetron, potassium chloride 10%, potassium chloride 10%, potassium chloride 10%, potassium, sodium phosphates, potassium, sodium phosphates, potassium, sodium phosphates, sodium chloride 0.9%     Review of Systems  Objective:     Weight: 77.1 kg (169 lb 15.9 oz)  Body mass index is 24.39 kg/m².  Vital Signs (Most Recent):  Temp: (!) 100.9 °F (38.3 °C) (12/26/19 0558)  Pulse: 72 (12/26/19 0605)  Resp: 20 (12/26/19 0605)  BP: (!) 113/56 (12/26/19 0605)  SpO2: 95 % (12/26/19 0605) Vital Signs (24h Range):  Temp:  [97.6 °F (36.4 °C)-100.9 °F (38.3 °C)] 100.9 °F (38.3 °C)  Pulse:  [68-86] 72  Resp:  [19-32] 20  SpO2:  [92 %-99 %] 95 %  BP: ()/(51-92) 113/56                Oxygen Concentration (%):  [28] 28    Male External Urinary Catheter 12/24/19 0700 (Active)   Collection Container Urimeter 12/26/2019  3:05 AM   Securement Method secured to top of thigh w/ adhesive device 12/26/2019  3:05 AM   Skin no redness;no breakdown 12/26/2019  3:05 AM   Tolerance no signs/symptoms of discomfort 12/26/2019  3:05 AM   Output (mL) 0 mL  12/26/2019  6:05 AM   Catheter Change Date 12/25/19 12/26/2019  3:05 AM   Catheter Change Time 0700 12/26/2019  3:05 AM       Neurosurgery Physical Exam   AO person/place  PERRL  FCX4    Significant Labs:  Recent Labs   Lab 12/25/19 0457 12/25/19  1048 12/26/19 0057   GLU 89  --  92   * 134* 134*   K 4.2  --  4.0     --  103   CO2 22*  --  25   BUN 10  --  15   CREATININE 0.7  --  0.8   CALCIUM 8.6*  --  8.7   MG 2.0  --  2.0     Recent Labs   Lab 12/25/19 0456 12/26/19 0057   WBC 5.60 7.72   HGB 12.0* 11.5*   HCT 36.1* 35.9*    151     Recent Labs   Lab 12/25/19 0456 12/26/19 0057   INR 1.5* 1.6*     Microbiology Results (last 7 days)     Procedure Component Value Units Date/Time    Blood culture [[id number]] Collected:  12/25/19 2153    Order Status:  Completed Specimen:  Blood from Peripheral, Forearm, Left Updated:  12/26/19 0545     Blood Culture, Routine No Growth to date    Narrative:       Blood cultures x 2 different sites. 4 bottles total. Please  draw cultures before administering antibiotics.    Blood culture [[id number]] Collected:  12/25/19 2150    Order Status:  Completed Specimen:  Blood from Peripheral, Hand, Right Updated:  12/26/19 0545     Blood Culture, Routine No Growth to date    Narrative:       Blood cultures from 2 different sites. 4 bottles total.  Please draw before starting antibiotics.            Significant Diagnostics:  CT head: reviewed, stable right convexity SDH from prior.    Assessment/Plan:     Parkinson disease  86 M w/ PMH of Parkinson's, A Fib on Coumadin, who presents with a R frontal extraaxial hemorrhage now s/p K Centra:    --Neuro improved  --Repeat imaging stable  --SBP less than 150  --INR goal of less than 1.3  --Keppra  --HOB 30-40  --Will likely TTF to NSGY today        Adelso Dee,   Neurosurgery  [facility]

## 2019-12-26 NOTE — PLAN OF CARE
POC reviewed with pt and family at 1400. Pt verbalized understanding. Questions and concerns addressed. No acute events today. Diet ordered. NS stopped. EEG pending. Pt progressing toward goals. Will continue to monitor. See flowsheets for full assessment and VS info.

## 2019-12-26 NOTE — PROGRESS NOTES
NCC team notified regarding pt's MAP <65. Mihai NP to bedside. 1000ml bolus and madison drip for MAP below 65 ordered. Pt's BP improved. Will continue to monitor.

## 2019-12-26 NOTE — PLAN OF CARE
Team requested SLP re-assess pt at the  bedside. Pt currently on diet of nectar thick liquids and purees and RN reports consistent coughing and concern for aspiration. No overt coughing observed in speech presence during trials this date, however, when SLP stepped out of room to get thickened liquids post puree trials RN and MD at the bedside report delayed coughing present and concern for aspiration remains. Team would prefer to keep pt PO except meds crushed in puree at this time and for the next 24 hrs. Speech to continue to follow.     Shanti Marlow MS, CCC-SLP  Speech Language Pathologist  Pager: [phone]  Date 12/26/2019

## 2019-12-26 NOTE — PROGRESS NOTES
Pharmacokinetic Initial Assessment: IV Vancomycin    Assessment/Plan:    Initiate intravenous vancomycin with loading dose of 1500 mg once  Maintenance dose 1250 mg Q24H  Goal trough 10-20 mcg/mL empirically  Draw trough before third dose on 12/28 at 07:30    Pharmacy will continue to follow and monitor vancomycin. Please call with any questions regarding this assessment.     Thank you for the consult,   Mery Suarez, PharmD, [facility]  Neurocritical Care Pharmacist  [phone]           Patient brief summary:  Elvis Thompson is a 86 y.o. male initiated on antimicrobial therapy with IV Vancomycin for treatment of suspected sepsis, empiric    Drug Allergies:   Review of patient's allergies indicates:   Allergen Reactions    Iodine and iodide containing products Other (See Comments)    Codeine Other (See Comments)     Insomnia      Morphine Nausea And Vomiting       Actual Body Weight:   77.1 kg    Renal Function:   Estimated Creatinine Clearance: 68.4 mL/min (based on SCr of 0.8 mg/dL).,     Dialysis Method (if applicable):  N/A    CBC (last 72 hours):  Recent Labs   Lab Result Units 12/24/19  0131 12/24/19  0503 12/25/19  0456 12/26/19  0057   WBC K/uL 8.10  --  5.60 7.72   Hemoglobin g/dL 13.0*  --  12.0* 11.5*   Hemoglobin A1C %  --  5.4  --   --    Hematocrit % 39.7*  --  36.1* 35.9*   Platelets K/uL 199  --  158 151   Gran% % 72.7  --  64.5 69.1   Lymph% % 15.1*  --  17.9* 16.8*   Mono% % 8.9  --  11.3 12.7   Eosinophil% % 2.7  --  5.4 0.9   Basophil% % 0.6  --  0.7 0.4   Differential Method  Automated  --  Automated Automated       Metabolic Panel (last 72 hours):  Recent Labs   Lab Result Units 12/24/19  0131 12/25/19  0457 12/25/19  1048 12/25/19  2129 12/26/19  0057 12/26/19  1118   Sodium mmol/L 139 133* 134*  --  134*  --    Potassium mmol/L 4.4 4.2  --   --  4.0  --    Chloride mmol/L 106 105  --   --  103  --    CO2 mmol/L 25 22*  --   --  25  --    Glucose mg/dL 120* 89  --   --  92  --    Glucose, UA    --   --   --  Negative  --  Negative   BUN, Bld mg/dL 20 10  --   --  15  --    Creatinine mg/dL 0.70* 0.7  --   --  0.8  --    Albumin g/dL 3.9 3.2*  --   --  3.1*  --    Total Bilirubin mg/dL 0.8 1.7*  --   --  1.9*  --    Alkaline Phosphatase U/L 118 129  --   --  118  --    AST U/L 27 16  --   --  12  --    ALT U/L 4* <5*  --   --  <5*  --    Magnesium mg/dL  --  2.0  --   --  2.0  --    Phosphorus mg/dL  --  2.8  --   --  3.2  --        Drug levels (last 3 results):  No results for input(s): VANCOMYCINRA, VANCOMYCINPE, VANCOMYCINTR in the last 72 hours.    Microbiologic Results:  Microbiology Results (last 7 days)     Procedure Component Value Units Date/Time    Culture, Respiratory with Gram Stain [[id number]] Collected:  12/26/19 1024    Order Status:  Completed Specimen:  Respiratory from Sputum Updated:  12/26/19 1324     Gram Stain (Respiratory) >10 epithelial cells per low power field     Gram Stain (Respiratory) Many WBC's     Gram Stain (Respiratory) Many budding yeast     Gram Stain (Respiratory) Many Gram positive cocci     Gram Stain (Respiratory) Moderate Gram negative rods     Gram Stain (Respiratory) Few Gram positive rods    Blood culture [[id number]] Collected:  12/25/19 2153    Order Status:  Completed Specimen:  Blood from Peripheral, Forearm, Left Updated:  12/26/19 0545     Blood Culture, Routine No Growth to date    Narrative:       Blood cultures x 2 different sites. 4 bottles total. Please  draw cultures before administering antibiotics.    Blood culture [[id number]] Collected:  12/25/19 2150    Order Status:  Completed Specimen:  Blood from Peripheral, Hand, Right Updated:  12/26/19 0545     Blood Culture, Routine No Growth to date    Narrative:       Blood cultures from 2 different sites. 4 bottles total.  Please draw before starting antibiotics.

## 2019-12-26 NOTE — ASSESSMENT & PLAN NOTE
86 M w/ PMH of Parkinson's, A Fib on Coumadin, who presents with a R frontal extraaxial hemorrhage now s/p K Centra:    --Neuro improved  --Repeat imaging stable  --SBP less than 150  --INR goal of less than 1.3  --Keppra  --HOB 30-40  --Will likely TTF to NSGY today

## 2019-12-27 PROBLEM — S06.4XAA EPIDURAL HEMATOMA: Status: ACTIVE | Noted: 2019-12-27

## 2019-12-27 LAB
ALBUMIN SERPL BCP-MCNC: 2.7 G/DL (ref 3.5–5.2)
ALP SERPL-CCNC: 97 U/L (ref 55–135)
ALT SERPL W/O P-5'-P-CCNC: <5 U/L (ref 10–44)
ANION GAP SERPL CALC-SCNC: 7 MMOL/L (ref 8–16)
AST SERPL-CCNC: 11 U/L (ref 10–40)
BASOPHILS # BLD AUTO: 0.03 K/UL (ref 0–0.2)
BASOPHILS NFR BLD: 0.5 % (ref 0–1.9)
BILIRUB SERPL-MCNC: 1.4 MG/DL (ref 0.1–1)
BUN SERPL-MCNC: 15 MG/DL (ref 8–23)
CALCIUM SERPL-MCNC: 8.2 MG/DL (ref 8.7–10.5)
CHLORIDE SERPL-SCNC: 107 MMOL/L (ref 95–110)
CO2 SERPL-SCNC: 20 MMOL/L (ref 23–29)
CREAT SERPL-MCNC: 0.8 MG/DL (ref 0.5–1.4)
DIFFERENTIAL METHOD: ABNORMAL
EOSINOPHIL # BLD AUTO: 0.1 K/UL (ref 0–0.5)
EOSINOPHIL NFR BLD: 1.1 % (ref 0–8)
ERYTHROCYTE [DISTWIDTH] IN BLOOD BY AUTOMATED COUNT: 13.1 % (ref 11.5–14.5)
EST. GFR  (AFRICAN AMERICAN): >60 ML/MIN/1.73 M^2
EST. GFR  (NON AFRICAN AMERICAN): >60 ML/MIN/1.73 M^2
GLUCOSE SERPL-MCNC: 105 MG/DL (ref 70–110)
HCT VFR BLD AUTO: 32.1 % (ref 40–54)
HGB BLD-MCNC: 10.4 G/DL (ref 14–18)
IMM GRANULOCYTES # BLD AUTO: 0.01 K/UL (ref 0–0.04)
IMM GRANULOCYTES NFR BLD AUTO: 0.2 % (ref 0–0.5)
INR PPP: 1.6 (ref 0.8–1.2)
LYMPHOCYTES # BLD AUTO: 1.2 K/UL (ref 1–4.8)
LYMPHOCYTES NFR BLD: 18.2 % (ref 18–48)
MAGNESIUM SERPL-MCNC: 1.8 MG/DL (ref 1.6–2.6)
MCH RBC QN AUTO: 30.7 PG (ref 27–31)
MCHC RBC AUTO-ENTMCNC: 32.4 G/DL (ref 32–36)
MCV RBC AUTO: 95 FL (ref 82–98)
MONOCYTES # BLD AUTO: 0.9 K/UL (ref 0.3–1)
MONOCYTES NFR BLD: 14.3 % (ref 4–15)
NEUTROPHILS # BLD AUTO: 4.2 K/UL (ref 1.8–7.7)
NEUTROPHILS NFR BLD: 65.7 % (ref 38–73)
NRBC BLD-RTO: 0 /100 WBC
PHOSPHATE SERPL-MCNC: 2.7 MG/DL (ref 2.7–4.5)
PLATELET # BLD AUTO: 129 K/UL (ref 150–350)
PMV BLD AUTO: 9.7 FL (ref 9.2–12.9)
POCT GLUCOSE: 100 MG/DL (ref 70–110)
POCT GLUCOSE: 106 MG/DL (ref 70–110)
POCT GLUCOSE: 110 MG/DL (ref 70–110)
POCT GLUCOSE: 112 MG/DL (ref 70–110)
POCT GLUCOSE: 124 MG/DL (ref 70–110)
POCT GLUCOSE: 97 MG/DL (ref 70–110)
POTASSIUM SERPL-SCNC: 3.8 MMOL/L (ref 3.5–5.1)
PROT SERPL-MCNC: 5.7 G/DL (ref 6–8.4)
PROTHROMBIN TIME: 15.2 SEC (ref 9–12.5)
RBC # BLD AUTO: 3.39 M/UL (ref 4.6–6.2)
SODIUM SERPL-SCNC: 134 MMOL/L (ref 136–145)
WBC # BLD AUTO: 6.43 K/UL (ref 3.9–12.7)

## 2019-12-27 PROCEDURE — 99232 SBSQ HOSP IP/OBS MODERATE 35: CPT | Mod: ,,, | Performed by: NURSE PRACTITIONER

## 2019-12-27 PROCEDURE — 97110 THERAPEUTIC EXERCISES: CPT

## 2019-12-27 PROCEDURE — 99232 PR SUBSEQUENT HOSPITAL CARE,LEVL II: ICD-10-PCS | Mod: ,,, | Performed by: NURSE PRACTITIONER

## 2019-12-27 PROCEDURE — 25000003 PHARM REV CODE 250: Performed by: NURSE PRACTITIONER

## 2019-12-27 PROCEDURE — 80053 COMPREHEN METABOLIC PANEL: CPT

## 2019-12-27 PROCEDURE — 97112 NEUROMUSCULAR REEDUCATION: CPT

## 2019-12-27 PROCEDURE — 25000242 PHARM REV CODE 250 ALT 637 W/ HCPCS: Performed by: NURSE PRACTITIONER

## 2019-12-27 PROCEDURE — 63600175 PHARM REV CODE 636 W HCPCS: Performed by: PSYCHIATRY & NEUROLOGY

## 2019-12-27 PROCEDURE — 99232 PR SUBSEQUENT HOSPITAL CARE,LEVL II: ICD-10-PCS | Mod: GC,,, | Performed by: NEUROLOGICAL SURGERY

## 2019-12-27 PROCEDURE — 99222 1ST HOSP IP/OBS MODERATE 55: CPT | Mod: ,,, | Performed by: NURSE PRACTITIONER

## 2019-12-27 PROCEDURE — 63600175 PHARM REV CODE 636 W HCPCS: Performed by: NURSE PRACTITIONER

## 2019-12-27 PROCEDURE — 94640 AIRWAY INHALATION TREATMENT: CPT

## 2019-12-27 PROCEDURE — 99222 PR INITIAL HOSPITAL CARE,LEVL II: ICD-10-PCS | Mod: ,,, | Performed by: NURSE PRACTITIONER

## 2019-12-27 PROCEDURE — 84100 ASSAY OF PHOSPHORUS: CPT

## 2019-12-27 PROCEDURE — 99900026 HC AIRWAY MAINTENANCE (STAT)

## 2019-12-27 PROCEDURE — 85610 PROTHROMBIN TIME: CPT

## 2019-12-27 PROCEDURE — 11000001 HC ACUTE MED/SURG PRIVATE ROOM

## 2019-12-27 PROCEDURE — 97530 THERAPEUTIC ACTIVITIES: CPT

## 2019-12-27 PROCEDURE — 27000221 HC OXYGEN, UP TO 24 HOURS

## 2019-12-27 PROCEDURE — 83735 ASSAY OF MAGNESIUM: CPT

## 2019-12-27 PROCEDURE — 99232 SBSQ HOSP IP/OBS MODERATE 35: CPT | Mod: GC,,, | Performed by: NEUROLOGICAL SURGERY

## 2019-12-27 PROCEDURE — 94761 N-INVAS EAR/PLS OXIMETRY MLT: CPT

## 2019-12-27 PROCEDURE — 85025 COMPLETE CBC W/AUTO DIFF WBC: CPT

## 2019-12-27 PROCEDURE — 97535 SELF CARE MNGMENT TRAINING: CPT

## 2019-12-27 RX ADMIN — Medication 800 MG: at 05:12

## 2019-12-27 RX ADMIN — CARBIDOPA AND LEVODOPA 1 TABLET: 25; 100 TABLET ORAL at 04:12

## 2019-12-27 RX ADMIN — POTASSIUM & SODIUM PHOSPHATES POWDER PACK 280-160-250 MG 2 PACKET: 280-160-250 PACK at 09:12

## 2019-12-27 RX ADMIN — CARBIDOPA AND LEVODOPA 1 TABLET: 25; 100 TABLET ORAL at 09:12

## 2019-12-27 RX ADMIN — LEVETIRACETAM 250 MG: 100 INJECTION, SOLUTION, CONCENTRATE INTRAVENOUS at 09:12

## 2019-12-27 RX ADMIN — VANCOMYCIN HYDROCHLORIDE 1250 MG: 1.25 INJECTION, POWDER, LYOPHILIZED, FOR SOLUTION INTRAVENOUS at 09:12

## 2019-12-27 RX ADMIN — LEVETIRACETAM 250 MG: 100 INJECTION, SOLUTION, CONCENTRATE INTRAVENOUS at 10:12

## 2019-12-27 RX ADMIN — SENNOSIDES AND DOCUSATE SODIUM 1 TABLET: 8.6; 5 TABLET ORAL at 09:12

## 2019-12-27 RX ADMIN — Medication 800 MG: at 09:12

## 2019-12-27 RX ADMIN — IPRATROPIUM BROMIDE AND ALBUTEROL SULFATE 3 ML: .5; 3 SOLUTION RESPIRATORY (INHALATION) at 11:12

## 2019-12-27 RX ADMIN — CARBIDOPA AND LEVODOPA 1 TABLET: 25; 100 TABLET ORAL at 01:12

## 2019-12-27 RX ADMIN — POTASSIUM CHLORIDE 40 MEQ: 20 SOLUTION ORAL at 05:12

## 2019-12-27 RX ADMIN — POTASSIUM & SODIUM PHOSPHATES POWDER PACK 280-160-250 MG 2 PACKET: 280-160-250 PACK at 05:12

## 2019-12-27 RX ADMIN — IPRATROPIUM BROMIDE AND ALBUTEROL SULFATE 3 ML: .5; 3 SOLUTION RESPIRATORY (INHALATION) at 12:12

## 2019-12-27 RX ADMIN — IPRATROPIUM BROMIDE AND ALBUTEROL SULFATE 3 ML: .5; 3 SOLUTION RESPIRATORY (INHALATION) at 04:12

## 2019-12-27 RX ADMIN — SODIUM CHLORIDE: 0.9 INJECTION, SOLUTION INTRAVENOUS at 03:12

## 2019-12-27 RX ADMIN — PIPERACILLIN AND TAZOBACTAM 4.5 G: 4; .5 INJECTION, POWDER, FOR SOLUTION INTRAVENOUS at 04:12

## 2019-12-27 RX ADMIN — PIPERACILLIN AND TAZOBACTAM 4.5 G: 4; .5 INJECTION, POWDER, FOR SOLUTION INTRAVENOUS at 09:12

## 2019-12-27 RX ADMIN — IPRATROPIUM BROMIDE AND ALBUTEROL SULFATE 3 ML: .5; 3 SOLUTION RESPIRATORY (INHALATION) at 08:12

## 2019-12-27 NOTE — PROGRESS NOTES
[facility]  Neurosurgery  Progress Note    Subjective:     History of Present Illness: 86 M with an EDH/SDH. PMH of Parkinson's, A Fib on coumadin, he was going to the bathroom when he tripped and fell, hitting his head.He denies LOC. He denies numbness, weakness, blurred vision, neck pain. He received K Centra in the emergency department.    Post-Op Info:  * No surgery found *         Interval History: NAEON. cEEG in place, read pending. Neurologically stable.     Medications:  Continuous Infusions:   sodium chloride 0.9% 75 mL/hr at 12/27/19 1002     Scheduled Meds:   albuterol-ipratropium  3 mL Nebulization Q4H    carbidopa-levodopa  mg  1 tablet Oral QID    levetiracetam IVPB  250 mg Intravenous Q12H    piperacillin-tazobactam 4.5 g in sodium chloride 0.9% 100 mL IVPB (ready to mix system)  4.5 g Intravenous Q8H    senna-docusate 8.6-50 mg  1 tablet Oral Daily    vancomycin (VANCOCIN) IVPB  15 mg/kg Intravenous Q24H     PRN Meds:acetaminophen, albuterol-ipratropium, dextrose 10 % in water (D10W), glucagon (human recombinant), insulin aspart U-100, labetalol, magnesium oxide, magnesium oxide, ondansetron, potassium chloride 10%, potassium chloride 10%, potassium chloride 10%, potassium, sodium phosphates, potassium, sodium phosphates, potassium, sodium phosphates, sodium chloride 0.9%, Pharmacy to dose Vancomycin consult **AND** vancomycin - pharmacy to dose     Review of Systems  Objective:     Weight: 77.1 kg (169 lb 15.9 oz)  Body mass index is 24.39 kg/m².  Vital Signs (Most Recent):  Temp: 98.4 °F (36.9 °C) (12/27/19 0702)  Pulse: 80 (12/27/19 1002)  Resp: (!) 30 (12/27/19 1002)  BP: (!) 142/102 (12/27/19 1002)  SpO2: (!) 94 % (12/27/19 1002) Vital Signs (24h Range):  Temp:  [98.4 °F (36.9 °C)-101 °F (38.3 °C)] 98.4 °F (36.9 °C)  Pulse:  [68-80] 80  Resp:  [18-36] 30  SpO2:  [92 %-99 %] 94 %  BP: (104-163)/() 142/102     Date 12/27/19 0700 - 12/28/19 0659   Shift  [id number] 8918-3499 2385-0659 24 Hour Total   INTAKE   I.V.(mL/kg) 296.3(3.8)   296.3(3.8)   IV Piggyback 450   450   Shift Total(mL/kg) 746.3(9.7)   746.3(9.7)   OUTPUT   Urine(mL/kg/hr) 700   700   Shift Total(mL/kg) 700(9.1)   700(9.1)   Weight (kg) 77.1 77.1 77.1 77.1                   Male External Urinary Catheter 12/24/19 0700 (Active)   Collection Container Urimeter 12/27/2019  7:02 AM   Securement Method secured to top of thigh w/ adhesive device 12/27/2019  7:02 AM   Skin no redness;no breakdown;penis/scrotum cleansed w/ soap and water 12/27/2019  7:02 AM   Tolerance no signs/symptoms of discomfort 12/27/2019  7:02 AM   Output (mL) 100 mL 12/27/2019  8:02 AM   Catheter Change Date 12/27/19 12/27/2019  7:02 AM   Catheter Change Time 0900 12/27/2019  7:02 AM       Neurosurgery Physical Exam  AO person/place  cEEG in place  PERRL  FCX4  Significant Labs:  Recent Labs   Lab 12/25/19  1048 12/26/19 0057 12/27/19 0147   GLU  --  92 105   * 134* 134*   K  --  4.0 3.8   CL  --  103 107   CO2  --  25 20*   BUN  --  15 15   CREATININE  --  0.8 0.8   CALCIUM  --  8.7 8.2*   MG  --  2.0 1.8     Recent Labs   Lab 12/26/19 0057 12/27/19 0147   WBC 7.72 6.43   HGB 11.5* 10.4*   HCT 35.9* 32.1*    129*     Recent Labs   Lab 12/26/19 0057 12/27/19 0147   INR 1.6* 1.6*     Microbiology Results (last 7 days)     Procedure Component Value Units Date/Time    Culture, Respiratory with Gram Stain [[id number]]  (Abnormal) Collected:  12/26/19 1024    Order Status:  Completed Specimen:  Respiratory from Sputum Updated:  12/27/19 0737     Respiratory Culture STAPHYLOCOCCUS AUREUS  Moderate  Susceptibility pending  Normal respiratory debby also present       Gram Stain (Respiratory) >10 epithelial cells per low power field     Gram Stain (Respiratory) Many WBC's     Gram Stain (Respiratory) Many budding yeast     Gram Stain (Respiratory) Many Gram positive cocci     Gram Stain (Respiratory) Moderate Gram negative  rods     Gram Stain (Respiratory) Few Gram positive rods    Blood culture [[id number]] Collected:  12/25/19 2153    Order Status:  Completed Specimen:  Blood from Peripheral, Forearm, Left Updated:  12/27/19 0612     Blood Culture, Routine No Growth to date      No Growth to date    Narrative:       Blood cultures x 2 different sites. 4 bottles total. Please  draw cultures before administering antibiotics.    Blood culture [[id number]] Collected:  12/25/19 2150    Order Status:  Completed Specimen:  Blood from Peripheral, Hand, Right Updated:  12/27/19 0612     Blood Culture, Routine No Growth to date      No Growth to date    Narrative:       Blood cultures from 2 different sites. 4 bottles total.  Please draw before starting antibiotics.            Assessment/Plan:     Parkinson disease  86 M w/ PMH of Parkinson's, A Fib on Coumadin, who presents with a R frontal extraaxial hemorrhage now s/p K Centra:    --Neuro improved, stable this AM  --cEEG in place, read pending  --SBP less than 150  - Continue Keppra  --INR goal of less than 1.3  --Keppra  --HOB 30-40  --TTF to NSGY once appropriate per NCC        Rachel Alaniz MD  Neurosurgery  [facility]

## 2019-12-27 NOTE — PLAN OF CARE
POC reviewed with pt at 0500. Pt verbalized understanding. Questions and concerns addressed. No acute events overnight. Pt progressing toward goals. Will continue to monitor. See flowsheets for full assessment and VS info     NS gtt @ 75  AM labs collected. K Mg, Phos replaced

## 2019-12-27 NOTE — SUBJECTIVE & OBJECTIVE
Interval History: NAEON. cEEG in place, read pending. Neurologically stable.     Medications:  Continuous Infusions:   sodium chloride 0.9% 75 mL/hr at 12/27/19 1002     Scheduled Meds:   albuterol-ipratropium  3 mL Nebulization Q4H    carbidopa-levodopa  mg  1 tablet Oral QID    levetiracetam IVPB  250 mg Intravenous Q12H    piperacillin-tazobactam 4.5 g in sodium chloride 0.9% 100 mL IVPB (ready to mix system)  4.5 g Intravenous Q8H    senna-docusate 8.6-50 mg  1 tablet Oral Daily    vancomycin (VANCOCIN) IVPB  15 mg/kg Intravenous Q24H     PRN Meds:acetaminophen, albuterol-ipratropium, dextrose 10 % in water (D10W), glucagon (human recombinant), insulin aspart U-100, labetalol, magnesium oxide, magnesium oxide, ondansetron, potassium chloride 10%, potassium chloride 10%, potassium chloride 10%, potassium, sodium phosphates, potassium, sodium phosphates, potassium, sodium phosphates, sodium chloride 0.9%, Pharmacy to dose Vancomycin consult **AND** vancomycin - pharmacy to dose     Review of Systems  Objective:     Weight: 77.1 kg (169 lb 15.9 oz)  Body mass index is 24.39 kg/m².  Vital Signs (Most Recent):  Temp: 98.4 °F (36.9 °C) (12/27/19 0702)  Pulse: 80 (12/27/19 1002)  Resp: (!) 30 (12/27/19 1002)  BP: (!) 142/102 (12/27/19 1002)  SpO2: (!) 94 % (12/27/19 1002) Vital Signs (24h Range):  Temp:  [98.4 °F (36.9 °C)-101 °F (38.3 °C)] 98.4 °F (36.9 °C)  Pulse:  [68-80] 80  Resp:  [18-36] 30  SpO2:  [92 %-99 %] 94 %  BP: (104-163)/() 142/102     Date 12/27/19 0700 - 12/28/19 0659   Shift 7178-0892 6092-4521 8357-4368 24 Hour Total   INTAKE   I.V.(mL/kg) 296.3(3.8)   296.3(3.8)   IV Piggyback 450   450   Shift Total(mL/kg) 746.3(9.7)   746.3(9.7)   OUTPUT   Urine(mL/kg/hr) 700   700   Shift Total(mL/kg) 700(9.1)   700(9.1)   Weight (kg) 77.1 77.1 77.1 77.1                   Male External Urinary Catheter 12/24/19 0700 (Active)   Collection Container Urimeter 12/27/2019  7:02 AM   Securement  Method secured to top of thigh w/ adhesive device 12/27/2019  7:02 AM   Skin no redness;no breakdown;penis/scrotum cleansed w/ soap and water 12/27/2019  7:02 AM   Tolerance no signs/symptoms of discomfort 12/27/2019  7:02 AM   Output (mL) 100 mL 12/27/2019  8:02 AM   Catheter Change Date 12/27/19 12/27/2019  7:02 AM   Catheter Change Time 0900 12/27/2019  7:02 AM       Neurosurgery Physical Exam  AO person/place  cEEG in place  PERRL  FCX4  Significant Labs:  Recent Labs   Lab 12/25/19  1048 12/26/19 0057 12/27/19 0147   GLU  --  92 105   * 134* 134*   K  --  4.0 3.8   CL  --  103 107   CO2  --  25 20*   BUN  --  15 15   CREATININE  --  0.8 0.8   CALCIUM  --  8.7 8.2*   MG  --  2.0 1.8     Recent Labs   Lab 12/26/19 0057 12/27/19 0147   WBC 7.72 6.43   HGB 11.5* 10.4*   HCT 35.9* 32.1*    129*     Recent Labs   Lab 12/26/19 0057 12/27/19 0147   INR 1.6* 1.6*     Microbiology Results (last 7 days)     Procedure Component Value Units Date/Time    Culture, Respiratory with Gram Stain [[id number]]  (Abnormal) Collected:  12/26/19 1024    Order Status:  Completed Specimen:  Respiratory from Sputum Updated:  12/27/19 0737     Respiratory Culture STAPHYLOCOCCUS AUREUS  Moderate  Susceptibility pending  Normal respiratory debby also present       Gram Stain (Respiratory) >10 epithelial cells per low power field     Gram Stain (Respiratory) Many WBC's     Gram Stain (Respiratory) Many budding yeast     Gram Stain (Respiratory) Many Gram positive cocci     Gram Stain (Respiratory) Moderate Gram negative rods     Gram Stain (Respiratory) Few Gram positive rods    Blood culture [[id number]] Collected:  12/25/19 2153    Order Status:  Completed Specimen:  Blood from Peripheral, Forearm, Left Updated:  12/27/19 0612     Blood Culture, Routine No Growth to date      No Growth to date    Narrative:       Blood cultures x 2 different sites. 4 bottles total. Please  draw cultures before administering antibiotics.     Blood culture [[id number]] Collected:  12/25/19 2150    Order Status:  Completed Specimen:  Blood from Peripheral, Hand, Right Updated:  12/27/19 0612     Blood Culture, Routine No Growth to date      No Growth to date    Narrative:       Blood cultures from 2 different sites. 4 bottles total.  Please draw before starting antibiotics.

## 2019-12-27 NOTE — PROCEDURES
DATE: 12/26/19    EEG NUMBER:   -1,  -2    REFERRING PHYSICIAN:  Dr. Dang      This EEG was performed to assess for evidence of underlying epilepsy.     ELECTROENCEPHALOGRAM REPORT     METHODOLOGY:  Electroencephalographic (EEG) is recorded with electrodes placed according to the International 10-20 placement system.  Thirty two (32) channels of digital signal (sampling rate of 512/sec), including T1 and T2, were simultaneously recorded from the scalp and may include EKG, EMG, and/or eye monitors.  Recording band pass was 0.1 to 512 Hz.  Digital video recording of the patient is simultaneously recorded with the EEG.  The patient is instructed to report clinical symptoms which may occur during the recording session.  EEG   and video recording are stored and archived in digital format.  Activation procedures, which include photic stimulation, hyperventilation and instructing patients to perform simple tasks, are done in selected patients.     The EEG is displayed on a monitor screen and can be reviewed using different montages.  Computer-assisted analysis is employed to detect spike and electrographic seizure activity.  The entire record is submitted for computer analysis.  The entire recording is visually reviewed, and the times identified by computer analysis as being spikes or seizures are reviewed again.     Compressed spectral analysis (CSA) is also performed on the activity recorded from each individual channel.  This is displayed as a power display of frequencies from 0 to 30 Hz over time.  The CSA is reviewed looking for asymmetries in power between homologous areas of the scalp, then compared with the original EEG recording.     Bringme software was also utilized in the review of this study.  This software suite analyzes the EEG recording in multiple domains.  Coherence and rhythmicity are computed to identify EEG sections which may contain organized seizures.  Each channel undergoes  analysis to detect the presence of spike and sharp waves which have special and morphological characteristics of epileptic activity.  The routine EEG recording is converted from special into frequency domain.  This is then displayed comparing homologous areas to identify areas of significant   asymmetry.  Algorithm to identify non-cortically generated artifact is used to separate artifact from the EEG.     Recording times  Start on December 26, 2019 at hour 10 min 16 sec 31  End on December 27, 2019 at hours 7 min 0 sec 7,  Restart on December 27, 2019 at hours 7 min 2 sec 24  End on December 27, 2019 hr 9 min 15 sec 48  The total time of EEG recording for this study was 22 hr and 52 min    EEG FINDINGS:  The recording was obtained with a number of standard bipolar and referential montages during wakefulness, drowsiness and sleep.  In the alert state, the posterior background rhythm was a symmetric, well-modulated nonsustained 9 Hz alpha rhythm, which reacted symmetrically to eye opening. Activation procedures are not performed.  Mixed theta range slowing was noted throughout the record.  Intermittent right temporal focal polymorphic mixed theta and delta range slowing was noted.  During drowsiness, the background rhythm waxed and waned and there were periods of slowing.  During stage II sleep, symmetric V waves and sleep spindles were noted.   There were no interictal epileptiform abnormalities and no clinical or electrographic seizures were recorded.  Portions of the record are obscured by artifacts related electrodes F4 and T6  The EKG channel revealed a sinus rhythm.     IMPRESSION:  This is an abnormal EEG during wakefulness, drowsiness and sleep.  Mild slowing of the background was noted.  Right temporal focal slowing was noted intermittently     CLINICAL CORRELATION:  The patient is a 86 -year-old male with a history of subdural hematoma who is currently maintained on Keppra. This is an abnormal EEG during  wakefulness, drowsiness and sleep.  The presence of mild slowing of the background suggestive of mild encephalopathy nonspecific to the cause.  The presence of her intermittent right temporal slowing suggestive of focal neuronal dysfunction in this region.  There is no evidence of an epileptic process and is recording.  No seizures were recorded during this study.

## 2019-12-27 NOTE — ASSESSMENT & PLAN NOTE
-NPO  -speech following   -following respiratory cultures, covering with broad spectrum abx. Will narrow once cultures has resulted .

## 2019-12-27 NOTE — HOSPITAL COURSE
12/24/19: Evaluated by PT & OT. Bed mobility Vanda w/ RW. Ambulated 10 ft CGA w/ RW.   12/27/19: participated w/ PT & OT. Bed mobility maxA. Sit to stand modA x 2 ppl. LBD totalA.

## 2019-12-27 NOTE — PROGRESS NOTES
[facility]  Neurocritical Care  Progress Note    Admit Date: 12/24/2019  Service Date: 12/26/2019  Length of Stay: 2    Subjective:     Chief Complaint: Traumatic epidural hematoma without loss of consciousness    History of Present Illness:   85 yo M, PMH Afib &  parkinson, presents as transfer for eval of SDH/EDH. He originally presented to OSH after falling at [facility], after losing his balance while walking. He denies LOC. Of note, he is on Coumadin for Afib, INR 1.9. He was given PCC prior to transfer, INR currently 1.1. CTH there showed trace right frontal SDH, with right EDH. Transferred to [facility] [place] for NSGY Eval. NCC to admit.       Hospital Course: 12/24: Arrived [facility], CTH with EDH/SDH  12/25: NAEON. Tolerating PO medications. EEG and CXR  12/26: Hypotensive overnight, responded to a fluid bolus. EEG showed mild slowing, no seizure activity.     Interval History:  Hypotensive overnight, bp responded to fluid bolus. Will hold home bp meds and reevaluate in the morning. Pt is more awake today. EEG with mild slowing per Dr Fried. Repeat chest xray with lower lobe subsegmental atelectasis, will follow respiratory status. High suspicion for possible aspiration. NPO expect for meds today, will reevaluate with speech tomorrow. Following cultures.      Review of Systems   Constitutional: Negative.    HENT: Negative.    Eyes: Negative.    Respiratory: Negative.    Cardiovascular: Negative.    Gastrointestinal: Negative.    Genitourinary: Positive for difficulty urinating.   Musculoskeletal: Negative.    Skin: Negative.    Neurological: Negative.    Psychiatric/Behavioral: Negative.        Objective:     Vitals:  Temp: 99.5 °F (37.5 °C)  Pulse: 75  Rhythm: normal sinus rhythm, paced rhythm  BP: (!) 152/65  MAP (mmHg): 94  Resp: 18  SpO2: (!) 94 %  O2 Device (Oxygen Therapy): room air    Temp  Min: 98.6 °F (37 °C)  Max: 100.9 °F (38.3 °C)  Pulse  Min: 68  Max: 84  BP  Min: 86/52  Max:  152/65  MAP (mmHg)  Min: 66  Max: 94  Resp  Min: 18  Max: 35  SpO2  Min: 94 %  Max: 99 %  Oxygen Concentration (%)  Min: 28  Max: 28    12/25 0701 - 12/26 0700  In: 277.3 [I.V.:277.3]  Out: 795 [Urine:795]   Unmeasured Output  Urine Occurrence: 1  Stool Occurrence: 0  Pad Count: 2       Physical Exam   Constitutional: He appears well-developed and well-nourished. No distress.   HENT:   Head: Normocephalic and atraumatic.   Right Ear: External ear normal.   Left Ear: External ear normal.   Eyes: Pupils are equal, round, and reactive to light. Conjunctivae and EOM are normal.   Neck: Normal range of motion.   Cardiovascular: An irregular rhythm present.   Pulmonary/Chest: Effort normal.   Rhonchi breath sounds to bilateral bases    Abdominal: Soft. Bowel sounds are normal.   Neurological: He is alert. No sensory deficit. GCS eye subscore is 4. GCS verbal subscore is 5. GCS motor subscore is 6.   GENERAL:  General appearance: Well, non-toxic appearing.  No apparent distress.     MENTAL STATUS:  Awake, alert, and oriented to person and place. Disoriented to time and situation.    Language: fluent       SPEECH:  Clear, and able to follow commands      CRANIAL NERVES:  Cranial Nerves II-XII were examined and grossly intact      GROSS MOTOR:  BANUELOS spontaneous   Parkinsonian tremor in all extremities     Skin: Skin is warm and dry. Capillary refill takes less than 2 seconds.   Psychiatric: He has a normal mood and affect.       Medications:  Continuous  sodium chloride 0.9% Last Rate: 75 mL/hr at 12/26/19 1600   phenylephrine    Scheduled  albuterol-ipratropium 3 mL Q4H   carbidopa-levodopa  mg 1 tablet QID   levetiracetam IVPB 250 mg Q12H   piperacillin-tazobactam 4.5 g in sodium chloride 0.9% 100 mL IVPB (ready to mix system) 4.5 g Q8H   senna-docusate 8.6-50 mg 1 tablet Daily   [START ON 12/27/2019] vancomycin (VANCOCIN) IVPB 15 mg/kg Q24H   PRN  acetaminophen 650 mg Q6H PRN   albuterol-ipratropium 3 mL Q4H PRN    dextrose 10 % in water (D10W) 12.5 g PRN   glucagon (human recombinant) 1 mg PRN   insulin aspart U-100 0-5 Units Q4H PRN   labetalol 10 mg Q4H PRN   magnesium oxide 800 mg PRN   magnesium oxide 800 mg PRN   ondansetron 4 mg Q8H PRN   potassium chloride 10% 40 mEq PRN   potassium chloride 10% 40 mEq PRN   potassium chloride 10% 60 mEq PRN   potassium, sodium phosphates 2 packet PRN   potassium, sodium phosphates 2 packet PRN   potassium, sodium phosphates 2 packet PRN   sodium chloride 0.9% 10 mL PRN   vancomycin - pharmacy to dose  pharmacy to manage frequency     Today I personally reviewed pertinent medications, lines/drains/airways, imaging, laboratory results, notably:    Diet  Diet NPO Except for: Medication  Diet NPO Except for: Medication        Assessment/Plan:     Neuro  Parkinson disease  - Continue carbidopa-levodopa     Cardiac/Vascular  Hypertension  - SBP <140  -Holding BP meds due to hypotensive episode overnight, will reevaluate in am.    - Echo on admit  · Mild left ventricular enlargement.  · Normal left ventricular systolic function. The estimated ejection fraction is 65%  · Eccentric left ventricular hypertrophy.  · Indeterminate left ventricular diastolic function.  · Normal right ventricular systolic function.  · Severe left atrial enlargement.  · Mild to moderate tricuspid regurgitation.  · Mild mitral regurgitation.  · Low flow Moderate to severe aortic valve stenosis. Aortic valve area is 0.99 cm2; peak velocity is 2.77 m/s; mean gradient is 18 mmHg.  · The estimated PA systolic pressure is 58 mm Hg. Pulmonary hypertension present.  · Intermediate central venous pressure (8 mm Hg).               The patient is being Prophylaxed for:  Venous Thromboembolism with: Not Applicable   Stress Ulcer with: Not Applicable   Ventilator Pneumonia with: not applicable    Activity Orders          Diet NPO Except for: Medication: NPO starting at 12/26 1710    Commode at bedside starting at 12/24 7471         Partial Code    Maria L Varela NP  Neurocritical Care  [facility]

## 2019-12-27 NOTE — ASSESSMENT & PLAN NOTE
- CTH with trace right frontal SDH, and right EDH after fall  - NSGY following, no acute intervention  - Coumadin reveresed with PCC prior to transport, current INR 1.1. Coags otherwise WNL  - Hold coumadin, discussed risk of ischemic stroke while coumadin held with family   - Will stepdown to neurosurgery services today   - Keppra ppx  - Repeat CTH stable  - PT/OT/SLP following

## 2019-12-27 NOTE — ASSESSMENT & PLAN NOTE
- On coumadin for Atrial Fib, INR 1.9 at OSH, given PCC before transport   - INR currently 1.6  - Will hold coumadin at this time, given acute head bleed.

## 2019-12-27 NOTE — PT/OT/SLP PROGRESS
Physical Therapy   Progress Note    Patient Name:  Elvis Thompson  MRN: [id number]  Recent Surgery: * No surgery found *     Recommendations:     Discharge Recommendations: Return to nursing home, with 24/7 care   Equipment recommendations: none  Barriers to discharge: Fall risk     Assessment:     Elvis Thompson is a 86 y.o. male admitted to [facility] [place] on 12/24/2019 with medical diagnosis of traumatic epidural hematoma without loss of consciousness. Pt presents with weakness, impaired balance, decreased endurance, decreased safety awareness, impaired functional mobility , gait instability and decreased trunk control.  Elvis Thompson would benefit from continued acute PT intervention to address listed functional deficits, provide patient/caregiver education, reduce fall risk, and maximize (I) and safety with functional mobility. Once medically stable, recommending pt return to NH with 24/7 care and continued therapy upon d/c.     Rehab Prognosis: Good; patient would benefit from acute skilled PT services to address these deficits and reach maximum level of function.      Plan:     During this hospitalization, patient to be seen 3 x/week to address the identified rehab impairments via gait training, therapeutic activities, therapeutic exercises, neuromuscular re-education, wheelchair management/training and progress towards stated goals.     Plan of Care Expires:  01/23/20  Plan of Care reviewed with: patient, pt's daughter    This plan of care has been discussed with the patient/caregiver, who was included in its development and is in agreement with the identified goals and treatment plan.     Subjective     Communicated with RN prior to session.  Patient agreeable to participate. Pt's daughter present at bedside upon PT entrance into room.    Patient comments/goals: Pt with no reported goals; per daughter, she is excited for pt to be moving with therapy     Pain/Comfort:  · Pt reported no pain during session   · Pt  assisted with repositioning for comfort     Patients cultural, spiritual, Religion conflicts given the current situation: No cultural, spiritual, or educational needs identified.    Objective:     Patient found HOB elevated with: blood pressure cuff, pulse ox (continuous), telemetry, peripheral IV, Condom Catheter    General Precautions: Standard, fall, aspiration   Orthopedic Precautions: None  Braces: N/A  Body mass index is 24.39 kg/m².  Oxygen Device: nasal cannula    Cognition:   Pt is Alert and Cooperative during session.    Functional Mobility:    Bed Mobility:  · Supine > Sit: Maximum Assistance  · Sit > Supine: Maximum Assistance    Transfers:   · Sit <> Stand Transfer: Moderate Assistance x 2 persons from EOB with no AD                  Gait: Pt unable to perform 2/2 weakness     Balance:  · Static Sit: Contact-Guard Assist - Minimum Assist at EOB x ~12 minutes  · Static Stand: Total Assist with Hand-held assist x 2    Outcome Measure: AM-PAC 6 CLICK MOBILITY  Total Score:9     Patient/Caregiver Education and Therapeutic Activities/Exercises     Therex for BLE strengthening and flexibility: performed AAROM for ankle pf/df and knee flex/ext with prolonged stretch to hamstrings.     Neuromuscular re-education facilitated to improve gross motor coordination, sitting and standing balance and postural awareness.     Therapist educated pt/caregiver regarding:    PT POC and goals for therapy    Safety with mobility and fall risk    Instruction on use of call button and importance of calling nursing staff for assistance with mobility     Patient/caregiver able to verbalize understanding; will follow-up with pt/caregiver during current admit for additional questions/concerns within scope of practice.     White board updated.     Patient left HOB elevated with all lines intact, call button in reach, bed alarm on, RN notified and daughter present.    Goals:     Multidisciplinary Problems     Physical Therapy  Goals        Problem: Physical Therapy Goal    Goal Priority Disciplines Outcome Goal Variances Interventions   Physical Therapy Goal     PT, PT/OT Ongoing, Progressing     Description:  Goals to be met by: 2020    Patient will increase functional independence with mobility by performin. Supine <> sit with Moderate Assistance.  2. Sit <> stand transfer with Contact Guard Assistance using Rolling Walker.  3. Bed <> chair transfer via Squat Pivot with Minimal Assistance using Rolling Walker.  4. Gait  x 25 feet with Minimal Assistance using Rolling Walker to prepare for community ambulation and endurance activities.  5. Able to tolerate exercise for 15-20 reps with independence.                        Time Tracking:       PT Received On: 19  PT Start Time: 0945     PT Stop Time: 1008  PT Total Time (min): 23 min     Billable Minutes: Therapeutic Exercise 8 min and Neuromuscular Re-education 15 min     Deepika Mckee PT, DPT   2019  Pager: [phone]

## 2019-12-27 NOTE — PLAN OF CARE
Continue OT plan of care.    Problem: Occupational Therapy Goal  Goal: Occupational Therapy Goal  Description  Goals to be met by: 7 days (12/31/19)     Patient will increase functional independence with ADLs by performing:    Feeding with Stand-by Assistance.  UE Dressing with Contact Guard Assistance.  Supine to sit with Minimal Assistance.  Toilet transfer to toilet with Minimal Assistance.     Outcome: Ongoing, Progressing

## 2019-12-27 NOTE — SUBJECTIVE & OBJECTIVE
Past Medical History:   Diagnosis Date    Anticoagulant long-term use     Bradycardia     CHF (congestive heart failure)     Chronic atrial fibrillation     Chronic combined systolic and diastolic CHF (congestive heart failure)     HHD (hypertensive heart disease)     HTN (hypertension)     LBBB (left bundle branch block)     Left bundle-branch block, unspecified     NSVT (nonsustained ventricular tachycardia)     Parkinson disease 03/24/2017    Parkinson's disease     Pneumonia     Pulmonary embolism     Shingles     Thyroid disease     nodules    Ventricular tachycardia      Past Surgical History:   Procedure Laterality Date    CARDIAC PACEMAKER PLACEMENT      cataract      FOOT SURGERY      HAND SURGERY      HERNIA REPAIR      INSERTION OF BIVENTRICULAR IMPLANTABLE CARDIOVERTER-DEFIBRILLATOR (ICD) N/A 1/23/2019    Procedure: INSERTION, ICD, BIVENTRICULAR, upgrade;  Surgeon: Luiz Soliman MD;  Location: [facility] C[facility];  Service: Cardiology;  Laterality: N/A;    KNEE SURGERY      right knee replacement    LEFT HEART CATHETERIZATION Left 1/23/2019    Procedure: Left heart cath;  Surgeon: Luiz Soliman MD;  Location: [facility] CATH;  Service: Cardiology;  Laterality: Left;    PROSTATE SURGERY       Review of patient's allergies indicates:   Allergen Reactions    Iodine and iodide containing products Other (See Comments)    Codeine Other (See Comments)     Insomnia      Morphine Nausea And Vomiting       Scheduled Medications:    albuterol-ipratropium  3 mL Nebulization Q4H    carbidopa-levodopa  mg  1 tablet Oral QID    levetiracetam IVPB  250 mg Intravenous Q12H    piperacillin-tazobactam 4.5 g in sodium chloride 0.9% 100 mL IVPB (ready to mix system)  4.5 g Intravenous Q8H    senna-docusate 8.6-50 mg  1 tablet Oral Daily    vancomycin (VANCOCIN) IVPB  15 mg/kg Intravenous Q24H       PRN Medications: acetaminophen, albuterol-ipratropium, dextrose 10 % in water (D10W), glucagon  (human recombinant), insulin aspart U-100, labetalol, magnesium oxide, magnesium oxide, ondansetron, potassium chloride 10%, potassium chloride 10%, potassium chloride 10%, potassium, sodium phosphates, potassium, sodium phosphates, potassium, sodium phosphates, sodium chloride 0.9%, Pharmacy to dose Vancomycin consult **AND** vancomycin - pharmacy to dose    Family History     Problem Relation (Age of Onset)    Heart disease Mother        Tobacco Use    Smoking status: Never Smoker    Smokeless tobacco: Never Used   Substance and Sexual Activity    Alcohol use: No    Drug use: Not on file    Sexual activity: Not Currently     Review of Systems   Reason unable to perform ROS: unable to get full ROS 2/2 CLI.   Constitutional: Positive for activity change.   Neurological: Positive for weakness.   Psychiatric/Behavioral: Positive for confusion.     Objective:     Vital Signs (Most Recent):  Temp: 98.8 °F (37.1 °C) (12/27/19 1102)  Pulse: 69 (12/27/19 1213)  Resp: 19 (12/27/19 1213)  BP: (!) 147/68 (12/27/19 1202)  SpO2: 96 % (12/27/19 1213)    Vital Signs (24h Range):  Temp:  [98.4 °F (36.9 °C)-101 °F (38.3 °C)] 98.8 °F (37.1 °C)  Pulse:  [68-80] 69  Resp:  [18-36] 19  SpO2:  [89 %-99 %] 96 %  BP: (104-163)/() 147/68     Body mass index is 24.39 kg/m².    Physical Exam   Constitutional: He appears well-developed and well-nourished.   HENT:   Head: Normocephalic and atraumatic.   Eyes: Pupils are equal, round, and reactive to light. EOM are normal.   Pulmonary/Chest: Effort normal. No respiratory distress.   Musculoskeletal: He exhibits no deformity.   Neurological:   - Lethargic  - Easily arousable   - Not oriented to location or time  - oriented to self  - following commands   Skin: Skin is warm and dry.   Psychiatric: His behavior is normal.   Nursing note and vitals reviewed.    NEUROLOGICAL EXAMINATION:     CRANIAL NERVES     CN III, IV, VI   Pupils are equal, round, and reactive to light.  Extraocular  motions are normal.       Diagnostic Results:   Labs: Reviewed  ECG: Reviewed  CT: Reviewed

## 2019-12-27 NOTE — PLAN OF CARE
Problem: Physical Therapy Goal  Goal: Physical Therapy Goal  Description  Goals to be met by: 2020    Patient will increase functional independence with mobility by performin. Supine <> sit with Moderate Assistance.  2. Sit <> stand transfer with Contact Guard Assistance using Rolling Walker.  3. Bed <> chair transfer via Squat Pivot with Minimal Assistance using Rolling Walker.  4. Gait  x 25 feet with Minimal Assistance using Rolling Walker to prepare for community ambulation and endurance activities.  5. Able to tolerate exercise for 15-20 reps with independence.       Outcome: Ongoing, Progressing     Goals remain appropriate. Continue current POC, progressing as tolerated.     Deepika Mckee PT, DPT   2019  Pager: [phone]

## 2019-12-27 NOTE — PROGRESS NOTES
[facility]  Neurocritical Care  Progress Note    Admit Date: 12/24/2019  Service Date: 12/27/2019  Length of Stay: 3    Subjective:     Chief Complaint: Traumatic epidural hematoma without loss of consciousness    History of Present Illness:   85 yo M, PMH Afib &  parkinson, presents as transfer for eval of SDH/EDH. He originally presented to OSH after falling at [facility], after losing his balance while walking. He denies LOC. Of note, he is on Coumadin for Afib, INR 1.9. He was given PCC prior to transfer, INR currently 1.1. CTH there showed trace right frontal SDH, with right EDH. Transferred to [facility] [place] for NSGY Eval. NCC to admit.       Hospital Course: 12/24: Arrived [facility], CTH with EDH/SDH  12/25: NAEON. Tolerating PO medications. EEG and CXR  12/26: Hypotensive overnight, responded to a fluid bolus. EEG showed mild slowing, no seizure activity.   12/27: NAEON. Remains NPO, speech following. Stepdown today.    Interval History:  No acute events overnight. Pt remained normotensive. Following cultures, will narrow abx when appropriate. Followed by speech, will continue to reevaluate when ok to order diet. For now patient will remain NPO.   Review of Systems   Constitutional: Negative for activity change.   HENT: Negative.    Eyes: Negative.    Respiratory: Negative.    Cardiovascular: Negative.    Gastrointestinal: Negative.    Genitourinary: Negative for difficulty urinating.   Skin: Negative.    Neurological: Positive for weakness. Negative for facial asymmetry, light-headedness and numbness.       Objective:     Vitals:  Temp: 98.8 °F (37.1 °C)  Pulse: 81  Rhythm: normal sinus rhythm  BP: (!) 120/96  MAP (mmHg): 105  Resp: (!) 26  SpO2: (!) 94 %  Oxygen Concentration (%): 28  O2 Device (Oxygen Therapy): nasal cannula    Temp  Min: 98.4 °F (36.9 °C)  Max: 101 °F (38.3 °C)  Pulse  Min: 68  Max: 81  BP  Min: 104/52  Max: 163/72  MAP (mmHg)  Min: 73  Max: 117  Resp  Min: 19  Max:  36  SpO2  Min: 89 %  Max: 99 %  Oxygen Concentration (%)  Min: 28  Max: 28    12/26 0701 - 12/27 0700  In: 2261.3 [I.V.:1511.3]  Out: 700 [Urine:700]   Unmeasured Output  Urine Occurrence: 1  Stool Occurrence: 0  Pad Count: 2       Physical Exam   Constitutional: He appears well-developed and well-nourished. No distress.   HENT:   Head: Normocephalic and atraumatic.   Right Ear: External ear normal.   Left Ear: External ear normal.   Mouth/Throat: Oropharynx is clear and moist.   Eyes: Pupils are equal, round, and reactive to light. EOM are normal.   Neck: Normal range of motion.   Cardiovascular: An irregular rhythm present.   Pulmonary/Chest: Effort normal.   Abdominal: Soft. Bowel sounds are normal.   Neurological: He is alert. He is disoriented.   GENERAL:  General appearance: Well, non-toxic appearing.  No apparent distress.     MENTAL STATUS:  Awake, alert, and oriented to person and place. Disoriented to time and situation.    Language: fluent       SPEECH:  Clear, and able to follow commands      CRANIAL NERVES:  Cranial Nerves II-XII were examined and grossly intact      GROSS MOTOR:  BANUELOS spontaneous   Parkinsonian tremor in all extremities     Skin: Skin is warm and dry. Capillary refill takes less than 2 seconds.   Psychiatric: He has a normal mood and affect.         Medications:  Continuous  sodium chloride 0.9% Last Rate: 75 mL/hr at 12/27/19 1402   Scheduled  albuterol-ipratropium 3 mL Q4H   carbidopa-levodopa  mg 1 tablet QID   levetiracetam IVPB 250 mg Q12H   piperacillin-tazobactam 4.5 g in sodium chloride 0.9% 100 mL IVPB (ready to mix system) 4.5 g Q8H   senna-docusate 8.6-50 mg 1 tablet Daily   vancomycin (VANCOCIN) IVPB 15 mg/kg Q24H   PRN  acetaminophen 650 mg Q6H PRN   albuterol-ipratropium 3 mL Q4H PRN   dextrose 10 % in water (D10W) 12.5 g PRN   glucagon (human recombinant) 1 mg PRN   insulin aspart U-100 0-5 Units Q4H PRN   labetalol 10 mg Q4H PRN   magnesium oxide 800 mg PRN    magnesium oxide 800 mg PRN   ondansetron 4 mg Q8H PRN   potassium chloride 10% 40 mEq PRN   potassium chloride 10% 40 mEq PRN   potassium chloride 10% 60 mEq PRN   potassium, sodium phosphates 2 packet PRN   potassium, sodium phosphates 2 packet PRN   potassium, sodium phosphates 2 packet PRN   sodium chloride 0.9% 10 mL PRN     Today I personally reviewed pertinent medications, lines/drains/airways, imaging, cardiology results, laboratory results, microbiology results, notably: Will narrow abx when culture sensitivity comes back.     Diet  Diet NPO Except for: Medication  Diet NPO Except for: Medication        Assessment/Plan:     Neuro  * Traumatic epidural hematoma without loss of consciousness  - CTH with trace right frontal SDH, and right EDH after fall  - NSGY following, no acute intervention  - Coumadin reveresed with PCC prior to transport, current INR 1.1. Coags otherwise WNL  - Hold coumadin, discussed risk of ischemic stroke while coumadin held with family   - Will stepdown to neurosurgery services today   - Keppra ppx  - Repeat CTH stable  - PT/OT/SLP following     SDH (subdural hematoma)  - Trace right frontal SDH  - See EDH    Parkinson disease  - Continue carbidopa-levodopa     Pulmonary  Aspiration pneumonia  -NPO  -speech following   -following respiratory cultures, covering with broad spectrum abx. Will narrow once cultures has resulted .     Cardiac/Vascular  Atrial fibrillation  - Irregular HR, PPM    Hematology  Chronic anticoagulation  - On coumadin for Atrial Fib, INR 1.9 at OSH, given PCC before transport   - INR currently 1.6  - Will hold coumadin at this time, given acute head bleed.           The patient is being Prophylaxed for:  Venous Thromboembolism with: Not Applicable   Stress Ulcer with: Not Applicable   Ventilator Pneumonia with: not applicable    Activity Orders          Diet NPO Except for: Medication: NPO starting at 12/26 0855    Commode at bedside starting at 12/24 0937         Partial Code    Maria L Varela NP  Neurocritical Care  [facility]

## 2019-12-27 NOTE — CONSULTS
[facility]  Physical Medicine & Rehab  Consult Note    Patient Name: Elvis Thompson  MRN: [id number]  Admission Date: 12/24/2019  Hospital Length of Stay: 3 days   Attending Physician: Monique Saleh MD     Inpatient consult to Physical Medicine & Rehabilitation  Consult performed by: Mary Lama NP  Consult requested by:  Monique Saleh MD    Collaborating Physician: Johnny Deras MD  Reason for Consult:  Assess rehabilitation needs     Consults  Subjective:     Principal Problem: Traumatic epidural hematoma without loss of consciousness    HPI: Elvis Juarez is a 86-year-old male with PMHx of  Parkinson's, A Fib on coumadin. Patient presented to [facility] on 12/24 s/p fall. CTH revealed evolving extra-axial hemorrhage overlying the right cerebral convexity compatible with acute subdural hemorrhage. There is no significant increased hemorrhage. Hospital course complicated by fever (Began empirically Zosyn and Vanc).     Functional History: Patient lives at [facility] in [facility]. Prior to admission, uses RW for short distances and W/C for mobility; requires assist and supervision for ADLs.    Hospital Course: 12/24/19: Evaluated by PT & OT. Bed mobility Vanda w/ RW. Ambulated 10 ft CGA w/ RW.     Past Medical History:   Diagnosis Date    Anticoagulant long-term use     Bradycardia     CHF (congestive heart failure)     Chronic atrial fibrillation     Chronic combined systolic and diastolic CHF (congestive heart failure)     HHD (hypertensive heart disease)     HTN (hypertension)     LBBB (left bundle branch block)     Left bundle-branch block, unspecified     NSVT (nonsustained ventricular tachycardia)     Parkinson disease 03/24/2017    Parkinson's disease     Pneumonia     Pulmonary embolism     Shingles     Thyroid disease     nodules    Ventricular tachycardia      Past Surgical History:   Procedure Laterality Date    CARDIAC PACEMAKER PLACEMENT      cataract       FOOT SURGERY      HAND SURGERY      HERNIA REPAIR      INSERTION OF BIVENTRICULAR IMPLANTABLE CARDIOVERTER-DEFIBRILLATOR (ICD) N/A 1/23/2019    Procedure: INSERTION, ICD, BIVENTRICULAR, upgrade;  Surgeon: Luiz Soliman MD;  [facility];  Service: Cardiology;  Laterality: N/A;    KNEE SURGERY      right knee replacement    LEFT HEART CATHETERIZATION Left 1/23/2019    Procedure: Left heart cath;  Surgeon: Luiz Soliman MD;  Location: Critical access hospital CATH;  Service: Cardiology;  Laterality: Left;    PROSTATE SURGERY       Review of patient's allergies indicates:   Allergen Reactions    Iodine and iodide containing products Other (See Comments)    Codeine Other (See Comments)     Insomnia      Morphine Nausea And Vomiting       Scheduled Medications:    albuterol-ipratropium  3 mL Nebulization Q4H    carbidopa-levodopa  mg  1 tablet Oral QID    levetiracetam IVPB  250 mg Intravenous Q12H    piperacillin-tazobactam 4.5 g in sodium chloride 0.9% 100 mL IVPB (ready to mix system)  4.5 g Intravenous Q8H    senna-docusate 8.6-50 mg  1 tablet Oral Daily    vancomycin (VANCOCIN) IVPB  15 mg/kg Intravenous Q24H       PRN Medications: acetaminophen, albuterol-ipratropium, dextrose 10 % in water (D10W), glucagon (human recombinant), insulin aspart U-100, labetalol, magnesium oxide, magnesium oxide, ondansetron, potassium chloride 10%, potassium chloride 10%, potassium chloride 10%, potassium, sodium phosphates, potassium, sodium phosphates, potassium, sodium phosphates, sodium chloride 0.9%, Pharmacy to dose Vancomycin consult **AND** vancomycin - pharmacy to dose    Family History     Problem Relation (Age of Onset)    Heart disease Mother        Tobacco Use    Smoking status: Never Smoker    Smokeless tobacco: Never Used   Substance and Sexual Activity    Alcohol use: No    Drug use: Not on file    Sexual activity: Not Currently     Review of Systems   Reason unable to perform ROS: unable to get  full ROS 2/2 CLI.   Constitutional: Positive for activity change.   Neurological: Positive for weakness.   Psychiatric/Behavioral: Positive for confusion.     Objective:     Vital Signs (Most Recent):  Temp: 98.8 °F (37.1 °C) (12/27/19 1102)  Pulse: 69 (12/27/19 1213)  Resp: 19 (12/27/19 1213)  BP: (!) 147/68 (12/27/19 1202)  SpO2: 96 % (12/27/19 1213)    Vital Signs (24h Range):  Temp:  [98.4 °F (36.9 °C)-101 °F (38.3 °C)] 98.8 °F (37.1 °C)  Pulse:  [68-80] 69  Resp:  [18-36] 19  SpO2:  [89 %-99 %] 96 %  BP: (104-163)/() 147/68     Body mass index is 24.39 kg/m².    Physical Exam   Constitutional: He appears well-developed and well-nourished.   HENT:   Head: Normocephalic and atraumatic.   Eyes: Pupils are equal, round, and reactive to light. EOM are normal.   Pulmonary/Chest: Effort normal. No respiratory distress.   Musculoskeletal: He exhibits no deformity.   Neurological:   - Lethargic  - Easily arousable   - Not oriented to location or time  - oriented to self  - following commands   Skin: Skin is warm and dry.   Psychiatric: His behavior is normal.   Nursing note and vitals reviewed.    Diagnostic Results:   Labs: Reviewed  ECG: Reviewed  CT: Reviewed    Assessment/Plan:     * Traumatic epidural hematoma without loss of consciousness  - CTH revealed evolving extra-axial hemorrhage overlying the right cerebral convexity compatible with acute subdural hemorrhage. There is no significant increased hemorrhage.     Atrial fibrillation  - on Coumadin     Debility  - Related to prolonged/acute hospital course.     Recommendations  -  Encourage mobility, OOB in chair at least 3 hours per day, and early ambulation as appropriate  -  PT/OT evaluate and treat  -  Pain management  -  Monitor for and prevent skin breakdown and pressure ulcers  · Early mobility, repositioning/weight shifting every 20-30 minutes when sitting, turn patient every 2 hours, proper mattress/overlay and chair cushioning, pressure  relief/heel protector boots  -  DVT prophylaxis    -  Reviewed discharge options (IP rehab, SNF, HH therapy, and OP therapy)    Will follow progress and discuss with rehab team for post acute care/rehab recommendation.    Thank you for your consult.     Mary Lama NP  Department of Physical Medicine & Rehab  [facility]

## 2019-12-27 NOTE — SUBJECTIVE & OBJECTIVE
Interval History:  Hypotensive overnight, bp responded to fluid bolus. Will hold home bp meds and reevaluate in the morning. Pt is more awake today. EEG with mild slowing per Dr Fried. Repeat chest xray with lower lobe subsegmental atelectasis, will follow respiratory status. High suspicion for possible aspiration. NPO expect for meds today, will reevaluate with speech tomorrow. Following cultures.      Review of Systems   Constitutional: Negative.    HENT: Negative.    Eyes: Negative.    Respiratory: Negative.    Cardiovascular: Negative.    Gastrointestinal: Negative.    Genitourinary: Positive for difficulty urinating.   Musculoskeletal: Negative.    Skin: Negative.    Neurological: Negative.    Psychiatric/Behavioral: Negative.        Objective:     Vitals:  Temp: 99.5 °F (37.5 °C)  Pulse: 75  Rhythm: normal sinus rhythm, paced rhythm  BP: (!) 152/65  MAP (mmHg): 94  Resp: 18  SpO2: (!) 94 %  O2 Device (Oxygen Therapy): room air    Temp  Min: 98.6 °F (37 °C)  Max: 100.9 °F (38.3 °C)  Pulse  Min: 68  Max: 84  BP  Min: 86/52  Max: 152/65  MAP (mmHg)  Min: 66  Max: 94  Resp  Min: 18  Max: 35  SpO2  Min: 94 %  Max: 99 %  Oxygen Concentration (%)  Min: 28  Max: 28    12/25 0701 - 12/26 0700  In: 277.3 [I.V.:277.3]  Out: 795 [Urine:795]   Unmeasured Output  Urine Occurrence: 1  Stool Occurrence: 0  Pad Count: 2       Physical Exam   Constitutional: He appears well-developed and well-nourished. No distress.   HENT:   Head: Normocephalic and atraumatic.   Right Ear: External ear normal.   Left Ear: External ear normal.   Eyes: Pupils are equal, round, and reactive to light. Conjunctivae and EOM are normal.   Neck: Normal range of motion.   Cardiovascular: An irregular rhythm present.   Pulmonary/Chest: Effort normal.   Rhonchi breath sounds to bilateral bases    Abdominal: Soft. Bowel sounds are normal.   Neurological: He is alert. No sensory deficit. GCS eye subscore is 4. GCS verbal subscore is 5. GCS motor subscore is  6.   GENERAL:  General appearance: Well, non-toxic appearing.  No apparent distress.     MENTAL STATUS:  Awake, alert, and oriented to person and place. Disoriented to time and situation.    Language: fluent       SPEECH:  Clear, and able to follow commands      CRANIAL NERVES:  Cranial Nerves II-XII were examined and grossly intact      GROSS MOTOR:  BANUELOS spontaneous   Parkinsonian tremor in all extremities     Skin: Skin is warm and dry. Capillary refill takes less than 2 seconds.   Psychiatric: He has a normal mood and affect.       Medications:  Continuous  sodium chloride 0.9% Last Rate: 75 mL/hr at 12/26/19 1600   phenylephrine    Scheduled  albuterol-ipratropium 3 mL Q4H   carbidopa-levodopa  mg 1 tablet QID   levetiracetam IVPB 250 mg Q12H   piperacillin-tazobactam 4.5 g in sodium chloride 0.9% 100 mL IVPB (ready to mix system) 4.5 g Q8H   senna-docusate 8.6-50 mg 1 tablet Daily   [START ON 12/27/2019] vancomycin (VANCOCIN) IVPB 15 mg/kg Q24H   PRN  acetaminophen 650 mg Q6H PRN   albuterol-ipratropium 3 mL Q4H PRN   dextrose 10 % in water (D10W) 12.5 g PRN   glucagon (human recombinant) 1 mg PRN   insulin aspart U-100 0-5 Units Q4H PRN   labetalol 10 mg Q4H PRN   magnesium oxide 800 mg PRN   magnesium oxide 800 mg PRN   ondansetron 4 mg Q8H PRN   potassium chloride 10% 40 mEq PRN   potassium chloride 10% 40 mEq PRN   potassium chloride 10% 60 mEq PRN   potassium, sodium phosphates 2 packet PRN   potassium, sodium phosphates 2 packet PRN   potassium, sodium phosphates 2 packet PRN   sodium chloride 0.9% 10 mL PRN   vancomycin - pharmacy to dose  pharmacy to manage frequency     Today I personally reviewed pertinent medications, lines/drains/airways, imaging, laboratory results, notably:    Diet  Diet NPO Except for: Medication  Diet NPO Except for: Medication

## 2019-12-27 NOTE — SUBJECTIVE & OBJECTIVE
Interval History:  No acute events overnight. Pt remained normotensive. Following cultures, will narrow abx when appropriate. Followed by speech, will continue to reevaluate when ok to order diet. For now patient will remain NPO.   Review of Systems   Constitutional: Negative for activity change.   HENT: Negative.    Eyes: Negative.    Respiratory: Negative.    Cardiovascular: Negative.    Gastrointestinal: Negative.    Genitourinary: Negative for difficulty urinating.   Skin: Negative.    Neurological: Positive for weakness. Negative for facial asymmetry, light-headedness and numbness.       Objective:     Vitals:  Temp: 98.8 °F (37.1 °C)  Pulse: 81  Rhythm: normal sinus rhythm  BP: (!) 120/96  MAP (mmHg): 105  Resp: (!) 26  SpO2: (!) 94 %  Oxygen Concentration (%): 28  O2 Device (Oxygen Therapy): nasal cannula    Temp  Min: 98.4 °F (36.9 °C)  Max: 101 °F (38.3 °C)  Pulse  Min: 68  Max: 81  BP  Min: 104/52  Max: 163/72  MAP (mmHg)  Min: 73  Max: 117  Resp  Min: 19  Max: 36  SpO2  Min: 89 %  Max: 99 %  Oxygen Concentration (%)  Min: 28  Max: 28    12/26 0701 - 12/27 0700  In: 2261.3 [I.V.:1511.3]  Out: 700 [Urine:700]   Unmeasured Output  Urine Occurrence: 1  Stool Occurrence: 0  Pad Count: 2       Physical Exam   Constitutional: He appears well-developed and well-nourished. No distress.   HENT:   Head: Normocephalic and atraumatic.   Right Ear: External ear normal.   Left Ear: External ear normal.   Mouth/Throat: Oropharynx is clear and moist.   Eyes: Pupils are equal, round, and reactive to light. EOM are normal.   Neck: Normal range of motion.   Cardiovascular: An irregular rhythm present.   Pulmonary/Chest: Effort normal.   Abdominal: Soft. Bowel sounds are normal.   Neurological: He is alert. He is disoriented.   GENERAL:  General appearance: Well, non-toxic appearing.  No apparent distress.     MENTAL STATUS:  Awake, alert, and oriented to person and place. Disoriented to time and situation.    Language:  fluent       SPEECH:  Clear, and able to follow commands      CRANIAL NERVES:  Cranial Nerves II-XII were examined and grossly intact      GROSS MOTOR:  BANUELOS spontaneous   Parkinsonian tremor in all extremities     Skin: Skin is warm and dry. Capillary refill takes less than 2 seconds.   Psychiatric: He has a normal mood and affect.         Medications:  Continuous  sodium chloride 0.9% Last Rate: 75 mL/hr at 12/27/19 1402   Scheduled  albuterol-ipratropium 3 mL Q4H   carbidopa-levodopa  mg 1 tablet QID   levetiracetam IVPB 250 mg Q12H   piperacillin-tazobactam 4.5 g in sodium chloride 0.9% 100 mL IVPB (ready to mix system) 4.5 g Q8H   senna-docusate 8.6-50 mg 1 tablet Daily   vancomycin (VANCOCIN) IVPB 15 mg/kg Q24H   PRN  acetaminophen 650 mg Q6H PRN   albuterol-ipratropium 3 mL Q4H PRN   dextrose 10 % in water (D10W) 12.5 g PRN   glucagon (human recombinant) 1 mg PRN   insulin aspart U-100 0-5 Units Q4H PRN   labetalol 10 mg Q4H PRN   magnesium oxide 800 mg PRN   magnesium oxide 800 mg PRN   ondansetron 4 mg Q8H PRN   potassium chloride 10% 40 mEq PRN   potassium chloride 10% 40 mEq PRN   potassium chloride 10% 60 mEq PRN   potassium, sodium phosphates 2 packet PRN   potassium, sodium phosphates 2 packet PRN   potassium, sodium phosphates 2 packet PRN   sodium chloride 0.9% 10 mL PRN     Today I personally reviewed pertinent medications, lines/drains/airways, imaging, cardiology results, laboratory results, microbiology results, notably: Will narrow abx when culture sensitivity comes back.     Diet  Diet NPO Except for: Medication  Diet NPO Except for: Medication

## 2019-12-27 NOTE — ASSESSMENT & PLAN NOTE
- SBP <140  -Holding BP meds due to hypotensive episode overnight, will reevaluate in am.    - Echo on admit  · Mild left ventricular enlargement.  · Normal left ventricular systolic function. The estimated ejection fraction is 65%  · Eccentric left ventricular hypertrophy.  · Indeterminate left ventricular diastolic function.  · Normal right ventricular systolic function.  · Severe left atrial enlargement.  · Mild to moderate tricuspid regurgitation.  · Mild mitral regurgitation.  · Low flow Moderate to severe aortic valve stenosis. Aortic valve area is 0.99 cm2; peak velocity is 2.77 m/s; mean gradient is 18 mmHg.  · The estimated PA systolic pressure is 58 mm Hg. Pulmonary hypertension present.  · Intermediate central venous pressure (8 mm Hg).

## 2019-12-27 NOTE — PLAN OF CARE
POC reviewed with pt and family at 1400. Pt verbalized understanding. Questions and concerns addressed. No acute events today. EEG in place. Antibiotics started. Sputum and urine culture obtained. Pt progressing toward goals. Will continue to monitor. See flowsheets for full assessment and VS info.

## 2019-12-27 NOTE — ASSESSMENT & PLAN NOTE
86 M w/ PMH of Parkinson's, A Fib on Coumadin, who presents with a R frontal extraaxial hemorrhage now s/p K Centra:    --Neuro improved, stable this AM  --cEEG in place, read pending  --SBP less than 150  - Continue Keppra  --INR goal of less than 1.3  --Keppra  --HOB 30-40  --TTF to NSGY once appropriate per NCC

## 2019-12-27 NOTE — HPI
Elvis Juarez is a 86-year-old male with PMHx of  Parkinson's, A Fib on coumadin. Patient presented to [facility] on 12/24 s/p fall. CTH revealed evolving extra-axial hemorrhage overlying the right cerebral convexity compatible with acute subdural hemorrhage. There is no significant increased hemorrhage. Hospital course complicated by fever (Began empirically Zosyn and Vanc).     Functional History: Patient lives at [facility] in [facility]. Prior to admission, uses RW for short distances and W/C for mobility; requires assist and supervision for ADLs.

## 2019-12-27 NOTE — ASSESSMENT & PLAN NOTE
- CTH revealed evolving extra-axial hemorrhage overlying the right cerebral convexity compatible with acute subdural hemorrhage. There is no significant increased hemorrhage.

## 2019-12-27 NOTE — PT/OT/SLP PROGRESS
"Occupational Therapy   Treatment    Name: Elvis Thompson  MRN: [id number]  Admitting Diagnosis:  Traumatic epidural hematoma without loss of consciousness       Recommendations:     Discharge Recommendations: Return to NH with continued therapy  Discharge Equipment Recommendations:  none  Barriers to discharge:  None    Assessment:     Elvis Thompson is a 86 y.o. male with a medical diagnosis of Traumatic epidural hematoma without loss of consciousness.  He presents with performance deficits including weakness, impaired endurance, impaired self care skills, impaired functional mobilty, impaired balance, decreased coordination, decreased upper extremity function, decreased lower extremity function, abnormal tone. Pt progressing toward goals and would continue to benefit from OT to increase functional independence and safety. Recommend return to NH with therapy upon D/C.     Rehab Prognosis:  Good; patient would benefit from acute skilled OT services to address these deficits and reach maximum level of function.       Plan:     Patient to be seen 3 x/week to address the above listed problems via self-care/home management, therapeutic activities, therapeutic exercises, neuromuscular re-education  · Plan of Care Expires: 01/24/20  · Plan of Care Reviewed with: patient, daughter    Subjective     Pain/Comfort:  · Pain Rating 1: 0/10  · Pain Rating Post-Intervention 1: 0/10    Objective:     Communicated with: RN prior to session.  Patient found with HOB elevated with blood pressure cuff, pulse ox (continuous), telemetry, peripheral IV, Condom Catheter upon OT entry to room, daughter present.  Daughter reports "He didn't look good the last 2 days and he wasn't communicating."  Pt awake/alert and conversant throughout session.    General Precautions: Standard, fall, aspiration   Orthopedic Precautions:N/A   Braces: N/A     Occupational Performance:     Bed Mobility:    · Patient completed Supine to Sit with maximal " assistance with increased time to initiate movement  · Patient completed Sit to Supine with maximal assistance     Functional Mobility/Transfers:  · Patient completed Sit <> Stand Transfer with moderate assistance and of 2 persons with hand-held assist from EOB 2 trials; forward flexed posture  · Functional Mobility: Unable this date    Activities of Daily Living:  · Lower Body Dressing: total assistance to don socks  · Toileting: total assistance for brigid hygiene while standing EOB      [facility] 6 Click ADL: 15    Treatment & Education:  Pt able to sit EOB with SBA-CGA; completed LE therex with PT and UE therex including scap retraction/elevation, shld flex/ext, and punch outs with assist for full range; practiced T/Fs from EOB and assisted with hygiene in standing; returned to supine with HOB elevated, discussed POC and D/C recs with pt and daughter    Patient left HOB elevated with all lines intact, call button in reach, bed alarm on, RN notified and daughter presentEducation:      GOALS:   Multidisciplinary Problems     Occupational Therapy Goals        Problem: Occupational Therapy Goal    Goal Priority Disciplines Outcome Interventions   Occupational Therapy Goal     OT, PT/OT Ongoing, Progressing    Description:  Goals to be met by: 7 days (12/31/19)     Patient will increase functional independence with ADLs by performing:    Feeding with Stand-by Assistance.  UE Dressing with Contact Guard Assistance.  Supine to sit with Minimal Assistance.  Toilet transfer to toilet with Minimal Assistance.                      Time Tracking:     OT Date of Treatment: 12/27/19  OT Start Time: 0945  OT Stop Time: 1008  OT Total Time (min): 23 min (Co-treat with PT)    Billable Minutes:Self Care/Home Management 8  Therapeutic Activity 15    SONIA Love  12/27/2019

## 2019-12-28 LAB
ALBUMIN SERPL BCP-MCNC: 2.8 G/DL (ref 3.5–5.2)
ALP SERPL-CCNC: 96 U/L (ref 55–135)
ALT SERPL W/O P-5'-P-CCNC: <5 U/L (ref 10–44)
ANION GAP SERPL CALC-SCNC: 6 MMOL/L (ref 8–16)
AST SERPL-CCNC: 9 U/L (ref 10–40)
BASOPHILS # BLD AUTO: 0.03 K/UL (ref 0–0.2)
BASOPHILS NFR BLD: 0.5 % (ref 0–1.9)
BILIRUB SERPL-MCNC: 1.2 MG/DL (ref 0.1–1)
BUN SERPL-MCNC: 12 MG/DL (ref 8–23)
CALCIUM SERPL-MCNC: 8.5 MG/DL (ref 8.7–10.5)
CHLORIDE SERPL-SCNC: 106 MMOL/L (ref 95–110)
CO2 SERPL-SCNC: 21 MMOL/L (ref 23–29)
CREAT SERPL-MCNC: 0.7 MG/DL (ref 0.5–1.4)
DIFFERENTIAL METHOD: ABNORMAL
EOSINOPHIL # BLD AUTO: 0.1 K/UL (ref 0–0.5)
EOSINOPHIL NFR BLD: 1.6 % (ref 0–8)
ERYTHROCYTE [DISTWIDTH] IN BLOOD BY AUTOMATED COUNT: 13.1 % (ref 11.5–14.5)
EST. GFR  (AFRICAN AMERICAN): >60 ML/MIN/1.73 M^2
EST. GFR  (NON AFRICAN AMERICAN): >60 ML/MIN/1.73 M^2
GLUCOSE SERPL-MCNC: 103 MG/DL (ref 70–110)
HCT VFR BLD AUTO: 32.3 % (ref 40–54)
HGB BLD-MCNC: 10.5 G/DL (ref 14–18)
IMM GRANULOCYTES # BLD AUTO: 0.02 K/UL (ref 0–0.04)
IMM GRANULOCYTES NFR BLD AUTO: 0.3 % (ref 0–0.5)
INR PPP: 1.7 (ref 0.8–1.2)
LYMPHOCYTES # BLD AUTO: 1.1 K/UL (ref 1–4.8)
LYMPHOCYTES NFR BLD: 17.9 % (ref 18–48)
MAGNESIUM SERPL-MCNC: 1.9 MG/DL (ref 1.6–2.6)
MCH RBC QN AUTO: 30.2 PG (ref 27–31)
MCHC RBC AUTO-ENTMCNC: 32.5 G/DL (ref 32–36)
MCV RBC AUTO: 93 FL (ref 82–98)
MONOCYTES # BLD AUTO: 0.8 K/UL (ref 0.3–1)
MONOCYTES NFR BLD: 13.2 % (ref 4–15)
NEUTROPHILS # BLD AUTO: 4.1 K/UL (ref 1.8–7.7)
NEUTROPHILS NFR BLD: 66.5 % (ref 38–73)
NRBC BLD-RTO: 0 /100 WBC
PHOSPHATE SERPL-MCNC: 2.3 MG/DL (ref 2.7–4.5)
PLATELET # BLD AUTO: 157 K/UL (ref 150–350)
PMV BLD AUTO: 10.3 FL (ref 9.2–12.9)
POCT GLUCOSE: 104 MG/DL (ref 70–110)
POCT GLUCOSE: 106 MG/DL (ref 70–110)
POCT GLUCOSE: 116 MG/DL (ref 70–110)
POCT GLUCOSE: 94 MG/DL (ref 70–110)
POTASSIUM SERPL-SCNC: 3.8 MMOL/L (ref 3.5–5.1)
PROT SERPL-MCNC: 6.1 G/DL (ref 6–8.4)
PROTHROMBIN TIME: 16.1 SEC (ref 9–12.5)
RBC # BLD AUTO: 3.48 M/UL (ref 4.6–6.2)
SODIUM SERPL-SCNC: 133 MMOL/L (ref 136–145)
WBC # BLD AUTO: 6.2 K/UL (ref 3.9–12.7)

## 2019-12-28 PROCEDURE — 63600175 PHARM REV CODE 636 W HCPCS: Performed by: NURSE PRACTITIONER

## 2019-12-28 PROCEDURE — 85025 COMPLETE CBC W/AUTO DIFF WBC: CPT

## 2019-12-28 PROCEDURE — 99232 PR SUBSEQUENT HOSPITAL CARE,LEVL II: ICD-10-PCS | Mod: ,,, | Performed by: NEUROLOGICAL SURGERY

## 2019-12-28 PROCEDURE — 94640 AIRWAY INHALATION TREATMENT: CPT

## 2019-12-28 PROCEDURE — 99900035 HC TECH TIME PER 15 MIN (STAT)

## 2019-12-28 PROCEDURE — 85610 PROTHROMBIN TIME: CPT

## 2019-12-28 PROCEDURE — 63600175 PHARM REV CODE 636 W HCPCS: Performed by: PSYCHIATRY & NEUROLOGY

## 2019-12-28 PROCEDURE — 94761 N-INVAS EAR/PLS OXIMETRY MLT: CPT

## 2019-12-28 PROCEDURE — 99232 SBSQ HOSP IP/OBS MODERATE 35: CPT | Mod: ,,, | Performed by: NEUROLOGICAL SURGERY

## 2019-12-28 PROCEDURE — 36415 COLL VENOUS BLD VENIPUNCTURE: CPT

## 2019-12-28 PROCEDURE — 80053 COMPREHEN METABOLIC PANEL: CPT

## 2019-12-28 PROCEDURE — 25000242 PHARM REV CODE 250 ALT 637 W/ HCPCS: Performed by: NURSE PRACTITIONER

## 2019-12-28 PROCEDURE — 83735 ASSAY OF MAGNESIUM: CPT

## 2019-12-28 PROCEDURE — 84100 ASSAY OF PHOSPHORUS: CPT

## 2019-12-28 PROCEDURE — 25000003 PHARM REV CODE 250: Performed by: NURSE PRACTITIONER

## 2019-12-28 PROCEDURE — 11000001 HC ACUTE MED/SURG PRIVATE ROOM

## 2019-12-28 RX ADMIN — SENNOSIDES AND DOCUSATE SODIUM 1 TABLET: 8.6; 5 TABLET ORAL at 08:12

## 2019-12-28 RX ADMIN — ACETAMINOPHEN 650 MG: 325 TABLET ORAL at 04:12

## 2019-12-28 RX ADMIN — IPRATROPIUM BROMIDE AND ALBUTEROL SULFATE 3 ML: .5; 3 SOLUTION RESPIRATORY (INHALATION) at 03:12

## 2019-12-28 RX ADMIN — PIPERACILLIN AND TAZOBACTAM 4.5 G: 4; .5 INJECTION, POWDER, FOR SOLUTION INTRAVENOUS at 10:12

## 2019-12-28 RX ADMIN — CARBIDOPA AND LEVODOPA 1 TABLET: 25; 100 TABLET ORAL at 10:12

## 2019-12-28 RX ADMIN — LEVETIRACETAM 250 MG: 100 INJECTION, SOLUTION, CONCENTRATE INTRAVENOUS at 10:12

## 2019-12-28 RX ADMIN — IPRATROPIUM BROMIDE AND ALBUTEROL SULFATE 3 ML: .5; 3 SOLUTION RESPIRATORY (INHALATION) at 07:12

## 2019-12-28 RX ADMIN — CARBIDOPA AND LEVODOPA 1 TABLET: 25; 100 TABLET ORAL at 08:12

## 2019-12-28 RX ADMIN — VANCOMYCIN HYDROCHLORIDE 1250 MG: 1.25 INJECTION, POWDER, LYOPHILIZED, FOR SOLUTION INTRAVENOUS at 08:12

## 2019-12-28 RX ADMIN — PIPERACILLIN AND TAZOBACTAM 4.5 G: 4; .5 INJECTION, POWDER, FOR SOLUTION INTRAVENOUS at 03:12

## 2019-12-28 RX ADMIN — IPRATROPIUM BROMIDE AND ALBUTEROL SULFATE 3 ML: .5; 3 SOLUTION RESPIRATORY (INHALATION) at 11:12

## 2019-12-28 RX ADMIN — LEVETIRACETAM 250 MG: 100 INJECTION, SOLUTION, CONCENTRATE INTRAVENOUS at 08:12

## 2019-12-28 RX ADMIN — PIPERACILLIN AND TAZOBACTAM 4.5 G: 4; .5 INJECTION, POWDER, FOR SOLUTION INTRAVENOUS at 12:12

## 2019-12-28 NOTE — PLAN OF CARE
Pt Awake Alert, oriented to self. free of falls. Pt has difficulty swallowing,unproductive cough noted. Oral suction performed removed about 150cc of mucus and oral secretions.  Neurosurgery team aware. PO meds held until speech eval as per med team. POC explained to pt and daughter, answered questions and concerns. Pt and daughter verbally understood. Denies pain/discomfort at this time. Turned and repositioned every 2 hours, utilizing pillow and wedge for positioning. Bed locked and in lowest position. Call bell in reach.  Side rails up x2. Compliant with care and treatment. Safety Maintained and encouraged.

## 2019-12-28 NOTE — PLAN OF CARE
Problem: Fall Injury Risk  Goal: Absence of Fall and Fall-Related Injury  Outcome: Ongoing, Progressing     Problem: Adult Inpatient Plan of Care  Goal: Plan of Care Review  Outcome: Ongoing, Progressing     Problem: Skin Injury Risk Increased  Goal: Skin Health and Integrity  Outcome: Ongoing, Progressing

## 2019-12-28 NOTE — NURSING TRANSFER
Nursing Transfer Note      12/27/2019     Transfer from 9073 to 910    Transfer via stretcher    Transfer with personal effects    Transported by RN    Medicines sent: None    Chart send with patient: Yes    Notified: Charge nurse, unit secretary    Patient reassessed at: 12/27/19 @1515    Upon arrival to floor: Pt reassessed, with no changes from given report; Pt positioned for comfort and oriented to room and safety protocols; family at bedside; no S/Sxs of pain/distress noted.

## 2019-12-29 LAB
ALBUMIN SERPL BCP-MCNC: 2.5 G/DL (ref 3.5–5.2)
ALP SERPL-CCNC: 85 U/L (ref 55–135)
ALT SERPL W/O P-5'-P-CCNC: <5 U/L (ref 10–44)
ANION GAP SERPL CALC-SCNC: 9 MMOL/L (ref 8–16)
AST SERPL-CCNC: 10 U/L (ref 10–40)
BASOPHILS # BLD AUTO: 0.04 K/UL (ref 0–0.2)
BASOPHILS NFR BLD: 0.8 % (ref 0–1.9)
BILIRUB SERPL-MCNC: 1 MG/DL (ref 0.1–1)
BUN SERPL-MCNC: 13 MG/DL (ref 8–23)
CALCIUM SERPL-MCNC: 8.3 MG/DL (ref 8.7–10.5)
CHLORIDE SERPL-SCNC: 108 MMOL/L (ref 95–110)
CO2 SERPL-SCNC: 20 MMOL/L (ref 23–29)
CREAT SERPL-MCNC: 0.8 MG/DL (ref 0.5–1.4)
DIFFERENTIAL METHOD: ABNORMAL
EOSINOPHIL # BLD AUTO: 0.2 K/UL (ref 0–0.5)
EOSINOPHIL NFR BLD: 3.1 % (ref 0–8)
ERYTHROCYTE [DISTWIDTH] IN BLOOD BY AUTOMATED COUNT: 13 % (ref 11.5–14.5)
EST. GFR  (AFRICAN AMERICAN): >60 ML/MIN/1.73 M^2
EST. GFR  (NON AFRICAN AMERICAN): >60 ML/MIN/1.73 M^2
GLUCOSE SERPL-MCNC: 77 MG/DL (ref 70–110)
HCT VFR BLD AUTO: 32.2 % (ref 40–54)
HGB BLD-MCNC: 10.2 G/DL (ref 14–18)
IMM GRANULOCYTES # BLD AUTO: 0.02 K/UL (ref 0–0.04)
IMM GRANULOCYTES NFR BLD AUTO: 0.4 % (ref 0–0.5)
INR PPP: 1.8 (ref 0.8–1.2)
LYMPHOCYTES # BLD AUTO: 1.3 K/UL (ref 1–4.8)
LYMPHOCYTES NFR BLD: 25.2 % (ref 18–48)
MAGNESIUM SERPL-MCNC: 1.9 MG/DL (ref 1.6–2.6)
MCH RBC QN AUTO: 29.9 PG (ref 27–31)
MCHC RBC AUTO-ENTMCNC: 31.7 G/DL (ref 32–36)
MCV RBC AUTO: 94 FL (ref 82–98)
MONOCYTES # BLD AUTO: 0.7 K/UL (ref 0.3–1)
MONOCYTES NFR BLD: 13.8 % (ref 4–15)
NEUTROPHILS # BLD AUTO: 2.9 K/UL (ref 1.8–7.7)
NEUTROPHILS NFR BLD: 56.7 % (ref 38–73)
NRBC BLD-RTO: 0 /100 WBC
PHOSPHATE SERPL-MCNC: 2.8 MG/DL (ref 2.7–4.5)
PLATELET # BLD AUTO: 154 K/UL (ref 150–350)
PMV BLD AUTO: 10.3 FL (ref 9.2–12.9)
POCT GLUCOSE: 100 MG/DL (ref 70–110)
POCT GLUCOSE: 106 MG/DL (ref 70–110)
POCT GLUCOSE: 72 MG/DL (ref 70–110)
POCT GLUCOSE: 82 MG/DL (ref 70–110)
POCT GLUCOSE: 89 MG/DL (ref 70–110)
POTASSIUM SERPL-SCNC: 3.7 MMOL/L (ref 3.5–5.1)
PROT SERPL-MCNC: 5.5 G/DL (ref 6–8.4)
PROTHROMBIN TIME: 17.3 SEC (ref 9–12.5)
RBC # BLD AUTO: 3.41 M/UL (ref 4.6–6.2)
SODIUM SERPL-SCNC: 137 MMOL/L (ref 136–145)
VANCOMYCIN TROUGH SERPL-MCNC: 5.3 UG/ML (ref 10–22)
WBC # BLD AUTO: 5.15 K/UL (ref 3.9–12.7)

## 2019-12-29 PROCEDURE — 94761 N-INVAS EAR/PLS OXIMETRY MLT: CPT

## 2019-12-29 PROCEDURE — 83735 ASSAY OF MAGNESIUM: CPT

## 2019-12-29 PROCEDURE — 11000001 HC ACUTE MED/SURG PRIVATE ROOM

## 2019-12-29 PROCEDURE — 85025 COMPLETE CBC W/AUTO DIFF WBC: CPT

## 2019-12-29 PROCEDURE — 94640 AIRWAY INHALATION TREATMENT: CPT

## 2019-12-29 PROCEDURE — 80202 ASSAY OF VANCOMYCIN: CPT

## 2019-12-29 PROCEDURE — 36415 COLL VENOUS BLD VENIPUNCTURE: CPT

## 2019-12-29 PROCEDURE — 63600175 PHARM REV CODE 636 W HCPCS: Performed by: NURSE PRACTITIONER

## 2019-12-29 PROCEDURE — 80053 COMPREHEN METABOLIC PANEL: CPT

## 2019-12-29 PROCEDURE — 25000242 PHARM REV CODE 250 ALT 637 W/ HCPCS: Performed by: NURSE PRACTITIONER

## 2019-12-29 PROCEDURE — 25000003 PHARM REV CODE 250: Performed by: NEUROLOGICAL SURGERY

## 2019-12-29 PROCEDURE — 84100 ASSAY OF PHOSPHORUS: CPT

## 2019-12-29 PROCEDURE — 85610 PROTHROMBIN TIME: CPT

## 2019-12-29 PROCEDURE — 25000003 PHARM REV CODE 250: Performed by: NURSE PRACTITIONER

## 2019-12-29 PROCEDURE — 63600175 PHARM REV CODE 636 W HCPCS: Performed by: NEUROLOGICAL SURGERY

## 2019-12-29 RX ORDER — FUROSEMIDE 40 MG/1
40 TABLET ORAL DAILY PRN
Status: DISCONTINUED | OUTPATIENT
Start: 2019-12-29 | End: 2019-12-30 | Stop reason: HOSPADM

## 2019-12-29 RX ORDER — CARBOXYMETHYLCELLULOSE SODIUM 10 MG/ML
GEL OPHTHALMIC
Status: DISCONTINUED | OUTPATIENT
Start: 2019-12-29 | End: 2019-12-30 | Stop reason: HOSPADM

## 2019-12-29 RX ADMIN — PIPERACILLIN AND TAZOBACTAM 4.5 G: 4; .5 INJECTION, POWDER, FOR SOLUTION INTRAVENOUS at 04:12

## 2019-12-29 RX ADMIN — LEVETIRACETAM 250 MG: 100 INJECTION, SOLUTION, CONCENTRATE INTRAVENOUS at 09:12

## 2019-12-29 RX ADMIN — PIPERACILLIN AND TAZOBACTAM 4.5 G: 4; .5 INJECTION, POWDER, FOR SOLUTION INTRAVENOUS at 10:12

## 2019-12-29 RX ADMIN — CARBIDOPA AND LEVODOPA 1 TABLET: 25; 100 TABLET ORAL at 04:12

## 2019-12-29 RX ADMIN — CARBIDOPA AND LEVODOPA 1 TABLET: 25; 100 TABLET ORAL at 09:12

## 2019-12-29 RX ADMIN — CARBIDOPA AND LEVODOPA 1 TABLET: 25; 100 TABLET ORAL at 11:12

## 2019-12-29 RX ADMIN — CARBOXYMETHYLCELLULOSE SODIUM: 10 GEL OPHTHALMIC at 12:12

## 2019-12-29 RX ADMIN — IPRATROPIUM BROMIDE AND ALBUTEROL SULFATE 3 ML: .5; 3 SOLUTION RESPIRATORY (INHALATION) at 07:12

## 2019-12-29 RX ADMIN — LEVETIRACETAM 250 MG: 100 INJECTION, SOLUTION, CONCENTRATE INTRAVENOUS at 12:12

## 2019-12-29 RX ADMIN — ACETAMINOPHEN 650 MG: 325 TABLET ORAL at 12:12

## 2019-12-29 RX ADMIN — SENNOSIDES AND DOCUSATE SODIUM 1 TABLET: 8.6; 5 TABLET ORAL at 11:12

## 2019-12-29 RX ADMIN — VANCOMYCIN HYDROCHLORIDE 1500 MG: 1.5 INJECTION, POWDER, LYOPHILIZED, FOR SOLUTION INTRAVENOUS at 12:12

## 2019-12-29 RX ADMIN — PIPERACILLIN AND TAZOBACTAM 4.5 G: 4; .5 INJECTION, POWDER, FOR SOLUTION INTRAVENOUS at 12:12

## 2019-12-29 RX ADMIN — IPRATROPIUM BROMIDE AND ALBUTEROL SULFATE 3 ML: .5; 3 SOLUTION RESPIRATORY (INHALATION) at 03:12

## 2019-12-29 NOTE — PROGRESS NOTES
Pharmacokinetic Assessment Follow Up: IV Vancomycin    Vancomycin serum concentration assessment(s):  · Vancomycin trough concentration resulted this AM subtherapeutic at 5.3 mcg/mL.  Goal trough 10-20 mcg/mL  · Scr stable at 0.8    Vancomycin Regimen Plan:  1.  Increase vancomycin regimen to 1500 mg IV Q24H  2.  Obtain trough prior to third dose of new regimen: 12/31 @ 1030  3.  Continue to monitor RF and make adjustments as needed    Drug levels (last 3 results):  Recent Labs   Lab Result Units 12/29/19  0850   Vancomycin-Trough ug/mL 5.3*       Pharmacy will continue to follow and monitor vancomycin.    Thank you for the consult,   Mariah Sprague, PharmD, BCPS  [id number]       Patient brief summary:  Elvis Thompson is a 86 y.o. male initiated on antimicrobial therapy with IV Vancomycin for treatment of lower respiratory infection    Drug Allergies:   Review of patient's allergies indicates:   Allergen Reactions    Iodine and iodide containing products Other (See Comments)    Codeine Other (See Comments)     Insomnia      Morphine Nausea And Vomiting       Actual Body Weight:   77.1 kg    Renal Function:   Estimated Creatinine Clearance: 68.4 mL/min (based on SCr of 0.8 mg/dL).,     CBC (last 72 hours):  Recent Labs   Lab Result Units 12/27/19  0147 12/28/19  0537 12/29/19  0349   WBC K/uL 6.43 6.20 5.15   Hemoglobin g/dL 10.4* 10.5* 10.2*   Hematocrit % 32.1* 32.3* 32.2*   Platelets K/uL 129* 157 154   Gran% % 65.7 66.5 56.7   Lymph% % 18.2 17.9* 25.2   Mono% % 14.3 13.2 13.8   Eosinophil% % 1.1 1.6 3.1   Basophil% % 0.5 0.5 0.8   Differential Method  Automated Automated Automated       Metabolic Panel (last 72 hours):  Recent Labs   Lab Result Units 12/26/19  1118 12/27/19  0147 12/28/19  0537 12/29/19  0349   Sodium mmol/L  --  134* 133* 137   Potassium mmol/L  --  3.8 3.8 3.7   Chloride mmol/L  --  107 106 108   CO2 mmol/L  --  20* 21* 20*   Glucose mg/dL  --  105 103 77   Glucose, UA  Negative  --   --   --     BUN, Bld mg/dL  --  15 12 13   Creatinine mg/dL  --  0.8 0.7 0.8   Albumin g/dL  --  2.7* 2.8* 2.5*   Total Bilirubin mg/dL  --  1.4* 1.2* 1.0   Alkaline Phosphatase U/L  --  97 96 85   AST U/L  --  11 9* 10   ALT U/L  --  <5* <5* <5*   Magnesium mg/dL  --  1.8 1.9 1.9   Phosphorus mg/dL  --  2.7 2.3* 2.8       Vancomycin Administrations:  vancomycin given in the last 96 hours                   vancomycin 1.25 g in dextrose 5% 250 mL IVPB (ready to mix) (mg) 1,250 mg New Bag 12/28/19 0823     1,250 mg New Bag 12/27/19 0932                Microbiologic Results:  Microbiology Results (last 7 days)     Procedure Component Value Units Date/Time    Blood culture [[id number]] Collected:  12/25/19 2150    Order Status:  Completed Specimen:  Blood from Peripheral, Hand, Right Updated:  12/29/19 0612     Blood Culture, Routine No Growth to date      No Growth to date      No Growth to date      No Growth to date    Narrative:       Blood cultures from 2 different sites. 4 bottles total.  Please draw before starting antibiotics.    Blood culture [[id number]] Collected:  12/25/19 2153    Order Status:  Completed Specimen:  Blood from Peripheral, Forearm, Left Updated:  12/29/19 0612     Blood Culture, Routine No Growth to date      No Growth to date      No Growth to date      No Growth to date    Narrative:       Blood cultures x 2 different sites. 4 bottles total. Please  draw cultures before administering antibiotics.    Culture, Respiratory with Gram Stain [[id number]]  (Abnormal) Collected:  12/26/19 1024    Order Status:  Completed Specimen:  Respiratory from Sputum Updated:  12/28/19 1132     Respiratory Culture STAPHYLOCOCCUS AUREUS  Moderate  Susceptibility pending  Normal respiratory debby also present       Gram Stain (Respiratory) >10 epithelial cells per low power field     Gram Stain (Respiratory) Many WBC's     Gram Stain (Respiratory) Many budding yeast     Gram Stain (Respiratory) Many Gram positive cocci      Gram Stain (Respiratory) Moderate Gram negative rods     Gram Stain (Respiratory) Few Gram positive rods

## 2019-12-29 NOTE — NURSING
POC reviewed with patient.Patient verbalized understanding. VSS. No signs of distress during shift. Patient has unproductive cough. Oral suction performed. MD consulted. Safety measures maintained will continue to monitor.

## 2019-12-30 VITALS
WEIGHT: 170 LBS | OXYGEN SATURATION: 92 % | TEMPERATURE: 96 F | SYSTOLIC BLOOD PRESSURE: 173 MMHG | HEIGHT: 70 IN | RESPIRATION RATE: 18 BRPM | DIASTOLIC BLOOD PRESSURE: 72 MMHG | BODY MASS INDEX: 24.34 KG/M2 | HEART RATE: 72 BPM

## 2019-12-30 DIAGNOSIS — S06.4X0A TRAUMATIC EPIDURAL HEMATOMA WITHOUT LOSS OF CONSCIOUSNESS, INITIAL ENCOUNTER: Primary | ICD-10-CM

## 2019-12-30 LAB
ALBUMIN SERPL BCP-MCNC: 2.5 G/DL (ref 3.5–5.2)
ALP SERPL-CCNC: 89 U/L (ref 55–135)
ALT SERPL W/O P-5'-P-CCNC: <5 U/L (ref 10–44)
ANION GAP SERPL CALC-SCNC: 8 MMOL/L (ref 8–16)
AST SERPL-CCNC: 10 U/L (ref 10–40)
BACTERIA SPEC AEROBE CULT: ABNORMAL
BACTERIA SPEC AEROBE CULT: ABNORMAL
BASOPHILS # BLD AUTO: 0.03 K/UL (ref 0–0.2)
BASOPHILS NFR BLD: 0.6 % (ref 0–1.9)
BILIRUB SERPL-MCNC: 0.9 MG/DL (ref 0.1–1)
BUN SERPL-MCNC: 11 MG/DL (ref 8–23)
CALCIUM SERPL-MCNC: 8 MG/DL (ref 8.7–10.5)
CHLORIDE SERPL-SCNC: 108 MMOL/L (ref 95–110)
CO2 SERPL-SCNC: 20 MMOL/L (ref 23–29)
CREAT SERPL-MCNC: 0.6 MG/DL (ref 0.5–1.4)
DIFFERENTIAL METHOD: ABNORMAL
EOSINOPHIL # BLD AUTO: 0.2 K/UL (ref 0–0.5)
EOSINOPHIL NFR BLD: 4.9 % (ref 0–8)
ERYTHROCYTE [DISTWIDTH] IN BLOOD BY AUTOMATED COUNT: 12.6 % (ref 11.5–14.5)
EST. GFR  (AFRICAN AMERICAN): >60 ML/MIN/1.73 M^2
EST. GFR  (NON AFRICAN AMERICAN): >60 ML/MIN/1.73 M^2
GLUCOSE SERPL-MCNC: 91 MG/DL (ref 70–110)
GRAM STN SPEC: ABNORMAL
HCT VFR BLD AUTO: 32.2 % (ref 40–54)
HGB BLD-MCNC: 10.3 G/DL (ref 14–18)
IMM GRANULOCYTES # BLD AUTO: 0.01 K/UL (ref 0–0.04)
IMM GRANULOCYTES NFR BLD AUTO: 0.2 % (ref 0–0.5)
INR PPP: 1.9 (ref 0.8–1.2)
LYMPHOCYTES # BLD AUTO: 1 K/UL (ref 1–4.8)
LYMPHOCYTES NFR BLD: 20.3 % (ref 18–48)
MAGNESIUM SERPL-MCNC: 1.8 MG/DL (ref 1.6–2.6)
MCH RBC QN AUTO: 30 PG (ref 27–31)
MCHC RBC AUTO-ENTMCNC: 32 G/DL (ref 32–36)
MCV RBC AUTO: 94 FL (ref 82–98)
MONOCYTES # BLD AUTO: 0.7 K/UL (ref 0.3–1)
MONOCYTES NFR BLD: 13.8 % (ref 4–15)
NEUTROPHILS # BLD AUTO: 3 K/UL (ref 1.8–7.7)
NEUTROPHILS NFR BLD: 60.2 % (ref 38–73)
NRBC BLD-RTO: 0 /100 WBC
PHOSPHATE SERPL-MCNC: 2.4 MG/DL (ref 2.7–4.5)
PLATELET # BLD AUTO: 170 K/UL (ref 150–350)
PMV BLD AUTO: 10.3 FL (ref 9.2–12.9)
POCT GLUCOSE: 110 MG/DL (ref 70–110)
POCT GLUCOSE: 86 MG/DL (ref 70–110)
POCT GLUCOSE: 94 MG/DL (ref 70–110)
POTASSIUM SERPL-SCNC: 3.7 MMOL/L (ref 3.5–5.1)
PROT SERPL-MCNC: 5.5 G/DL (ref 6–8.4)
PROTHROMBIN TIME: 18.4 SEC (ref 9–12.5)
RBC # BLD AUTO: 3.43 M/UL (ref 4.6–6.2)
SODIUM SERPL-SCNC: 136 MMOL/L (ref 136–145)
WBC # BLD AUTO: 4.93 K/UL (ref 3.9–12.7)

## 2019-12-30 PROCEDURE — 97535 SELF CARE MNGMENT TRAINING: CPT

## 2019-12-30 PROCEDURE — 99232 PR SUBSEQUENT HOSPITAL CARE,LEVL II: ICD-10-PCS | Mod: ,,, | Performed by: NURSE PRACTITIONER

## 2019-12-30 PROCEDURE — 63600175 PHARM REV CODE 636 W HCPCS: Performed by: PHYSICIAN ASSISTANT

## 2019-12-30 PROCEDURE — 85025 COMPLETE CBC W/AUTO DIFF WBC: CPT

## 2019-12-30 PROCEDURE — 83735 ASSAY OF MAGNESIUM: CPT

## 2019-12-30 PROCEDURE — 25000003 PHARM REV CODE 250: Performed by: STUDENT IN AN ORGANIZED HEALTH CARE EDUCATION/TRAINING PROGRAM

## 2019-12-30 PROCEDURE — 63600175 PHARM REV CODE 636 W HCPCS: Performed by: STUDENT IN AN ORGANIZED HEALTH CARE EDUCATION/TRAINING PROGRAM

## 2019-12-30 PROCEDURE — 36415 COLL VENOUS BLD VENIPUNCTURE: CPT

## 2019-12-30 PROCEDURE — 97112 NEUROMUSCULAR REEDUCATION: CPT

## 2019-12-30 PROCEDURE — 97110 THERAPEUTIC EXERCISES: CPT

## 2019-12-30 PROCEDURE — 99232 SBSQ HOSP IP/OBS MODERATE 35: CPT | Mod: ,,, | Performed by: NURSE PRACTITIONER

## 2019-12-30 PROCEDURE — 92526 ORAL FUNCTION THERAPY: CPT

## 2019-12-30 PROCEDURE — 97530 THERAPEUTIC ACTIVITIES: CPT

## 2019-12-30 PROCEDURE — 99233 SBSQ HOSP IP/OBS HIGH 50: CPT | Mod: ,,, | Performed by: PHYSICIAN ASSISTANT

## 2019-12-30 PROCEDURE — 25000003 PHARM REV CODE 250: Performed by: PHYSICIAN ASSISTANT

## 2019-12-30 PROCEDURE — 85610 PROTHROMBIN TIME: CPT

## 2019-12-30 PROCEDURE — 84100 ASSAY OF PHOSPHORUS: CPT

## 2019-12-30 PROCEDURE — 99233 PR SUBSEQUENT HOSPITAL CARE,LEVL III: ICD-10-PCS | Mod: ,,, | Performed by: PHYSICIAN ASSISTANT

## 2019-12-30 PROCEDURE — 80053 COMPREHEN METABOLIC PANEL: CPT

## 2019-12-30 PROCEDURE — 63600175 PHARM REV CODE 636 W HCPCS: Performed by: NEUROLOGICAL SURGERY

## 2019-12-30 RX ORDER — CEPHALEXIN 500 MG/1
500 CAPSULE ORAL 4 TIMES DAILY
Qty: 12 CAPSULE | Refills: 0 | Status: SHIPPED | OUTPATIENT
Start: 2019-12-30 | End: 2020-01-02

## 2019-12-30 RX ORDER — LEVETIRACETAM 250 MG/1
250 TABLET ORAL 2 TIMES DAILY
Qty: 14 TABLET | Refills: 0 | Status: SHIPPED | OUTPATIENT
Start: 2019-12-30 | End: 2020-03-27

## 2019-12-30 RX ORDER — SODIUM,POTASSIUM PHOSPHATES 280-250MG
2 POWDER IN PACKET (EA) ORAL
Status: DISCONTINUED | OUTPATIENT
Start: 2019-12-30 | End: 2019-12-30 | Stop reason: HOSPADM

## 2019-12-30 RX ORDER — LOSARTAN POTASSIUM 50 MG/1
50 TABLET ORAL DAILY
Status: DISCONTINUED | OUTPATIENT
Start: 2019-12-30 | End: 2019-12-30 | Stop reason: HOSPADM

## 2019-12-30 RX ORDER — LEVETIRACETAM 250 MG/1
250 TABLET ORAL 2 TIMES DAILY
Status: DISCONTINUED | OUTPATIENT
Start: 2019-12-30 | End: 2019-12-30 | Stop reason: HOSPADM

## 2019-12-30 RX ORDER — HYDRALAZINE HYDROCHLORIDE 20 MG/ML
5 INJECTION INTRAMUSCULAR; INTRAVENOUS EVERY 6 HOURS PRN
Status: DISCONTINUED | OUTPATIENT
Start: 2019-12-30 | End: 2019-12-30 | Stop reason: HOSPADM

## 2019-12-30 RX ADMIN — LABETALOL HCL IV SOLN PREFILLED SYRINGE 20 MG/4ML (5 MG/ML) 10 MG: 20/4 SOLUTION PREFILLED SYRINGE at 04:12

## 2019-12-30 RX ADMIN — LEVETIRACETAM 250 MG: 250 TABLET ORAL at 11:12

## 2019-12-30 RX ADMIN — VANCOMYCIN HYDROCHLORIDE 1500 MG: 1.5 INJECTION, POWDER, LYOPHILIZED, FOR SOLUTION INTRAVENOUS at 10:12

## 2019-12-30 RX ADMIN — PIPERACILLIN AND TAZOBACTAM 4.5 G: 4; .5 INJECTION, POWDER, FOR SOLUTION INTRAVENOUS at 11:12

## 2019-12-30 RX ADMIN — CARBIDOPA AND LEVODOPA 1 TABLET: 25; 100 TABLET ORAL at 08:12

## 2019-12-30 RX ADMIN — SENNOSIDES AND DOCUSATE SODIUM 1 TABLET: 8.6; 5 TABLET ORAL at 08:12

## 2019-12-30 RX ADMIN — LOSARTAN POTASSIUM 50 MG: 50 TABLET ORAL at 08:12

## 2019-12-30 RX ADMIN — PIPERACILLIN AND TAZOBACTAM 4.5 G: 4; .5 INJECTION, POWDER, FOR SOLUTION INTRAVENOUS at 12:12

## 2019-12-30 RX ADMIN — HYDRALAZINE HYDROCHLORIDE 5 MG: 20 INJECTION INTRAMUSCULAR; INTRAVENOUS at 06:12

## 2019-12-30 RX ADMIN — LABETALOL HCL IV SOLN PREFILLED SYRINGE 20 MG/4ML (5 MG/ML) 10 MG: 20/4 SOLUTION PREFILLED SYRINGE at 12:12

## 2019-12-30 NOTE — HOSPITAL COURSE
12/28: Pt with swelling in left arm, 2/2 IV infiltration per nursing. Will order DVTUS to monitor.  12/29: Left arm swelling decreased. US w/o DVT. Nursing reports pt tolerating PO meds crushed in pudding well. Requested speech consult for re-evaluation.  12/30: NAEON. VSSAF. BP slightly elevated, restarted home losartan. Repeat CTH today remains stable. Respiratory Cx for suspected aspiration PNA finalized with MSSA. Will DC Vanc/Zosyn, de-escalate to cephalexin x 7 days total therapy. SLP following, pt tolerating pureed diet. Neuro exam improving to near baseline per son at bedside. Pt denies headache, vision changes, n/v, SOB, fever, chills, acute weakness, and paresthesias. Medically stable for discharge back to NH today with continued therapy.

## 2019-12-30 NOTE — ASSESSMENT & PLAN NOTE
86 M w/ PMH of Parkinson's, A Fib on Coumadin, who presents with a R frontal extraaxial hemorrhage now s/p K Centra:    - Neurologically stable on exam  - Repeat CTH done today to evaluate for stability, reviewed: R frontal hemorrhage remains stable compared to last scan done 12/25 with no interval detrimental changes  - No acute neurosurgical intervention needed at this time  - q4hr neurochecks  - cEEG demonstrates mild background slowing, no seizure activity  - Continue Keppra for seizure ppx x 7 days  - HOB 30-40  - Essential HTN: Goal SBP less than 150, slightly elevated. Restarted home losartan.  - Afib on Chronic Anticoagulation: INR goal of less than 1.3, increased to 1.9 today off Coumadin. Continue to hold Coumadin x 2 weeks post-bleed. Continue to monitor INR at NH per facility. Rate controlled.  - Aspiration Pneumonia: Last CXR concerning for aspiration PNA. Respiratory Cx 12/26 with Staph aureus, sensitive to penicillin. Pt has been on Vanc/Zosyn x 4 days. Will de-escalate to cephalexin 500mg QID PO x 3 days for total of 7 days Abx therapy.  - Dysphagia: SLP following, evaluated today. Tolerating current diet, improved swallowing of PO meds with pudding. Continue pureed diet with nectar thick liquids. SLP to continue evaluating at nursing home, advance diet as tolerated.  - Hypophosphatemia: Phos 2.4 today, pt asymptomatic. Replacing orally. CTM at NH with labs per facility protocol.  - Follow up in Neurosurgery clinic with Dr. Rosenthal in 2 weeks with repeat CTH.  - Discharge instructions given verbally to patient's son, including concerning signs/symptoms and reasons to return. All of his questions were answered. He was encouraged to call the clinic with any questions or concerns prior to follow up appt.     Dispo: Medically stable for re-admit to NH today.     Discussed with attending staff Dr. Rosenthal

## 2019-12-30 NOTE — DISCHARGE INSTRUCTIONS
Neurosurgery Patient Information      -Do not take any OTC products containing acetaminophen at the same time as you take your narcotic pain medication. Medications that may contain acetaminophen include but are not limited to: Excedrin and other headache medications, arthritis medications, cold and sinus medications, etc. Please review the list of active ingredients in any OTC medication prior to taking it.  -Do not take any Aspirin or Aspirin-containing products for 2 weeks after head bleed.  -Do not take any Aleve, Naprosyn, Naproxen, Ibuprofen, Advil or any other nonsteroidal anti-inflammatory drug (NSAID) for 2 weeks after surgery.  -Do not take any herbal supplements for 2 weeks after head bleed.   -Do not consume any alcoholic beverages until released by your neurosurgeon  -Do not perform any excessive bending over or leaning forward as this is a fall hazard.  -Do not lift anything heavier than a gallon of milk until cleared in post-operative visit.     Contact the Neurosurgery Office immediately if:  If you begin to notice any neurologic changes such as:           -Sudden onset of lethargy or sleepiness           -Sudden confusion, trouble speaking, or understanding            -Sudden trouble seeing in one or both eyes            -Sudden trouble walking, dizziness, loss of coordination            -Sudden severe headache with no known cause            -Sudden onset of numbness or weakness       Miscellaneous:  -You have been discharged home on antibiotics and it is very important that you complete the course of antibiotics as instructed.   -Follow up with Dr. Rosenthal  in 2 weeks with a repeat CT head. Appointment will be mailed to you.    -You had an acute head bleed. Continue to hold home Aspirin and Coumadin for 2 weeks until cleared by Dr. Rosenthal with Neurosurgery at your 2 week follow up appointment.      Neurosurgery Office: [phone]

## 2019-12-30 NOTE — PLAN OF CARE
Patient to be discharged back to [facility].  Care deferred to [facility].  Patient to be transported via wheelchair van provided per [facility].  Neurosurgery clinic to schedule follow up appointment.    Future Appointments   Date Time Provider Department Center   1/14/2020  1:00 PM Radha Rosenthal MD [facility] NEUROS7 [facility]   4/7/2020 11:45 AM Bandar Lopez MD [facility] NEURO [facility]        12/30/19 1440   Final Note   Assessment Type Final Discharge Note   Anticipated Discharge Disposition Rehab   Hospital Follow Up  Appt(s) scheduled?   (Neurosurgery clinic to schedule follow up appointment.)   Discharge plans and expectations educations in teach back method with documentation complete? Yes

## 2019-12-30 NOTE — PLAN OF CARE
Problem: Fall Injury Risk  Goal: Absence of Fall and Fall-Related Injury  Outcome: Ongoing, Progressing     Problem: Adult Inpatient Plan of Care  Goal: Plan of Care Review  Outcome: Ongoing, Progressing  Goal: Absence of Hospital-Acquired Illness or Injury  Outcome: Ongoing, Progressing  Goal: Optimal Comfort and Wellbeing  Outcome: Ongoing, Progressing     Problem: Skin Injury Risk Increased  Goal: Skin Health and Integrity  Outcome: Ongoing, Progressing     POC reviewed with patient. All questions and concerns reviewed. Fall/ safety precautions implemented & maintained. Bed locked in lowest position, bed alarm activated & audible, & call light in reach. Will continue to monitor.

## 2019-12-30 NOTE — PROGRESS NOTES
[facility]  Neurosurgery  Progress Note    Subjective:     History of Present Illness: 86 M with an EDH/SDH. PMH of Parkinson's, A Fib on coumadin, he was going to the bathroom when he tripped and fell, hitting his head.He denies LOC. He denies numbness, weakness, blurred vision, neck pain. He received K Centra in the emergency department.    Post-Op Info:  * No surgery found *         Interval History:Pt with swelling in left arm, 2/2 IV infiltration per nursing. Will order DVTUS to monitor.    Medications:  Continuous Infusions:   sodium chloride 0.9% Stopped (12/27/19 1641)     Scheduled Meds:   carbidopa-levodopa  mg  1 tablet Oral QID    levetiracetam IVPB  250 mg Intravenous Q12H    piperacillin-tazobactam 4.5 g in sodium chloride 0.9% 100 mL IVPB (ready to mix system)  4.5 g Intravenous Q8H    senna-docusate 8.6-50 mg  1 tablet Oral Daily    vancomycin (VANCOCIN) IVPB  1,500 mg Intravenous Q24H     PRN Meds:acetaminophen, albuterol-ipratropium, carboxymethylcellulose sodium, dextrose 10 % in water (D10W), furosemide, glucagon (human recombinant), insulin aspart U-100, labetalol, ondansetron, sodium chloride 0.9%     Review of Systems    Objective:     Weight: 77.1 kg (169 lb 15.9 oz)  Body mass index is 24.39 kg/m².  Vital Signs (Most Recent):  Temp: 98.8 °F (37.1 °C) (12/30/19 0433)  Pulse: 70 (12/30/19 0433)  Resp: 18 (12/30/19 0433)  BP: (!) 150/68 (12/30/19 0433)  SpO2: 98 % (12/30/19 0433) Vital Signs (24h Range):  Temp:  [97 °F (36.1 °C)-98.8 °F (37.1 °C)] 98.8 °F (37.1 °C)  Pulse:  [69-80] 70  Resp:  [18-22] 18  SpO2:  [96 %-99 %] 98 %  BP: (141-172)/(68-81) 150/68        Male External Urinary Catheter 12/24/19 0700 (Active)   Collection Container Urimeter 12/27/2019  7:02 AM   Securement Method secured to top of thigh w/ adhesive device 12/27/2019  7:02 AM   Skin no redness;no breakdown;penis/scrotum cleansed w/ soap and water 12/27/2019  7:02 AM   Tolerance no  signs/symptoms of discomfort 12/27/2019  7:02 AM   Output (mL) 100 mL 12/27/2019  8:02 AM   Catheter Change Date 12/27/19 12/27/2019  7:02 AM   Catheter Change Time 0900 12/27/2019  7:02 AM       Neurosurgery Physical Exam    Awake and oriented x3  Flat affect  PERRL  FCX4    Significant Labs:  Recent Labs   Lab 12/28/19  0537 12/29/19  0349    77   * 137   K 3.8 3.7    108   CO2 21* 20*   BUN 12 13   CREATININE 0.7 0.8   CALCIUM 8.5* 8.3*   MG 1.9 1.9     Recent Labs   Lab 12/28/19  0537 12/29/19  0349   WBC 6.20 5.15   HGB 10.5* 10.2*   HCT 32.3* 32.2*    154     Recent Labs   Lab 12/28/19  0537 12/29/19  0349   INR 1.7* 1.8*     Microbiology Results (last 7 days)     Procedure Component Value Units Date/Time    Culture, Respiratory with Gram Stain [[id number]]  (Abnormal)  (Susceptibility) Collected:  12/26/19 1024    Order Status:  Completed Specimen:  Respiratory from Sputum Updated:  12/29/19 1200     Respiratory Culture STAPHYLOCOCCUS AUREUS  Moderate  Normal respiratory debby also present       Gram Stain (Respiratory) >10 epithelial cells per low power field     Gram Stain (Respiratory) Many WBC's     Gram Stain (Respiratory) Many budding yeast     Gram Stain (Respiratory) Many Gram positive cocci     Gram Stain (Respiratory) Moderate Gram negative rods     Gram Stain (Respiratory) Few Gram positive rods    Blood culture [[id number]] Collected:  12/25/19 2150    Order Status:  Completed Specimen:  Blood from Peripheral, Hand, Right Updated:  12/29/19 0612     Blood Culture, Routine No Growth to date      No Growth to date      No Growth to date      No Growth to date    Narrative:       Blood cultures from 2 different sites. 4 bottles total.  Please draw before starting antibiotics.    Blood culture [[id number]] Collected:  12/25/19 2153    Order Status:  Completed Specimen:  Blood from Peripheral, Forearm, Left Updated:  12/29/19 0612     Blood Culture, Routine No Growth to date       No Growth to date      No Growth to date      No Growth to date    Narrative:       Blood cultures x 2 different sites. 4 bottles total. Please  draw cultures before administering antibiotics.            Assessment/Plan:     Parkinson disease  86 M w/ PMH of Parkinson's, A Fib on Coumadin, who presents with a R frontal extraaxial hemorrhage now s/p K Centra:    - q4hr neurochecks  - cEEG demonstrates mild background slowing, no seizure activity  - SBP less than 150  - Continue Keppra  - INR goal of less than 1.3  - HOB 30-40  - Dispo pending re-admit to NH. CM on board.        Dani Munoz MD  Neurosurgery  [facility]

## 2019-12-30 NOTE — PLAN OF CARE
[facility]    FACILITY TRANSFER ORDERS      Patient Name: Elvis Thompson  YOB: 1933    PCP: Mitchell Nayak MD   PCP Address: [address] [place] [address] / [facility]  PCP Phone Number: [phone]  PCP Fax: [phone]    Encounter Date: 12/30/2019    Admit to: [facility]    Vital Signs:  Routine    Diagnoses:   Active Hospital Problems    Diagnosis  POA    *Traumatic epidural hematoma without loss of consciousness [S06.4X0A]  Yes    Epidural hematoma [S06.4X9A]  Yes    Subdural hematoma [S06.5X9A]  Yes    Anticoagulated on Coumadin [Z79.01]  Not Applicable    Chronic diastolic congestive heart failure [I50.32]  Yes    SDH (subdural hematoma) [S06.5X9A]  Yes    ICD (implantable cardioverter-defibrillator) in place [Z95.810]  Yes    Hypertension [I10]  Yes    Atrial fibrillation [I48.91]  Yes    Debility [R53.81]  Yes    Chronic anticoagulation [Z79.01]  Not Applicable    Parkinson disease [G20]  Yes    Aspiration pneumonia [J69.0]  Unknown      Resolved Hospital Problems   No resolved problems to display.       Allergies:  Review of patient's allergies indicates:   Allergen Reactions    Iodine and iodide containing products Other (See Comments)    Codeine Other (See Comments)     Insomnia      Morphine Nausea And Vomiting       Diet: pureed diet nectar thick    Activities: Activity as tolerated    Nursing:  Fall precautions, Aspiration precautions, Seizure precautions    Aspiration Precautions:   · 1 SMALL bite/sip at a time,   · Sips of liquid via teaspoon per patient request  · Alternating bites/sips,   · Assistance with meals and Assistance with thickening liquids,   · Feed only when awake/alert,   · Frequent oral care,   · No straw  · HOB to 90 degrees  Continue to monitor for signs and symptoms of aspiration and discontinue oral feeding should you notice any of the following: watery eyes, reddened facial area, wet vocal quality, increased work of  breathing, change in respiratory status, increased congestion, coughing, fever, etc.    Labs: CBC, BMP and INR per facility provider protocol    CONSULTS:    Physical Therapy to evaluate and treat. , Occupational Therapy to evaluate and treat. and Speech Therapy to evaluate and treat for Language, Swallowing and Cognition.    MISCELLANEOUS CARE:  N/A    WOUND CARE ORDERS  None    Medications: Review discharge medications with patient and family and provide education.      Current Discharge Medication List      START taking these medications    Details   cephALEXin (KEFLEX) 500 MG capsule Take 1 capsule (500 mg total) by mouth 4 (four) times daily. for 3 days  Qty: 12 capsule, Refills: 0      levETIRAcetam (KEPPRA) 250 MG Tab Take 1 tablet (250 mg total) by mouth 2 (two) times daily. for 7 days  Qty: 14 tablet, Refills: 0         CONTINUE these medications which have NOT CHANGED    Details   albuterol-ipratropium (DUO-NEB) 2.5 mg-0.5 mg/3 mL nebulizer solution Take 3 mLs by nebulization every 6 (six) hours as needed for Wheezing. Rescue             carbidopa-levodopa  mg (SINEMET)  mg per tablet Take 1 tablet by mouth 4 (four) times daily.  Qty: 360 tablet, Refills: 3      carvedilol (COREG) 3.125 MG tablet Take 3.125 mg by mouth 2 (two) times daily.      docusate sodium (COLACE) 100 MG capsule Take 100 mg by mouth once daily.      furosemide (LASIX) 40 MG tablet Take 40 mg by mouth daily as needed (or shortness of breath).       losartan (COZAAR) 50 MG tablet Take 50 mg by mouth once daily.      ondansetron (ZOFRAN) 8 MG tablet Take 8 mg by mouth every 6 (six) hours as needed for Nausea.      polyethylene glycol (MIRALAX) 17 gram PwPk Take 17 g by mouth daily as needed. Mix in 8 ounces of water      pseudoephedrine-dextromethorphan-guaifenesin (ROBITUSSIN-PE) 30- mg/5 mL solution Take 10 mLs by mouth every 6 (six) hours as needed for Cough.       tramadol-acetaminophen 37.5-325 mg (ULTRACET)  37.5-325 mg Tab Take 1 tablet by mouth every 4 (four) hours as needed for Pain.                 Continue to HOLD these medications for 2 WEEKS after date of head bleed (may resume 1/7/2020):    aspirin (ECOTRIN) 81 MG EC tablet Take 1 tablet (81 mg total) by mouth once daily.  Refills: 0        warfarin (COUMADIN) 2 MG tablet Take 2 mg by mouth once daily.              _________________________________  Kirstie Reilly PA-C  12/30/2019

## 2019-12-30 NOTE — PT/OT/SLP PROGRESS
Occupational Therapy   Treatment    Name: Elvis Thompson  MRN: [id number]  Admitting Diagnosis:  Traumatic epidural hematoma without loss of consciousness       Recommendations:     Discharge Recommendations: Return to NH with continued therapy  Discharge Equipment Recommendations:  none  Barriers to discharge:  None    Assessment:     Elvis Thompson is a 86 y.o. male with a medical diagnosis of Traumatic epidural hematoma without loss of consciousness.  He presents with performance deficits including weakness, impaired endurance, impaired self care skills, impaired functional mobilty, gait instability, impaired balance, decreased safety awareness, decreased ROM, impaired coordination. Pt would continue to benefit from OT to increase functional independence and safety. Recommend return to NH with therapy upon D/C.    Rehab Prognosis:  Fair; patient would benefit from acute skilled OT services to address these deficits and reach maximum level of function.       Plan:     Patient to be seen 3 x/week to address the above listed problems via self-care/home management, therapeutic activities, therapeutic exercises, neuromuscular re-education  · Plan of Care Expires: 01/24/20  · Plan of Care Reviewed with: patient, son    Subjective     Pain/Comfort:  · Pain Rating 1: 0/10  · Pain Rating Post-Intervention 1: 0/10    Objective:     Communicated with: RN prior to session. Patient found with HOB elevated with telemetry, bed alarm, peripheral IV upon OT entry to room, son present.    General Precautions: Standard, fall, aspiration   Orthopedic Precautions:N/A   Braces: N/A     Occupational Performance:     Bed Mobility:    · Patient completed Supine to Sit with maximal assistance with increased time to initiate movement  · Patient completed Sit to Supine with maximal assistance      Functional Mobility/Transfers:  · Patient completed Sit <> Stand Transfer with moderate assistance and of 2 persons with hand-held assist from EOB  2 trials, rolling walker with one trial; cues for hand placement, forward flexed posture  · Functional Mobility: Unable this date     Activities of Daily Living:  · Lower Body Dressing: total assistance to don socks    [facility] 6 Click ADL: 15    Treatment & Education:  Pt awake/alert, drowsy at times throughout session and with flat affect, stiff/rigid posture; able to sit EOB with close SBA-CGA while completing LE therex with PT; practiced standing trials with and without use of RW and able to maintain static standing ~30 seconds per trial; pt SOB and required seated rest breaks and cues for breathing technique; returned to supine with HOB elevated and discussed POC with pt and son    Patient left HOB elevated with all lines intact, call button in reach, bed alarm on and son presentEducation:      GOALS:   Multidisciplinary Problems     Occupational Therapy Goals        Problem: Occupational Therapy Goal    Goal Priority Disciplines Outcome Interventions   Occupational Therapy Goal     OT, PT/OT Ongoing, Progressing    Description:  Goals to be met by: 7 days (12/31/19)     Patient will increase functional independence with ADLs by performing:    Feeding with Stand-by Assistance.  UE Dressing with Contact Guard Assistance.  Supine to sit with Minimal Assistance.  Toilet transfer to toilet with Minimal Assistance.                      Time Tracking:     OT Date of Treatment: 12/30/19  OT Start Time: 1050  OT Stop Time: 1113  OT Total Time (min): 23 min (Co-treat with PT)    Billable Minutes:Neuromuscular Re-education 23 minutes    SONIA Love  12/30/2019

## 2019-12-30 NOTE — PROGRESS NOTES
[facility]  Physical Medicine & Rehab  Progress Note    Patient Name: Elvis Thompson  MRN: [id number]  Admission Date: 12/24/2019  Length of Stay: 6 days  Attending Physician: Radha Rosenthal MD    Subjective:     Principal Problem:Traumatic epidural hematoma without loss of consciousness    Hospital Course:   12/24/19: Evaluated by PT & OT. Bed mobility Vanda w/ RW. Ambulated 10 ft CGA w/ RW.   12/27/19: participated w/ PT & OT. Bed mobility maxA. Sit to stand modA x 2 ppl. LBD totalA.     Interval History 12/30/2019:  Patient is seen for follow-up rehab evaluation and recommendations: Participating with therapy. Stepped down to floor.     HPI, Past Medical, Family, and Social History remains the same as documented in the initial encounter.    Scheduled Medications:    carbidopa-levodopa  mg  1 tablet Oral QID    levETIRAcetam  250 mg Oral BID    losartan  50 mg Oral Daily    piperacillin-tazobactam 4.5 g in sodium chloride 0.9% 100 mL IVPB (ready to mix system)  4.5 g Intravenous Q8H    potassium, sodium phosphates  2 packet Oral QID (AC & HS)    senna-docusate 8.6-50 mg  1 tablet Oral Daily    vancomycin (VANCOCIN) IVPB  1,500 mg Intravenous Q24H       Diagnostic Results: Labs: Reviewed    PRN Medications: acetaminophen, albuterol-ipratropium, carboxymethylcellulose sodium, dextrose 10 % in water (D10W), furosemide, glucagon (human recombinant), hydrALAZINE, insulin aspart U-100, labetalol, ondansetron, sodium chloride 0.9%    Review of Systems   Reason unable to perform ROS: unable to get full ROS 2/2 CLI.   Constitutional: Positive for activity change.   Neurological: Positive for weakness.   Psychiatric/Behavioral: Positive for confusion.     Objective:     Vital Signs (Most Recent):  Temp: 98.7 °F (37.1 °C) (12/30/19 0727)  Pulse: 69(Simultaneous filing. User may not have seen previous data.) (12/30/19 0727)  Resp: 20 (12/30/19 0614)  BP: (!) 154/69 (12/30/19 0727)  SpO2: 97 %  (12/30/19 5728)    Vital Signs (24h Range):  Temp:  [98.2 °F (36.8 °C)-98.8 °F (37.1 °C)] 98.7 °F (37.1 °C)  Pulse:  [69-80] 69  Resp:  [18-20] 20  SpO2:  [96 %-98 %] 97 %  BP: (141-172)/(67-81) 154/69     Physical Exam   Constitutional: He appears well-developed and well-nourished.   HENT:   Head: Normocephalic and atraumatic.   Eyes: Pupils are equal, round, and reactive to light. EOM are normal.   Pulmonary/Chest: Effort normal. No respiratory distress.   Musculoskeletal: He exhibits no deformity.   Neurological:   - Lethargic  - Easily arousable   - oriented to self  - following commands   Skin: Skin is warm and dry.   Psychiatric: His behavior is normal.   Nursing note and vitals reviewed.    Assessment/Plan:      * Traumatic epidural hematoma without loss of consciousness  - CTH revealed evolving extra-axial hemorrhage overlying the right cerebral convexity compatible with acute subdural hemorrhage. There is no significant increased hemorrhage.     Atrial fibrillation  - on Coumadin     Debility  - Related to prolonged/acute hospital course.     Recommendations  -  Encourage mobility, OOB in chair at least 3 hours per day, and early ambulation as appropriate  -  PT/OT evaluate and treat  -  Pain management  -  Monitor for and prevent skin breakdown and pressure ulcers  · Early mobility, repositioning/weight shifting every 20-30 minutes when sitting, turn patient every 2 hours, proper mattress/overlay and chair cushioning, pressure relief/heel protector boots  -  DVT prophylaxis    -  Reviewed discharge options (IP rehab, SNF, HH therapy, and OP therapy)    Recommend Skilled Nursing and back to [facility] per family request.     Mary Lama NP  Department of Physical Medicine & Rehab   [facility]

## 2019-12-30 NOTE — SUBJECTIVE & OBJECTIVE
Interval History: NAEON. VSSAF. BP slightly elevated, restarted home losartan. Repeat CTH today remains stable. Respiratory Cx for suspected aspiration PNA finalized with MSSA. Will DC Vanc/Zosyn, de-escalate to cephalexin x 7 days total therapy. SLP following, pt tolerating pureed diet. Neuro exam improving to near baseline per son at bedside. Pt denies headache, vision changes, n/v, SOB, fever, chills, acute weakness, and paresthesias. Medically stable for discharge back to NH today with continued therapy.    Medications:  Continuous Infusions:  Scheduled Meds:   carbidopa-levodopa  mg  1 tablet Oral QID    levETIRAcetam  250 mg Oral BID    losartan  50 mg Oral Daily    piperacillin-tazobactam 4.5 g in sodium chloride 0.9% 100 mL IVPB (ready to mix system)  4.5 g Intravenous Q8H    potassium, sodium phosphates  2 packet Oral QID (AC & HS)    senna-docusate 8.6-50 mg  1 tablet Oral Daily    vancomycin (VANCOCIN) IVPB  1,500 mg Intravenous Q24H     PRN Meds:acetaminophen, albuterol-ipratropium, carboxymethylcellulose sodium, dextrose 10 % in water (D10W), furosemide, glucagon (human recombinant), hydrALAZINE, insulin aspart U-100, labetalol, ondansetron, sodium chloride 0.9%     Review of Systems  Objective:     Weight: 77.1 kg (169 lb 15.9 oz)  Body mass index is 24.39 kg/m².  Vital Signs (Most Recent):  Temp: 98.4 °F (36.9 °C) (12/30/19 1152)  Pulse: 70 (12/30/19 1152)  Resp: 20 (12/30/19 0614)  BP: 139/65 (12/30/19 1300)  SpO2: 96 % (12/30/19 1152) Vital Signs (24h Range):  Temp:  [98.2 °F (36.8 °C)-98.8 °F (37.1 °C)] 98.4 °F (36.9 °C)  Pulse:  [69-80] 70  Resp:  [18-20] 20  SpO2:  [96 %-98 %] 96 %  BP: (139-163)/(65-74) 139/65                     Male External Urinary Catheter 12/24/19 0700 (Active)   Collection Container Standard drainage bag 12/27/2019  8:00 PM   Securement Method secured to top of thigh w/ adhesive device 12/27/2019  8:00 PM   Skin no redness;no breakdown 12/27/2019  8:00 PM    Tolerance no signs/symptoms of discomfort 12/27/2019  8:00 PM   Output (mL) 210 mL 12/27/2019  4:00 PM   Catheter Change Date 12/27/19 12/27/2019  8:00 PM   Catheter Change Time 2000 12/27/2019  8:00 PM       Neurosurgery Physical Exam    General: well developed, well nourished, no distress.   Head: normocephalic, atraumatic  Neck: No tracheal deviation. No palpable masses. Full ROM.   Neurologic: Alert, disoriented. Thought content appropriate.  GCS: Motor: 6/Verbal: 5/Eyes: 4 GCS Total: 15  Mental Status: Awake, Alert, Oriented x 2 (person and place, disoriented to time). Follows commands.  Language: No aphasia  Speech: No dysarthria  Cranial nerves: face symmetric, tongue midline, CN II-XII grossly intact.   Eyes: pupils equal, round, reactive to light with accomodation, EOMI.   Ears: No drainage.   Pulmonary: normal respirations, no signs of respiratory distress  Abdomen: soft, non-distended, not tender to palpation    Sensory: intact to light touch throughout  Motor Strength: Moves all extremities spontaneously with good strength and tone. No abnormal movements seen.     Pronator Drift: no drift noted  Finger-to-nose: Intact bilaterally  Vascular: Pulses 2+ and symmetric radial and dorsalis pedis. No LE edema.   Skin: Skin is warm, dry and intact.      Significant Labs:  Recent Labs   Lab 12/29/19 0349 12/30/19 0436   GLU 77 91    136   K 3.7 3.7    108   CO2 20* 20*   BUN 13 11   CREATININE 0.8 0.6   CALCIUM 8.3* 8.0*   MG 1.9 1.8     Recent Labs   Lab 12/29/19 0349 12/30/19 0436   WBC 5.15 4.93   HGB 10.2* 10.3*   HCT 32.2* 32.2*    170     Recent Labs   Lab 12/29/19 0349 12/30/19 0436   INR 1.8* 1.9*     Microbiology Results (last 7 days)     Procedure Component Value Units Date/Time    Culture, Respiratory with Gram Stain [[id number]]  (Abnormal)  (Susceptibility) Collected:  12/26/19 1024    Order Status:  Completed Specimen:  Respiratory from Sputum Updated:  12/30/19 0821      Respiratory Culture No Pseudomonas isolated.      STAPHYLOCOCCUS AUREUS  Moderate  Normal respiratory debby also present       Gram Stain (Respiratory) >10 epithelial cells per low power field     Gram Stain (Respiratory) Many WBC's     Gram Stain (Respiratory) Many budding yeast     Gram Stain (Respiratory) Many Gram positive cocci     Gram Stain (Respiratory) Moderate Gram negative rods     Gram Stain (Respiratory) Few Gram positive rods    Blood culture [[id number]] Collected:  12/25/19 2153    Order Status:  Completed Specimen:  Blood from Peripheral, Forearm, Left Updated:  12/30/19 0612     Blood Culture, Routine No Growth to date      No Growth to date      No Growth to date      No Growth to date      No Growth to date    Narrative:       Blood cultures x 2 different sites. 4 bottles total. Please  draw cultures before administering antibiotics.    Blood culture [[id number]] Collected:  12/25/19 2150    Order Status:  Completed Specimen:  Blood from Peripheral, Hand, Right Updated:  12/30/19 0612     Blood Culture, Routine No Growth to date      No Growth to date      No Growth to date      No Growth to date      No Growth to date    Narrative:       Blood cultures from 2 different sites. 4 bottles total.  Please draw before starting antibiotics.        All pertinent labs from the last 24 hours have been reviewed.    Significant Diagnostics:  I have reviewed and interpreted all pertinent imaging results/findings within the past 24 hours.

## 2019-12-30 NOTE — NURSING
Patient picked up by EMS @1700 to be transferred back to NH.Discharge papers reviewed with son. Report given to NH. No other issues noted.

## 2019-12-30 NOTE — SUBJECTIVE & OBJECTIVE
Interval History 12/30/2019:  Patient is seen for follow-up rehab evaluation and recommendations: Participating with therapy. Stepped down to floor.     HPI, Past Medical, Family, and Social History remains the same as documented in the initial encounter.    Scheduled Medications:    carbidopa-levodopa  mg  1 tablet Oral QID    levETIRAcetam  250 mg Oral BID    losartan  50 mg Oral Daily    piperacillin-tazobactam 4.5 g in sodium chloride 0.9% 100 mL IVPB (ready to mix system)  4.5 g Intravenous Q8H    potassium, sodium phosphates  2 packet Oral QID (AC & HS)    senna-docusate 8.6-50 mg  1 tablet Oral Daily    vancomycin (VANCOCIN) IVPB  1,500 mg Intravenous Q24H       Diagnostic Results: Labs: Reviewed    PRN Medications: acetaminophen, albuterol-ipratropium, carboxymethylcellulose sodium, dextrose 10 % in water (D10W), furosemide, glucagon (human recombinant), hydrALAZINE, insulin aspart U-100, labetalol, ondansetron, sodium chloride 0.9%    Review of Systems   Reason unable to perform ROS: unable to get full ROS 2/2 CLI.   Constitutional: Positive for activity change.   Neurological: Positive for weakness.   Psychiatric/Behavioral: Positive for confusion.     Objective:     Vital Signs (Most Recent):  Temp: 98.7 °F (37.1 °C) (12/30/19 0727)  Pulse: 69(Simultaneous filing. User may not have seen previous data.) (12/30/19 0727)  Resp: 20 (12/30/19 0614)  BP: (!) 154/69 (12/30/19 0727)  SpO2: 97 % (12/30/19 0727)    Vital Signs (24h Range):  Temp:  [98.2 °F (36.8 °C)-98.8 °F (37.1 °C)] 98.7 °F (37.1 °C)  Pulse:  [69-80] 69  Resp:  [18-20] 20  SpO2:  [96 %-98 %] 97 %  BP: (141-172)/(67-81) 154/69     Physical Exam   Constitutional: He appears well-developed and well-nourished.   HENT:   Head: Normocephalic and atraumatic.   Eyes: Pupils are equal, round, and reactive to light. EOM are normal.   Pulmonary/Chest: Effort normal. No respiratory distress.   Musculoskeletal: He exhibits no deformity.    Neurological:   - Lethargic  - Easily arousable   - Not oriented to location or time  - oriented to self  - following commands   Skin: Skin is warm and dry.   Psychiatric: His behavior is normal.   Nursing note and vitals reviewed.    NEUROLOGICAL EXAMINATION:     CRANIAL NERVES     CN III, IV, VI   Pupils are equal, round, and reactive to light.  Extraocular motions are normal.

## 2019-12-30 NOTE — PROGRESS NOTES
[facility]  Neurosurgery  Progress Note    Subjective:     History of Present Illness: 86 M with an EDH/SDH. PMH of Parkinson's, A Fib on coumadin, he was going to the bathroom when he tripped and fell, hitting his head.He denies LOC. He denies numbness, weakness, blurred vision, neck pain. He received K Centra in the emergency department.    Post-Op Info:  * No surgery found *         Interval History: Left arm swelling decreased. US w/o DVT. Nursing reports pt tolerating PO meds crushed in pudding well. Requested speech consult for re-evaluation.    Medications:  Continuous Infusions:   sodium chloride 0.9% Stopped (12/27/19 1641)     Scheduled Meds:   carbidopa-levodopa  mg  1 tablet Oral QID    levetiracetam IVPB  250 mg Intravenous Q12H    piperacillin-tazobactam 4.5 g in sodium chloride 0.9% 100 mL IVPB (ready to mix system)  4.5 g Intravenous Q8H    senna-docusate 8.6-50 mg  1 tablet Oral Daily    vancomycin (VANCOCIN) IVPB  1,500 mg Intravenous Q24H     PRN Meds:acetaminophen, albuterol-ipratropium, carboxymethylcellulose sodium, dextrose 10 % in water (D10W), furosemide, glucagon (human recombinant), insulin aspart U-100, labetalol, ondansetron, sodium chloride 0.9%     Review of Systems    Objective:     Weight: 77.1 kg (169 lb 15.9 oz)  Body mass index is 24.39 kg/m².  Vital Signs (Most Recent):  Temp: 98.8 °F (37.1 °C) (12/30/19 0433)  Pulse: 70 (12/30/19 0433)  Resp: 18 (12/30/19 0433)  BP: (!) 150/68 (12/30/19 0433)  SpO2: 98 % (12/30/19 0433) Vital Signs (24h Range):  Temp:  [97 °F (36.1 °C)-98.8 °F (37.1 °C)] 98.8 °F (37.1 °C)  Pulse:  [69-80] 70  Resp:  [18-22] 18  SpO2:  [96 %-99 %] 98 %  BP: (141-172)/(68-81) 150/68        Male External Urinary Catheter 12/24/19 0700 (Active)   Collection Container Urimeter 12/27/2019  7:02 AM   Securement Method secured to top of thigh w/ adhesive device 12/27/2019  7:02 AM   Skin no redness;no breakdown;penis/scrotum cleansed w/  soap and water 12/27/2019  7:02 AM   Tolerance no signs/symptoms of discomfort 12/27/2019  7:02 AM   Output (mL) 100 mL 12/27/2019  8:02 AM   Catheter Change Date 12/27/19 12/27/2019  7:02 AM   Catheter Change Time 0900 12/27/2019  7:02 AM       Neurosurgery Physical Exam    Awake and oriented x3  Flat affect  PERRL  FCX4    Significant Labs:  Recent Labs   Lab 12/28/19  0537 12/29/19  0349    77   * 137   K 3.8 3.7    108   CO2 21* 20*   BUN 12 13   CREATININE 0.7 0.8   CALCIUM 8.5* 8.3*   MG 1.9 1.9     Recent Labs   Lab 12/28/19 0537 12/29/19 0349   WBC 6.20 5.15   HGB 10.5* 10.2*   HCT 32.3* 32.2*    154     Recent Labs   Lab 12/28/19 0537 12/29/19 0349   INR 1.7* 1.8*     Microbiology Results (last 7 days)     Procedure Component Value Units Date/Time    Culture, Respiratory with Gram Stain [[id number]]  (Abnormal)  (Susceptibility) Collected:  12/26/19 1024    Order Status:  Completed Specimen:  Respiratory from Sputum Updated:  12/29/19 1200     Respiratory Culture STAPHYLOCOCCUS AUREUS  Moderate  Normal respiratory debby also present       Gram Stain (Respiratory) >10 epithelial cells per low power field     Gram Stain (Respiratory) Many WBC's     Gram Stain (Respiratory) Many budding yeast     Gram Stain (Respiratory) Many Gram positive cocci     Gram Stain (Respiratory) Moderate Gram negative rods     Gram Stain (Respiratory) Few Gram positive rods    Blood culture [[id number]] Collected:  12/25/19 2150    Order Status:  Completed Specimen:  Blood from Peripheral, Hand, Right Updated:  12/29/19 0612     Blood Culture, Routine No Growth to date      No Growth to date      No Growth to date      No Growth to date    Narrative:       Blood cultures from 2 different sites. 4 bottles total.  Please draw before starting antibiotics.    Blood culture [[id number]] Collected:  12/25/19 2153    Order Status:  Completed Specimen:  Blood from Peripheral, Forearm, Left Updated:  12/29/19 0612      Blood Culture, Routine No Growth to date      No Growth to date      No Growth to date      No Growth to date    Narrative:       Blood cultures x 2 different sites. 4 bottles total. Please  draw cultures before administering antibiotics.            Assessment/Plan:     Parkinson disease  86 M w/ PMH of Parkinson's, A Fib on Coumadin, who presents with a R frontal extraaxial hemorrhage now s/p K Centra:    - q4hr neurochecks  - cEEG demonstrates mild background slowing, no seizure activity  - SBP less than 150  - Continue Keppra  - INR goal of less than 1.3  - HOB 30-40  - Speech will see on Monday for swallow recs  - Dispo pending re-admit to NH. CM on board.        Dani Munoz MD  Neurosurgery  [facility]

## 2019-12-30 NOTE — PT/OT/SLP PROGRESS
Speech Language Pathology Treatment    Patient Name:  Elvis Thompson   MRN:  [id number]   910/910 A    Admitting Diagnosis: Traumatic epidural hematoma without loss of consciousness    Recommendations:                 General Recommendations:  Dysphagia therapy  Diet recommendations:  Puree, Nectar Thick; crushed po medications  Aspiration Precautions:   · 1 SMALL bite/sip at a time,   · Sips of liquid via teaspoon per patient request  · Alternating bites/sips,   · Assistance with meals and Assistance with thickening liquids,   · Feed only when awake/alert,   · Frequent oral care,   · No straw  · HOB to 90 degrees  · Continue to monitor for signs and symptoms of aspiration and discontinue oral feeding should you notice any of the following: watery eyes, reddened facial area, wet vocal quality, increased work of breathing, change in respiratory status, increased congestion, coughing, fever, etc.  General Precautions: Standard, aspiration, fall, nectar thick, pureed diet  Communication strategies:  provide increased time to answer    Subjective     Patient awake and cooperative. RN present in room for part of ST session.     Objective:     Has the patient been evaluated by SLP for swallowing?   Yes  Keep patient NPO? No   Current Respiratory Status: room air      Patient seen to assess tolerance of diet. Per chart review, patient placed NPO over weekend 2/2 noted coughing per nursing. Current RN reports re-initiating diet yesterday after noting patient without difficulties with meds crushed in puree and calling ST. SLP assessed patient with whole breakfast tray including apple sauce, pureed sausage, pureed eggs, RN administered crushed meds, sips of nectar thick water via cup and teaspoon, and sips of nectar thick orange juice via cup and teaspoon. Patient reporting preference for teaspoon sips of liquids. Continued significantly delayed swallowing appreciated. Patient requesting alternating bites and sips. Oral residue  intermittently noted, however, patient clears with liquid wash and cues for lingual sweeps. He reports inconsistent globus sensation after puree which is cleared with nectar thick liquid wash. No overt s/s of aspiration noted throughout meal with safe swallowing precautions in place and during education following meal. Patient initially refusing thickened liquids and requesting thin water. SLP provided education on importance of thickener and results of prior MBSS. Further education provided on swallowing and Parkinson's, SLP recommendations, SLP role, s/s and risks of aspiration, safe swallow precautions, and POC. Patient demonstrated understanding, however, would benefit from reinforcement. Patient unable to recall if he receives ST at NH. No family present to confirm baseline speech, language, and cognition vs current presentation to determine if acute ST for speech, language, and cognition is appropriate. SLP attempted to contact family (daughter's number in chart), however, no answer. SLP will follow up.    Assessment:     Elvis Thompson is a 86 y.o. male with an SLP diagnosis of Dysphagia, Dysarthria and Cognitive-Linguistic Impairment.  ST will follow up.     Goals:   Multidisciplinary Problems     SLP Goals        Problem: SLP Goal    Goal Priority Disciplines Outcome   SLP Goal     SLP Ongoing, Progressing   Description:  Speech Therapy Short Term Goals  Goal expected to be met by 1/6  1. Pt will tolerate puree diet and NECTAR thick liquids with no overt s/s of aspiration noted.   2. Pt will participate in an ongoing assessment to determine the least restrictive and safest diet with possible updated goals to follow pending results.  3. SLP will discuss baseline vs. current presentation to determine further acute ST needs for speech, language, and cognition.    4. Pt will verbalize 3 safe swallowing precautions including need for thickener.                     Plan:     · Patient to be seen:  3 x/week    · Plan of Care expires:     · Plan of Care reviewed with:  patient   · SLP Follow-Up:  Yes       Discharge recommendations:  (return to NH with ST)   Barriers to Discharge:  None    Time Tracking:     SLP Treatment Date:   12/30/19  Speech Start Time:  0826  Speech Stop Time:  0905     Speech Total Time (min):  39 min    Billable Minutes: Treatment Swallowing Dysfunction 24 and Seld Care/Home Management Training 15    TERRY Zavala, CCC-SLP  12/30/2019

## 2019-12-30 NOTE — PLAN OF CARE
12/30/19 1504   Post-Acute Status   Post-Acute Authorization Placement   Post-Acute Placement Status Set-up Complete       [facility]  Nurse can call report to Smita at [phone], Transport setup by EMS for 4:30.  SW in contact with CM and Medical staff.       Faraz Jauregui, KRISTAL  [facility]   Ext. [phone]

## 2019-12-30 NOTE — PLAN OF CARE
Patient discharge is to return to shelter nursing home at [facility].     12/30/19 1444   Final Note   Assessment Type Final Discharge Note   Anticipated Discharge Disposition assisted Nu

## 2019-12-30 NOTE — PROGRESS NOTES
[facility]  Neurosurgery  Progress Note    Subjective:     History of Present Illness: 86 M with an EDH/SDH. PMH of Parkinson's, A Fib on coumadin, he was going to the bathroom when he tripped and fell, hitting his head.He denies LOC. He denies numbness, weakness, blurred vision, neck pain. He received K Centra in the emergency department.    Post-Op Info:  * No surgery found *         Interval History: NAEON. VSSAF. BP slightly elevated, restarted home losartan. Repeat CTH today remains stable. Respiratory Cx for suspected aspiration PNA finalized with MSSA. Will DC Vanc/Zosyn, de-escalate to cephalexin x 7 days total therapy. SLP following, pt tolerating pureed diet. Neuro exam improving to near baseline per son at bedside. Pt denies headache, vision changes, n/v, SOB, fever, chills, acute weakness, and paresthesias. Medically stable for discharge back to NH today with continued therapy.    Medications:  Continuous Infusions:  Scheduled Meds:   carbidopa-levodopa  mg  1 tablet Oral QID    levETIRAcetam  250 mg Oral BID    losartan  50 mg Oral Daily    piperacillin-tazobactam 4.5 g in sodium chloride 0.9% 100 mL IVPB (ready to mix system)  4.5 g Intravenous Q8H    potassium, sodium phosphates  2 packet Oral QID (AC & HS)    senna-docusate 8.6-50 mg  1 tablet Oral Daily    vancomycin (VANCOCIN) IVPB  1,500 mg Intravenous Q24H     PRN Meds:acetaminophen, albuterol-ipratropium, carboxymethylcellulose sodium, dextrose 10 % in water (D10W), furosemide, glucagon (human recombinant), hydrALAZINE, insulin aspart U-100, labetalol, ondansetron, sodium chloride 0.9%     Review of Systems  Objective:     Weight: 77.1 kg (169 lb 15.9 oz)  Body mass index is 24.39 kg/m².  Vital Signs (Most Recent):  Temp: 98.4 °F (36.9 °C) (12/30/19 1152)  Pulse: 70 (12/30/19 1152)  Resp: 20 (12/30/19 0614)  BP: 139/65 (12/30/19 1300)  SpO2: 96 % (12/30/19 1152) Vital Signs (24h Range):  Temp:  [98.2 °F (36.8  °C)-98.8 °F (37.1 °C)] 98.4 °F (36.9 °C)  Pulse:  [69-80] 70  Resp:  [18-20] 20  SpO2:  [96 %-98 %] 96 %  BP: (139-163)/(65-74) 139/65                     Male External Urinary Catheter 12/24/19 0700 (Active)   Collection Container Standard drainage bag 12/27/2019  8:00 PM   Securement Method secured to top of thigh w/ adhesive device 12/27/2019  8:00 PM   Skin no redness;no breakdown 12/27/2019  8:00 PM   Tolerance no signs/symptoms of discomfort 12/27/2019  8:00 PM   Output (mL) 210 mL 12/27/2019  4:00 PM   Catheter Change Date 12/27/19 12/27/2019  8:00 PM   Catheter Change Time 2000 12/27/2019  8:00 PM       Neurosurgery Physical Exam    General: well developed, well nourished, no distress.   Head: normocephalic, atraumatic  Neck: No tracheal deviation. No palpable masses. Full ROM.   Neurologic: Alert, disoriented. Thought content appropriate.  GCS: Motor: 6/Verbal: 5/Eyes: 4 GCS Total: 15  Mental Status: Awake, Alert, Oriented x 2 (person and place, disoriented to time). Follows commands.  Language: No aphasia  Speech: No dysarthria  Cranial nerves: face symmetric, tongue midline, CN II-XII grossly intact.   Eyes: pupils equal, round, reactive to light with accomodation, EOMI.   Ears: No drainage.   Pulmonary: normal respirations, no signs of respiratory distress  Abdomen: soft, non-distended, not tender to palpation    Sensory: intact to light touch throughout  Motor Strength: Moves all extremities spontaneously with good strength and tone. No abnormal movements seen.     Pronator Drift: no drift noted  Finger-to-nose: Intact bilaterally  Vascular: Pulses 2+ and symmetric radial and dorsalis pedis. No LE edema.   Skin: Skin is warm, dry and intact.      Significant Labs:  Recent Labs   Lab 12/29/19  0349 12/30/19  0436   GLU 77 91    136   K 3.7 3.7    108   CO2 20* 20*   BUN 13 11   CREATININE 0.8 0.6   CALCIUM 8.3* 8.0*   MG 1.9 1.8     Recent Labs   Lab 12/29/19  0349 12/30/19  0436   WBC 5.15  4.93   HGB 10.2* 10.3*   HCT 32.2* 32.2*    170     Recent Labs   Lab 12/29/19  0349 12/30/19  0436   INR 1.8* 1.9*     Microbiology Results (last 7 days)     Procedure Component Value Units Date/Time    Culture, Respiratory with Gram Stain [[id number]]  (Abnormal)  (Susceptibility) Collected:  12/26/19 1024    Order Status:  Completed Specimen:  Respiratory from Sputum Updated:  12/30/19 0821     Respiratory Culture No Pseudomonas isolated.      STAPHYLOCOCCUS AUREUS  Moderate  Normal respiratory debby also present       Gram Stain (Respiratory) >10 epithelial cells per low power field     Gram Stain (Respiratory) Many WBC's     Gram Stain (Respiratory) Many budding yeast     Gram Stain (Respiratory) Many Gram positive cocci     Gram Stain (Respiratory) Moderate Gram negative rods     Gram Stain (Respiratory) Few Gram positive rods    Blood culture [[id number]] Collected:  12/25/19 2153    Order Status:  Completed Specimen:  Blood from Peripheral, Forearm, Left Updated:  12/30/19 0612     Blood Culture, Routine No Growth to date      No Growth to date      No Growth to date      No Growth to date      No Growth to date    Narrative:       Blood cultures x 2 different sites. 4 bottles total. Please  draw cultures before administering antibiotics.    Blood culture [[id number]] Collected:  12/25/19 2150    Order Status:  Completed Specimen:  Blood from Peripheral, Hand, Right Updated:  12/30/19 0612     Blood Culture, Routine No Growth to date      No Growth to date      No Growth to date      No Growth to date      No Growth to date    Narrative:       Blood cultures from 2 different sites. 4 bottles total.  Please draw before starting antibiotics.        All pertinent labs from the last 24 hours have been reviewed.    Significant Diagnostics:  I have reviewed and interpreted all pertinent imaging results/findings within the past 24 hours.    Assessment/Plan:     Parkinson disease  86 M w/ PMH of Parkinson's, A  Fib on Coumadin, who presents with a R frontal extraaxial hemorrhage now s/p K Centra:    - Neurologically stable on exam  - Repeat CTH done today to evaluate for stability, reviewed: R frontal hemorrhage remains stable compared to last scan done 12/25 with no interval detrimental changes  - No acute neurosurgical intervention needed at this time  - q4hr neurochecks  - cEEG demonstrates mild background slowing, no seizure activity  - Continue Keppra for seizure ppx x 7 days  - HOB 30-40  - Essential HTN: Goal SBP less than 150, slightly elevated. Restarted home losartan.  - Afib on Chronic Anticoagulation: INR goal of less than 1.3, increased to 1.9 today off Coumadin. Continue to hold Coumadin x 2 weeks post-bleed. Continue to monitor INR at NH per facility. Rate controlled.  - Aspiration Pneumonia: Last CXR concerning for aspiration PNA. Respiratory Cx 12/26 with Staph aureus, sensitive to penicillin. Pt has been on Vanc/Zosyn x 4 days. Will de-escalate to cephalexin 500mg QID PO x 3 days for total of 7 days Abx therapy.  - Dysphagia: SLP following, evaluated today. Tolerating current diet, improved swallowing of PO meds with pudding. Continue pureed diet with nectar thick liquids. SLP to continue evaluating at nursing home, advance diet as tolerated.  - Hypophosphatemia: Phos 2.4 today, pt asymptomatic. Replacing orally. CTM at NH with labs per facility protocol.  - Follow up in Neurosurgery clinic with Dr. Rosenthal in 2 weeks with repeat CTH.  - Discharge instructions given verbally to patient's son, including concerning signs/symptoms and reasons to return. All of his questions were answered. He was encouraged to call the clinic with any questions or concerns prior to follow up appt.     Dispo: Medically stable for re-admit to NH today.     Discussed with attending staff Dr. Ariadna Reilly PALaneC  Neurosurgery  [facility]

## 2019-12-30 NOTE — SUBJECTIVE & OBJECTIVE
Interval History: Left arm swelling decreased. US w/o DVT. Nursing reports pt tolerating PO meds crushed in pudding well. Requested speech consult for re-evaluation.    Medications:  Continuous Infusions:   sodium chloride 0.9% Stopped (12/27/19 1641)     Scheduled Meds:   carbidopa-levodopa  mg  1 tablet Oral QID    levetiracetam IVPB  250 mg Intravenous Q12H    piperacillin-tazobactam 4.5 g in sodium chloride 0.9% 100 mL IVPB (ready to mix system)  4.5 g Intravenous Q8H    senna-docusate 8.6-50 mg  1 tablet Oral Daily    vancomycin (VANCOCIN) IVPB  1,500 mg Intravenous Q24H     PRN Meds:acetaminophen, albuterol-ipratropium, carboxymethylcellulose sodium, dextrose 10 % in water (D10W), furosemide, glucagon (human recombinant), insulin aspart U-100, labetalol, ondansetron, sodium chloride 0.9%     Review of Systems    Objective:     Weight: 77.1 kg (169 lb 15.9 oz)  Body mass index is 24.39 kg/m².  Vital Signs (Most Recent):  Temp: 98.8 °F (37.1 °C) (12/30/19 0433)  Pulse: 70 (12/30/19 0433)  Resp: 18 (12/30/19 0433)  BP: (!) 150/68 (12/30/19 0433)  SpO2: 98 % (12/30/19 0433) Vital Signs (24h Range):  Temp:  [97 °F (36.1 °C)-98.8 °F (37.1 °C)] 98.8 °F (37.1 °C)  Pulse:  [69-80] 70  Resp:  [18-22] 18  SpO2:  [96 %-99 %] 98 %  BP: (141-172)/(68-81) 150/68        Male External Urinary Catheter 12/24/19 0700 (Active)   Collection Container Urimeter 12/27/2019  7:02 AM   Securement Method secured to top of thigh w/ adhesive device 12/27/2019  7:02 AM   Skin no redness;no breakdown;penis/scrotum cleansed w/ soap and water 12/27/2019  7:02 AM   Tolerance no signs/symptoms of discomfort 12/27/2019  7:02 AM   Output (mL) 100 mL 12/27/2019  8:02 AM   Catheter Change Date 12/27/19 12/27/2019  7:02 AM   Catheter Change Time 0900 12/27/2019  7:02 AM       Neurosurgery Physical Exam    Awake and oriented x3  Flat affect  PERRL  FCX4    Significant Labs:  Recent Labs   Lab 12/28/19  0537 12/29/19  0349    77    * 137   K 3.8 3.7    108   CO2 21* 20*   BUN 12 13   CREATININE 0.7 0.8   CALCIUM 8.5* 8.3*   MG 1.9 1.9     Recent Labs   Lab 12/28/19  0537 12/29/19  0349   WBC 6.20 5.15   HGB 10.5* 10.2*   HCT 32.3* 32.2*    154     Recent Labs   Lab 12/28/19  0537 12/29/19  0349   INR 1.7* 1.8*     Microbiology Results (last 7 days)     Procedure Component Value Units Date/Time    Culture, Respiratory with Gram Stain [[id number]]  (Abnormal)  (Susceptibility) Collected:  12/26/19 1024    Order Status:  Completed Specimen:  Respiratory from Sputum Updated:  12/29/19 1200     Respiratory Culture STAPHYLOCOCCUS AUREUS  Moderate  Normal respiratory debby also present       Gram Stain (Respiratory) >10 epithelial cells per low power field     Gram Stain (Respiratory) Many WBC's     Gram Stain (Respiratory) Many budding yeast     Gram Stain (Respiratory) Many Gram positive cocci     Gram Stain (Respiratory) Moderate Gram negative rods     Gram Stain (Respiratory) Few Gram positive rods    Blood culture [[id number]] Collected:  12/25/19 2150    Order Status:  Completed Specimen:  Blood from Peripheral, Hand, Right Updated:  12/29/19 0612     Blood Culture, Routine No Growth to date      No Growth to date      No Growth to date      No Growth to date    Narrative:       Blood cultures from 2 different sites. 4 bottles total.  Please draw before starting antibiotics.    Blood culture [[id number]] Collected:  12/25/19 2153    Order Status:  Completed Specimen:  Blood from Peripheral, Forearm, Left Updated:  12/29/19 0612     Blood Culture, Routine No Growth to date      No Growth to date      No Growth to date      No Growth to date    Narrative:       Blood cultures x 2 different sites. 4 bottles total. Please  draw cultures before administering antibiotics.

## 2019-12-30 NOTE — PLAN OF CARE
Problem: SLP Goal  Goal: SLP Goal  Description  Speech Therapy Short Term Goals  Goal expected to be met by 1/6  1. Pt will tolerate puree diet and NECTAR thick liquids with no overt s/s of aspiration noted.   2. Pt will participate in an ongoing assessment to determine the least restrictive and safest diet with possible updated goals to follow pending results.  3. SLP will discuss baseline vs. current presentation to determine further acute ST needs for speech, language, and cognition.    4. Pt will verbalize 3 safe swallowing precautions including need for thickener.   Outcome: Ongoing, Progressing  Patient tolerating pureed diet and nectar thick liquids with no overt s/s of aspiration. Patient would benefit from reinforcement of safe swallowing precautions and need for thickener. Patient suspected to be at or close to baseline for speech, language, and cognition. Attempted to contact family. Will follow up.   MILA Pereira., CCC-SLP  12/30/2019

## 2019-12-30 NOTE — DISCHARGE SUMMARY
[facility]  Neurosurgery  Discharge Summary      Patient Name: Elvis Thompson  MRN: [id number]  Admission Date: 12/24/2019  Hospital Length of Stay: 6 days  Discharge Date and Time:  12/30/2019 3:19 PM  Attending Physician: Radha Rosenthal MD   Discharging Provider: Kirstie Reilly PA-C  Primary Care Provider: Mitchell Nayak MD    HPI:   86 M with an EDH/SDH. PMH of Parkinson's, A Fib on coumadin, he was going to the bathroom when he tripped and fell, hitting his head.He denies LOC. He denies numbness, weakness, blurred vision, neck pain. He received K Centra in the emergency department.    * No surgery found *     Hospital Course: 12/28: Pt with swelling in left arm, 2/2 IV infiltration per nursing. Will order DVTUS to monitor.  12/29: Left arm swelling decreased. US w/o DVT. Nursing reports pt tolerating PO meds crushed in pudding well. Requested speech consult for re-evaluation.  12/30: NAEON. VSSAF. BP slightly elevated, restarted home losartan. Repeat CTH today remains stable. Respiratory Cx for suspected aspiration PNA finalized with MSSA. Will DC Vanc/Zosyn, de-escalate to cephalexin x 7 days total therapy. SLP following, pt tolerating pureed diet. Neuro exam improving to near baseline per son at bedside. Pt denies headache, vision changes, n/v, SOB, fever, chills, acute weakness, and paresthesias. Medically stable for discharge back to NH today with continued therapy.    Consults:   Consults (From admission, onward)        Status Ordering Provider     Inpatient consult to Neuro Critical Care  Once     Provider:  (Not yet assigned)    Acknowledged BHARATH MANDUJANO     Inpatient consult to Neurosurgery  Once     Provider:  (Not yet assigned)    Acknowledged BHARATH MANDUJANO     Inpatient consult to Physical Medicine Rehab  Once     Provider:  (Not yet assigned)    Completed ARIK WILDER     Inpatient consult to Registered Dietitian/Nutritionist  Once     Provider:   (Not yet assigned)    Completed ARIK WILDER     IP consult to case management/social work  Once     Provider:  (Not yet assigned)    Completed BHARATH MANDUJANO     Pharmacy to dose Vancomycin consult  Once     Provider:  (Not yet assigned)    Acknowledged BHARATH MANDUJANO          Significant Diagnostic Studies: Labs:   BMP:   Recent Labs   Lab 12/29/19  0349 12/30/19  0436   GLU 77 91    136   K 3.7 3.7    108   CO2 20* 20*   BUN 13 11   CREATININE 0.8 0.6   CALCIUM 8.3* 8.0*   MG 1.9 1.8   , CMP   Recent Labs   Lab 12/29/19  0349 12/30/19  0436    136   K 3.7 3.7    108   CO2 20* 20*   GLU 77 91   BUN 13 11   CREATININE 0.8 0.6   CALCIUM 8.3* 8.0*   PROT 5.5* 5.5*   ALBUMIN 2.5* 2.5*   BILITOT 1.0 0.9   ALKPHOS 85 89   AST 10 10   ALT <5* <5*   ANIONGAP 9 8   ESTGFRAFRICA >60.0 >60.0   EGFRNONAA >60.0 >60.0   , CBC   Recent Labs   Lab 12/29/19  0349 12/30/19  0436   WBC 5.15 4.93   HGB 10.2* 10.3*   HCT 32.2* 32.2*    170   , INR   Lab Results   Component Value Date    INR 1.9 (H) 12/30/2019    INR 1.8 (H) 12/29/2019    INR 1.7 (H) 12/28/2019    and All labs within the past 24 hours have been reviewed  Microbiology:   Blood Culture   Lab Results   Component Value Date    LABBLOO No Growth to date 12/25/2019    LABBLOO No Growth to date 12/25/2019    LABBLOO No Growth to date 12/25/2019    LABBLOO No Growth to date 12/25/2019    LABBLOO No Growth to date 12/25/2019    and Sputum Culture   Lab Results   Component Value Date    GSRESP >10 epithelial cells per low power field 12/26/2019    GSRESP Many WBC's 12/26/2019    GSRESP Many budding yeast 12/26/2019    GSRESP Many Gram positive cocci 12/26/2019    GSRESP Moderate Gram negative rods 12/26/2019    GSRESP Few Gram positive rods 12/26/2019    RESPIRATORYC No Pseudomonas isolated. 12/26/2019    RESPIRATORYC (A) 12/26/2019     STAPHYLOCOCCUS AUREUS  Moderate  Normal respiratory debby also present       Radiology:  CT Head, CT Cervical Spine, X-Ray Pelvis, US Upper Extremity Left & Right, Chest X-Ray    Pending Diagnostic Studies:     Procedure Component Value Units Date/Time    Vitamin K [[id number]] Collected:  12/24/19 0707    Order Status:  Sent Lab Status:  In process Updated:  12/24/19 1043    Specimen:  Blood         Final Active Diagnoses:    Diagnosis Date Noted POA    PRINCIPAL PROBLEM:  Traumatic epidural hematoma without loss of consciousness [S06.4X0A] 12/24/2019 Yes    Epidural hematoma [S06.4X9A] 12/27/2019 Yes    Subdural hematoma [S06.5X9A] 12/26/2019 Yes    Anticoagulated on Coumadin [Z79.01] 12/24/2019 Not Applicable    Chronic diastolic congestive heart failure [I50.32] 12/24/2019 Yes    SDH (subdural hematoma) [S06.5X9A] 12/24/2019 Yes    ICD (implantable cardioverter-defibrillator) in place [Z95.810] 12/24/2019 Yes    Hypertension [I10] 12/20/2018 Yes    Atrial fibrillation [I48.91] 10/24/2018 Yes    Debility [R53.81] 10/15/2018 Yes    Chronic anticoagulation [Z79.01] 10/14/2018 Not Applicable    Parkinson disease [G20] 10/10/2018 Yes    Aspiration pneumonia [J69.0] 10/10/2018 Unknown      Problems Resolved During this Admission:      Discharged Condition: stable    Disposition: Skilled Nursing Facility    Follow Up:  -With Neurosurgery in 2 weeks with repeat CT head    Follow-up Information     Mitchell Nayak MD.    Specialty:  Internal Medicine  Why:  Outpatient Services  Contact information:  [address] [place] [id number] [phone]                 Patient Instructions:      Notify your health care provider if you experience any of the following:  temperature >100.4     Notify your health care provider if you experience any of the following:  persistent nausea and vomiting or diarrhea     Notify your health care provider if you experience any of the following:  severe uncontrolled pain     Notify your health care provider if you experience any of the following:  redness,  tenderness, or signs of infection (pain, swelling, redness, odor or green/yellow discharge around incision site)     Notify your health care provider if you experience any of the following:  difficulty breathing or increased cough     Notify your health care provider if you experience any of the following:  severe persistent headache     Notify your health care provider if you experience any of the following:  worsening rash     Notify your health care provider if you experience any of the following:  persistent dizziness, light-headedness, or visual disturbances     Notify your health care provider if you experience any of the following:  increased confusion or weakness     Activity as tolerated     Medications:  Reconciled Home Medications:      Medication List      START taking these medications    cephALEXin 500 MG capsule  Commonly known as:  KEFLEX  Take 1 capsule (500 mg total) by mouth 4 (four) times daily. for 3 days     levETIRAcetam 250 MG Tab  Commonly known as:  KEPPRA  Take 1 tablet (250 mg total) by mouth 2 (two) times daily. for 7 days        CONTINUE taking these medications    albuterol-ipratropium 2.5 mg-0.5 mg/3 mL nebulizer solution  Commonly known as:  DUO-NEB  Take 3 mLs by nebulization every 6 (six) hours as needed for Wheezing. Rescue     carbidopa-levodopa  mg  mg per tablet  Commonly known as:  SINEMET  Take 1 tablet by mouth 4 (four) times daily.     carvedilol 3.125 MG tablet  Commonly known as:  COREG  Take 3.125 mg by mouth 2 (two) times daily.     docusate sodium 100 MG capsule  Commonly known as:  COLACE  Take 100 mg by mouth once daily.     furosemide 40 MG tablet  Commonly known as:  LASIX  Take 40 mg by mouth daily as needed (or shortness of breath).     losartan 50 MG tablet  Commonly known as:  COZAAR  Take 50 mg by mouth once daily.     Miralax 17 gram Pwpk  Generic drug:  polyethylene glycol  Take 17 g by mouth daily as needed. Mix in 8 ounces of water      ondansetron 8 MG tablet  Commonly known as:  ZOFRAN  Take 8 mg by mouth every 6 (six) hours as needed for Nausea.     pseudoephedrine-dextromethorphan-guaifenesin 30- mg/5 mL solution  Commonly known as:  ROBITUSSIN-PE  Take 10 mLs by mouth every 6 (six) hours as needed for Cough.     tramadol-acetaminophen 37.5-325 mg 37.5-325 mg Tab  Commonly known as:  ULTRACET  Take 1 tablet by mouth every 4 (four) hours as needed for Pain.        ASK your doctor about these medications    aspirin 81 MG EC tablet  Commonly known as:  ECOTRIN  Take 1 tablet (81 mg total) by mouth once daily.    Continue to hold for 2 weeks   warfarin 2 MG tablet  Commonly known as:  COUMADIN  Take 2 mg by mouth once daily.     Continue to hold for 2 weeks       Kirstie Reilly PA-C  Neurosurgery  [facility]

## 2019-12-30 NOTE — ASSESSMENT & PLAN NOTE
86 M w/ PMH of Parkinson's, A Fib on Coumadin, who presents with a R frontal extraaxial hemorrhage now s/p K Centra:    - q4hr neurochecks  - cEEG demonstrates mild background slowing, no seizure activity  - SBP less than 150  - Continue Keppra  - INR goal of less than 1.3  - HOB 30-40  - Dispo pending re-admit to NH. CM on board.

## 2019-12-30 NOTE — SUBJECTIVE & OBJECTIVE
Interval History:Pt with swelling in left arm, 2/2 IV infiltration per nursing. Will order DVTUS to monitor.    Medications:  Continuous Infusions:   sodium chloride 0.9% Stopped (12/27/19 1641)     Scheduled Meds:   carbidopa-levodopa  mg  1 tablet Oral QID    levetiracetam IVPB  250 mg Intravenous Q12H    piperacillin-tazobactam 4.5 g in sodium chloride 0.9% 100 mL IVPB (ready to mix system)  4.5 g Intravenous Q8H    senna-docusate 8.6-50 mg  1 tablet Oral Daily    vancomycin (VANCOCIN) IVPB  1,500 mg Intravenous Q24H     PRN Meds:acetaminophen, albuterol-ipratropium, carboxymethylcellulose sodium, dextrose 10 % in water (D10W), furosemide, glucagon (human recombinant), insulin aspart U-100, labetalol, ondansetron, sodium chloride 0.9%     Review of Systems    Objective:     Weight: 77.1 kg (169 lb 15.9 oz)  Body mass index is 24.39 kg/m².  Vital Signs (Most Recent):  Temp: 98.8 °F (37.1 °C) (12/30/19 0433)  Pulse: 70 (12/30/19 0433)  Resp: 18 (12/30/19 0433)  BP: (!) 150/68 (12/30/19 0433)  SpO2: 98 % (12/30/19 0433) Vital Signs (24h Range):  Temp:  [97 °F (36.1 °C)-98.8 °F (37.1 °C)] 98.8 °F (37.1 °C)  Pulse:  [69-80] 70  Resp:  [18-22] 18  SpO2:  [96 %-99 %] 98 %  BP: (141-172)/(68-81) 150/68        Male External Urinary Catheter 12/24/19 0700 (Active)   Collection Container Urimeter 12/27/2019  7:02 AM   Securement Method secured to top of thigh w/ adhesive device 12/27/2019  7:02 AM   Skin no redness;no breakdown;penis/scrotum cleansed w/ soap and water 12/27/2019  7:02 AM   Tolerance no signs/symptoms of discomfort 12/27/2019  7:02 AM   Output (mL) 100 mL 12/27/2019  8:02 AM   Catheter Change Date 12/27/19 12/27/2019  7:02 AM   Catheter Change Time 0900 12/27/2019  7:02 AM       Neurosurgery Physical Exam    Awake and oriented x3  Flat affect  PERRL  FCX4    Significant Labs:  Recent Labs   Lab 12/28/19  0537 12/29/19  0349    77   * 137   K 3.8 3.7    108   CO2 21* 20*   BUN  12 13   CREATININE 0.7 0.8   CALCIUM 8.5* 8.3*   MG 1.9 1.9     Recent Labs   Lab 12/28/19  0537 12/29/19  0349   WBC 6.20 5.15   HGB 10.5* 10.2*   HCT 32.3* 32.2*    154     Recent Labs   Lab 12/28/19  0537 12/29/19  0349   INR 1.7* 1.8*     Microbiology Results (last 7 days)     Procedure Component Value Units Date/Time    Culture, Respiratory with Gram Stain [[id number]]  (Abnormal)  (Susceptibility) Collected:  12/26/19 1024    Order Status:  Completed Specimen:  Respiratory from Sputum Updated:  12/29/19 1200     Respiratory Culture STAPHYLOCOCCUS AUREUS  Moderate  Normal respiratory debby also present       Gram Stain (Respiratory) >10 epithelial cells per low power field     Gram Stain (Respiratory) Many WBC's     Gram Stain (Respiratory) Many budding yeast     Gram Stain (Respiratory) Many Gram positive cocci     Gram Stain (Respiratory) Moderate Gram negative rods     Gram Stain (Respiratory) Few Gram positive rods    Blood culture [[id number]] Collected:  12/25/19 2150    Order Status:  Completed Specimen:  Blood from Peripheral, Hand, Right Updated:  12/29/19 0612     Blood Culture, Routine No Growth to date      No Growth to date      No Growth to date      No Growth to date    Narrative:       Blood cultures from 2 different sites. 4 bottles total.  Please draw before starting antibiotics.    Blood culture [[id number]] Collected:  12/25/19 2153    Order Status:  Completed Specimen:  Blood from Peripheral, Forearm, Left Updated:  12/29/19 0612     Blood Culture, Routine No Growth to date      No Growth to date      No Growth to date      No Growth to date    Narrative:       Blood cultures x 2 different sites. 4 bottles total. Please  draw cultures before administering antibiotics.

## 2019-12-30 NOTE — PT/OT/SLP PROGRESS
Physical Therapy Treatment    Patient Name:  Elvis Thompson   MRN:  [id number]    Recommendations:     Discharge Recommendations:  (Return to NH with continued PT/OT)   Discharge Equipment Recommendations: none   Barriers to discharge: Inaccessible home and Decreased caregiver support    Assessment:     Elvis Thompson is a 86 y.o. male admitted with a medical diagnosis of Traumatic epidural hematoma without loss of consciousness.  He presents with the following impairments/functional limitations:  weakness, impaired endurance, impaired self care skills, impaired functional mobilty, gait instability, impaired balance, decreased lower extremity function, impaired coordination.    Rehab Prognosis: Good; patient would benefit from acute skilled PT services to address these deficits and reach maximum level of function.    Recent Surgery: * No surgery found *      Plan:     During this hospitalization, patient to be seen 3 x/week to address the identified rehab impairments via gait training, therapeutic activities, therapeutic exercises, neuromuscular re-education and progress toward the following goals:    · Plan of Care Expires:  01/23/20    Subjective     Chief Complaint: none verbalized  Patient/Family Comments/goals: return to [facility]  Pt's family reports he had been using his walker less frequently and using a lift chair at the nursing home and t/fing to his wheel chair.  Pain/Comfort:  · Pain Rating 1: 0/10  · Pain Rating Post-Intervention 1: 0/10      Objective:     Communicated with nursing prior to session.  Patient found HOB elevated with telemetry, bed alarm, peripheral IV upon PT entry to room.     General Precautions: Standard, aspiration, fall   Orthopedic Precautions:N/A   Braces: N/A     Observation: increased respiration rate upon sitting EOB and with functional activities    Functional Mobility:  · Bed Mobility:     · Rolling Right: maximal assistance with increased time to perform and assistance with hand  placement on bed rail  · Scooting: maximal assistance (to EOB)  · Supine to Sit: maximal assistance (increased time needed for initiation of movement)  · Sit to Supine: maximal assistance  · Transfers:     · Sit to Stand Trial 1:  moderate assistance and of 2 persons   · Forward flexed posture, unable to come to full stand, pt unwilling to let go of bed rail  · Duration: ~30 seconds  · Sit to Stand Trial 2 & 3:  moderate assistance and of 2 persons with RW  · Forward flexed posture, unable to come to full stand, VCing and assistance with hand placement on bed and on RW  · overall improved with use of RW and pt willing to let go of bed rail  · Duration: ~30 seconds each  · Gait: deferred 2/2 fatigue and increased respiratory rate      AM-PAC 6 CLICK MOBILITY  Turning over in bed (including adjusting bedclothes, sheets and blankets)?: 2  Sitting down on and standing up from a chair with arms (e.g., wheelchair, bedside commode, etc.): 2  Moving from lying on back to sitting on the side of the bed?: 2  Moving to and from a bed to a chair (including a wheelchair)?: 1  Need to walk in hospital room?: 1  Climbing 3-5 steps with a railing?: 1  Basic Mobility Total Score: 9       Therapeutic Activities and Exercises:  Therapeutic exercises: 1 x 3 STS with holds for ~30 seconds to improve pt strength and standing tolerance. Seated rest breaks and VCing and demonstration for appropriate breathing technique (PLB) given. Increased respiration rate noted with each stand. Further reps deferred 2/2 patient fatigue and increased respiration rate.    Patient educated on role of therapy, goals of session, and benefits of mobilizing. Discussed PT plan of care during hospitalization. Pt educated on calling for assistance. Patient educated on how their diagnosis impacts their mobility within PT scope of practice.   Communication board updated.  All questions answered within PT scope of practice.        Patient left HOB elevated with all  lines intact, call button in reach, bed alarm on, nursing notified and son present..    GOALS:   Multidisciplinary Problems     Physical Therapy Goals        Problem: Physical Therapy Goal    Goal Priority Disciplines Outcome Goal Variances Interventions   Physical Therapy Goal     PT, PT/OT Ongoing, Progressing     Description:  Goals to be met by: 2020    Patient will increase functional independence with mobility by performin. Supine <> sit with Moderate Assistance.  2. Sit <> stand transfer with Contact Guard Assistance using Rolling Walker.  3. Bed <> chair transfer via Squat Pivot with Minimal Assistance using Rolling Walker.  4. Gait  x 25 feet with Minimal Assistance using Rolling Walker to prepare for community ambulation and endurance activities.  5. Able to tolerate exercise for 15-20 reps with independence.                        Time Tracking:     PT Received On: 19  PT Start Time: 1050     PT Stop Time: 1113  PT Total Time (min): 23 min co-tx with OT    Billable Minutes: Therapeutic Activity 13 and Therapeutic Exercise 10    Treatment Type: Treatment  PT/PTA: PT     PTA Visit Number: 0     Marcia Gupta, PT  2019

## 2019-12-30 NOTE — ASSESSMENT & PLAN NOTE
86 M w/ PMH of Parkinson's, A Fib on Coumadin, who presents with a R frontal extraaxial hemorrhage now s/p K [facility]:    - q4hr neurochecks  - cEEG demonstrates mild background slowing, no seizure activity  - SBP less than 150  - Continue Keppra  - INR goal of less than 1.3  - HOB 30-40  - Speech will see on Monday for swallow recs  - Dispo pending re-admit to NH. CM on board.

## 2019-12-31 LAB
BACTERIA BLD CULT: NORMAL
BACTERIA BLD CULT: NORMAL

## 2019-12-31 NOTE — PT/OT/SLP DISCHARGE
Occupational Therapy Discharge Summary    Elvis Thompson  MRN: [id number]   Principal Problem: Traumatic epidural hematoma without loss of consciousness      Patient Discharged from acute Occupational Therapy on 12/30/19.  Please refer to prior OT note dated 12/30/19 for functional status.    Assessment:      Patient appropriate for care in another setting.    Objective:     GOALS:   Multidisciplinary Problems     Occupational Therapy Goals        Problem: Occupational Therapy Goal    Goal Priority Disciplines Outcome Interventions   Occupational Therapy Goal     OT, PT/OT Ongoing, Progressing    Description:  Goals to be met by: 7 days (12/31/19)     Patient will increase functional independence with ADLs by performing:    Feeding with Stand-by Assistance.  UE Dressing with Contact Guard Assistance.  Supine to sit with Minimal Assistance.  Toilet transfer to toilet with Minimal Assistance.                      Reasons for Discontinuation of Therapy Services  Transfer to alternate level of care.      Plan:     Patient Discharged to: Return to [facility]    SONIA Love  12/31/2019

## 2020-01-14 ENCOUNTER — OFFICE VISIT (OUTPATIENT)
Dept: NEUROSURGERY | Facility: CLINIC | Age: 85
End: 2020-01-14
Payer: MEDICARE

## 2020-01-14 ENCOUNTER — HOSPITAL ENCOUNTER (OUTPATIENT)
Dept: RADIOLOGY | Facility: HOSPITAL | Age: 85
Discharge: HOME | End: 2020-01-14
Attending: PHYSICIAN ASSISTANT
Payer: MEDICARE

## 2020-01-14 ENCOUNTER — TELEPHONE (OUTPATIENT)
Dept: NEUROSURGERY | Facility: CLINIC | Age: 85
End: 2020-01-14

## 2020-01-14 VITALS
WEIGHT: 169 LBS | BODY MASS INDEX: 24.2 KG/M2 | DIASTOLIC BLOOD PRESSURE: 57 MMHG | SYSTOLIC BLOOD PRESSURE: 123 MMHG | TEMPERATURE: 98 F | HEIGHT: 70 IN | HEART RATE: 70 BPM

## 2020-01-14 DIAGNOSIS — S06.4X0A TRAUMATIC EPIDURAL HEMATOMA WITHOUT LOSS OF CONSCIOUSNESS, INITIAL ENCOUNTER: ICD-10-CM

## 2020-01-14 DIAGNOSIS — S06.4XAA EPIDURAL HEMATOMA: ICD-10-CM

## 2020-01-14 DIAGNOSIS — G20.A1 PARKINSON DISEASE: Primary | ICD-10-CM

## 2020-01-14 DIAGNOSIS — S06.4X0A TRAUMATIC EPIDURAL HEMATOMA WITHOUT LOSS OF CONSCIOUSNESS, INITIAL ENCOUNTER: Primary | ICD-10-CM

## 2020-01-14 DIAGNOSIS — S06.5XAA SDH (SUBDURAL HEMATOMA): ICD-10-CM

## 2020-01-14 PROCEDURE — 99999 PR PBB SHADOW E&M-EST. PATIENT-LVL III: ICD-10-PCS | Mod: PBBFAC,,, | Performed by: NEUROLOGICAL SURGERY

## 2020-01-14 PROCEDURE — 70450 CT HEAD WITHOUT CONTRAST: ICD-10-PCS | Mod: 26,,, | Performed by: RADIOLOGY

## 2020-01-14 PROCEDURE — 70450 CT HEAD/BRAIN W/O DYE: CPT | Mod: 26,,, | Performed by: RADIOLOGY

## 2020-01-14 PROCEDURE — 1159F PR MEDICATION LIST DOCUMENTED IN MEDICAL RECORD: ICD-10-PCS | Mod: ,,, | Performed by: NEUROLOGICAL SURGERY

## 2020-01-14 PROCEDURE — 70450 CT HEAD/BRAIN W/O DYE: CPT | Mod: TC

## 2020-01-14 PROCEDURE — 99999 PR PBB SHADOW E&M-EST. PATIENT-LVL III: CPT | Mod: PBBFAC,,, | Performed by: NEUROLOGICAL SURGERY

## 2020-01-14 PROCEDURE — 99213 OFFICE O/P EST LOW 20 MIN: CPT | Mod: PBBFAC,25 | Performed by: NEUROLOGICAL SURGERY

## 2020-01-14 PROCEDURE — 99214 PR OFFICE/OUTPT VISIT, EST, LEVL IV, 30-39 MIN: ICD-10-PCS | Mod: S$PBB,,, | Performed by: NEUROLOGICAL SURGERY

## 2020-01-14 PROCEDURE — 1126F AMNT PAIN NOTED NONE PRSNT: CPT | Mod: ,,, | Performed by: NEUROLOGICAL SURGERY

## 2020-01-14 PROCEDURE — 1159F MED LIST DOCD IN RCRD: CPT | Mod: ,,, | Performed by: NEUROLOGICAL SURGERY

## 2020-01-14 PROCEDURE — 99214 OFFICE O/P EST MOD 30 MIN: CPT | Mod: S$PBB,,, | Performed by: NEUROLOGICAL SURGERY

## 2020-01-14 PROCEDURE — 1126F PR PAIN SEVERITY QUANTIFIED, NO PAIN PRESENT: ICD-10-PCS | Mod: ,,, | Performed by: NEUROLOGICAL SURGERY

## 2020-01-14 NOTE — PROGRESS NOTES
Neurosurgery  Follow-up    SUBJECTIVE:     History of Present Illness:  Elvis Thompson is a 86 y.o. male with Parkinson's disease and atrial fibrillation who presents in follow-up after recent hospitalization for extra-axial hematoma.  The patient was on Coumadin at the time of his fall, which occurred on Christmas Eve.  He was observed in the ICU prior to being transferred to the Neurosurgery step-down unit and ultimately discharged back to his nursing home.  He has been off of aspirin and Coumadin since the fall.  He received case [facility] for reversal of Coumadin on arrival to [facility].    Since discharge, he reports that he has been usual state of health.  His daughter, and, who is with him today provides much of the history.  She agrees that her father has been in his usual state since returning back to the nursing facility, though she notes that he was a little bit more confused upon initial discharge.  I explained that this happens frequently in so far as patients may become delirious while in the hospital.    Repeat CT head was obtained today prior to today's appointment.  This demonstrates significant interval improvement in the extra-axial collection with some small residual subdural blood in the right posterior convexity, overlying the parietal and occipital lobes.    Review of patient's allergies indicates:   Allergen Reactions    Iodine and iodide containing products Other (See Comments)    Codeine Other (See Comments)     Insomnia      Morphine Nausea And Vomiting       Current Outpatient Medications   Medication Sig Dispense Refill    albuterol-ipratropium (DUO-NEB) 2.5 mg-0.5 mg/3 mL nebulizer solution Take 3 mLs by nebulization every 6 (six) hours as needed for Wheezing. Rescue      aspirin (ECOTRIN) 81 MG EC tablet Take 1 tablet (81 mg total) by mouth once daily.  0    carbidopa-levodopa  mg (SINEMET)  mg per tablet Take 1 tablet by mouth 4 (four) times daily. 360 tablet 3     carvedilol (COREG) 3.125 MG tablet Take 3.125 mg by mouth 2 (two) times daily.      docusate sodium (COLACE) 100 MG capsule Take 100 mg by mouth once daily.      furosemide (LASIX) 40 MG tablet Take 40 mg by mouth daily as needed (or shortness of breath).       losartan (COZAAR) 50 MG tablet Take 50 mg by mouth once daily.      ondansetron (ZOFRAN) 8 MG tablet Take 8 mg by mouth every 6 (six) hours as needed for Nausea.      polyethylene glycol (MIRALAX) 17 gram PwPk Take 17 g by mouth daily as needed. Mix in 8 ounces of water      pseudoephedrine-dextromethorphan-guaifenesin (ROBITUSSIN-PE) 30- mg/5 mL solution Take 10 mLs by mouth every 6 (six) hours as needed for Cough.       tramadol-acetaminophen 37.5-325 mg (ULTRACET) 37.5-325 mg Tab Take 1 tablet by mouth every 4 (four) hours as needed for Pain.      warfarin (COUMADIN) 2 MG tablet Take 2 mg by mouth once daily.      levETIRAcetam (KEPPRA) 250 MG Tab Take 1 tablet (250 mg total) by mouth 2 (two) times daily. for 7 days 14 tablet 0     No current facility-administered medications for this visit.        Past Medical History:   Diagnosis Date    Anticoagulant long-term use     Bradycardia     CHF (congestive heart failure)     Chronic atrial fibrillation     Chronic combined systolic and diastolic CHF (congestive heart failure)     HHD (hypertensive heart disease)     HTN (hypertension)     LBBB (left bundle branch block)     Left bundle-branch block, unspecified     NSVT (nonsustained ventricular tachycardia)     Parkinson disease 03/24/2017    Parkinson's disease     Pneumonia     Pulmonary embolism     SDH (subdural hematoma)     Shingles     Thyroid disease     nodules    Ventricular tachycardia      Past Surgical History:   Procedure Laterality Date    CARDIAC PACEMAKER PLACEMENT      cataract      FOOT SURGERY      HAND SURGERY      HERNIA REPAIR      INSERTION OF BIVENTRICULAR IMPLANTABLE CARDIOVERTER-DEFIBRILLATOR  (ICD) N/A 1/23/2019    Procedure: INSERTION, ICD, BIVENTRICULAR, upgrade;  Surgeon: Luiz Soliman MD;  Location: [facility] CATH;  Service: Cardiology;  Laterality: N/A;    KNEE SURGERY      right knee replacement    LEFT HEART CATHETERIZATION Left 1/23/2019    Procedure: Left heart cath;  Surgeon: Luiz Soliman MD;  Location: [facility] CATH;  Service: Cardiology;  Laterality: Left;    PROSTATE SURGERY       Family History     Problem Relation (Age of Onset)    Heart disease Mother        Social History     Socioeconomic History    Marital status:      Spouse name: Not on file    Number of children: Not on file    Years of education: Not on file    Highest education level: Not on file   Occupational History    Not on file   Social Needs    Financial resource strain: Not on file    Food insecurity:     Worry: Not on file     Inability: Not on file    Transportation needs:     Medical: Not on file     Non-medical: Not on file   Tobacco Use    Smoking status: Never Smoker    Smokeless tobacco: Never Used   Substance and Sexual Activity    Alcohol use: No    Drug use: Not on file    Sexual activity: Not Currently   Lifestyle    Physical activity:     Days per week: Not on file     Minutes per session: Not on file    Stress: Not on file   Relationships    Social connections:     Talks on phone: Not on file     Gets together: Not on file     Attends [employer] service: Not on file     Active member of club or organization: Not on file     Attends meetings of clubs or organizations: Not on file     Relationship status: Not on file   Other Topics Concern    Not on file   Social History Narrative    Not on file       Review of Systems   Constitutional: Negative for activity change.   HENT: Negative for nosebleeds.    Eyes: Negative for visual disturbance.   Respiratory: Negative for shortness of breath.    Cardiovascular: Negative for chest pain.   Gastrointestinal: Negative for vomiting.   Endocrine:  "Negative for cold intolerance.   Musculoskeletal: Positive for neck stiffness.   Skin: Negative for color change.   Allergic/Immunologic: Negative for food allergies.   Neurological: Negative for seizures and headaches.   Hematological: Bruises/bleeds easily.   Psychiatric/Behavioral: Negative for self-injury.       OBJECTIVE:     Vital Signs  Temp: 97.8 °F (36.6 °C)  Pulse: 70  BP: (!) 123/57  Pain Score: 0-No pain  Height: 5' 10" (177.8 cm)  Weight: 76.7 kg (169 lb)  Body mass index is 24.25 kg/m².      Physical Exam:    Constitutional: He appears well-developed and well-nourished. No distress.     Eyes: Pupils are equal, round, and reactive to light.   Cannot look up      Cardiovascular: No edema.     Abdominal: Soft.     Skin: Skin displays no rash on extremities. Skin displays no lesions on extremities.     Psych/Behavior: He is alert.     Musculoskeletal:        Neck: Range of motion is limited.        Right Upper Extremities: Muscle strength is 5/5.        Left Upper Extremities: Muscle strength is 5/5.     Neurological:        Coordination:   Bilateral resting tremor, right>left     Pulmonary: symmetric expansion     Diagnostic Results:  CT head personally reviewed  As above    ASSESSMENT/PLAN:     Elvis Thompson is a 86 y.o. male who presents in follow-up for extra-axial hematoma sustained on [place].  He and his daughter report that he is in his usual baseline state of health.  CT head demonstrates significant improvement in that subdural collection, with redistribution of the blood to the posterior convexity and overall decrease in volume.    His daughter asks about resuming aspirin and Coumadin at this time.  I think that he may resume the aspirin, and he may resume Coumadin with whatever bridge his cardiologist would advise.  This is, of course, assuming that his cardiologist feels that his atrial fibrillation warrants ongoing need for anti-platelet and anticoagulation agents.  He is clearly at " increased risk of repeat fall given his Parkinson disease.  Erlinda and I had a nice discussion regarding the increased risk of bleeding while taking anticoagulation and anti-platelet agents; she expresses understanding of this risk. Care should be taken to help minimize his fall risk as much as possible.     I have advised them that I would like to see him back in approximately 6 weeks with a repeat CT head noncontrast to evaluate the evolution of the hemorrhage.  We discussed that they should contact the clinic sooner or return to the emergency department should he have any adverse clinical change, such as significant headache, increased confusion, new onset weakness, seizure, or pupillary change.  Erlinda expressed understanding thereof.    Note generated with voice recognition software; please excuse any typographical errors.

## 2020-02-18 ENCOUNTER — HOSPITAL ENCOUNTER (OUTPATIENT)
Dept: RADIOLOGY | Facility: HOSPITAL | Age: 85
Discharge: HOME | End: 2020-02-18
Attending: NEUROLOGICAL SURGERY
Payer: MEDICARE

## 2020-02-18 ENCOUNTER — OFFICE VISIT (OUTPATIENT)
Dept: NEUROSURGERY | Facility: CLINIC | Age: 85
End: 2020-02-18
Payer: MEDICARE

## 2020-02-18 VITALS
HEIGHT: 70 IN | TEMPERATURE: 98 F | BODY MASS INDEX: 24.2 KG/M2 | HEART RATE: 69 BPM | WEIGHT: 169 LBS | DIASTOLIC BLOOD PRESSURE: 71 MMHG | SYSTOLIC BLOOD PRESSURE: 130 MMHG

## 2020-02-18 DIAGNOSIS — S06.4XAA EPIDURAL HEMATOMA: ICD-10-CM

## 2020-02-18 DIAGNOSIS — S06.4X0A TRAUMATIC EPIDURAL HEMATOMA WITHOUT LOSS OF CONSCIOUSNESS, INITIAL ENCOUNTER: ICD-10-CM

## 2020-02-18 DIAGNOSIS — S06.5XAA SDH (SUBDURAL HEMATOMA): Primary | ICD-10-CM

## 2020-02-18 PROCEDURE — 99999 PR PBB SHADOW E&M-EST. PATIENT-LVL III: CPT | Mod: PBBFAC,,, | Performed by: NEUROLOGICAL SURGERY

## 2020-02-18 PROCEDURE — 70450 CT HEAD/BRAIN W/O DYE: CPT | Mod: 26,,, | Performed by: RADIOLOGY

## 2020-02-18 PROCEDURE — 70450 CT HEAD WITHOUT CONTRAST: ICD-10-PCS | Mod: 26,,, | Performed by: RADIOLOGY

## 2020-02-18 PROCEDURE — 99214 OFFICE O/P EST MOD 30 MIN: CPT | Mod: S$PBB,,, | Performed by: NEUROLOGICAL SURGERY

## 2020-02-18 PROCEDURE — 99213 OFFICE O/P EST LOW 20 MIN: CPT | Mod: PBBFAC,25 | Performed by: NEUROLOGICAL SURGERY

## 2020-02-18 PROCEDURE — 99999 PR PBB SHADOW E&M-EST. PATIENT-LVL III: ICD-10-PCS | Mod: PBBFAC,,, | Performed by: NEUROLOGICAL SURGERY

## 2020-02-18 PROCEDURE — 70450 CT HEAD/BRAIN W/O DYE: CPT | Mod: TC

## 2020-02-18 PROCEDURE — 99214 PR OFFICE/OUTPT VISIT, EST, LEVL IV, 30-39 MIN: ICD-10-PCS | Mod: S$PBB,,, | Performed by: NEUROLOGICAL SURGERY

## 2020-02-18 NOTE — PROGRESS NOTES
Neurosurgery  Follow-up    SUBJECTIVE:     History of Present Illness:  Elvis Thompson is a 87 y.o. male with Parkinson's disease and atrial fibrillation who presents in follow-up after recent hospitalization for extra-axial hematoma.  The patient was on Coumadin at the time of his fall, which occurred on Christmas Eve.  He was observed in the ICU prior to being transferred to the Neurosurgery step-down unit and ultimately discharged back to his nursing home.  He has been off of aspirin and Coumadin since the fall.  He received case [facility] for reversal of Coumadin on arrival to [facility].    After discharge, he reports that he has been usual state of health.  His daughter, and, who is with him today provides much of the history.  She agrees that her father has been in his usual state since returning back to the nursing facility, though she notes that he was a little bit more confused upon initial discharge.  I explained that this happens frequently in so far as patients may become delirious while in the hospital.    Since his last visit, he has gotten confused a couple of times and tried to get up out of his chair without assistance.  This has not resulted in any further falls.  Otherwise he is in his usual state of health.  His daughter reports that he has resumed Coumadin, which is confirmed by his nursing home orders.  Repeat CT head was obtained today prior to today's appointment.  This demonstrates interval resolution of the previously appreciated extra-axial hematoma.    Review of patient's allergies indicates:   Allergen Reactions    Iodine and iodide containing products Other (See Comments)    Codeine Other (See Comments)     Insomnia      Morphine Nausea And Vomiting       Current Outpatient Medications   Medication Sig Dispense Refill    albuterol-ipratropium (DUO-NEB) 2.5 mg-0.5 mg/3 mL nebulizer solution Take 3 mLs by nebulization every 6 (six) hours as needed for Wheezing. Rescue       carbidopa-levodopa  mg (SINEMET)  mg per tablet Take 1 tablet by mouth 4 (four) times daily. 360 tablet 3    carvedilol (COREG) 3.125 MG tablet Take 3.125 mg by mouth 2 (two) times daily.      docusate sodium (COLACE) 100 MG capsule Take 100 mg by mouth once daily.      furosemide (LASIX) 40 MG tablet Take 40 mg by mouth daily as needed (or shortness of breath).       losartan (COZAAR) 50 MG tablet Take 50 mg by mouth once daily.      ondansetron (ZOFRAN) 8 MG tablet Take 8 mg by mouth every 6 (six) hours as needed for Nausea.      polyethylene glycol (MIRALAX) 17 gram PwPk Take 17 g by mouth daily as needed. Mix in 8 ounces of water      pseudoephedrine-dextromethorphan-guaifenesin (ROBITUSSIN-PE) 30- mg/5 mL solution Take 10 mLs by mouth every 6 (six) hours as needed for Cough.       tramadol-acetaminophen 37.5-325 mg (ULTRACET) 37.5-325 mg Tab Take 1 tablet by mouth every 4 (four) hours as needed for Pain.      warfarin (COUMADIN) 2 MG tablet Take 2 mg by mouth once daily.      aspirin (ECOTRIN) 81 MG EC tablet Take 1 tablet (81 mg total) by mouth once daily.  0    levETIRAcetam (KEPPRA) 250 MG Tab Take 1 tablet (250 mg total) by mouth 2 (two) times daily. for 7 days 14 tablet 0     No current facility-administered medications for this visit.        Past Medical History:   Diagnosis Date    Anticoagulant long-term use     Bradycardia     CHF (congestive heart failure)     Chronic atrial fibrillation     Chronic combined systolic and diastolic CHF (congestive heart failure)     HHD (hypertensive heart disease)     HTN (hypertension)     LBBB (left bundle branch block)     Left bundle-branch block, unspecified     NSVT (nonsustained ventricular tachycardia)     Parkinson disease 03/24/2017    Parkinson's disease     Pneumonia     Pulmonary embolism     SDH (subdural hematoma)     Shingles     Thyroid disease     nodules    Ventricular tachycardia      Past Surgical  History:   Procedure Laterality Date    CARDIAC PACEMAKER PLACEMENT      cataract      FOOT SURGERY      HAND SURGERY      HERNIA REPAIR      INSERTION OF BIVENTRICULAR IMPLANTABLE CARDIOVERTER-DEFIBRILLATOR (ICD) N/A 1/23/2019    Procedure: INSERTION, ICD, BIVENTRICULAR, upgrade;  Surgeon: Luiz Soliman MD;  Location: [facility] CATH;  Service: Cardiology;  Laterality: N/A;    KNEE SURGERY      right knee replacement    LEFT HEART CATHETERIZATION Left 1/23/2019    Procedure: Left heart cath;  Surgeon: Luiz Soliman MD;  Location: [facility] CATH;  Service: Cardiology;  Laterality: Left;    PROSTATE SURGERY       Family History     Problem Relation (Age of Onset)    Heart disease Mother        Social History     Socioeconomic History    Marital status:      Spouse name: Not on file    Number of children: Not on file    Years of education: Not on file    Highest education level: Not on file   Occupational History    Not on file   Social Needs    Financial resource strain: Not on file    Food insecurity:     Worry: Not on file     Inability: Not on file    Transportation needs:     Medical: Not on file     Non-medical: Not on file   Tobacco Use    Smoking status: Never Smoker    Smokeless tobacco: Never Used   Substance and Sexual Activity    Alcohol use: No    Drug use: Not on file    Sexual activity: Not Currently   Lifestyle    Physical activity:     Days per week: Not on file     Minutes per session: Not on file    Stress: Not on file   Relationships    Social connections:     Talks on phone: Not on file     Gets together: Not on file     Attends Sabianism service: Not on file     Active member of club or organization: Not on file     Attends meetings of clubs or organizations: Not on file     Relationship status: Not on file   Other Topics Concern    Not on file   Social History Narrative    Not on file       Review of Systems   Constitutional: Negative for activity change.   HENT:  "Negative for nosebleeds.    Eyes: Negative for visual disturbance.   Respiratory: Negative for shortness of breath.    Cardiovascular: Negative for chest pain.   Gastrointestinal: Negative for vomiting.   Endocrine: Negative for cold intolerance.   Musculoskeletal: Positive for neck stiffness.   Skin: Negative for color change.   Allergic/Immunologic: Negative for food allergies.   Neurological: Negative for seizures and headaches.   Hematological: Bruises/bleeds easily.   Psychiatric/Behavioral: Positive for confusion. Negative for self-injury.       OBJECTIVE:     Vital Signs  Temp: 98.2 °F (36.8 °C)  Pulse: 69  BP: 130/71  Pain Score: 0-No pain  Height: 5' 10" (177.8 cm)  Weight: 76.7 kg (169 lb)  Body mass index is 24.25 kg/m².      Physical Exam:    Constitutional: He appears well-developed and well-nourished. No distress.     Eyes: Pupils are equal, round, and reactive to light.   Cannot look up      Cardiovascular: No edema.     Abdominal: Soft.     Skin: Skin displays no rash on extremities. Skin displays no lesions on extremities.     Psych/Behavior: He is alert.     Musculoskeletal:        Neck: Range of motion is limited.        Right Upper Extremities: Muscle strength is 5/5.        Left Upper Extremities: Muscle strength is 5/5.     Neurological:        Coordination: He has abnormal finger to nose coordination.   Bilateral resting tremor, right>left     Pulmonary: symmetric expansion     Diagnostic Results:  CT head personally reviewed  As above    ASSESSMENT/PLAN:     Elvis Thompson is a 87 y.o. male who presents in follow-up for extra-axial hematoma sustained on [address].  He and his daughter report that he is in his usual baseline state of health.  CT head demonstrates interval resolution of that subdural collection.    He is clearly at increased risk of repeat fall given his Parkinson disease.  Erlinda and I had a nice discussion regarding the increased risk of bleeding while taking " anticoagulation and anti-platelet agents; she expresses understanding of this risk. Care should be taken to help minimize his fall risk as much as possible.  This was reiterated to the patient as well; he expresses understanding right now.    At this point, I think he can follow up with Neurosurgery on an as-needed basis.  He has scheduled follow-up with his neurologist for management of his Parkinson's disease.  We discussed that they should contact the clinic or return to the emergency department should he have any adverse clinical change, such as significant headache, increased confusion, new onset weakness, seizure, or pupillary change.  Erlinda expressed understanding thereof.    Note generated with voice recognition software; please excuse any typographical errors.

## 2020-03-27 PROBLEM — S06.5XAA SUBDURAL HEMATOMA: Status: ACTIVE | Noted: 2020-03-27

## 2020-03-28 PROBLEM — E63.9 INADEQUATE DIETARY ENERGY INTAKE: Status: ACTIVE | Noted: 2020-03-28

## 2020-04-07 ENCOUNTER — OFFICE VISIT (OUTPATIENT)
Dept: NEUROLOGY | Facility: CLINIC | Age: 85
End: 2020-04-07
Payer: MEDICARE

## 2020-04-07 VITALS — WEIGHT: 162 LBS | BODY MASS INDEX: 31.8 KG/M2 | HEIGHT: 60 IN

## 2020-04-07 DIAGNOSIS — S06.5XAA SUBDURAL HEMATOMA: ICD-10-CM

## 2020-04-07 DIAGNOSIS — R41.3 MEMORY LOSS: ICD-10-CM

## 2020-04-07 DIAGNOSIS — R13.19 OTHER DYSPHAGIA: ICD-10-CM

## 2020-04-07 DIAGNOSIS — Z79.01 CHRONIC ANTICOAGULATION: ICD-10-CM

## 2020-04-07 DIAGNOSIS — G20.A1 PARKINSON DISEASE: Primary | ICD-10-CM

## 2020-04-07 DIAGNOSIS — R53.81 DEBILITY: ICD-10-CM

## 2020-04-07 PROCEDURE — 99999 PR STA SHADOW: ICD-10-PCS | Mod: PBBFAC,95,, | Performed by: PSYCHIATRY & NEUROLOGY

## 2020-04-07 PROCEDURE — 99999 PR STA SHADOW: CPT | Mod: PBBFAC,95,, | Performed by: PSYCHIATRY & NEUROLOGY

## 2020-04-07 PROCEDURE — 99214 OFFICE O/P EST MOD 30 MIN: CPT | Performed by: PSYCHIATRY & NEUROLOGY

## 2020-04-07 NOTE — PROGRESS NOTES
The patient location is: home  The chief complaint leading to consultation is: parkinson's and memory loss  Visit type: Virtual visit with synchronous audio and video  Total time spent with patient: 12 minutes  Each patient to whom he or she provides medical services by telemedicine is:  (1) informed of the relationship between the physician and patient and the respective role of any other health care provider with respect to management of the patient; and (2) notified that he or she may decline to receive medical services by telemedicine and may withdraw from such care at any time.        HPI: Elvis Thompson is a 86 y.o. male with tremor consistent with parkinson's disease and memory loss  At a prior visit:  complaining of generalized  Weakness and exam shows slower gait    He was hospitalized several times since the last visit  He is on Coumadin for chronic atrial fibrillation and suffered a head injury from fall which resulted in SDH (see below)  He had another fall in 3/2020 and [place] [facility] [place]  Coumadin was continued 2 weeks after SDH as recommended by NS in 12/2020 and Neurosurgery clinic notes are reviewed    Drooling more and not bothersome to patient  Tremor is not as bad overall  Constipation is well controlled  Memory is more challenged since falls/ SDH    ROS not reliable due to memory loss        Objective:         Exam:  Gen Appearance, well developed/nourished in no apparent distress    Neuro:  MS: Awake, alert, n. Sustains attention. Recent/remote memory intact, Language is full to spontaneous speech/repetition/naming/comprehension. Fund of Knowledge is full  CN: PERRL, Extraoccular movements and visual fields are full. Normal facial  strength,   Motor: Normal bulk, moves all extremities well, no pronator drift but resting tremor noted mild-moderately   Sensory: Romberg not done  Cerebellar: Patient cant well cooperate  Gait: Not done-not ambulatory at this  time  Cervical dystonia noted as prior    The remainder of the exam is limited due to telemedicine nature of the visit    Gait: Not done-in wheelchair today  Cervical dystonia      Imagin2017:  CT head unremarkable  2018 CT L spine: Chronic compression fracture of L1 with loss of approximately 30% superior vertebral body height.  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  No acute fracture or subluxation is seen.Multilevel degenerative changes of the lumbar spine contributing to central canal stenosis and neural foraminal narrowing as detailed in the above level by level description.    2018 CT C spine: Multilevel degenerative changes of the cervical spine, most pronounced at C5-6, where there is likely a degree of central canal stenosis and neural foraminal narrowing.    Heterogenous and enlarged thyroid gland.  Consider further evaluation with a thyroid ultrasound as clinically warranted.    3/2019 CT C spine: No cervical spine fracture or dislocation.      2018 CT head: Multilevel degenerative changes of the lumbar spine contributing to central canal stenosis and neural foraminal narrowing as detailed in the above level by level description.      2017 CT head: No acute intracranial findings.    3/2019 CT head: No evidence of an acute intracranial abnormality.    Chronic brain parenchymal changes as above.    Age-appropriate cerebral volume loss with mild moderate patchy decreased attenuation supratentorial white matter while nonspecific suggestive for chronic ischemic change.    No evidence for acute intracranial hemorrhage.  Clinical correlation and further evaluation as warranted.   2017 CT C spine: Multilevel degenerative changes of the cervical spine without fracture or subluxation.    CT head 2019: Approximate 1 x 2.5 cm acute extra-axial hemorrhage adjacent to right temporal lobe, with a configuration suspicious for epidural hemorrhage    Small acute right subdural  hemorrhage as well    No mass effect, hydrocephalus or midline shift    3/27/2020: Interval decrease in size of the left frontal parietal subdural hematoma without significant mass effect. (6mm, initially 1 cm)    2018: CMP, CBC, TSH, B12, CK, ESR unremarkable/ stable    Assessment/ Recommendations:    Elvis Thompson is a 87 y.o. male with tremor consistent with parkinson's disease and memory loss  At a prior visit:  complaining of generalized  Weakness and exam shows slower gait  I recommend:  1. Patient suffered subdural hematoma in 12/2019 after fall  -he takes coumadin for CVA prevention given his afib  -this SDH is decreased in size by 3/2020 CT head  -Plan on repeat CT to make sure this is resolved in 6 weeks (if able after Covid-19 pandemic) but sooner if new or worse symptoms  -Coumadin was continued 2 weeks after SDH as recommended by NS in 12/2020 and he will see Neurosurgery as needed  -Risk of falls with PD, benefits of anticoagulation for CVA prevention, need for therapy to reduce fall risk reviewed with patient and daughter Erlinda. Patient is currently in PT/OT/ST and NH is maintaining is non-ambulatory status  In PT /OT and ST  -On a modified diet per ST  -he now resides in an NH after hip fracture     2. Memory has worsened as expected for worsening PD, cerebral bleeding. He could not tolerate Exelon prior (he has a pacemaker)   -Patient is a likely a hospice candidate if any worsening discussed with patient and daughter today    3. Updated CT C spine showed spinal stenosis in [id number]. (can't have MRI due to pacemaker).      4.  Raised dose of sinemet to QID  in 2018.   -Unable to evaluate effects of Sinemet increase to qid, given his deconditioning from repeated admits.   - tremor is tolerable and stable    4. He completed LSVT Big Therapy for Parkinson's disease prior    5. He treats constipation with OTC agents PRN.     6. Drooling noted, not unexpected for PD and not severe. Consider atropine  drops if any worse      RTC 6 weeks

## 2020-04-26 NOTE — H&P
[facility] Emergency Department  [address] [address]  Phone:  [phone]  Fax:  [phone]                                    Christina John   MRN: [id number]    Department:  [facility] Emergency Department   Date of Visit:  4/26/2020           After Visit Summary Signature Page    I have received my discharge instructions, and my questions have been answered. I have discussed any challenges I see with this plan with the nurse or doctor.    ..........................................................................................................................................  Patient/Patient Representative Signature      ..........................................................................................................................................  Patient Representative Print Name and Relationship to Patient    ..................................................               ................................................  Date                                   Time    ..........................................................................................................................................  Reviewed by Signature/Title    ...................................................              ..............................................  Date                                               Time          22EPIC Rev 08/18        [facility] Medicine  History & Physical    Patient Name: Elvis Thompson  MRN: [id number]  Admission Date: 10/15/2018  Attending Physician: Makeda Pham MD   Primary Care Provider: Regulo Martínez MD         Patient information was obtained from patient, past medical records and ER records.     Subjective:     Principal Problem:Debility    Chief Complaint:   Chief Complaint   Patient presents with    Fatigue     debility :         HPI: 85 to male patient was admitted originally for asp pneumonia and + blood cultures. He is still on augmentin for the  Pneumonia. Not coughing, no need for O2 supplementation. He is also on cipro for ecoli bacteremia. No fever. No elevated WBC. Repeat blood cultures NGTD. He was placed on SWING for  Debility. He is doing well. Goal; Gait  x 50 feet with Minimal Assistance using Rolling Walker. He is at goal, will need [facility].     Past Medical History:   Diagnosis Date    Anticoagulant long-term use     Bradycardia     HTN (hypertension)     Parkinson disease 03/24/2017    Pulmonary embolism     Shingles        Past Surgical History:   Procedure Laterality Date    CARDIAC PACEMAKER PLACEMENT      cataract      FOOT SURGERY      HAND SURGERY      HERNIA REPAIR      KNEE SURGERY      right knee replacement    MYELOGRAM N/A 3/27/2017    Performed by [facility] Provider at [facility] OR    PROSTATE SURGERY      REPAIR, HERNIA, INGUINAL WITH PROLENE HERNIA SYSTEM (EXTENDED VERSION) Left 10/4/2018    Performed by Raman Garcia MD at [facility] OR       Review of patient's allergies indicates:   Allergen Reactions    Iodine and iodide containing products Other (See Comments)    Codeine Other (See Comments)     Insomnia      Morphine Nausea And Vomiting       Current Facility-Administered Medications on File Prior to Encounter   Medication    [DISCONTINUED] acetaminophen tablet 650 mg     [DISCONTINUED] amLODIPine tablet 5 mg    [DISCONTINUED] amoxicillin-clavulanate 875-125mg per tablet 1 tablet    [DISCONTINUED] carbidopa-levodopa  mg per tablet 1 tablet    [DISCONTINUED] celecoxib capsule 200 mg    [DISCONTINUED] ciprofloxacin (CIPRO)400mg/200ml D5W IVPB 400 mg    [DISCONTINUED] cloNIDine tablet 0.1 mg    [DISCONTINUED] docusate sodium capsule 100 mg    [DISCONTINUED] ipratropium 0.02 % nebulizer solution 0.5 mg    [DISCONTINUED] levalbuterol nebulizer solution 1.25 mg    [DISCONTINUED] levalbuterol nebulizer solution 1.25 mg    [DISCONTINUED] ondansetron injection 4 mg    [DISCONTINUED] pantoprazole EC tablet 40 mg    [DISCONTINUED] polyethylene glycol packet 17 g    [DISCONTINUED] promethazine (PHENERGAN) 12.5 mg in dextrose 5 % 50 mL IVPB    [DISCONTINUED] senna tablet 8.6 mg    [DISCONTINUED] warfarin (COUMADIN) tablet 1 mg     Current Outpatient Medications on File Prior to Encounter   Medication Sig    amlodipine (NORVASC) 5 MG tablet Take 5 mg by mouth once daily.    carbidopa-levodopa  mg (SINEMET)  mg per tablet Take 1 tablet by mouth 4 (four) times daily.    celecoxib (CELEBREX) 200 MG capsule Take 200 mg by mouth daily as needed for Pain.     furosemide (LASIX) 20 MG tablet Take 20 mg by mouth daily as needed.     warfarin (COUMADIN) 2 MG tablet Take 1 mg by mouth Daily.      Family History     Problem Relation (Age of Onset)    Heart disease Mother        Tobacco Use    Smoking status: Never Smoker    Smokeless tobacco: Never Used   Substance and Sexual Activity    Alcohol use: No    Drug use: Not on file    Sexual activity: Not Currently     Review of Systems   Constitutional: Negative for chills and fever.   HENT: Negative for congestion, ear pain, postnasal drip, rhinorrhea, sore throat and trouble swallowing.    Eyes: Positive for visual disturbance (right eye blurred vision, chornic due to retina issue). Negative for redness and itching.    Respiratory: Negative for cough, shortness of breath and wheezing.    Cardiovascular: Negative for chest pain and palpitations.   Gastrointestinal: Negative for abdominal pain, diarrhea, nausea and vomiting.   Genitourinary: Negative for difficulty urinating, dysuria and frequency.   Skin: Negative for rash.   Neurological: Positive for weakness (generalized). Negative for headaches.     Objective:     Vital Signs (Most Recent):  Temp: 97.4 °F (36.3 °C) (10/16/18 0729)  Pulse: 62 (10/16/18 0744)  Resp: 20 (10/16/18 0744)  BP: (!) 148/83 (10/16/18 0729)  SpO2: (!) 94 % (10/16/18 0744) Vital Signs (24h Range):  Temp:  [96.2 °F (35.7 °C)-98.6 °F (37 °C)] 97.4 °F (36.3 °C)  Pulse:  [53-84] 62  Resp:  [16-20] 20  SpO2:  [93 %-99 %] 94 %  BP: (113-148)/(56-83) 148/83     Weight: 87.3 kg (192 lb 7.4 oz)  Body mass index is 32.03 kg/m².    Physical Exam   Constitutional: He is oriented to person, place, and time. He appears well-developed and well-nourished.   HENT:   Head: Normocephalic and atraumatic.   Nose: Nose normal.   Mouth/Throat: Oropharynx is clear and moist.   Eyes: Conjunctivae and EOM are normal. Pupils are equal, round, and reactive to light.   Neck: Normal range of motion. Neck supple. No JVD present. No thyromegaly present.   Cardiovascular: Normal rate, regular rhythm, normal heart sounds and intact distal pulses.   Pulmonary/Chest: Effort normal and breath sounds normal.   Abdominal: Soft. Bowel sounds are normal. He exhibits no distension and no mass. There is no tenderness. There is no guarding.   Musculoskeletal: Normal range of motion. He exhibits no edema.   Lymphadenopathy:     He has no cervical adenopathy.   Neurological: He is alert and oriented to person, place, and time. He has normal reflexes. No cranial nerve deficit.   Skin: Skin is warm and dry. No rash noted. No pallor.   Psychiatric: He has a normal mood and affect.   Nursing note and vitals reviewed.        CRANIAL NERVES     CN III,  IV, VI   Pupils are equal, round, and reactive to light.  Extraocular motions are normal.        Significant Labs: Significant Labs:   CBC:       Recent Labs   Lab  10/15/18   0531   WBC  7.91   HGB  12.2*   HCT  36.1*   PLT  336      CMP:        Recent Labs   Lab  10/14/18   0622  10/15/18   0531   NA  136  136   K  4.4  4.4   CL  104  104   CO2  27  24   GLU  106  112*   BUN  10  11   CREATININE  0.7  0.8   CALCIUM  8.8  9.0   ANIONGAP  5*  8   EGFRNONAA  >60  >60               Lab Results   Component Value Date     INR 1.8 (H) 10/15/2018     INR 2.5 (H) 10/14/2018     INR 2.7 (H) 10/13/2018      Lipase 24  Mag 1.8  Lactic acid 2.1>1.2      Recent Labs   Lab  10/09/18   1639   TROPONINI  0.023      BNP      Recent Labs   Lab  10/09/18   1625   BNP  230*      Flu negative  Blood cultures 10/9 - gram + TURICELLA OTITIDIS  Blood cultures 10/9 - gram negative ecoli sensitive to cipro  Blood cultures 10/9 - gram negative BACTEROIDES FRAGILIS   Blood cultures 10/9 NGTD     Significant Imaging:      10/10 CXR-No significant interval change of the bilateral pleural effusions and bilateral lower lobe alveolar infiltrates.  Cardiomegaly and suspected interstitial edema..  10/11/18 CXR- Interval worsening of the cardiopulmonary findings.  There is cardiomegaly with pulmonary vascular congestion and pulmonary edema.  Bilateral pleural effusions are suspected.  Left-sided pacemaker device remains in place.  The skeletal structures are intact.  10/11/18 KUB- Contrast material is seen in the colon from recent CT scan.  There is some gaseous distention of the bowel without evidence for definite obstruction.  No free air is seen.  Vascular calcifications are noted.              Assessment/Plan:     * Debility    Cont PT/OT    With recent hospitalization from surgery and now bacteremia/asp pneumonia he has became debilitated. Discussing with family swing vs nursing home placement for PT/OT. Case management and   working with family                 Chronic anticoagulation    Coumadin held 10/14. Since then has been low although restarting home dose. Will give double dose tonight and resume 1mg daily dose tomorrow, cont to check INR daily          Bacteremia    cipro for UTI/bacteremia (stop date 10/21/18)          Aspiration pneumonia    S/p vanc and zosyn on acute bed  Cont Augmentin for aspiration (stop date 10/17/18)           Diarrhea of presumed infectious origin    10/14: stopped all stool cx ordered on admit  Resolved now and having constipation  KUB without obstruction    Has senna, colace and m,iralax, no BM, check for impaction        Parkinson disease    Cont home meds           Incarcerated left inguinal hernia    S/p surgical repair, general surgery still following            VTE Risk Mitigation (From admission, onward)        Ordered     warfarin (COUMADIN) tablet 1 mg  Daily      10/16/18 0946     warfarin (COUMADIN) tablet 2 mg  Once      10/16/18 0946             Dulce Mcmahon MD  Department of Hospital Medicine   [facility]

## 2020-05-20 ENCOUNTER — OFFICE VISIT (OUTPATIENT)
Dept: NEUROLOGY | Facility: CLINIC | Age: 85
End: 2020-05-20
Payer: MEDICARE

## 2020-05-20 DIAGNOSIS — R53.81 DEBILITY: ICD-10-CM

## 2020-05-20 DIAGNOSIS — Z79.01 CHRONIC ANTICOAGULATION: ICD-10-CM

## 2020-05-20 DIAGNOSIS — G20.A1 PARKINSON DISEASE: ICD-10-CM

## 2020-05-20 DIAGNOSIS — I48.20 CHRONIC ATRIAL FIBRILLATION: ICD-10-CM

## 2020-05-20 DIAGNOSIS — S06.5XAA SUBDURAL HEMATOMA: Primary | ICD-10-CM

## 2020-05-20 PROBLEM — J69.0 ASPIRATION PNEUMONIA OF RIGHT UPPER LOBE: Status: RESOLVED | Noted: 2019-09-03 | Resolved: 2020-05-20

## 2020-05-20 PROBLEM — I62.00 SUBDURAL HEMORRHAGE: Status: RESOLVED | Noted: 2020-05-20 | Resolved: 2020-05-20

## 2020-05-20 PROBLEM — I62.00 SUBDURAL HEMORRHAGE: Status: ACTIVE | Noted: 2020-05-20

## 2020-05-20 PROBLEM — J69.0 ASPIRATION PNEUMONIA: Status: RESOLVED | Noted: 2018-10-10 | Resolved: 2020-05-20

## 2020-05-20 PROBLEM — R41.0 DELIRIUM: Status: RESOLVED | Noted: 2019-03-14 | Resolved: 2020-05-20

## 2020-05-20 PROCEDURE — 99999 PR STA SHADOW: CPT | Mod: PBBFAC,95,, | Performed by: NURSE PRACTITIONER

## 2020-05-20 PROCEDURE — 99214 OFFICE O/P EST MOD 30 MIN: CPT | Mod: 95 | Performed by: NURSE PRACTITIONER

## 2020-05-20 PROCEDURE — 99999 PR STA SHADOW: ICD-10-PCS | Mod: PBBFAC,95,, | Performed by: NURSE PRACTITIONER

## 2020-05-20 RX ORDER — WARFARIN 3 MG/1
3 TABLET ORAL
COMMUNITY

## 2020-05-20 NOTE — PROGRESS NOTES
The patient location is: home  The chief complaint leading to consultation is: parkinson's and memory loss  Visit type: Virtual visit with synchronous audio and video  Total time spent with patient: 12 minutes  Each patient to whom he or she provides medical services by telemedicine is:  (1) informed of the relationship between the physician and patient and the respective role of any other health care provider with respect to management of the patient; and (2) notified that he or she may decline to receive medical services by telemedicine and may withdraw from such care at any time.    HPI: Elvis Thompson is a 86 y.o. male with tremor consistent with parkinson's disease and memory loss    At a prior visit:  complaining of generalized  Weakness and exam shows slower gait. He is on Coumadin for chronic Atrial Fibrillation and suffered a head injury from fall which resulted in SDH (see below). Coumadin was continued 2 weeks after SDH as recommended by NS in 12/2020 and Neurosurgery clinic notes are reviewed.    He has been mostly wheelchair bound since his last follow up, due to weakness. No further falls. No changes. He does receive PT/OT/ST [facility].     He is recovering from Covid 19, diagnosed on 5/12/2020. He was symptomatic.     Drooling more and not bothersome to patient.     Tremor is not as bad overall.     Constipation is well controlled.     Memory is more challenged since falls/ SDH.     ROS not reliable due to memory loss    Objective:     Exam:  Gen Appearance, well developed/nourished in no apparent distress    Neuro:  MS: Awake, alert, n. Sustains attention. Recent/remote memory intact, Language is full to spontaneous speech/repetition/naming/comprehension. Fund of Knowledge is full  CN: PERRL, Extraoccular movements and visual fields are full. Normal facial  strength,   Motor: Normal bulk, moves all extremities well, no pronator drift but resting tremor noted mild-moderately   Sensory:  Romberg not done  Cerebellar: Patient cant well cooperate  Gait: Not done-not ambulatory at this time  Cervical dystonia noted as prior    The remainder of the exam is limited due to telemedicine nature of the visit    Gait: Not done-in wheelchair today  Cervical dystonia      Imagin/2017:  CT head unremarkable  2018 CT L spine: Chronic compression fracture of L1 with loss of approximately 30% superior vertebral body height.  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  No acute fracture or subluxation is seen.Multilevel degenerative changes of the lumbar spine contributing to central canal stenosis and neural foraminal narrowing as detailed in the above level by level description.    2018 CT C spine: Multilevel degenerative changes of the cervical spine, most pronounced at C5-6, where there is likely a degree of central canal stenosis and neural foraminal narrowing.    Heterogenous and enlarged thyroid gland.  Consider further evaluation with a thyroid ultrasound as clinically warranted.    3/2019 CT C spine: No cervical spine fracture or dislocation.    2018 CT head: Multilevel degenerative changes of the lumbar spine contributing to central canal stenosis and neural foraminal narrowing as detailed in the above level by level description.    2017 CT head: No acute intracranial findings.    3/2019 CT head: No evidence of an acute intracranial abnormality.    Chronic brain parenchymal changes as above.    Age-appropriate cerebral volume loss with mild moderate patchy decreased attenuation supratentorial white matter while nonspecific suggestive for chronic ischemic change.    No evidence for acute intracranial hemorrhage.  Clinical correlation and further evaluation as warranted.   2017 CT C spine: Multilevel degenerative changes of the cervical spine without fracture or subluxation.    CT head 2019: Approximate 1 x 2.5 cm acute extra-axial hemorrhage adjacent to right temporal  lobe, with a configuration suspicious for epidural hemorrhage    Small acute right subdural hemorrhage as well    No mass effect, hydrocephalus or midline shift    3/27/2020: Interval decrease in size of the left frontal parietal subdural hematoma without significant mass effect. (6mm, initially 1 cm)    2018: CMP, CBC, TSH, B12, CK, ESR unremarkable/ stable    Assessment/ Recommendations:    Elvis Thompson is a 87 y.o. male with tremor consistent with parkinson's disease and memory loss  At a prior visit:  complaining of generalized  Weakness and exam shows slower gait  I recommend:  1. Patient suffered subdural hematoma in 12/2019 after fall  -he takes coumadin for CVA prevention given his A-fib  -this SDH is decreased in size by 3/2020 CT head  -Plan on repeat CT at this time to make sure SDH is resolved   -Nursing home to notify clinic with worsening issues.   -Coumadin was continued 2 weeks after SDH as recommended by NS in 12/2020 and he will see Neurosurgery as needed  -Risk of falls with PD, benefits of anticoagulation for CVA prevention, need for therapy to reduce fall risk reviewed with patient and daughter Erlinda. Patient is currently in PT/OT/ST and NH is maintaining is non-ambulatory status  In PT /OT and ST  -On a modified diet per ST  -he now resides in an NH after hip fracture.    2. Memory has worsened as expected for worsening PD, cerebral bleeding. He could not tolerate Exelon prior (he has a pacemaker)   -Patient is a likely a hospice candidate if any worsening discussed with patient and daughter today.     3. Updated CT C spine showed spinal stenosis in [id number]. (can't have MRI due to pacemaker).      4.  Raised dose of sinemet to QID  in 2018.   -Unable to evaluate effects of Sinemet increase to qid, given his deconditioning from repeated admits.   - tremor is tolerable and stable    4. He completed LSVT Big Therapy for Parkinson's disease prior    5. He treats constipation with OTC agents PRN.      6. Drooling noted, not unexpected for PD and not severe. Consider atropine drops if any worse    RTC 8 weeks
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
## 2020-07-15 PROBLEM — S72.001A CLOSED FRACTURE OF RIGHT HIP: Status: ACTIVE | Noted: 2020-07-15

## 2020-07-17 PROBLEM — G93.49 INFECTIOUS ENCEPHALOPATHY: Status: ACTIVE | Noted: 2020-07-17

## 2020-07-17 PROBLEM — N39.0 ACUTE UTI: Status: ACTIVE | Noted: 2020-07-17

## 2020-07-17 PROBLEM — B99.9 INFECTIOUS ENCEPHALOPATHY: Status: ACTIVE | Noted: 2020-07-17

## 2020-07-20 PROBLEM — R13.19 DYSPHAGIA, NEUROLOGIC: Status: ACTIVE | Noted: 2020-07-20

## 2020-07-23 PROBLEM — G93.41 ENCEPHALOPATHY, METABOLIC: Status: ACTIVE | Noted: 2020-07-23

## 2020-07-23 PROBLEM — E87.0 HYPERNATREMIA: Status: ACTIVE | Noted: 2020-07-23

## 2020-07-27 PROBLEM — R63.8 ALTERATION IN NUTRITION: Status: ACTIVE | Noted: 2020-03-28

## 2020-08-10 ENCOUNTER — TELEPHONE (OUTPATIENT)
Dept: NEUROLOGY | Facility: CLINIC | Age: 85
End: 2020-08-10

## 2020-08-10 NOTE — TELEPHONE ENCOUNTER
----- Message from Migdalia Lugo sent at 8/10/2020  9:35 AM CDT -----  Contact: DAUGHTER - ROZINA Thompson  MRN: [id number]  : 1933  PCP: Mitchell Nayak  Home Phone      [phone]  Work Phone      Not on file.  Mobile          [phone]      MESSAGE: Daughter wanted to let Dr. Lopez & Salome know that the patient passed away

## 2022-05-12 NOTE — PROGRESS NOTES
Staff Handoff    Report received from ROSEMARIE Wick. VS stable. Afebrile. No distress noted. Assessment complete per flowsheet. Call bell in reach. Instructed to call for any needs. Will continue to monitor for any change in status.  Resident Handoff     Quality 130: Documentation Of Current Medications In The Medical Record: Current Medications Documented

## 2023-03-19 NOTE — ED PROVIDER NOTES
"[facility]                                                  Chief Complaint  85 y.o. male with Back Pain (lower back) and Leg Pain (bilateral legs)    History of Present Illness  Elvis Thompson presents to the emergency room with complaints of lower back and bilateral leg pain.  Patient is a 85-year-old male who lives alone.  He has Parkinson's.  He reports that he was exercising to his favorite workouts CD when he fell "flat on his back "on Tuesday.  Patient had his medic alert bracelet and was able to call the paramedics.  They assisted him up however at the time he felt fine and refused transport to the hospital.  He now complains of back pain and leg pain.  He has no neurologic complaints.  He is awake alert and oriented × 4.       Past Medical History:   Diagnosis Date    Anticoagulant long-term use     Bradycardia     HTN (hypertension)     Parkinson disease 03/24/2017    Pulmonary embolism     Shingles      Past Surgical History:   Procedure Laterality Date    CARDIAC PACEMAKER PLACEMENT      cataract      FOOT SURGERY      HAND SURGERY      HERNIA REPAIR      KNEE SURGERY      right knee replacement    PROSTATE SURGERY        Review of patient's allergies indicates:   Allergen Reactions    Iodine and iodide containing products Other (See Comments)    Codeine Other (See Comments)     Insomnia      Morphine Nausea And Vomiting        Review of Systems and Physical Exam     Review of Systems  -- Constitution - no fever, denies fatigue, no weakness, no chills  -- Eyes - no tearing or redness, no visual disturbance  -- Ear, Nose - no tinnitus or earache, no nasal congestion or discharge  -- Mouth,Throat - no sore throat, no toothache, normal voice, normal swallowing  -- Respiratory - denies cough and congestion, no shortness of breath, no wheezing  -- Cardiovascular - denies chest pain, no palpitations, denies claudication  -- Gastrointestinal - denies abdominal pain, nausea, " "vomiting, or diarrhea  -- Genitourinary - no dysuria, no denies flank pain, no hematuria or frequency   -- Musculoskeletal - reports back pain, left hip pain, right thigh pain  -- Neurological - no headache, denies weakness or seizure; no LOC  -- Skin - denies pallor, rash, or changes in skin. no hives or welts noted    Vital Signs   height is 5' 5" (1.651 m) and weight is 85.3 kg (188 lb). His oral temperature is 96.2 °F (35.7 °C). His blood pressure is 155/68 (abnormal) and his pulse is 64. His respiration is 18.      Physical Exam  -- Nursing note and vitals reviewed  -- Constitutional: Appears well-developed and well-nourished awake alert and oriented ×4  -- Head: Atraumatic. Normocephalic. No obvious abnormality  -- Eyes: Pupils are equal and reactive to light. Normal conjunctiva and lids  -- Nose: Nose normal in appearance, nares grossly normal. No discharge  -- Throat: Mucous membranes moist, pharynx normal, normal tonsils. No lesions   -- Ears: External ears and TM normal bilaterally. Normal hearing and no drainage  -- Neck: Normal range of motion. Neck supple. No masses, trachea midline  -- Cardiac: Normal rate, regular rhythm and normal heart sounds  -- Pulmonary: Normal respiratory effort, breath sounds clear to auscultation  -- Abdominal: Soft, no tenderness. Normal bowel sounds. Normal liver edge  -- Musculoskeletal: Normal range of motion, no effusions. Joints stable Pelvis stable, no point tenderness of L-spine.  Lumbar diffuse tenderness to palpation paraspinally.  Patient's left hip is tender with full range of motion.  Tenderness of right thigh.  Patient has 5 out of 5 strength distally in bilateral lower extremities.  No obvious deformity.  No discoloration.  Bilateral extremities are neurovascularly intact.  -- Neurological: Cranial nerves II through XII grossly intact No focal deficits. Showed good interaction with staff  -- Vascular: Posterior tibial, dorsalis pedis and radial pulses 2+ " bilaterally    -- Lymphatics: No cervical or peripheral lymphadenopathy. No edema noted  -- Skin: Warm and dry. No evidence of rash or cellulitis  -- Psychiatric: Normal mood and affect. Bedside behavior is appropriate    Emergency Room Course     Treatment and Evaluation  1.  Physical exam as noted above  2.  CT lumbar spine and pelvis no acute fractures or other process  3.  X-ray right femur no acute fracture or other process  4.  Tylenol 1000 mg by mouth  5.  Patient declines CT of the head, daughter is present and we all agreed that we would hold off since he believes he did not injure or hit his head, he has no neurologic deficits, and it has been 4 days.  6.  DC home follow-up PCP, strict return precautions      Medications Given  Medications   acetaminophen tablet 1,000 mg (1,000 mg Oral Given 5/25/18 8283)           Diagnosis  -- Contusion lumbar spine    Disposition and Plan  -- Disposition: home  -- Condition: stable  -- Follow-up: Patient to follow up with Regulo Martínez MD in 1-2 days.  -- I advised the patient that we have found no life threatening condition today  -- At this time, I believe the patient is clinically stable for discharge.   -- The patient acknowledges that close follow up with a MD is required   -- Patient agrees to comply with all instruction and direction given in the ER  -- Patient counseled on strict return precautions as discussed       Maria Antonia Patel MD  05/25/18 2500     good, to achieve stated therapy goals

## 2024-01-01 NOTE — PLAN OF CARE
Discussed pt status with Dr Cabrera, he assessed pt and discussed plan of care with pt and pts daughter, both individuals voiced understanding, Questions answered   N/A

## 2025-06-03 NOTE — PROGRESS NOTES
"Occupational Therapy   Treatment    Elvis Thompson   MRN: [id number]   Date of Note: 11/21/2017  Referring Physician: Dr. Lopez      Diagnosis: Parkinson's   Encounter Diagnoses   Name Primary?    Parkinson's disease     Lack of coordination Yes    Balance problem       Start Time: 1:55  End Time: 2:40  Total Time: 45 min    Visit: 6/10 (16)    Medicare:  Therapeutic exercise x 3= $95.94  Total: $1683.73    Subjective: "My back pain is about a four today, but it was up to a nine Saturday."    Pain:   Pre-session: 4/10 back  Post session: 4/10 back    Objective:   Patient being seen by occupational therapy for therapeutic exercise in order to improve posture, ROM, strength, balance, and endurance in order to improve patient's ability to complete functional mobility and daily home activities with improved independence and safety.    Treatment: Therapeutic exercises x 45 min    Maximal Daily Exercises  1) Floor to Ceiling (seated) - 8 reps held for 10 seconds each  2) Side to Side (seated) - 8 reps held for 10 seconds each  3) Forward Step and Reach (standing) - 8 reps each side right and left  4) Sideways Step and Reach (standing) - 8 reps each side right and left  5) Backward Step and Reach (standing) - 8 reps each side right and left   6) Forwards Rock and Reach (standing) - 10 reps each side right and left   7) Sideways Rock and Reach (standing) - 10 reps each side right and left       Functional Component Tasks  1) Movement #1: BIG Sit to Stand - 5 reps.   2) Movement #2: Bilateral  strengthening with medium strength (pink) thera putty with gross grasp and rotation of cone in putty (to simulate shower knob Pt reports having difficulty with.)  3) Movement #3: Bilateral scapula retractions 10 reps x 5 with red theraband   4) Movement #4: Picked cones off floor with (B) LE  on airex x 5 reps, CGA at times   5) Movement #5: Step-ups onto Airex x 5 reps.  6) Functional reaching across midline (B) UE x 5 reps. " Had low BP and dizziness on admission   BP today 120/81   Change positions slowly  Encourage PO intake   Metoprolol 25 mg BID, hold for SBP < 110, or pulse < 60  Simvastatin 40 mg nightly            BIG Walking (Distance, surface, time, etc):  Completed 30 feet in wide hallway x 5 reps. Minimal VC's needed for correction of posture    Hierarchy Tasks (patient effort, modeling, cueing, assistance, progressions)  1) Hierarchy #1: Stepping over cones into small spaces   2) Hierarchy #2: Obstacle course with patient turning in small spaces.    3) Hierarchy #3: Walked on treadmill for 3.5 min at 2.0 speed, Improvement with shuffle, Pt required verbal cueing for erect posture. Pt used side rails to increase balance.       Assessment:  Patient tolerated session well. Patient continues to need constant cueing for posture, however his progress in this area may be limited due to his significant back pain. Pt stated that he only walked five minutes on his treadmill at home before at 2.0 speed when previously reported to therapist 10 min.  Therapy will continue to strive for the goal of 2.5. Pt progressing well towards all other goals.   Patient would benefit from continued skilled OT services to continue challenging high rep therapeutic exercise and activity in order for patient to improve strength and balance needed for patient to improve his ability to complete and safety with his daily home activities.    GOALS:  Time frame: 4 weeks              1. Pt will increase Bernabe Balance score by 4 points to show a significant change in balance to improve performance in ADLs/IADLs (met)              2. Pt will tolerate 5 minutes walking on treadmill with UE support at a speed of 2.5 to increase performance of leisure activity of walking on treadmill.                           (unmet)              3. Pt will increase MMT score of shoulder flexion, EXT, and ABD to 4/5 to increase proximal strength for transfers. NT              4. Pt will decrease 9-hole peg score by 10 sec to increase fine motor coordination for increase performance in dressing. (unmet)    Revised/Continued Goals:       1. Pt will increase Bernabe Balance score  to 50  to show a significant change in balance to improve performance in ADLs/IADLs               2. Pt will tolerate 5 minutes walking on treadmill with UE support at a speed of 2.5 to increase performance of leisure activity of walking on treadmill.                        3. Pt will increase MMT score of shoulder flexion, EXT, and ABD to 4/5 to increase proximal strength for transfers.              4. Pt will decrease 9-hole peg score by 10 sec to increase fine motor coordination for increase performance in dressing.     G-codes:  Carrying, moving and handling objects  Current:  CI  1<20% impaired  Goal:       CI 1<20% impaired      Plan:  Continue 4 x week x 4 weeks with an additional 2 weeks for the original evaluation and at the end for a discharge session during the certification period from 10/25/2017 to 12/1/2017 in pursuit of OT goals.        SONIA Cuellar

## (undated) DEVICE — PACK GENERAL SURGERY